# Patient Record
Sex: FEMALE | Race: WHITE | HISPANIC OR LATINO | Employment: PART TIME | URBAN - METROPOLITAN AREA
[De-identification: names, ages, dates, MRNs, and addresses within clinical notes are randomized per-mention and may not be internally consistent; named-entity substitution may affect disease eponyms.]

---

## 2018-03-19 LAB
ALBUMIN SERPL BCP-MCNC: 4.5 G/DL (ref 3.5–5)
ALBUMIN/GLOB SERPL ELPH: 1.6 {RATIO}
ALP SERPL-CCNC: 99 U/L (ref 25–125)
ALT SERPL W P-5'-P-CCNC: 33 U/L (ref 7–35)
AST SERPL-CCNC: 26 U/L (ref 13–35)
BILIRUB SERPL-MCNC: 0.7 MG/DL (ref 0.1–1.4)
BUN/CREAT SERPL: 23
CALCIUM SERPL-MCNC: 9.3 MG/DL (ref 8.7–10.7)
CHLORIDE SERPL-SCNC: 98 MMOL/L (ref 99–108)
CHOLESTEROL, TOTAL: 156
CO2 SERPL-SCNC: 25 MMOL/L (ref 13–22)
CREAT SERPL-MCNC: 0.8 MG/DL
EGFR IF AFRICAN AMERICAN: 87
EST. GFR  (NON AFRICAN AMERICAN): 76 MG/DL
GLOBULIN SER CALC-MCNC: 2.8 G/L
GLUCOSE: 150
HBA1C MFR BLD: 7.1 % (ref 4–6)
HDLC SERPL-MCNC: 43 MG/DL (ref 35–70)
Lab: 19
POTASSIUM SERPL-SCNC: 4.9 MMOL/L (ref 3.4–5.3)
PROT SERPL-MCNC: 7.3 G/DL
SODIUM BLD-SCNC: 138 MMOL/L (ref 137–147)

## 2018-09-26 ENCOUNTER — CLINICAL SUPPORT (OUTPATIENT)
Dept: FAMILY MEDICINE | Facility: CLINIC | Age: 63
End: 2018-09-26
Attending: FAMILY MEDICINE
Payer: MEDICARE

## 2018-09-26 ENCOUNTER — HOSPITAL ENCOUNTER (OUTPATIENT)
Dept: RADIOLOGY | Facility: HOSPITAL | Age: 63
Discharge: HOME | End: 2018-09-26
Attending: FAMILY MEDICINE
Payer: MEDICARE

## 2018-09-26 ENCOUNTER — OFFICE VISIT (OUTPATIENT)
Dept: FAMILY MEDICINE | Facility: CLINIC | Age: 63
End: 2018-09-26
Payer: MEDICARE

## 2018-09-26 VITALS
BODY MASS INDEX: 24.09 KG/M2 | WEIGHT: 130.94 LBS | HEIGHT: 62 IN | OXYGEN SATURATION: 98 % | HEART RATE: 74 BPM | SYSTOLIC BLOOD PRESSURE: 130 MMHG | DIASTOLIC BLOOD PRESSURE: 68 MMHG

## 2018-09-26 DIAGNOSIS — K21.9 GASTROESOPHAGEAL REFLUX DISEASE WITHOUT ESOPHAGITIS: ICD-10-CM

## 2018-09-26 DIAGNOSIS — I10 ESSENTIAL HYPERTENSION: ICD-10-CM

## 2018-09-26 DIAGNOSIS — M15.9 PRIMARY OSTEOARTHRITIS INVOLVING MULTIPLE JOINTS: ICD-10-CM

## 2018-09-26 DIAGNOSIS — E78.5 DYSLIPIDEMIA: ICD-10-CM

## 2018-09-26 DIAGNOSIS — E11.65 TYPE 2 DIABETES MELLITUS WITH HYPERGLYCEMIA, WITHOUT LONG-TERM CURRENT USE OF INSULIN: ICD-10-CM

## 2018-09-26 DIAGNOSIS — E04.1 THYROID NODULE: ICD-10-CM

## 2018-09-26 DIAGNOSIS — Z23 NEED FOR VACCINATION AGAINST STREPTOCOCCUS PNEUMONIAE: ICD-10-CM

## 2018-09-26 DIAGNOSIS — Z23 NEED FOR INFLUENZA VACCINATION: ICD-10-CM

## 2018-09-26 DIAGNOSIS — Z00.00 ANNUAL PHYSICAL EXAM: Primary | ICD-10-CM

## 2018-09-26 DIAGNOSIS — Z12.31 ENCOUNTER FOR SCREENING MAMMOGRAM FOR MALIGNANT NEOPLASM OF BREAST: ICD-10-CM

## 2018-09-26 LAB
ALBUMIN/CREAT UR: 9.3 UG/MG
CREAT UR-MCNC: 43 MG/DL
MICROALBUMIN UR DL<=1MG/L-MCNC: 4 UG/ML

## 2018-09-26 PROCEDURE — 73560 X-RAY EXAM OF KNEE 1 OR 2: CPT | Mod: TC,FY,PO,RT

## 2018-09-26 PROCEDURE — 90662 IIV NO PRSV INCREASED AG IM: CPT | Mod: PBBFAC,PO

## 2018-09-26 PROCEDURE — 73502 X-RAY EXAM HIP UNI 2-3 VIEWS: CPT | Mod: 26,RT,, | Performed by: RADIOLOGY

## 2018-09-26 PROCEDURE — 73560 X-RAY EXAM OF KNEE 1 OR 2: CPT | Mod: 26,RT,, | Performed by: RADIOLOGY

## 2018-09-26 PROCEDURE — 99205 OFFICE O/P NEW HI 60 MIN: CPT | Mod: PBBFAC,25,27,PO | Performed by: FAMILY MEDICINE

## 2018-09-26 PROCEDURE — 99499 UNLISTED E&M SERVICE: CPT | Mod: S$GLB,,, | Performed by: FAMILY MEDICINE

## 2018-09-26 PROCEDURE — 73030 X-RAY EXAM OF SHOULDER: CPT | Mod: TC,FY,PO,RT

## 2018-09-26 PROCEDURE — 92250 FUNDUS PHOTOGRAPHY W/I&R: CPT | Mod: S$GLB,,, | Performed by: OPTOMETRIST

## 2018-09-26 PROCEDURE — 99212 OFFICE O/P EST SF 10 MIN: CPT | Mod: PBBFAC,PO,25

## 2018-09-26 PROCEDURE — 73502 X-RAY EXAM HIP UNI 2-3 VIEWS: CPT | Mod: TC,FY,PO,RT

## 2018-09-26 PROCEDURE — 82043 UR ALBUMIN QUANTITATIVE: CPT

## 2018-09-26 PROCEDURE — 99999 PR PBB SHADOW E&M-NEW PATIENT-LVL V: CPT | Mod: PBBFAC,,, | Performed by: FAMILY MEDICINE

## 2018-09-26 PROCEDURE — G0009 ADMIN PNEUMOCOCCAL VACCINE: HCPCS | Mod: PBBFAC,PO

## 2018-09-26 PROCEDURE — 73030 X-RAY EXAM OF SHOULDER: CPT | Mod: 26,RT,, | Performed by: RADIOLOGY

## 2018-09-26 PROCEDURE — 99999 PR PBB SHADOW E&M-EST. PATIENT-LVL II: CPT | Mod: PBBFAC,,,

## 2018-09-26 PROCEDURE — 99214 OFFICE O/P EST MOD 30 MIN: CPT | Mod: S$PBB,,, | Performed by: FAMILY MEDICINE

## 2018-09-26 RX ORDER — ASPIRIN 81 MG/1
81 TABLET ORAL DAILY
COMMUNITY
End: 2018-09-26 | Stop reason: SDUPTHER

## 2018-09-26 RX ORDER — LOSARTAN POTASSIUM 100 MG/1
100 TABLET ORAL DAILY
Qty: 90 TABLET | Refills: 3 | Status: SHIPPED | OUTPATIENT
Start: 2018-09-26 | End: 2019-11-19 | Stop reason: SDUPTHER

## 2018-09-26 RX ORDER — ASPIRIN 81 MG/1
81 TABLET ORAL DAILY
Qty: 90 TABLET | Refills: 3 | Status: SHIPPED | OUTPATIENT
Start: 2018-09-26 | End: 2019-11-19 | Stop reason: SDUPTHER

## 2018-09-26 RX ORDER — METFORMIN HYDROCHLORIDE 750 MG/1
750 TABLET, EXTENDED RELEASE ORAL
Qty: 90 TABLET | Refills: 3 | Status: SHIPPED | OUTPATIENT
Start: 2018-09-26 | End: 2019-08-29 | Stop reason: SDUPTHER

## 2018-09-26 RX ORDER — LOSARTAN POTASSIUM 100 MG/1
100 TABLET ORAL DAILY
COMMUNITY
End: 2018-09-26 | Stop reason: SDUPTHER

## 2018-09-26 RX ORDER — CALCIUM CARBONATE 200(500)MG
1 TABLET,CHEWABLE ORAL DAILY
COMMUNITY
End: 2019-04-29

## 2018-09-26 RX ORDER — METFORMIN HYDROCHLORIDE 750 MG/1
750 TABLET, EXTENDED RELEASE ORAL
COMMUNITY
End: 2018-09-26 | Stop reason: SDUPTHER

## 2018-09-26 RX ORDER — LOVASTATIN 20 MG/1
20 TABLET ORAL NIGHTLY
Qty: 90 TABLET | Refills: 3 | Status: SHIPPED | OUTPATIENT
Start: 2018-09-26 | End: 2019-11-19 | Stop reason: SDUPTHER

## 2018-09-26 RX ORDER — LOVASTATIN 20 MG/1
20 TABLET ORAL NIGHTLY
COMMUNITY
End: 2018-09-26 | Stop reason: SDUPTHER

## 2018-09-26 RX ORDER — OMEPRAZOLE 20 MG/1
20 CAPSULE, DELAYED RELEASE ORAL DAILY
COMMUNITY
End: 2018-09-26 | Stop reason: SDUPTHER

## 2018-09-26 RX ORDER — OMEPRAZOLE 20 MG/1
20 CAPSULE, DELAYED RELEASE ORAL
Qty: 90 CAPSULE | Refills: 3 | Status: SHIPPED | OUTPATIENT
Start: 2018-09-26 | End: 2019-11-19 | Stop reason: SDUPTHER

## 2018-09-26 NOTE — PATIENT INSTRUCTIONS
Consejos para controlar el reflujo de ácido (agruras)    Para controlar el reflujo de ácido es necesario hacer algunos cambios básicos en la alimentación y el estilo de maykel. Los siguientes consejos pueden ser suficientes para aliviar el malestar.  Vigile lo que come  · Evite los alimentos grasosos o demasiado picantes.  · Coma menos alimentos ácidos, aubrey cítricos y alimentos a base de tomates, ya que pueden agravar los síntomas.  · Limite el consumo de alcohol, cafeína y bebidas gaseosas. Todas estas bebidas aumentan el reflujo.  · Intente limitar el consumo de chocolate y menta. Laxmi puede empeorar el reflujo de ácido en algunas personas.  Vigile cuándo come  · Evite acostarse blanquita 3 horas después de elif comido.  · No coma nada antes de irse a la cama.  Mantenga la dion levantada  Mantener la dion y el pecho elevados unas 4 a 6 pulgadas, le ayudará a aliviar el reflujo cuando esté acostado. Ponga soportes debajo de la cabecera de la cama para elevarla.  Otros cambios  · Pierda el exceso de peso.  · No yue ejercicio poco antes de acostarse.  · Evite usar ropas demasiado ajustadas.  · Limite el uso de aspirina e ibuprofeno.  · Deje de fumar.   Date Last Reviewed: 3/14/2014  © 5981-0980 [employer], [email or web address]. [address] [place] [address], [place] [address]. Todos los derechos reservados. Esta información no pretende sustituir la atención médica profesional. Sólo august médico puede diagnosticar y tratar un problema de jorge.

## 2018-09-26 NOTE — PROGRESS NOTES
Subjective:       Patient ID: Tomeka Burns is a 63 y.o. female.    Chief Complaint: Establish Care; Shoulder Pain (x 3 weeks); and Leg Pain (x one year on and off)    63-year-old female came to the clinic for her physical examination .  Patient with right shoulder hip and knee pain for the last year.  The pain is 3/10 of intensity on and off aggravated with activity and better with rest.  Blood pressure today stable .  No chest pain, palpitation, orthopnea or PND.  Last A1c was 7.1 .  No polyuria, polydipsia or polyphagia .  Patient previously diagnosed with thyroid nodule couple years ago.  No recent follow-up.      Review of Systems   Constitutional: Negative.    HENT: Negative.    Eyes: Negative.    Respiratory: Negative.    Cardiovascular: Negative.  Negative for chest pain, palpitations and leg swelling.   Gastrointestinal: Negative.    Endocrine: Negative for cold intolerance, heat intolerance, polydipsia, polyphagia and polyuria.   Genitourinary: Negative.    Musculoskeletal: Positive for arthralgias.   Skin: Negative.    Neurological: Negative.    Psychiatric/Behavioral: Negative.        Objective:      Physical Exam   Constitutional: She is oriented to person, place, and time. She appears well-developed and well-nourished. No distress.   HENT:   Head: Normocephalic and atraumatic.   Right Ear: External ear normal.   Left Ear: External ear normal.   Nose: Nose normal.   Mouth/Throat: Oropharynx is clear and moist. No oropharyngeal exudate.   Eyes: Conjunctivae and EOM are normal. Pupils are equal, round, and reactive to light. Right eye exhibits no discharge. Left eye exhibits no discharge. No scleral icterus.   Neck: Normal range of motion. Neck supple. No JVD present. No tracheal deviation present. No thyromegaly present.   Cardiovascular: Normal rate, regular rhythm, normal heart sounds and intact distal pulses. Exam reveals no gallop and no friction rub.   No murmur heard.  Pulmonary/Chest: Effort normal  and breath sounds normal. No stridor. No respiratory distress. She has no wheezes. She has no rales. She exhibits no tenderness.   Abdominal: Soft. Bowel sounds are normal. She exhibits no distension and no mass. There is no tenderness. There is no rebound and no guarding.   Genitourinary: Vagina normal and uterus normal. Rectal exam shows guaiac negative stool. No vaginal discharge found.   Musculoskeletal: Normal range of motion. She exhibits no edema or tenderness.   Lymphadenopathy:     She has no cervical adenopathy.   Neurological: She is alert and oriented to person, place, and time. She has normal reflexes. She displays normal reflexes. No cranial nerve deficit. She exhibits normal muscle tone. Coordination normal.   Skin: Skin is warm and dry. No rash noted. She is not diaphoretic. No erythema. No pallor.   Psychiatric: She has a normal mood and affect. Her behavior is normal. Judgment and thought content normal.       Assessment:       1. Annual physical exam    2. Need for vaccination against Streptococcus pneumoniae    3. Need for influenza vaccination    4. Type 2 diabetes mellitus with hyperglycemia, without long-term current use of insulin    5. Essential hypertension    6. Gastroesophageal reflux disease without esophagitis    7. Encounter for screening mammogram for malignant neoplasm of breast    8. Primary osteoarthritis involving multiple joints    9. Thyroid nodule    10. Dyslipidemia        Plan:         Tomeka was seen today for establish care, shoulder pain and leg pain.    Diagnoses and all orders for this visit:    Annual physical exam  -     Comprehensive metabolic panel; Future  -     Lipid panel; Future  -     Hemoglobin A1c; Future  -     TSH; Future  -     Cancel: Microalbumin/creatinine urine ratio; Future  -     CBC auto differential; Future    Need for vaccination against Streptococcus pneumoniae  -     Pneumococcal Polysaccharide Vaccine (23 Valent) (SQ/IM)    Need for influenza  vaccination  -     Influenza - High Dose (65+) (PF) (IM)    Type 2 diabetes mellitus with hyperglycemia, without long-term current use of insulin  -     Comprehensive metabolic panel; Future  -     Lipid panel; Future  -     Hemoglobin A1c; Future  -     Cancel: Microalbumin/creatinine urine ratio; Future  -     Diabetic Eye Screening Photo; Future  -     aspirin (ECOTRIN) 81 MG EC tablet; Take 1 tablet (81 mg total) by mouth once daily.  -     metFORMIN (GLUCOPHAGE-XR) 750 MG 24 hr tablet; Take 1 tablet (750 mg total) by mouth daily with breakfast.  -     Microalbumin/creatinine urine ratio    Essential hypertension  -     Comprehensive metabolic panel; Future  -     Lipid panel; Future  -     TSH; Future  -     aspirin (ECOTRIN) 81 MG EC tablet; Take 1 tablet (81 mg total) by mouth once daily.  -     losartan (COZAAR) 100 MG tablet; Take 1 tablet (100 mg total) by mouth once daily.    Gastroesophageal reflux disease without esophagitis  -     omeprazole (PRILOSEC) 20 MG capsule; Take 1 capsule (20 mg total) by mouth before breakfast.    Encounter for screening mammogram for malignant neoplasm of breast  -     Mammo Digital Screening Bilat with CAD; Future  -     Mammo Digital Screening Bilat with CAD    Primary osteoarthritis involving multiple joints  -     X-ray Shoulder 2 or More Views Right; Future  -     X-Ray Hip 2 or 3 views Right; Future  -     X-Ray Knee 1 or 2 View Right; Future    Thyroid nodule  -     US Soft Tissue Head Neck Thyroid; Future    Dyslipidemia  -     lovastatin (MEVACOR) 20 MG tablet; Take 1 tablet (20 mg total) by mouth every evening.

## 2018-09-26 NOTE — PROGRESS NOTES
Tomeka Burns is a 63 y.o. female here for a diabetic eye screening with non-dilated fundus photos per Dr. Hernández     Patient cooperative?: Yes  Small pupils?: Yes  Last eye exam: n/a    For exam results, see Encounter Report.

## 2018-09-27 ENCOUNTER — TELEPHONE (OUTPATIENT)
Dept: ADMINISTRATIVE | Facility: HOSPITAL | Age: 63
End: 2018-09-27

## 2018-09-28 DIAGNOSIS — Z11.59 NEED FOR HEPATITIS C SCREENING TEST: ICD-10-CM

## 2018-10-03 ENCOUNTER — HOSPITAL ENCOUNTER (OUTPATIENT)
Dept: RADIOLOGY | Facility: HOSPITAL | Age: 63
Discharge: HOME | End: 2018-10-03
Attending: FAMILY MEDICINE
Payer: MEDICARE

## 2018-10-03 DIAGNOSIS — E04.2 MULTINODULAR THYROID: Primary | ICD-10-CM

## 2018-10-03 DIAGNOSIS — E04.1 THYROID NODULE: ICD-10-CM

## 2018-10-03 PROCEDURE — 76536 US EXAM OF HEAD AND NECK: CPT | Mod: TC,PO

## 2018-10-03 RX ORDER — INSULIN PUMP SYRINGE, 3 ML
EACH MISCELLANEOUS
Qty: 1 EACH | Refills: 0 | Status: SHIPPED | OUTPATIENT
Start: 2018-10-03 | End: 2019-08-02 | Stop reason: SDUPTHER

## 2018-10-03 RX ORDER — LANCETS
1 EACH MISCELLANEOUS DAILY
Qty: 100 EACH | Refills: 3 | Status: SHIPPED | OUTPATIENT
Start: 2018-10-03 | End: 2019-08-02 | Stop reason: SDUPTHER

## 2018-10-03 NOTE — TELEPHONE ENCOUNTER
Patient with multinodular thyroid.      Endocrinology referral was done.    Thyroid scan was ordered by Radiology recommendation.    Please notify the patient with appointments.

## 2018-10-03 NOTE — TELEPHONE ENCOUNTER
Spoke to patient and inform of MD recommendations,  and that referral specialist will be calling her to see endocrinologist and thyroid scan was ordered. Patient voices understanding.

## 2018-10-04 ENCOUNTER — PATIENT MESSAGE (OUTPATIENT)
Dept: FAMILY MEDICINE | Facility: CLINIC | Age: 63
End: 2018-10-04

## 2018-10-05 ENCOUNTER — TELEPHONE (OUTPATIENT)
Dept: FAMILY MEDICINE | Facility: CLINIC | Age: 63
End: 2018-10-05

## 2018-10-05 NOTE — TELEPHONE ENCOUNTER
Blood glucometer was sent to the pharmacy 10/3.      Patient is calling Because is not in the pharmacy.    Please contact the pharmacy .

## 2018-10-08 ENCOUNTER — PATIENT MESSAGE (OUTPATIENT)
Dept: FAMILY MEDICINE | Facility: CLINIC | Age: 63
End: 2018-10-08

## 2018-10-25 ENCOUNTER — HOSPITAL ENCOUNTER (OUTPATIENT)
Dept: RADIOLOGY | Facility: HOSPITAL | Age: 63
Discharge: HOME | End: 2018-10-25
Attending: FAMILY MEDICINE
Payer: MEDICARE

## 2018-10-25 ENCOUNTER — TELEPHONE (OUTPATIENT)
Dept: ADMINISTRATIVE | Facility: HOSPITAL | Age: 63
End: 2018-10-25

## 2018-10-25 DIAGNOSIS — E04.2 MULTINODULAR THYROID: ICD-10-CM

## 2018-10-25 PROCEDURE — 78014 THYROID IMAGING W/BLOOD FLOW: CPT | Mod: TC,PO

## 2018-10-25 PROCEDURE — A9516 IODINE I-123 SOD IODIDE MIC: HCPCS | Mod: PO

## 2018-10-26 ENCOUNTER — HOSPITAL ENCOUNTER (OUTPATIENT)
Dept: RADIOLOGY | Facility: HOSPITAL | Age: 63
Discharge: HOME | End: 2018-10-26
Attending: FAMILY MEDICINE
Payer: MEDICARE

## 2018-11-18 ENCOUNTER — PATIENT MESSAGE (OUTPATIENT)
Dept: FAMILY MEDICINE | Facility: CLINIC | Age: 63
End: 2018-11-18

## 2018-11-19 ENCOUNTER — TELEPHONE (OUTPATIENT)
Dept: OPTOMETRY | Facility: CLINIC | Age: 63
End: 2018-11-19

## 2018-11-19 NOTE — TELEPHONE ENCOUNTER
Called patient informed her with diabetic eye screening result; patient will follow up in 12 months.      ----- Message from Saroj Torres, OD sent at 11/16/2018  2:17 PM CST -----  No diabetic retinopathy, no csme. Return in 1 year for dilated eye exam.
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
## 2019-01-21 ENCOUNTER — OFFICE VISIT (OUTPATIENT)
Dept: ENDOCRINOLOGY | Facility: CLINIC | Age: 64
End: 2019-01-21
Payer: MEDICARE

## 2019-01-21 VITALS
WEIGHT: 130.19 LBS | BODY MASS INDEX: 23.07 KG/M2 | HEART RATE: 76 BPM | DIASTOLIC BLOOD PRESSURE: 90 MMHG | HEIGHT: 63 IN | RESPIRATION RATE: 18 BRPM | SYSTOLIC BLOOD PRESSURE: 170 MMHG

## 2019-01-21 DIAGNOSIS — E11.65 TYPE 2 DIABETES MELLITUS WITH HYPERGLYCEMIA, WITHOUT LONG-TERM CURRENT USE OF INSULIN: ICD-10-CM

## 2019-01-21 DIAGNOSIS — E04.2 MULTIPLE THYROID NODULES: Primary | ICD-10-CM

## 2019-01-21 DIAGNOSIS — I10 ESSENTIAL HYPERTENSION: ICD-10-CM

## 2019-01-21 PROCEDURE — 3077F SYST BP >= 140 MM HG: CPT | Mod: CPTII,S$GLB,, | Performed by: INTERNAL MEDICINE

## 2019-01-21 PROCEDURE — 1101F PR PT FALLS ASSESS DOC 0-1 FALLS W/OUT INJ PAST YR: ICD-10-PCS | Mod: CPTII,S$GLB,, | Performed by: INTERNAL MEDICINE

## 2019-01-21 PROCEDURE — 3080F DIAST BP >= 90 MM HG: CPT | Mod: CPTII,S$GLB,, | Performed by: INTERNAL MEDICINE

## 2019-01-21 PROCEDURE — 1101F PT FALLS ASSESS-DOCD LE1/YR: CPT | Mod: CPTII,S$GLB,, | Performed by: INTERNAL MEDICINE

## 2019-01-21 PROCEDURE — 3080F PR MOST RECENT DIASTOLIC BLOOD PRESSURE >= 90 MM HG: ICD-10-PCS | Mod: CPTII,S$GLB,, | Performed by: INTERNAL MEDICINE

## 2019-01-21 PROCEDURE — 3008F BODY MASS INDEX DOCD: CPT | Mod: CPTII,S$GLB,, | Performed by: INTERNAL MEDICINE

## 2019-01-21 PROCEDURE — 3045F PR MOST RECENT HEMOGLOBIN A1C LEVEL 7.0-9.0%: CPT | Mod: CPTII,S$GLB,, | Performed by: INTERNAL MEDICINE

## 2019-01-21 PROCEDURE — 3077F PR MOST RECENT SYSTOLIC BLOOD PRESSURE >= 140 MM HG: ICD-10-PCS | Mod: CPTII,S$GLB,, | Performed by: INTERNAL MEDICINE

## 2019-01-21 PROCEDURE — 99204 OFFICE O/P NEW MOD 45 MIN: CPT | Mod: S$GLB,,, | Performed by: INTERNAL MEDICINE

## 2019-01-21 PROCEDURE — 99204 PR OFFICE/OUTPT VISIT, NEW, LEVL IV, 45-59 MIN: ICD-10-PCS | Mod: S$GLB,,, | Performed by: INTERNAL MEDICINE

## 2019-01-21 PROCEDURE — 99999 PR PBB SHADOW E&M-EST. PATIENT-LVL III: CPT | Mod: PBBFAC,,, | Performed by: INTERNAL MEDICINE

## 2019-01-21 PROCEDURE — 3045F PR MOST RECENT HEMOGLOBIN A1C LEVEL 7.0-9.0%: ICD-10-PCS | Mod: CPTII,S$GLB,, | Performed by: INTERNAL MEDICINE

## 2019-01-21 PROCEDURE — 99999 PR PBB SHADOW E&M-EST. PATIENT-LVL III: ICD-10-PCS | Mod: PBBFAC,,, | Performed by: INTERNAL MEDICINE

## 2019-01-21 PROCEDURE — 3008F PR BODY MASS INDEX (BMI) DOCUMENTED: ICD-10-PCS | Mod: CPTII,S$GLB,, | Performed by: INTERNAL MEDICINE

## 2019-01-21 NOTE — LETTER
January 21, 2019      Andrei Miller MD  [address] [facility] [address]           [facility]  [address]  [place] [address]  Phone: [phone]          Patient: Tomeka Burns   MR Number: [id number]   YOB: 1955   Date of Visit: 1/21/2019       Dear Dr. Andrei Miller:    Thank you for referring Tomeka Burns to me for evaluation. Attached you will find relevant portions of my assessment and plan of care.    If you have questions, please do not hesitate to call me. I look forward to following Tomeka Burns along with you.    Sincerely,    Karla Rousseau MD    Enclosure  CC:  No Recipients    If you would like to receive this communication electronically, please contact [email or web address] or [phone] to request more information on [employer] Link access.    For providers and/or their staff who would like to refer a patient to [facility], please contact us through our one-stop-shop provider referral line, [facility], at [phone].    If you feel you have received this communication in error or would no longer like to receive these types of communications, please e-mail [email or web address]

## 2019-02-06 ENCOUNTER — TELEPHONE (OUTPATIENT)
Dept: FAMILY MEDICINE | Facility: CLINIC | Age: 64
End: 2019-02-06

## 2019-02-06 DIAGNOSIS — I10 ESSENTIAL HYPERTENSION: ICD-10-CM

## 2019-02-06 DIAGNOSIS — E11.65 TYPE 2 DIABETES MELLITUS WITH HYPERGLYCEMIA, WITHOUT LONG-TERM CURRENT USE OF INSULIN: Primary | ICD-10-CM

## 2019-02-06 NOTE — TELEPHONE ENCOUNTER
----- Message from Sheyla Olivares sent at 2/6/2019 12:51 PM CST -----  Contact: [phone]  Pt its requesting an appointment for lab work prior to her follow up on April . Please advise

## 2019-03-29 ENCOUNTER — LAB VISIT (OUTPATIENT)
Dept: LAB | Facility: HOSPITAL | Age: 64
End: 2019-03-29
Attending: FAMILY MEDICINE
Payer: MEDICARE

## 2019-03-29 DIAGNOSIS — I10 ESSENTIAL HYPERTENSION: ICD-10-CM

## 2019-03-29 DIAGNOSIS — E11.65 TYPE 2 DIABETES MELLITUS WITH HYPERGLYCEMIA, WITHOUT LONG-TERM CURRENT USE OF INSULIN: ICD-10-CM

## 2019-03-29 LAB
ALBUMIN SERPL BCP-MCNC: 4.1 G/DL (ref 3.5–5.2)
ALP SERPL-CCNC: 95 U/L (ref 55–135)
ALT SERPL W/O P-5'-P-CCNC: 58 U/L (ref 10–44)
ANION GAP SERPL CALC-SCNC: 11 MMOL/L (ref 8–16)
AST SERPL-CCNC: 40 U/L (ref 10–40)
BASOPHILS # BLD AUTO: 0.02 K/UL (ref 0–0.2)
BASOPHILS NFR BLD: 0.3 % (ref 0–1.9)
BILIRUB SERPL-MCNC: 1.1 MG/DL (ref 0.1–1)
BUN SERPL-MCNC: 18 MG/DL (ref 8–23)
CALCIUM SERPL-MCNC: 9.8 MG/DL (ref 8.7–10.5)
CHLORIDE SERPL-SCNC: 102 MMOL/L (ref 95–110)
CHOLEST SERPL-MCNC: 165 MG/DL (ref 120–199)
CHOLEST/HDLC SERPL: 3.8 {RATIO} (ref 2–5)
CO2 SERPL-SCNC: 24 MMOL/L (ref 23–29)
CREAT SERPL-MCNC: 0.8 MG/DL (ref 0.5–1.4)
DIFFERENTIAL METHOD: NORMAL
EOSINOPHIL # BLD AUTO: 0.3 K/UL (ref 0–0.5)
EOSINOPHIL NFR BLD: 3.7 % (ref 0–8)
ERYTHROCYTE [DISTWIDTH] IN BLOOD BY AUTOMATED COUNT: 11.9 % (ref 11.5–14.5)
EST. GFR  (AFRICAN AMERICAN): >60 ML/MIN/1.73 M^2
EST. GFR  (NON AFRICAN AMERICAN): >60 ML/MIN/1.73 M^2
ESTIMATED AVG GLUCOSE: 157 MG/DL (ref 68–131)
GLUCOSE SERPL-MCNC: 147 MG/DL (ref 70–110)
HBA1C MFR BLD HPLC: 7.1 % (ref 4–5.6)
HCT VFR BLD AUTO: 42.1 % (ref 37–48.5)
HDLC SERPL-MCNC: 44 MG/DL (ref 40–75)
HDLC SERPL: 26.7 % (ref 20–50)
HGB BLD-MCNC: 13.6 G/DL (ref 12–16)
IMM GRANULOCYTES # BLD AUTO: 0.01 K/UL (ref 0–0.04)
IMM GRANULOCYTES NFR BLD AUTO: 0.1 % (ref 0–0.5)
LDLC SERPL CALC-MCNC: 98.8 MG/DL (ref 63–159)
LYMPHOCYTES # BLD AUTO: 3.1 K/UL (ref 1–4.8)
LYMPHOCYTES NFR BLD: 44.3 % (ref 18–48)
MCH RBC QN AUTO: 29 PG (ref 27–31)
MCHC RBC AUTO-ENTMCNC: 32.3 G/DL (ref 32–36)
MCV RBC AUTO: 90 FL (ref 82–98)
MONOCYTES # BLD AUTO: 0.5 K/UL (ref 0.3–1)
MONOCYTES NFR BLD: 7.2 % (ref 4–15)
NEUTROPHILS # BLD AUTO: 3.1 K/UL (ref 1.8–7.7)
NEUTROPHILS NFR BLD: 44.4 % (ref 38–73)
NONHDLC SERPL-MCNC: 121 MG/DL
NRBC BLD-RTO: 0 /100 WBC
PLATELET # BLD AUTO: 260 K/UL (ref 150–350)
PMV BLD AUTO: 11.1 FL (ref 9.2–12.9)
POTASSIUM SERPL-SCNC: 4.2 MMOL/L (ref 3.5–5.1)
PROT SERPL-MCNC: 7.7 G/DL (ref 6–8.4)
RBC # BLD AUTO: 4.69 M/UL (ref 4–5.4)
SODIUM SERPL-SCNC: 137 MMOL/L (ref 136–145)
TRIGL SERPL-MCNC: 111 MG/DL (ref 30–150)
WBC # BLD AUTO: 6.96 K/UL (ref 3.9–12.7)

## 2019-03-29 PROCEDURE — 80053 COMPREHEN METABOLIC PANEL: CPT

## 2019-03-29 PROCEDURE — 85025 COMPLETE CBC W/AUTO DIFF WBC: CPT

## 2019-03-29 PROCEDURE — 83036 HEMOGLOBIN GLYCOSYLATED A1C: CPT

## 2019-03-29 PROCEDURE — 36415 COLL VENOUS BLD VENIPUNCTURE: CPT | Mod: PO

## 2019-03-29 PROCEDURE — 80061 LIPID PANEL: CPT

## 2019-04-01 ENCOUNTER — OFFICE VISIT (OUTPATIENT)
Dept: FAMILY MEDICINE | Facility: CLINIC | Age: 64
End: 2019-04-01
Payer: MEDICARE

## 2019-04-01 VITALS
HEIGHT: 63 IN | OXYGEN SATURATION: 98 % | DIASTOLIC BLOOD PRESSURE: 78 MMHG | SYSTOLIC BLOOD PRESSURE: 128 MMHG | BODY MASS INDEX: 23.09 KG/M2 | WEIGHT: 130.31 LBS | HEART RATE: 72 BPM

## 2019-04-01 DIAGNOSIS — E11.65 TYPE 2 DIABETES MELLITUS WITH HYPERGLYCEMIA, WITHOUT LONG-TERM CURRENT USE OF INSULIN: ICD-10-CM

## 2019-04-01 DIAGNOSIS — R07.89 ATYPICAL CHEST PAIN: ICD-10-CM

## 2019-04-01 DIAGNOSIS — M25.511 CHRONIC RIGHT SHOULDER PAIN: ICD-10-CM

## 2019-04-01 DIAGNOSIS — E78.5 DYSLIPIDEMIA: ICD-10-CM

## 2019-04-01 DIAGNOSIS — I10 ESSENTIAL HYPERTENSION: Primary | ICD-10-CM

## 2019-04-01 DIAGNOSIS — G89.29 CHRONIC RIGHT SHOULDER PAIN: ICD-10-CM

## 2019-04-01 DIAGNOSIS — Z23 NEED FOR PROPHYLACTIC VACCINATION AGAINST HEPATITIS B VIRUS: ICD-10-CM

## 2019-04-01 DIAGNOSIS — Z01.419 ENCOUNTER FOR GYNECOLOGICAL EXAMINATION WITHOUT ABNORMAL FINDING: ICD-10-CM

## 2019-04-01 PROCEDURE — G0010 HEPATITIS B VACCINE ADULT IM: ICD-10-PCS | Mod: S$GLB,,, | Performed by: FAMILY MEDICINE

## 2019-04-01 PROCEDURE — 2024F 7 FLD RTA PHOTO EVC RTNOPTHY: CPT | Mod: S$GLB,,, | Performed by: FAMILY MEDICINE

## 2019-04-01 PROCEDURE — 3008F PR BODY MASS INDEX (BMI) DOCUMENTED: ICD-10-PCS | Mod: CPTII,S$GLB,, | Performed by: FAMILY MEDICINE

## 2019-04-01 PROCEDURE — 3074F PR MOST RECENT SYSTOLIC BLOOD PRESSURE < 130 MM HG: ICD-10-PCS | Mod: CPTII,S$GLB,, | Performed by: FAMILY MEDICINE

## 2019-04-01 PROCEDURE — 99214 OFFICE O/P EST MOD 30 MIN: CPT | Mod: 25,S$GLB,, | Performed by: FAMILY MEDICINE

## 2019-04-01 PROCEDURE — 99999 PR PBB SHADOW E&M-EST. PATIENT-LVL IV: ICD-10-PCS | Mod: PBBFAC,,, | Performed by: FAMILY MEDICINE

## 2019-04-01 PROCEDURE — 90746 HEPATITIS B VACCINE ADULT IM: ICD-10-PCS | Mod: S$GLB,,, | Performed by: FAMILY MEDICINE

## 2019-04-01 PROCEDURE — 99214 PR OFFICE/OUTPT VISIT, EST, LEVL IV, 30-39 MIN: ICD-10-PCS | Mod: 25,S$GLB,, | Performed by: FAMILY MEDICINE

## 2019-04-01 PROCEDURE — 3045F PR MOST RECENT HEMOGLOBIN A1C LEVEL 7.0-9.0%: ICD-10-PCS | Mod: CPTII,S$GLB,, | Performed by: FAMILY MEDICINE

## 2019-04-01 PROCEDURE — G0010 ADMIN HEPATITIS B VACCINE: HCPCS | Mod: S$GLB,,, | Performed by: FAMILY MEDICINE

## 2019-04-01 PROCEDURE — 3074F SYST BP LT 130 MM HG: CPT | Mod: CPTII,S$GLB,, | Performed by: FAMILY MEDICINE

## 2019-04-01 PROCEDURE — 99999 PR PBB SHADOW E&M-EST. PATIENT-LVL IV: CPT | Mod: PBBFAC,,, | Performed by: FAMILY MEDICINE

## 2019-04-01 PROCEDURE — 1101F PR PT FALLS ASSESS DOC 0-1 FALLS W/OUT INJ PAST YR: ICD-10-PCS | Mod: CPTII,S$GLB,, | Performed by: FAMILY MEDICINE

## 2019-04-01 PROCEDURE — 1101F PT FALLS ASSESS-DOCD LE1/YR: CPT | Mod: CPTII,S$GLB,, | Performed by: FAMILY MEDICINE

## 2019-04-01 PROCEDURE — 3078F DIAST BP <80 MM HG: CPT | Mod: CPTII,S$GLB,, | Performed by: FAMILY MEDICINE

## 2019-04-01 PROCEDURE — 90746 HEPB VACCINE 3 DOSE ADULT IM: CPT | Mod: S$GLB,,, | Performed by: FAMILY MEDICINE

## 2019-04-01 PROCEDURE — 3008F BODY MASS INDEX DOCD: CPT | Mod: CPTII,S$GLB,, | Performed by: FAMILY MEDICINE

## 2019-04-01 PROCEDURE — 3045F PR MOST RECENT HEMOGLOBIN A1C LEVEL 7.0-9.0%: CPT | Mod: CPTII,S$GLB,, | Performed by: FAMILY MEDICINE

## 2019-04-01 PROCEDURE — 2024F PR 7 FIELD PHOTOS WITH INTERP/ REVIEW: ICD-10-PCS | Mod: S$GLB,,, | Performed by: FAMILY MEDICINE

## 2019-04-01 PROCEDURE — 3078F PR MOST RECENT DIASTOLIC BLOOD PRESSURE < 80 MM HG: ICD-10-PCS | Mod: CPTII,S$GLB,, | Performed by: FAMILY MEDICINE

## 2019-04-01 RX ORDER — ETODOLAC 400 MG/1
400 TABLET, EXTENDED RELEASE ORAL DAILY PRN
Qty: 30 TABLET | Refills: 0 | Status: SHIPPED | OUTPATIENT
Start: 2019-04-01 | End: 2019-05-06 | Stop reason: SDUPTHER

## 2019-04-01 NOTE — PROGRESS NOTES
Subjective:       Patient ID: Tomeka Burns is a 63 y.o. female.    Chief Complaint: Follow-up; Hypertension; Diabetes; and Shoulder Pain (left side off and on)    63 years old female who came to clinic with right shoulder pain for the last month.  The pain is 6/10 of intensity on and off aggravated with activity and better with rest.  Patient also with episodic chest pain. Patient with this normal stress test 3 years ago.  No palpitation, orthopnea or PND.  Last A1c was stable.  No polyuria, polydipsia or polyphagia .  Patient requests hepatitis B vaccination and shingles.    Review of Systems   Constitutional: Negative for activity change and unexpected weight change.   HENT: Positive for hearing loss. Negative for rhinorrhea and trouble swallowing.    Eyes: Negative for discharge and visual disturbance.   Respiratory: Positive for chest tightness. Negative for wheezing.    Cardiovascular: Positive for chest pain. Negative for palpitations.   Gastrointestinal: Negative for blood in stool, constipation, diarrhea and vomiting.   Endocrine: Negative for polydipsia and polyuria.   Genitourinary: Negative for difficulty urinating, dysuria, hematuria and menstrual problem.   Musculoskeletal: Positive for arthralgias and joint swelling. Negative for neck pain.   Neurological: Negative for weakness and headaches.   Psychiatric/Behavioral: Negative for confusion and dysphoric mood.       Objective:      Physical Exam   Constitutional: She is oriented to person, place, and time. She appears well-developed and well-nourished. No distress.   HENT:   Head: Normocephalic and atraumatic.   Right Ear: External ear normal.   Left Ear: External ear normal.   Nose: Nose normal.   Mouth/Throat: Oropharynx is clear and moist. No oropharyngeal exudate.   Eyes: Pupils are equal, round, and reactive to light. Conjunctivae and EOM are normal. Right eye exhibits no discharge. Left eye exhibits no discharge. No scleral icterus.   Neck: Normal  range of motion. Neck supple. No JVD present. No tracheal deviation present. No thyromegaly present.   Cardiovascular: Normal rate, regular rhythm, normal heart sounds and intact distal pulses. Exam reveals no gallop and no friction rub.   No murmur heard.  Pulmonary/Chest: Effort normal and breath sounds normal. No stridor. No respiratory distress. She has no wheezes. She has no rales. She exhibits no tenderness.   Abdominal: Soft. Bowel sounds are normal. She exhibits no distension and no mass. There is no tenderness. There is no rebound and no guarding.   Musculoskeletal: She exhibits no edema.        Right shoulder: She exhibits decreased range of motion and tenderness.   Lymphadenopathy:     She has no cervical adenopathy.   Neurological: She is alert and oriented to person, place, and time. She has normal reflexes. No cranial nerve deficit. She exhibits normal muscle tone. Coordination normal.   Skin: Skin is warm and dry. No rash noted. She is not diaphoretic. No erythema. No pallor.   Psychiatric: She has a normal mood and affect. Her behavior is normal. Judgment and thought content normal.       Assessment:       1. Essential hypertension    2. Atypical chest pain    3. Type 2 diabetes mellitus with hyperglycemia, without long-term current use of insulin    4. Dyslipidemia    5. Chronic right shoulder pain    6. Encounter for gynecological examination without abnormal finding    7. Need for prophylactic vaccination against hepatitis B virus        Plan:         Tomeka was seen today for follow-up, hypertension, diabetes and shoulder pain.    Diagnoses and all orders for this visit:    Essential hypertension    Atypical chest pain  -     Echocardiogram stress test with CFD (Cupid Only); Future    Type 2 diabetes mellitus with hyperglycemia, without long-term current use of insulin    Dyslipidemia    Chronic right shoulder pain  -     etodolac (LODINE XL) 400 MG 24 hr tablet; Take 1 tablet (400 mg total) by  mouth daily as needed.  -     Ambulatory referral to Orthopedics    Encounter for gynecological examination without abnormal finding    Need for prophylactic vaccination against hepatitis B virus  -     (In Office Administered) Hepatitis B Vaccine (Adult) (IM)    Continue monitoring blood sugar at home,ADA diet.  Continue monitoring blood pressure at home, low sodium diet.

## 2019-04-22 ENCOUNTER — HOSPITAL ENCOUNTER (OUTPATIENT)
Dept: CARDIOLOGY | Facility: HOSPITAL | Age: 64
Discharge: HOME | End: 2019-04-22
Attending: FAMILY MEDICINE
Payer: MEDICARE

## 2019-04-22 DIAGNOSIS — I44.7 LEFT BUNDLE BRANCH BLOCK: ICD-10-CM

## 2019-04-22 DIAGNOSIS — R07.89 ATYPICAL CHEST PAIN: ICD-10-CM

## 2019-04-22 LAB
AORTIC ROOT ANNULUS: 2.57 CM
AV INDEX (PROSTH): 0.73
AV MEAN GRADIENT: 3.49 MMHG
AV PEAK GRADIENT: 5.86 MMHG
AV VALVE AREA: 2.26 CM2
AV VELOCITY RATIO: 0.82
CV ECHO LV RWT: 0.38 CM
DOP CALC AO PEAK VEL: 1.21 M/S
DOP CALC AO VTI: 29.58 CM
DOP CALC LVOT AREA: 3.08 CM2
DOP CALC LVOT DIAMETER: 1.98 CM
DOP CALC LVOT PEAK VEL: 0.99 M/S
DOP CALC LVOT STROKE VOLUME: 66.81 CM3
DOP CALCLVOT PEAK VEL VTI: 21.71 CM
E WAVE DECELERATION TIME: 256.13 MSEC
E/A RATIO: 0.48
ECHO LV POSTERIOR WALL: 0.81 CM (ref 0.6–1.1)
FRACTIONAL SHORTENING: 33 % (ref 28–44)
INTERVENTRICULAR SEPTUM: 0.95 CM (ref 0.6–1.1)
LA MAJOR: 4.09 CM
LA MINOR: 4.14 CM
LA WIDTH: 3.61 CM
LEFT ATRIUM SIZE: 3.03 CM
LEFT ATRIUM VOLUME: 38.26 CM3
LEFT INTERNAL DIMENSION IN SYSTOLE: 2.84 CM (ref 2.1–4)
LEFT VENTRICLE DIASTOLIC VOLUME: 79.78 ML
LEFT VENTRICLE SYSTOLIC VOLUME: 30.69 ML
LEFT VENTRICULAR INTERNAL DIMENSION IN DIASTOLE: 4.23 CM (ref 3.5–6)
LEFT VENTRICULAR MASS: 116.44 G
LV LATERAL E/E' RATIO: 10
MV PEAK A VEL: 1.24 M/S
MV PEAK E VEL: 0.6 M/S
PULM VEIN S/D RATIO: 3.78
PV PEAK D VEL: 0.18 M/S
PV PEAK S VEL: 0.68 M/S
PV PEAK VELOCITY: 1.12 CM/S
RA MAJOR: 3.84 CM
RA PRESSURE: 3 MMHG
RIGHT VENTRICULAR END-DIASTOLIC DIMENSION: 2.19 CM
STJ: 2.54 CM
TDI LATERAL: 0.06

## 2019-04-22 PROCEDURE — 93306 TTE W/DOPPLER COMPLETE: CPT | Mod: 26,,, | Performed by: INTERNAL MEDICINE

## 2019-04-22 PROCEDURE — 93306 TTE W/DOPPLER COMPLETE: CPT

## 2019-04-22 PROCEDURE — 93306 TRANSTHORACIC ECHO (TTE) COMPLETE (CUPID ONLY): ICD-10-PCS | Mod: 26,,, | Performed by: INTERNAL MEDICINE

## 2019-04-24 ENCOUNTER — TELEPHONE (OUTPATIENT)
Dept: CARDIOLOGY | Facility: CLINIC | Age: 64
End: 2019-04-24

## 2019-04-29 ENCOUNTER — OFFICE VISIT (OUTPATIENT)
Dept: OBSTETRICS AND GYNECOLOGY | Facility: CLINIC | Age: 64
End: 2019-04-29
Payer: MEDICARE

## 2019-04-29 VITALS
WEIGHT: 130.31 LBS | SYSTOLIC BLOOD PRESSURE: 170 MMHG | DIASTOLIC BLOOD PRESSURE: 78 MMHG | BODY MASS INDEX: 23.08 KG/M2

## 2019-04-29 DIAGNOSIS — M89.9 DISORDER OF BONE: ICD-10-CM

## 2019-04-29 DIAGNOSIS — Z01.419 ENCOUNTER FOR GYNECOLOGICAL EXAMINATION WITHOUT ABNORMAL FINDING: Primary | ICD-10-CM

## 2019-04-29 DIAGNOSIS — Z13.820 OSTEOPOROSIS SCREENING: ICD-10-CM

## 2019-04-29 PROCEDURE — G0101 CA SCREEN;PELVIC/BREAST EXAM: HCPCS | Mod: S$GLB,,, | Performed by: OBSTETRICS & GYNECOLOGY

## 2019-04-29 PROCEDURE — 3078F PR MOST RECENT DIASTOLIC BLOOD PRESSURE < 80 MM HG: ICD-10-PCS | Mod: CPTII,S$GLB,, | Performed by: OBSTETRICS & GYNECOLOGY

## 2019-04-29 PROCEDURE — 3077F PR MOST RECENT SYSTOLIC BLOOD PRESSURE >= 140 MM HG: ICD-10-PCS | Mod: CPTII,S$GLB,, | Performed by: OBSTETRICS & GYNECOLOGY

## 2019-04-29 PROCEDURE — 99999 PR PBB SHADOW E&M-EST. PATIENT-LVL III: ICD-10-PCS | Mod: PBBFAC,,, | Performed by: OBSTETRICS & GYNECOLOGY

## 2019-04-29 PROCEDURE — 3077F SYST BP >= 140 MM HG: CPT | Mod: CPTII,S$GLB,, | Performed by: OBSTETRICS & GYNECOLOGY

## 2019-04-29 PROCEDURE — 3078F DIAST BP <80 MM HG: CPT | Mod: CPTII,S$GLB,, | Performed by: OBSTETRICS & GYNECOLOGY

## 2019-04-29 PROCEDURE — G0101 PR CA SCREEN;PELVIC/BREAST EXAM: ICD-10-PCS | Mod: S$GLB,,, | Performed by: OBSTETRICS & GYNECOLOGY

## 2019-04-29 PROCEDURE — 99999 PR PBB SHADOW E&M-EST. PATIENT-LVL III: CPT | Mod: PBBFAC,,, | Performed by: OBSTETRICS & GYNECOLOGY

## 2019-04-29 NOTE — PROGRESS NOTES
62 yo female s/p HYST/RSO many years ago for benign reasons who presents for routine gyn visit.  No gyn complaints today.  Does report vaginal dryness that improves slightly with OTC lubricants.  The patient has used HRT in the past. This caused problems with breast tenderness.  . No h/o STDs. Declines STD testing today.  No h/o abl pap smears prior to HYST.    ROS:  GENERAL: Denies weight gain or weight loss. Feeling well overall.   SKIN: Denies rash or lesions.   CHEST: Denies chest pain or shortness of breath.   CARDIOVASCULAR: Denies palpitations or left sided chest pain.   ABDOMEN: No abdominal pain, constipation, diarrhea, nausea, vomiting or rectal bleeding.   URINARY: No frequency, dysuria, hematuria, or burning on urination.  REPRODUCTIVE: See HPI.   BREASTS:  denies pain, lumps, or nipple discharge.   HEMATOLOGIC: No easy bruisability or excessive bleeding.   MUSCULOSKELETAL: Denies joint pain or swelling.   NEUROLOGIC: Denies syncope or weakness.   PSYCHIATRIC: Denies depression, anxiety or mood swings.         PE:   Vitals: BP (!) 170/78   Wt 59.1 kg (130 lb 4.7 oz)   BMI 23.08 kg/m²    Repeat /78  APPEARANCE: Well nourished, well developed, in no acute distress.  SKIN: Normal skin turgor, no lesions.  CHEST: Lungs clear to auscultation.  HEART: Regular rate and rhythm, no murmurs, rubs or gallops.  ABDOMEN: Soft. No tenderness or masses. No hepatosplenomegaly. No hernias.  BREASTS: Symmetrical, no skin changes or visible lesions. No palpable masses, nipple discharge or adenopathy bilaterally.  PELVIC: Normal external female genitalia without lesions. Normal hair distribution. Adequate perineal body, normal urethral meatus. Atrophic vagina without lesions or discharge. Uterus/cervix surgically absent; No significant cystocele or rectocele. Bimanual exam showed vaginal cuff intact, nontender. Adnexa without masses or tenderness. Urethra and bladder normal.      AP  Routine gyn  -s/p normal  breast exam: mammogram completed with DIS in 10/2018  -s/p normal pelvic exam:   -Pap not indicated  -STD testing: declined  -colonoscopy: completed in 2016  -DEXA: ordered    BIBIANA Delgadillo MD

## 2019-04-29 NOTE — LETTER
April 29, 2019      Andrei Miller MD  [address] [facility]  [place] [address]           [facility]  [address] [facility], [address]  [address]th [address] [facility]  [place] [address]  Phone: [phone]          Patient: Tomeka Burns   MR Number: [id number]   YOB: 1955   Date of Visit: 4/29/2019       Dear Dr. Andrei Miller:    Thank you for referring Tomeka Burns to me for evaluation. Attached you will find relevant portions of my assessment and plan of care.    If you have questions, please do not hesitate to call me. I look forward to following Tomeka Burns along with you.    Sincerely,    Alayna Delgadillo MD    Enclosure  CC:  No Recipients    If you would like to receive this communication electronically, please contact [email or web address] or [phone] to request more information on [employer] Link access.    For providers and/or their staff who would like to refer a patient to [facility], please contact us through our one-stop-shop provider referral line, [facility], at [phone].    If you feel you have received this communication in error or would no longer like to receive these types of communications, please e-mail [email or web address]

## 2019-04-30 ENCOUNTER — OFFICE VISIT (OUTPATIENT)
Dept: CARDIOLOGY | Facility: CLINIC | Age: 64
End: 2019-04-30
Payer: MEDICARE

## 2019-04-30 VITALS
SYSTOLIC BLOOD PRESSURE: 180 MMHG | WEIGHT: 129.94 LBS | HEART RATE: 84 BPM | DIASTOLIC BLOOD PRESSURE: 80 MMHG | BODY MASS INDEX: 23.02 KG/M2 | OXYGEN SATURATION: 97 %

## 2019-04-30 DIAGNOSIS — E11.65 TYPE 2 DIABETES MELLITUS WITH HYPERGLYCEMIA, WITHOUT LONG-TERM CURRENT USE OF INSULIN: ICD-10-CM

## 2019-04-30 DIAGNOSIS — R07.9 ACUTE CHEST PAIN: ICD-10-CM

## 2019-04-30 DIAGNOSIS — E04.2 MULTIPLE THYROID NODULES: ICD-10-CM

## 2019-04-30 DIAGNOSIS — R06.00 DYSPNEA, UNSPECIFIED TYPE: ICD-10-CM

## 2019-04-30 DIAGNOSIS — I44.7 LEFT BUNDLE BRANCH BLOCK: Primary | ICD-10-CM

## 2019-04-30 DIAGNOSIS — R00.2 PALPITATIONS: ICD-10-CM

## 2019-04-30 DIAGNOSIS — I10 ESSENTIAL HYPERTENSION: ICD-10-CM

## 2019-04-30 PROCEDURE — 93010 ELECTROCARDIOGRAM REPORT: CPT | Mod: S$GLB,,, | Performed by: INTERNAL MEDICINE

## 2019-04-30 PROCEDURE — 3008F BODY MASS INDEX DOCD: CPT | Mod: CPTII,S$GLB,, | Performed by: INTERNAL MEDICINE

## 2019-04-30 PROCEDURE — 2024F PR 7 FIELD PHOTOS WITH INTERP/ REVIEW: ICD-10-PCS | Mod: S$GLB,,, | Performed by: INTERNAL MEDICINE

## 2019-04-30 PROCEDURE — 93005 ELECTROCARDIOGRAM TRACING: CPT | Mod: S$GLB,,, | Performed by: INTERNAL MEDICINE

## 2019-04-30 PROCEDURE — 3045F PR MOST RECENT HEMOGLOBIN A1C LEVEL 7.0-9.0%: ICD-10-PCS | Mod: CPTII,S$GLB,, | Performed by: INTERNAL MEDICINE

## 2019-04-30 PROCEDURE — 3079F PR MOST RECENT DIASTOLIC BLOOD PRESSURE 80-89 MM HG: ICD-10-PCS | Mod: CPTII,S$GLB,, | Performed by: INTERNAL MEDICINE

## 2019-04-30 PROCEDURE — 1101F PT FALLS ASSESS-DOCD LE1/YR: CPT | Mod: CPTII,S$GLB,, | Performed by: INTERNAL MEDICINE

## 2019-04-30 PROCEDURE — 1101F PR PT FALLS ASSESS DOC 0-1 FALLS W/OUT INJ PAST YR: ICD-10-PCS | Mod: CPTII,S$GLB,, | Performed by: INTERNAL MEDICINE

## 2019-04-30 PROCEDURE — 3045F PR MOST RECENT HEMOGLOBIN A1C LEVEL 7.0-9.0%: CPT | Mod: CPTII,S$GLB,, | Performed by: INTERNAL MEDICINE

## 2019-04-30 PROCEDURE — 2024F 7 FLD RTA PHOTO EVC RTNOPTHY: CPT | Mod: S$GLB,,, | Performed by: INTERNAL MEDICINE

## 2019-04-30 PROCEDURE — 99999 PR PBB SHADOW E&M-EST. PATIENT-LVL III: ICD-10-PCS | Mod: PBBFAC,,, | Performed by: INTERNAL MEDICINE

## 2019-04-30 PROCEDURE — 3079F DIAST BP 80-89 MM HG: CPT | Mod: CPTII,S$GLB,, | Performed by: INTERNAL MEDICINE

## 2019-04-30 PROCEDURE — 3077F SYST BP >= 140 MM HG: CPT | Mod: CPTII,S$GLB,, | Performed by: INTERNAL MEDICINE

## 2019-04-30 PROCEDURE — 99205 OFFICE O/P NEW HI 60 MIN: CPT | Mod: S$GLB,,, | Performed by: INTERNAL MEDICINE

## 2019-04-30 PROCEDURE — 99205 PR OFFICE/OUTPT VISIT, NEW, LEVL V, 60-74 MIN: ICD-10-PCS | Mod: S$GLB,,, | Performed by: INTERNAL MEDICINE

## 2019-04-30 PROCEDURE — 3008F PR BODY MASS INDEX (BMI) DOCUMENTED: ICD-10-PCS | Mod: CPTII,S$GLB,, | Performed by: INTERNAL MEDICINE

## 2019-04-30 PROCEDURE — 93005 EKG 12-LEAD: ICD-10-PCS | Mod: S$GLB,,, | Performed by: INTERNAL MEDICINE

## 2019-04-30 PROCEDURE — 93010 EKG 12-LEAD: ICD-10-PCS | Mod: S$GLB,,, | Performed by: INTERNAL MEDICINE

## 2019-04-30 PROCEDURE — 99999 PR PBB SHADOW E&M-EST. PATIENT-LVL III: CPT | Mod: PBBFAC,,, | Performed by: INTERNAL MEDICINE

## 2019-04-30 PROCEDURE — 3077F PR MOST RECENT SYSTOLIC BLOOD PRESSURE >= 140 MM HG: ICD-10-PCS | Mod: CPTII,S$GLB,, | Performed by: INTERNAL MEDICINE

## 2019-04-30 NOTE — LETTER
April 30, 2019      Lino Armas, RANDY  [address]  [address]  [facility] [address]           [facility]  [address], [address]  [facility] [address]  Phone: [phone]          Patient: Tomeka Burns   MR Number: [id number]   YOB: 1955   Date of Visit: 4/30/2019       Dear Lnio Armas:    Thank you for referring Tomeka Burns to me for evaluation. Attached you will find relevant portions of my assessment and plan of care.    If you have questions, please do not hesitate to call me. I look forward to following Tomeka Burns along with you.    Sincerely,    Gab Calderon MD    Enclosure  CC:  No Recipients    If you would like to receive this communication electronically, please contact [email or web address] or [phone] to request more information on [employer] Link access.    For providers and/or their staff who would like to refer a patient to [facility], please contact us through our one-stop-shop provider referral line, [facility], at [phone].    If you feel you have received this communication in error or would no longer like to receive these types of communications, please e-mail [email or web address]

## 2019-04-30 NOTE — PROGRESS NOTES
Subjective:    Patient ID:  Tomeka Burns is a 63 y.o. female who presents for follow-up of Chest Pain      HPI  64 y/o Iranian speaking female with hx of HTN, DM, LBBB (unknown if new) who presents for evaluation. Had developed mild nonspecific, left sided sharp chest pain, unrelated to exertion, lasts seconds, and resolves spontaneously. Sent for Exercise stress echo, however, found to have LBBB and stress portion stopped. 2DE with normal EF, DD, no WMA's. Claims to have had a stress test years ago and was told she had a mild blockage. Has some intermittent TROTTER and palps. Denies orthopnea, PND, syncope. Compliant with meds, does not exercise regularly, does not follow a low salt diet. Non smoker. No fam hx of early CAD, although mother had valve replaced at about 56 y/o.    Review of Systems   Constitution: Negative for malaise/fatigue.   HENT: Negative for congestion.    Eyes: Negative for blurred vision.   Cardiovascular: Positive for chest pain, dyspnea on exertion and palpitations. Negative for claudication, cyanosis, irregular heartbeat, leg swelling, near-syncope, orthopnea, paroxysmal nocturnal dyspnea and syncope.   Respiratory: Negative for shortness of breath.    Endocrine: Negative for polyuria.   Hematologic/Lymphatic: Negative for bleeding problem.   Skin: Negative for itching and rash.   Musculoskeletal: Negative for joint swelling, muscle cramps and muscle weakness.   Gastrointestinal: Negative for abdominal pain, hematemesis, hematochezia, melena, nausea and vomiting.   Genitourinary: Negative for dysuria and hematuria.   Neurological: Negative for dizziness, focal weakness, headaches, light-headedness, loss of balance and weakness.   Psychiatric/Behavioral: Negative for depression. The patient is not nervous/anxious.         Objective:    Physical Exam   Constitutional: She is oriented to person, place, and time. She appears well-developed and well-nourished.   HENT:   Head: Normocephalic and  atraumatic.   Neck: Neck supple. No JVD present.   Cardiovascular: Normal rate, regular rhythm and normal heart sounds.   Pulses:       Carotid pulses are 2+ on the right side, and 2+ on the left side.       Radial pulses are 2+ on the right side, and 2+ on the left side.        Femoral pulses are 2+ on the right side, and 2+ on the left side.       Dorsalis pedis pulses are 2+ on the right side, and 2+ on the left side.        Posterior tibial pulses are 2+ on the right side, and 2+ on the left side.   Pulmonary/Chest: Effort normal and breath sounds normal.   Abdominal: Soft. Bowel sounds are normal.   Musculoskeletal: She exhibits no edema.   Neurological: She is alert and oriented to person, place, and time.   Skin: Skin is warm and dry.   Psychiatric: She has a normal mood and affect. Her behavior is normal. Thought content normal.         Assessment:       1. Left bundle branch block    2. Essential hypertension    3. Type 2 diabetes mellitus with hyperglycemia, without long-term current use of insulin    4. Multiple thyroid nodules    5. Acute chest pain    6. Dyspnea, unspecified type    7. Palpitations      63 year old patient with hx and presentation as above. Has risk factors for having CAD and will evaluate symptoms with noninvasive cardiac stress imaging and Holter. BP elevated in clinic and recent other clinic visit. Repeat /80. BP log and will consider adding HCTZ. Discussed the etiology, evaluation, and management of chest pain. Discussed the importance of med compliance, heart healthy diet, and regular exercise.       Plan:       -Nuclear SPECT  -Holter x 48 hours  -BP log BID  -f/u in 1 month

## 2019-05-03 ENCOUNTER — TELEPHONE (OUTPATIENT)
Dept: FAMILY MEDICINE | Facility: CLINIC | Age: 64
End: 2019-05-03

## 2019-05-03 DIAGNOSIS — K63.5 COLON POLYPOSIS: Primary | ICD-10-CM

## 2019-05-06 ENCOUNTER — OFFICE VISIT (OUTPATIENT)
Dept: ORTHOPEDICS | Facility: CLINIC | Age: 64
End: 2019-05-06
Payer: MEDICARE

## 2019-05-06 VITALS — WEIGHT: 129 LBS | HEIGHT: 63 IN | BODY MASS INDEX: 22.86 KG/M2

## 2019-05-06 DIAGNOSIS — M75.21 BICEPS TENDINITIS OF RIGHT UPPER EXTREMITY: ICD-10-CM

## 2019-05-06 DIAGNOSIS — M25.511 CHRONIC RIGHT SHOULDER PAIN: ICD-10-CM

## 2019-05-06 DIAGNOSIS — G89.29 CHRONIC RIGHT SHOULDER PAIN: ICD-10-CM

## 2019-05-06 PROCEDURE — 99203 PR OFFICE/OUTPT VISIT, NEW, LEVL III, 30-44 MIN: ICD-10-PCS | Mod: 25,S$GLB,, | Performed by: ORTHOPAEDIC SURGERY

## 2019-05-06 PROCEDURE — 3008F PR BODY MASS INDEX (BMI) DOCUMENTED: ICD-10-PCS | Mod: CPTII,S$GLB,, | Performed by: ORTHOPAEDIC SURGERY

## 2019-05-06 PROCEDURE — 99999 PR PBB SHADOW E&M-EST. PATIENT-LVL III: CPT | Mod: PBBFAC,,, | Performed by: ORTHOPAEDIC SURGERY

## 2019-05-06 PROCEDURE — 20550 PR INJECT TENDON SHEATH/LIGAMENT: ICD-10-PCS | Mod: RT,S$GLB,, | Performed by: ORTHOPAEDIC SURGERY

## 2019-05-06 PROCEDURE — 3008F BODY MASS INDEX DOCD: CPT | Mod: CPTII,S$GLB,, | Performed by: ORTHOPAEDIC SURGERY

## 2019-05-06 PROCEDURE — 99203 OFFICE O/P NEW LOW 30 MIN: CPT | Mod: 25,S$GLB,, | Performed by: ORTHOPAEDIC SURGERY

## 2019-05-06 PROCEDURE — 1101F PT FALLS ASSESS-DOCD LE1/YR: CPT | Mod: CPTII,S$GLB,, | Performed by: ORTHOPAEDIC SURGERY

## 2019-05-06 PROCEDURE — 99999 PR PBB SHADOW E&M-EST. PATIENT-LVL III: ICD-10-PCS | Mod: PBBFAC,,, | Performed by: ORTHOPAEDIC SURGERY

## 2019-05-06 PROCEDURE — 20550 NJX 1 TENDON SHEATH/LIGAMENT: CPT | Mod: RT,S$GLB,, | Performed by: ORTHOPAEDIC SURGERY

## 2019-05-06 PROCEDURE — 1101F PR PT FALLS ASSESS DOC 0-1 FALLS W/OUT INJ PAST YR: ICD-10-PCS | Mod: CPTII,S$GLB,, | Performed by: ORTHOPAEDIC SURGERY

## 2019-05-06 RX ORDER — ETODOLAC 400 MG/1
400 TABLET, EXTENDED RELEASE ORAL DAILY PRN
Qty: 30 TABLET | Refills: 0 | Status: SHIPPED | OUTPATIENT
Start: 2019-05-06 | End: 2019-06-06 | Stop reason: SDUPTHER

## 2019-05-06 RX ORDER — TRIAMCINOLONE ACETONIDE 40 MG/ML
40 INJECTION, SUSPENSION INTRA-ARTICULAR; INTRAMUSCULAR
Status: COMPLETED | OUTPATIENT
Start: 2019-05-06 | End: 2019-05-06

## 2019-05-06 RX ADMIN — TRIAMCINOLONE ACETONIDE 40 MG: 40 INJECTION, SUSPENSION INTRA-ARTICULAR; INTRAMUSCULAR at 10:05

## 2019-05-06 NOTE — PROGRESS NOTES
"Subjective:      Patient ID: Tomeka Burns is a 63 y.o. female.    Chief Complaint: Pain of the Right Shoulder      HPI  Tomeka Burns is a  63 y.o. female presenting today for right shoulder pain.  There was not a history of trauma.  Onset of symptoms began about 6 months ago  She is reporting pain in the anterolateral shoulder area  Symptoms worse with use  No numbness or tingling reported.      Review of patient's allergies indicates:  No Known Allergies      Current Outpatient Medications   Medication Sig Dispense Refill    aspirin (ECOTRIN) 81 MG EC tablet Take 1 tablet (81 mg total) by mouth once daily. 90 tablet 3    blood sugar diagnostic (BLOOD GLUCOSE TEST) Strp 1 strip by Misc.(Non-Drug; Combo Route) route Daily. 100 strip 1    blood-glucose meter kit Use as instructed 1 each 0    etodolac (LODINE XL) 400 MG 24 hr tablet Take 1 tablet (400 mg total) by mouth daily as needed. 30 tablet 0    lancets Misc 1 lancet by Misc.(Non-Drug; Combo Route) route Daily. 100 each 3    losartan (COZAAR) 100 MG tablet Take 1 tablet (100 mg total) by mouth once daily. 90 tablet 3    lovastatin (MEVACOR) 20 MG tablet Take 1 tablet (20 mg total) by mouth every evening. 90 tablet 3    metFORMIN (GLUCOPHAGE-XR) 750 MG 24 hr tablet Take 1 tablet (750 mg total) by mouth daily with breakfast. 90 tablet 3    omeprazole (PRILOSEC) 20 MG capsule Take 1 capsule (20 mg total) by mouth before breakfast. 90 capsule 3     No current facility-administered medications for this visit.        Past Medical History:   Diagnosis Date    Diabetes mellitus, type 2     GERD (gastroesophageal reflux disease)     Hyperlipidemia     Hypertension     Multiple thyroid nodules 1/21/2019       Past Surgical History:   Procedure Laterality Date    HYSTERECTOMY      TONSILLECTOMY         Review of Systems:  ROS    OBJECTIVE:     PHYSICAL EXAM:  Height: 5' 3" (160 cm) Weight: 58.5 kg (129 lb)  Vitals:    05/06/19 1010   Weight: 58.5 kg (129 lb) " "  Height: 5' 3" (1.6 m)   PainSc:   6     Well developed, well nourished female in no acute distress  Alert and oriented x 3  HEENT- Normal exam  Lungs- Clear to auscultation  Heart- Regular rate and rhythm  Abdomen- Soft nontender  Extremity exam- examination right shoulder he does she does have tenderness over the biceps tendon anteriorly range of motion right shoulder full  Mildly positive impingement sign  No instability  Neurologic exam intact  She does have pain with resisted biceps stress and elbow flexion    RADIOGRAPHS:  Mild spurring off the anterolateral acromion  Comments: I have personally reviewed the imaging and I agree with the above radiologist's report.    ASSESSMENT/PLAN:     IMPRESSION:  Biceps tendonitis right shoulder    PLAN:  I explained the nature of the problem to the patient recommended performed injection today.  After pause for time-out identified the right shoulder injected biceps tendon with combination Kenalog 40mg and 2 cc xylocaine  She tolerated the procedure well without complication  Continue Advil or Motrin by mouth  Follow-up 4-6 weeks       - We talked at length about the anatomy and pathophysiology of   Encounter Diagnoses   Name Primary?    Chronic right shoulder pain     Biceps tendinitis of right upper extremity            Disclaimer: This note has been generated using voice-recognition software. There may be typographical errors that have been missed during proof-reading.  "

## 2019-05-07 ENCOUNTER — HOSPITAL ENCOUNTER (OUTPATIENT)
Dept: RADIOLOGY | Facility: HOSPITAL | Age: 64
Discharge: HOME | End: 2019-05-07
Attending: OBSTETRICS & GYNECOLOGY
Payer: MEDICARE

## 2019-05-07 DIAGNOSIS — Z01.419 ENCOUNTER FOR GYNECOLOGICAL EXAMINATION WITHOUT ABNORMAL FINDING: ICD-10-CM

## 2019-05-07 DIAGNOSIS — Z13.820 OSTEOPOROSIS SCREENING: ICD-10-CM

## 2019-05-07 DIAGNOSIS — M89.9 DISORDER OF BONE: ICD-10-CM

## 2019-05-07 PROCEDURE — 77080 DEXA BONE DENSITY SPINE HIP: ICD-10-PCS | Mod: 26,,, | Performed by: RADIOLOGY

## 2019-05-07 PROCEDURE — 77080 DXA BONE DENSITY AXIAL: CPT | Mod: TC

## 2019-05-07 PROCEDURE — 77080 DXA BONE DENSITY AXIAL: CPT | Mod: 26,,, | Performed by: RADIOLOGY

## 2019-05-09 ENCOUNTER — HOSPITAL ENCOUNTER (OUTPATIENT)
Dept: RADIOLOGY | Facility: HOSPITAL | Age: 64
Discharge: HOME | End: 2019-05-09
Attending: INTERNAL MEDICINE
Payer: MEDICARE

## 2019-05-09 ENCOUNTER — HOSPITAL ENCOUNTER (OUTPATIENT)
Dept: CARDIOLOGY | Facility: HOSPITAL | Age: 64
Discharge: HOME | End: 2019-05-09
Attending: INTERNAL MEDICINE
Payer: MEDICARE

## 2019-05-09 DIAGNOSIS — I10 ESSENTIAL HYPERTENSION: ICD-10-CM

## 2019-05-09 DIAGNOSIS — R00.2 PALPITATIONS: ICD-10-CM

## 2019-05-09 DIAGNOSIS — R06.00 DYSPNEA, UNSPECIFIED TYPE: ICD-10-CM

## 2019-05-09 DIAGNOSIS — R07.9 ACUTE CHEST PAIN: ICD-10-CM

## 2019-05-09 LAB
CV STRESS BASE HR: 66 BPM
DIASTOLIC BLOOD PRESSURE: 86 MMHG
OHS CV CPX 1 MINUTE RECOVERY HEART RATE: 108 BPM
OHS CV CPX 85 PERCENT MAX PREDICTED HEART RATE MALE: 126
OHS CV CPX MAX PREDICTED HEART RATE: 149
OHS CV CPX PATIENT IS FEMALE: 1
OHS CV CPX PATIENT IS MALE: 0
OHS CV CPX PEAK DIASTOLIC BLOOD PRESSURE: 84 MMHG
OHS CV CPX PEAK HEAR RATE: 125 BPM
OHS CV CPX PEAK RATE PRESSURE PRODUCT: NORMAL
OHS CV CPX PEAK SYSTOLIC BLOOD PRESSURE: 196 MMHG
OHS CV CPX PERCENT MAX PREDICTED HEART RATE ACHIEVED: 84
OHS CV CPX RATE PRESSURE PRODUCT PRESENTING: NORMAL
SYSTOLIC BLOOD PRESSURE: 181 MMHG

## 2019-05-09 PROCEDURE — 63600175 PHARM REV CODE 636 W HCPCS: Mod: PO | Performed by: INTERNAL MEDICINE

## 2019-05-09 PROCEDURE — 93016 CV STRESS TEST SUPVJ ONLY: CPT | Mod: ,,, | Performed by: INTERNAL MEDICINE

## 2019-05-09 PROCEDURE — A9502 TC99M TETROFOSMIN: HCPCS | Mod: PO

## 2019-05-09 PROCEDURE — 93017 CV STRESS TEST TRACING ONLY: CPT | Mod: PO

## 2019-05-09 PROCEDURE — 93227 XTRNL ECG REC<48 HR R&I: CPT | Mod: ,,, | Performed by: INTERNAL MEDICINE

## 2019-05-09 PROCEDURE — 93018 CV STRESS TEST I&R ONLY: CPT | Mod: ,,, | Performed by: INTERNAL MEDICINE

## 2019-05-09 PROCEDURE — 93227 HOLTER MONITOR - 48 HOUR (CUPID ONLY): ICD-10-PCS | Mod: ,,, | Performed by: INTERNAL MEDICINE

## 2019-05-09 PROCEDURE — 93018 TREADMILL STRESS TEST (CUPID ONLY): ICD-10-PCS | Mod: ,,, | Performed by: INTERNAL MEDICINE

## 2019-05-09 PROCEDURE — 93226 XTRNL ECG REC<48 HR SCAN A/R: CPT | Mod: PO

## 2019-05-09 PROCEDURE — 93016 TREADMILL STRESS TEST (CUPID ONLY): ICD-10-PCS | Mod: ,,, | Performed by: INTERNAL MEDICINE

## 2019-05-09 RX ORDER — REGADENOSON 0.08 MG/ML
0.4 INJECTION, SOLUTION INTRAVENOUS ONCE
Status: COMPLETED | OUTPATIENT
Start: 2019-05-09 | End: 2019-05-09

## 2019-05-09 RX ADMIN — REGADENOSON 0.4 MG: 0.08 INJECTION, SOLUTION INTRAVENOUS at 12:05

## 2019-05-13 ENCOUNTER — TELEPHONE (OUTPATIENT)
Dept: FAMILY MEDICINE | Facility: CLINIC | Age: 64
End: 2019-05-13

## 2019-05-13 NOTE — TELEPHONE ENCOUNTER
----- Message from Angeli Munoz LPN sent at 5/13/2019 12:26 PM CDT -----  Contact: self, [phone] (M)      ----- Message -----  From: Sonam Mitchell  Sent: 5/13/2019  11:37 AM  To: [facility] Staff    Patient requests to know when she can come back in to get 2nd dose of Hep B. Please advise.

## 2019-05-14 ENCOUNTER — TELEPHONE (OUTPATIENT)
Dept: CARDIOLOGY | Facility: CLINIC | Age: 64
End: 2019-05-14

## 2019-05-14 ENCOUNTER — CLINICAL SUPPORT (OUTPATIENT)
Dept: FAMILY MEDICINE | Facility: CLINIC | Age: 64
End: 2019-05-14
Payer: MEDICARE

## 2019-05-14 DIAGNOSIS — Z23 NEED FOR HEPATITIS B VACCINATION: Primary | ICD-10-CM

## 2019-05-14 PROCEDURE — 99999 PR PBB SHADOW E&M-EST. PATIENT-LVL I: CPT | Mod: PBBFAC,,,

## 2019-05-14 PROCEDURE — 90746 HEPB VACCINE 3 DOSE ADULT IM: CPT | Mod: S$GLB,,, | Performed by: FAMILY MEDICINE

## 2019-05-14 PROCEDURE — 99999 PR PBB SHADOW E&M-EST. PATIENT-LVL I: ICD-10-PCS | Mod: PBBFAC,,,

## 2019-05-14 PROCEDURE — 90746 HEPATITIS B VACCINE ADULT IM: ICD-10-PCS | Mod: S$GLB,,, | Performed by: FAMILY MEDICINE

## 2019-05-14 PROCEDURE — G0010 HEPATITIS B VACCINE ADULT IM: ICD-10-PCS | Mod: S$GLB,,, | Performed by: FAMILY MEDICINE

## 2019-05-14 PROCEDURE — G0010 ADMIN HEPATITIS B VACCINE: HCPCS | Mod: S$GLB,,, | Performed by: FAMILY MEDICINE

## 2019-05-14 NOTE — TELEPHONE ENCOUNTER
Left pt detailed message in regards to result notes     Pt advised to give a call back on how she'd like to proceed

## 2019-05-14 NOTE — TELEPHONE ENCOUNTER
----- Message from Gab Calderon MD sent at 5/14/2019  3:41 PM CDT -----  Your stress test was abnormal. I would like to perform a procedure called an angiogram to look for heart blockages. We can schedule for the procedure directly or schedule you a clinic visit first to discuss the procedure.

## 2019-05-15 ENCOUNTER — TELEPHONE (OUTPATIENT)
Dept: CARDIOLOGY | Facility: CLINIC | Age: 64
End: 2019-05-15

## 2019-05-15 LAB
OHS CV EVENT MONITOR DAY: 0
OHS CV HOLTER LENGTH DECIMAL HOURS: 48
OHS CV HOLTER LENGTH HOURS: 48
OHS CV HOLTER LENGTH MINUTES: 0

## 2019-05-15 NOTE — TELEPHONE ENCOUNTER
----- Message from Rhina Williamson sent at 5/15/2019  8:57 AM CDT -----  Contact: Self [phone]  Patient is returning your call.  Please advise.

## 2019-05-22 ENCOUNTER — TELEPHONE (OUTPATIENT)
Dept: CARDIOLOGY | Facility: CLINIC | Age: 64
End: 2019-05-22

## 2019-05-22 NOTE — TELEPHONE ENCOUNTER
----- Message from Gab Calderon MD sent at 5/22/2019 11:58 AM CDT -----  Your heart monitor showed normal heart rhythm and no abnormality

## 2019-05-28 ENCOUNTER — OFFICE VISIT (OUTPATIENT)
Dept: CARDIOLOGY | Facility: CLINIC | Age: 64
End: 2019-05-28
Payer: MEDICARE

## 2019-05-28 VITALS
WEIGHT: 130.75 LBS | HEART RATE: 73 BPM | BODY MASS INDEX: 23.16 KG/M2 | OXYGEN SATURATION: 97 % | DIASTOLIC BLOOD PRESSURE: 90 MMHG | SYSTOLIC BLOOD PRESSURE: 186 MMHG

## 2019-05-28 DIAGNOSIS — E11.65 TYPE 2 DIABETES MELLITUS WITH HYPERGLYCEMIA, WITHOUT LONG-TERM CURRENT USE OF INSULIN: ICD-10-CM

## 2019-05-28 DIAGNOSIS — I10 ESSENTIAL HYPERTENSION: ICD-10-CM

## 2019-05-28 DIAGNOSIS — R94.39 ABNORMAL STRESS TEST: Primary | ICD-10-CM

## 2019-05-28 DIAGNOSIS — I44.7 LEFT BUNDLE BRANCH BLOCK: ICD-10-CM

## 2019-05-28 PROCEDURE — 3008F PR BODY MASS INDEX (BMI) DOCUMENTED: ICD-10-PCS | Mod: CPTII,S$GLB,, | Performed by: INTERNAL MEDICINE

## 2019-05-28 PROCEDURE — 3077F SYST BP >= 140 MM HG: CPT | Mod: CPTII,S$GLB,, | Performed by: INTERNAL MEDICINE

## 2019-05-28 PROCEDURE — 3077F PR MOST RECENT SYSTOLIC BLOOD PRESSURE >= 140 MM HG: ICD-10-PCS | Mod: CPTII,S$GLB,, | Performed by: INTERNAL MEDICINE

## 2019-05-28 PROCEDURE — 99214 PR OFFICE/OUTPT VISIT, EST, LEVL IV, 30-39 MIN: ICD-10-PCS | Mod: S$GLB,,, | Performed by: INTERNAL MEDICINE

## 2019-05-28 PROCEDURE — 1101F PT FALLS ASSESS-DOCD LE1/YR: CPT | Mod: CPTII,S$GLB,, | Performed by: INTERNAL MEDICINE

## 2019-05-28 PROCEDURE — 99499 RISK ADDL DX/OHS AUDIT: ICD-10-PCS | Mod: S$GLB,,, | Performed by: INTERNAL MEDICINE

## 2019-05-28 PROCEDURE — 3045F PR MOST RECENT HEMOGLOBIN A1C LEVEL 7.0-9.0%: CPT | Mod: CPTII,S$GLB,, | Performed by: INTERNAL MEDICINE

## 2019-05-28 PROCEDURE — 99214 OFFICE O/P EST MOD 30 MIN: CPT | Mod: S$GLB,,, | Performed by: INTERNAL MEDICINE

## 2019-05-28 PROCEDURE — 2024F PR 7 FIELD PHOTOS WITH INTERP/ REVIEW: ICD-10-PCS | Mod: S$GLB,,, | Performed by: INTERNAL MEDICINE

## 2019-05-28 PROCEDURE — 3080F PR MOST RECENT DIASTOLIC BLOOD PRESSURE >= 90 MM HG: ICD-10-PCS | Mod: CPTII,S$GLB,, | Performed by: INTERNAL MEDICINE

## 2019-05-28 PROCEDURE — 1101F PR PT FALLS ASSESS DOC 0-1 FALLS W/OUT INJ PAST YR: ICD-10-PCS | Mod: CPTII,S$GLB,, | Performed by: INTERNAL MEDICINE

## 2019-05-28 PROCEDURE — 2024F 7 FLD RTA PHOTO EVC RTNOPTHY: CPT | Mod: S$GLB,,, | Performed by: INTERNAL MEDICINE

## 2019-05-28 PROCEDURE — 99499 UNLISTED E&M SERVICE: CPT | Mod: S$GLB,,, | Performed by: INTERNAL MEDICINE

## 2019-05-28 PROCEDURE — 99999 PR PBB SHADOW E&M-EST. PATIENT-LVL III: ICD-10-PCS | Mod: PBBFAC,,, | Performed by: INTERNAL MEDICINE

## 2019-05-28 PROCEDURE — 99999 PR PBB SHADOW E&M-EST. PATIENT-LVL III: CPT | Mod: PBBFAC,,, | Performed by: INTERNAL MEDICINE

## 2019-05-28 PROCEDURE — 3045F PR MOST RECENT HEMOGLOBIN A1C LEVEL 7.0-9.0%: ICD-10-PCS | Mod: CPTII,S$GLB,, | Performed by: INTERNAL MEDICINE

## 2019-05-28 PROCEDURE — 3080F DIAST BP >= 90 MM HG: CPT | Mod: CPTII,S$GLB,, | Performed by: INTERNAL MEDICINE

## 2019-05-28 PROCEDURE — 3008F BODY MASS INDEX DOCD: CPT | Mod: CPTII,S$GLB,, | Performed by: INTERNAL MEDICINE

## 2019-05-28 RX ORDER — SODIUM CHLORIDE 9 MG/ML
3 INJECTION, SOLUTION INTRAVENOUS CONTINUOUS
Status: CANCELLED | OUTPATIENT
Start: 2019-05-28 | End: 2019-05-28

## 2019-05-28 RX ORDER — DIPHENHYDRAMINE HCL 25 MG
50 CAPSULE ORAL ONCE
Status: CANCELLED | OUTPATIENT
Start: 2019-05-28 | End: 2019-05-28

## 2019-05-28 RX ORDER — HYDROCHLOROTHIAZIDE 25 MG/1
25 TABLET ORAL DAILY
Qty: 90 TABLET | Refills: 3 | Status: SHIPPED | OUTPATIENT
Start: 2019-05-28 | End: 2020-11-09 | Stop reason: SDUPTHER

## 2019-05-28 NOTE — PROGRESS NOTES
Subjective:    Patient ID:  Tomeka Burns is a 63 y.o. female who presents for follow-up of Abnormal Stress Test      HPI     64 y/o Slovak speaking female with hx of HTN, DM, LBBB (unknown if new) who presents for f/u. Had developed mild nonspecific, left sided sharp chest pain, unrelated to exertion, lasts seconds, and resolves spontaneously. Sent for Exercise stress echo, however, found to have LBBB and stress portion stopped. 2DE with normal EF, DD, no WMA's. Claims to have had a stress test years ago and was told she had a mild blockage. Has some intermittent TROTTER and palps. Had nuclear SPECT with fixed perfusion defect in the septal wall of the left ventricle and moderate apical thinning.  Here to discuss angiogram.   Has BP log and average 140-150's at home. Again elevated today 180's.  Denies orthopnea, PND, syncope. Compliant with meds, does not exercise regularly, does not follow a low salt diet. Non smoker. No fam hx of early CAD, although mother had valve replaced at about 54 y/o.    Review of Systems   Constitution: Positive for night sweats. Negative for malaise/fatigue.   HENT: Negative for congestion.    Eyes: Negative for blurred vision.   Cardiovascular: Positive for chest pain, dyspnea on exertion and palpitations. Negative for claudication, cyanosis, irregular heartbeat, leg swelling, near-syncope, orthopnea, paroxysmal nocturnal dyspnea and syncope.   Respiratory: Negative for shortness of breath.    Endocrine: Negative for polyuria.   Hematologic/Lymphatic: Negative for bleeding problem.   Skin: Negative for itching and rash.   Musculoskeletal: Positive for arthritis and joint pain. Negative for joint swelling, muscle cramps and muscle weakness.   Gastrointestinal: Negative for abdominal pain, hematemesis, hematochezia, melena, nausea and vomiting.   Genitourinary: Negative for dysuria and hematuria.   Neurological: Negative for dizziness, focal weakness, headaches, light-headedness, loss of  balance and weakness.   Psychiatric/Behavioral: Negative for depression. The patient is not nervous/anxious.         Objective:    Physical Exam   Constitutional: She is oriented to person, place, and time. She appears well-developed and well-nourished.   HENT:   Head: Normocephalic and atraumatic.   Neck: Neck supple. No JVD present.   Cardiovascular: Normal rate, regular rhythm and normal heart sounds.   Pulses:       Carotid pulses are 2+ on the right side, and 2+ on the left side.       Radial pulses are 2+ on the right side, and 2+ on the left side.        Femoral pulses are 2+ on the right side, and 2+ on the left side.       Dorsalis pedis pulses are 2+ on the right side, and 2+ on the left side.        Posterior tibial pulses are 2+ on the right side, and 2+ on the left side.   Pulmonary/Chest: Effort normal and breath sounds normal.   Abdominal: Soft. Bowel sounds are normal.   Musculoskeletal: She exhibits no edema.   Neurological: She is alert and oriented to person, place, and time.   Skin: Skin is warm and dry.   Psychiatric: She has a normal mood and affect. Her behavior is normal. Thought content normal.         Assessment:       1. Abnormal stress test    2. Left bundle branch block    3. Essential hypertension    4. Type 2 diabetes mellitus with hyperglycemia, without long-term current use of insulin      62 y/o pt with hx and presentation as above. Has risk factors for CAD and abnormal stress test with symptoms and will plan for LHC. For BP will add HCTZ (separate tab for now as she just renewed 90 day supply for losartan and will combine meds when Rx done. Discussed the etiology, evaluation, and management of CAD. Discussed the importance of med compliance, heart healthy diet, and regular exercise.        Plan:       -HCTZ 25 mg daily  -BP log  -LHC (diagnostic only  -Right radial access with 4/5 slender, mandy, pigtail  -The procedure was discussed with the pt along with the risks/benefits and  the pt voiced understanding. All questions were answered and written consents obtained.   -f/u in 1 month

## 2019-05-28 NOTE — H&P (VIEW-ONLY)
Subjective:    Patient ID:  Tomeka Burns is a 63 y.o. female who presents for follow-up of Abnormal Stress Test      HPI     64 y/o Greek speaking female with hx of HTN, DM, LBBB (unknown if new) who presents for f/u. Had developed mild nonspecific, left sided sharp chest pain, unrelated to exertion, lasts seconds, and resolves spontaneously. Sent for Exercise stress echo, however, found to have LBBB and stress portion stopped. 2DE with normal EF, DD, no WMA's. Claims to have had a stress test years ago and was told she had a mild blockage. Has some intermittent TROTTER and palps. Had nuclear SPECT with fixed perfusion defect in the septal wall of the left ventricle and moderate apical thinning.  Here to discuss angiogram.   Has BP log and average 140-150's at home. Again elevated today 180's.  Denies orthopnea, PND, syncope. Compliant with meds, does not exercise regularly, does not follow a low salt diet. Non smoker. No fam hx of early CAD, although mother had valve replaced at about 54 y/o.    Review of Systems   Constitution: Positive for night sweats. Negative for malaise/fatigue.   HENT: Negative for congestion.    Eyes: Negative for blurred vision.   Cardiovascular: Positive for chest pain, dyspnea on exertion and palpitations. Negative for claudication, cyanosis, irregular heartbeat, leg swelling, near-syncope, orthopnea, paroxysmal nocturnal dyspnea and syncope.   Respiratory: Negative for shortness of breath.    Endocrine: Negative for polyuria.   Hematologic/Lymphatic: Negative for bleeding problem.   Skin: Negative for itching and rash.   Musculoskeletal: Positive for arthritis and joint pain. Negative for joint swelling, muscle cramps and muscle weakness.   Gastrointestinal: Negative for abdominal pain, hematemesis, hematochezia, melena, nausea and vomiting.   Genitourinary: Negative for dysuria and hematuria.   Neurological: Negative for dizziness, focal weakness, headaches, light-headedness, loss of  balance and weakness.   Psychiatric/Behavioral: Negative for depression. The patient is not nervous/anxious.         Objective:    Physical Exam   Constitutional: She is oriented to person, place, and time. She appears well-developed and well-nourished.   HENT:   Head: Normocephalic and atraumatic.   Neck: Neck supple. No JVD present.   Cardiovascular: Normal rate, regular rhythm and normal heart sounds.   Pulses:       Carotid pulses are 2+ on the right side, and 2+ on the left side.       Radial pulses are 2+ on the right side, and 2+ on the left side.        Femoral pulses are 2+ on the right side, and 2+ on the left side.       Dorsalis pedis pulses are 2+ on the right side, and 2+ on the left side.        Posterior tibial pulses are 2+ on the right side, and 2+ on the left side.   Pulmonary/Chest: Effort normal and breath sounds normal.   Abdominal: Soft. Bowel sounds are normal.   Musculoskeletal: She exhibits no edema.   Neurological: She is alert and oriented to person, place, and time.   Skin: Skin is warm and dry.   Psychiatric: She has a normal mood and affect. Her behavior is normal. Thought content normal.         Assessment:       1. Abnormal stress test    2. Left bundle branch block    3. Essential hypertension    4. Type 2 diabetes mellitus with hyperglycemia, without long-term current use of insulin      64 y/o pt with hx and presentation as above. Has risk factors for CAD and abnormal stress test with symptoms and will plan for LHC. For BP will add HCTZ (separate tab for now as she just renewed 90 day supply for losartan and will combine meds when Rx done. Discussed the etiology, evaluation, and management of CAD. Discussed the importance of med compliance, heart healthy diet, and regular exercise.        Plan:       -HCTZ 25 mg daily  -BP log  -LHC (diagnostic only  -Right radial access with 4/5 slender, mandy, pigtail  -The procedure was discussed with the pt along with the risks/benefits and  the pt voiced understanding. All questions were answered and written consents obtained.   -f/u in 1 month

## 2019-06-06 ENCOUNTER — OFFICE VISIT (OUTPATIENT)
Dept: ORTHOPEDICS | Facility: CLINIC | Age: 64
End: 2019-06-06
Payer: MEDICARE

## 2019-06-06 VITALS — BODY MASS INDEX: 23.04 KG/M2 | HEIGHT: 63 IN | WEIGHT: 130 LBS

## 2019-06-06 DIAGNOSIS — G89.29 CHRONIC RIGHT SHOULDER PAIN: ICD-10-CM

## 2019-06-06 DIAGNOSIS — M75.21 BICEPS TENDINITIS OF RIGHT UPPER EXTREMITY: Primary | ICD-10-CM

## 2019-06-06 DIAGNOSIS — M25.511 CHRONIC RIGHT SHOULDER PAIN: ICD-10-CM

## 2019-06-06 PROCEDURE — 1101F PR PT FALLS ASSESS DOC 0-1 FALLS W/OUT INJ PAST YR: ICD-10-PCS | Mod: CPTII,S$GLB,, | Performed by: ORTHOPAEDIC SURGERY

## 2019-06-06 PROCEDURE — 99999 PR PBB SHADOW E&M-EST. PATIENT-LVL III: ICD-10-PCS | Mod: PBBFAC,,, | Performed by: ORTHOPAEDIC SURGERY

## 2019-06-06 PROCEDURE — 3008F BODY MASS INDEX DOCD: CPT | Mod: CPTII,S$GLB,, | Performed by: ORTHOPAEDIC SURGERY

## 2019-06-06 PROCEDURE — 99213 PR OFFICE/OUTPT VISIT, EST, LEVL III, 20-29 MIN: ICD-10-PCS | Mod: S$GLB,,, | Performed by: ORTHOPAEDIC SURGERY

## 2019-06-06 PROCEDURE — 99999 PR PBB SHADOW E&M-EST. PATIENT-LVL III: CPT | Mod: PBBFAC,,, | Performed by: ORTHOPAEDIC SURGERY

## 2019-06-06 PROCEDURE — 3008F PR BODY MASS INDEX (BMI) DOCUMENTED: ICD-10-PCS | Mod: CPTII,S$GLB,, | Performed by: ORTHOPAEDIC SURGERY

## 2019-06-06 PROCEDURE — 1101F PT FALLS ASSESS-DOCD LE1/YR: CPT | Mod: CPTII,S$GLB,, | Performed by: ORTHOPAEDIC SURGERY

## 2019-06-06 PROCEDURE — 99213 OFFICE O/P EST LOW 20 MIN: CPT | Mod: S$GLB,,, | Performed by: ORTHOPAEDIC SURGERY

## 2019-06-06 RX ORDER — ETODOLAC 400 MG/1
400 TABLET, EXTENDED RELEASE ORAL DAILY PRN
Qty: 30 TABLET | Refills: 1 | Status: SHIPPED | OUTPATIENT
Start: 2019-06-06 | End: 2019-08-06

## 2019-06-06 NOTE — PROGRESS NOTES
"Subjective:      Patient ID: Tomeka Burns is a 63 y.o. female.  Chief Complaint: Pain and Follow-up of the Right Shoulder      HPI  Tomeka Burns is a  63 y.o. female presenting today for follow up of right shoulder pain related to biceps tendinitis.  She reports that she is much better after the injection last visit  The Lodine also helps  .    Review of patient's allergies indicates:  No Known Allergies      Current Outpatient Medications   Medication Sig Dispense Refill    aspirin (ECOTRIN) 81 MG EC tablet Take 1 tablet (81 mg total) by mouth once daily. 90 tablet 3    blood sugar diagnostic (BLOOD GLUCOSE TEST) Strp 1 strip by Misc.(Non-Drug; Combo Route) route Daily. 100 strip 1    blood-glucose meter kit Use as instructed 1 each 0    etodolac (LODINE XL) 400 MG 24 hr tablet Take 1 tablet (400 mg total) by mouth daily as needed. 30 tablet 1    hydroCHLOROthiazide (HYDRODIURIL) 25 MG tablet Take 1 tablet (25 mg total) by mouth once daily. 90 tablet 3    lancets Misc 1 lancet by Misc.(Non-Drug; Combo Route) route Daily. 100 each 3    losartan (COZAAR) 100 MG tablet Take 1 tablet (100 mg total) by mouth once daily. 90 tablet 3    lovastatin (MEVACOR) 20 MG tablet Take 1 tablet (20 mg total) by mouth every evening. 90 tablet 3    metFORMIN (GLUCOPHAGE-XR) 750 MG 24 hr tablet Take 1 tablet (750 mg total) by mouth daily with breakfast. 90 tablet 3    omeprazole (PRILOSEC) 20 MG capsule Take 1 capsule (20 mg total) by mouth before breakfast. 90 capsule 3     No current facility-administered medications for this visit.        Past Medical History:   Diagnosis Date    Diabetes mellitus, type 2     GERD (gastroesophageal reflux disease)     Hyperlipidemia     Hypertension     Multiple thyroid nodules 1/21/2019       Past Surgical History:   Procedure Laterality Date    HYSTERECTOMY      TONSILLECTOMY         OBJECTIVE:   PHYSICAL EXAM:  Height: 5' 3" (160 cm) Weight: 59 kg (130 lb)  Vitals:    06/06/19 1142 " "  Weight: 59 kg (130 lb)   Height: 5' 3" (1.6 m)   PainSc:   3   PainLoc: Shoulder     Ortho/SPM Exam  Examination right shoulder there is no tenderness no swelling range of motion excellent mildly positive impingement sign good strength  Neurologic exam normal    RADIOGRAPHS:  None  Comments: I have personally reviewed the imaging and I agree with the above radiologist's report.    ASSESSMENT/PLAN:     IMPRESSION:  Biceps tendonitis right shoulder improved  Continue Lodine as needed  Avoid overhead lifting keep an eye on symptoms  PLAN:  As above    FOLLOW UP:  P.r.n.    Disclaimer: This note has been generated using voice-recognition software. There may be typographical errors that have been missed during proof-reading.  "

## 2019-06-06 NOTE — LETTER
June 6, 2019      Andrei Miller MD  [address] [facility]  [place] [address]           [facility]  [address] [place] [address]  [facility] [address]  Phone: [phone]          Patient: Tomeka Burns   MR Number: [id number]   YOB: 1955   Date of Visit: 6/6/2019       Dear Dr. Andrei Miller:    Thank you for referring Tomeka Burns to me for evaluation. Attached you will find relevant portions of my assessment and plan of care.    If you have questions, please do not hesitate to call me. I look forward to following Tomeka Burns along with you.    Sincerely,    Karlo Gomez Jr., MD    Enclosure  CC:  No Recipients    If you would like to receive this communication electronically, please contact [email or web address] or [phone] to request more information on [facility] access.    For providers and/or their staff who would like to refer a patient to [facility], please contact us through our one-stop-shop provider referral line, [facility], at [phone].    If you feel you have received this communication in error or would no longer like to receive these types of communications, please e-mail [email or web address]

## 2019-06-17 ENCOUNTER — HOSPITAL ENCOUNTER (OUTPATIENT)
Facility: HOSPITAL | Age: 64
Discharge: HOME | End: 2019-06-17
Attending: INTERNAL MEDICINE | Admitting: INTERNAL MEDICINE
Payer: MEDICARE

## 2019-06-17 VITALS
BODY MASS INDEX: 22.5 KG/M2 | TEMPERATURE: 99 F | DIASTOLIC BLOOD PRESSURE: 60 MMHG | OXYGEN SATURATION: 100 % | WEIGHT: 127 LBS | HEART RATE: 71 BPM | SYSTOLIC BLOOD PRESSURE: 136 MMHG | HEIGHT: 63 IN | RESPIRATION RATE: 16 BRPM

## 2019-06-17 DIAGNOSIS — R94.39 ABNORMAL STRESS TEST: ICD-10-CM

## 2019-06-17 DIAGNOSIS — I44.7 LEFT BUNDLE BRANCH BLOCK: ICD-10-CM

## 2019-06-17 LAB
ABO + RH BLD: NORMAL
ANION GAP SERPL CALC-SCNC: 10 MMOL/L (ref 8–16)
BASOPHILS # BLD AUTO: 0.01 K/UL (ref 0–0.2)
BASOPHILS NFR BLD: 0.1 % (ref 0–1.9)
BLD GP AB SCN CELLS X3 SERPL QL: NORMAL
BUN SERPL-MCNC: 18 MG/DL (ref 8–23)
CALCIUM SERPL-MCNC: 10.1 MG/DL (ref 8.7–10.5)
CATH EF QUANTITATIVE: 60 %
CHLORIDE SERPL-SCNC: 103 MMOL/L (ref 95–110)
CO2 SERPL-SCNC: 26 MMOL/L (ref 23–29)
CREAT SERPL-MCNC: 0.9 MG/DL (ref 0.5–1.4)
DIFFERENTIAL METHOD: NORMAL
EOSINOPHIL # BLD AUTO: 0.1 K/UL (ref 0–0.5)
EOSINOPHIL NFR BLD: 1.9 % (ref 0–8)
ERYTHROCYTE [DISTWIDTH] IN BLOOD BY AUTOMATED COUNT: 12.2 % (ref 11.5–14.5)
EST. GFR  (AFRICAN AMERICAN): >60 ML/MIN/1.73 M^2
EST. GFR  (NON AFRICAN AMERICAN): >60 ML/MIN/1.73 M^2
GLUCOSE SERPL-MCNC: 162 MG/DL (ref 70–110)
HCT VFR BLD AUTO: 43.3 % (ref 37–48.5)
HGB BLD-MCNC: 14.6 G/DL (ref 12–16)
LYMPHOCYTES # BLD AUTO: 2.3 K/UL (ref 1–4.8)
LYMPHOCYTES NFR BLD: 33.5 % (ref 18–48)
MCH RBC QN AUTO: 29.1 PG (ref 27–31)
MCHC RBC AUTO-ENTMCNC: 33.7 G/DL (ref 32–36)
MCV RBC AUTO: 86 FL (ref 82–98)
MONOCYTES # BLD AUTO: 0.7 K/UL (ref 0.3–1)
MONOCYTES NFR BLD: 9.3 % (ref 4–15)
NEUTROPHILS # BLD AUTO: 3.8 K/UL (ref 1.8–7.7)
NEUTROPHILS NFR BLD: 55.1 % (ref 38–73)
PLATELET # BLD AUTO: 286 K/UL (ref 150–350)
PMV BLD AUTO: 10 FL (ref 9.2–12.9)
POTASSIUM SERPL-SCNC: 3.9 MMOL/L (ref 3.5–5.1)
RBC # BLD AUTO: 5.01 M/UL (ref 4–5.4)
SODIUM SERPL-SCNC: 139 MMOL/L (ref 136–145)
WBC # BLD AUTO: 6.98 K/UL (ref 3.9–12.7)

## 2019-06-17 PROCEDURE — 85025 COMPLETE CBC W/AUTO DIFF WBC: CPT

## 2019-06-17 PROCEDURE — C1769 GUIDE WIRE: HCPCS | Performed by: INTERNAL MEDICINE

## 2019-06-17 PROCEDURE — 86901 BLOOD TYPING SEROLOGIC RH(D): CPT

## 2019-06-17 PROCEDURE — 93010 ELECTROCARDIOGRAM REPORT: CPT | Mod: ,,, | Performed by: INTERNAL MEDICINE

## 2019-06-17 PROCEDURE — 93458 L HRT ARTERY/VENTRICLE ANGIO: CPT | Performed by: INTERNAL MEDICINE

## 2019-06-17 PROCEDURE — 93005 ELECTROCARDIOGRAM TRACING: CPT

## 2019-06-17 PROCEDURE — C1894 INTRO/SHEATH, NON-LASER: HCPCS | Performed by: INTERNAL MEDICINE

## 2019-06-17 PROCEDURE — 36415 COLL VENOUS BLD VENIPUNCTURE: CPT

## 2019-06-17 PROCEDURE — 93458 PR CATH PLACE/CORON ANGIO, IMG SUPER/INTERP,W LEFT HEART VENTRICULOGRAPHY: ICD-10-PCS | Mod: 26,,, | Performed by: INTERNAL MEDICINE

## 2019-06-17 PROCEDURE — 93458 L HRT ARTERY/VENTRICLE ANGIO: CPT | Mod: 26,,, | Performed by: INTERNAL MEDICINE

## 2019-06-17 PROCEDURE — 80048 BASIC METABOLIC PNL TOTAL CA: CPT

## 2019-06-17 PROCEDURE — 63600175 PHARM REV CODE 636 W HCPCS: Performed by: INTERNAL MEDICINE

## 2019-06-17 PROCEDURE — 99152 MOD SED SAME PHYS/QHP 5/>YRS: CPT | Mod: ,,, | Performed by: INTERNAL MEDICINE

## 2019-06-17 PROCEDURE — 25500020 PHARM REV CODE 255: Performed by: INTERNAL MEDICINE

## 2019-06-17 PROCEDURE — 99152 PR MOD CONSCIOUS SEDATION, SAME PHYS, 5+ YRS, FIRST 15 MIN: ICD-10-PCS | Mod: ,,, | Performed by: INTERNAL MEDICINE

## 2019-06-17 PROCEDURE — 99152 MOD SED SAME PHYS/QHP 5/>YRS: CPT | Performed by: INTERNAL MEDICINE

## 2019-06-17 PROCEDURE — C1887 CATHETER, GUIDING: HCPCS | Performed by: INTERNAL MEDICINE

## 2019-06-17 PROCEDURE — 93010 EKG 12-LEAD: ICD-10-PCS | Mod: ,,, | Performed by: INTERNAL MEDICINE

## 2019-06-17 RX ORDER — SODIUM CHLORIDE 9 MG/ML
3 INJECTION, SOLUTION INTRAVENOUS CONTINUOUS
Status: ACTIVE | OUTPATIENT
Start: 2019-06-17 | End: 2019-06-17

## 2019-06-17 RX ORDER — DIPHENHYDRAMINE HYDROCHLORIDE 50 MG/ML
INJECTION INTRAMUSCULAR; INTRAVENOUS
Status: DISCONTINUED | OUTPATIENT
Start: 2019-06-17 | End: 2019-06-17 | Stop reason: HOSPADM

## 2019-06-17 RX ORDER — DIPHENHYDRAMINE HCL 25 MG
50 CAPSULE ORAL ONCE
Status: DISCONTINUED | OUTPATIENT
Start: 2019-06-17 | End: 2019-06-17 | Stop reason: HOSPADM

## 2019-06-17 RX ORDER — MIDAZOLAM HYDROCHLORIDE 1 MG/ML
INJECTION, SOLUTION INTRAMUSCULAR; INTRAVENOUS
Status: DISCONTINUED | OUTPATIENT
Start: 2019-06-17 | End: 2019-06-17 | Stop reason: HOSPADM

## 2019-06-17 RX ORDER — IODIXANOL 320 MG/ML
INJECTION, SOLUTION INTRAVASCULAR
Status: DISCONTINUED | OUTPATIENT
Start: 2019-06-17 | End: 2019-06-17 | Stop reason: HOSPADM

## 2019-06-17 RX ORDER — FENTANYL CITRATE 50 UG/ML
INJECTION, SOLUTION INTRAMUSCULAR; INTRAVENOUS
Status: DISCONTINUED | OUTPATIENT
Start: 2019-06-17 | End: 2019-06-17 | Stop reason: HOSPADM

## 2019-06-17 NOTE — Clinical Note
110 ml injected throughout the case. 40 mL total wasted during the case. 150 mL total used in the case.

## 2019-06-17 NOTE — NURSING
Remaining air removed from right radial vasc band. Gauze and tegaderm dressing applied c.d.i.   No drainage or shadowing noted. No bleeding or hematoma noted around site. +2 bing radial pulses palpated. Skin normal in color, warm to touch, < 3 sec cap refill.   Pt tolerated well.  Will continue to monitor pt.

## 2019-06-17 NOTE — NURSING
1040  Patient transferred to recovery cath lab .  Pt drowsy but able to follow commands. Pt is stable when connecting to cardiac monitors.  VSS. Right radial vasc band in place with 15ml of air in band c.d.i. no drainage or noted. No redness, bruising, or hematoma noted around site. +2 bing radial pulses palpated.  dopplered bing pedal pulses.

## 2019-06-17 NOTE — DISCHARGE SUMMARY
[facility]  Discharge Summary      Admit Date: 6/17/2019    Discharge Date and Time:  06/17/2019 12:29 PM    Attending Physician: Gab Calderon MD     Reason for Admission: [facility]    Procedures Performed: Procedure(s) (LRB):  Left heart cath (N/A)  ANGIOGRAM, CORONARY ARTERY (N/A)  Left ventriculography (N/A)    Hospital Course (synopsis of major diagnoses, care, treatment, and services provided during the course of the hospital stay): The pt was brought to the cath lab and LHC revealed normal coronaries, normal EF and elevated EDP. No immediate post procedure complications.      Final Diagnoses:    Principal Problem: Abnormal stress test   Secondary Diagnoses:   Active Hospital Problems    Diagnosis  POA    *Abnormal stress test [R94.39]  Yes     Priority: Low    Left bundle branch block [I44.7]  Yes    Multiple thyroid nodules [E04.2]  Yes    Essential hypertension [I10]  Yes    Type 2 diabetes mellitus with hyperglycemia, without long-term current use of insulin [E11.65]  Yes      Resolved Hospital Problems   No resolved problems to display.       Discharged Condition: good    Disposition: Home or Self Care    Follow Up/Patient Instructions:   As previously scheduled    Medications:  Reconciled Home Medications:      Medication List      CONTINUE taking these medications    aspirin 81 MG EC tablet  Commonly known as:  ECOTRIN  Take 1 tablet (81 mg total) by mouth once daily.     blood sugar diagnostic Strp  Commonly known as:  BLOOD GLUCOSE TEST  1 strip by Misc.(Non-Drug; Combo Route) route Daily.     blood-glucose meter kit  Use as instructed     etodolac 400 MG 24 hr tablet  Commonly known as:  LODINE XL  Take 1 tablet (400 mg total) by mouth daily as needed.     hydroCHLOROthiazide 25 MG tablet  Commonly known as:  HYDRODIURIL  Take 1 tablet (25 mg total) by mouth once daily.     lancets Misc  1 lancet by Misc.(Non-Drug; Combo Route) route Daily.     losartan 100 MG tablet  Commonly  known as:  COZAAR  Take 1 tablet (100 mg total) by mouth once daily.     lovastatin 20 MG tablet  Commonly known as:  MEVACOR  Take 1 tablet (20 mg total) by mouth every evening.     metFORMIN 750 MG 24 hr tablet  Commonly known as:  GLUCOPHAGE-XR  Take 1 tablet (750 mg total) by mouth daily with breakfast.     omeprazole 20 MG capsule  Commonly known as:  PRILOSEC  Take 1 capsule (20 mg total) by mouth before breakfast.          Discharge Procedure Orders   Diet Cardiac     Activity as tolerated

## 2019-06-17 NOTE — PROCEDURES
": Gab Calderon MD  Pre-procedure diagnosis: Abnormal stress test  Post-procedure diagnosis: Normal coronaries    Post Procedure Note: [facility]    The pt was brought to the cath lab and under sterile technique, right radial access was obtained without difficulty. Images were obtained in multiple views and normal coronaries with normal EF noted. Please see full report for details. The pt tolerated the procedure well without complications. Radial band device used with successful hemostasis.     Vitals:    06/17/19 0747   BP: (!) 163/72   BP Location: Right arm   Patient Position: Lying   Pulse: 75   Resp: 18   Temp: 98.9 °F (37.2 °C)   TempSrc: Oral   SpO2: 99%   Weight: 57.6 kg (127 lb)   Height: 5' 3" (1.6 m)         Gen: NAD  Ext: 2+ radial pulse, no evidence of hematoma  Estimated blood loss: < 50 cc    Plan:  -Post cath care per protocol  -Primary prevention of CAD    "

## 2019-06-17 NOTE — Clinical Note
Catheter is inserted into the ostium   right coronary artery. Angiography performed of the right coronary arteries. Angiography performed via hand injection with .

## 2019-07-02 ENCOUNTER — OFFICE VISIT (OUTPATIENT)
Dept: CARDIOLOGY | Facility: CLINIC | Age: 64
End: 2019-07-02
Payer: MEDICARE

## 2019-07-02 VITALS
WEIGHT: 129.88 LBS | SYSTOLIC BLOOD PRESSURE: 150 MMHG | OXYGEN SATURATION: 98 % | HEART RATE: 86 BPM | BODY MASS INDEX: 23 KG/M2 | DIASTOLIC BLOOD PRESSURE: 82 MMHG

## 2019-07-02 DIAGNOSIS — I44.7 LEFT BUNDLE BRANCH BLOCK: Primary | ICD-10-CM

## 2019-07-02 DIAGNOSIS — M75.21 BICEPS TENDINITIS OF RIGHT UPPER EXTREMITY: ICD-10-CM

## 2019-07-02 DIAGNOSIS — I10 ESSENTIAL HYPERTENSION: ICD-10-CM

## 2019-07-02 DIAGNOSIS — E04.2 MULTIPLE THYROID NODULES: ICD-10-CM

## 2019-07-02 DIAGNOSIS — K63.5 COLON POLYPOSIS: ICD-10-CM

## 2019-07-02 DIAGNOSIS — E11.65 TYPE 2 DIABETES MELLITUS WITH HYPERGLYCEMIA, WITHOUT LONG-TERM CURRENT USE OF INSULIN: ICD-10-CM

## 2019-07-02 PROCEDURE — 3079F DIAST BP 80-89 MM HG: CPT | Mod: CPTII,S$GLB,, | Performed by: INTERNAL MEDICINE

## 2019-07-02 PROCEDURE — 1101F PT FALLS ASSESS-DOCD LE1/YR: CPT | Mod: CPTII,S$GLB,, | Performed by: INTERNAL MEDICINE

## 2019-07-02 PROCEDURE — 1101F PR PT FALLS ASSESS DOC 0-1 FALLS W/OUT INJ PAST YR: ICD-10-PCS | Mod: CPTII,S$GLB,, | Performed by: INTERNAL MEDICINE

## 2019-07-02 PROCEDURE — 99499 UNLISTED E&M SERVICE: CPT | Mod: S$GLB,,, | Performed by: INTERNAL MEDICINE

## 2019-07-02 PROCEDURE — 3077F PR MOST RECENT SYSTOLIC BLOOD PRESSURE >= 140 MM HG: ICD-10-PCS | Mod: CPTII,S$GLB,, | Performed by: INTERNAL MEDICINE

## 2019-07-02 PROCEDURE — 99214 PR OFFICE/OUTPT VISIT, EST, LEVL IV, 30-39 MIN: ICD-10-PCS | Mod: S$GLB,,, | Performed by: INTERNAL MEDICINE

## 2019-07-02 PROCEDURE — 99214 OFFICE O/P EST MOD 30 MIN: CPT | Mod: S$GLB,,, | Performed by: INTERNAL MEDICINE

## 2019-07-02 PROCEDURE — 3079F PR MOST RECENT DIASTOLIC BLOOD PRESSURE 80-89 MM HG: ICD-10-PCS | Mod: CPTII,S$GLB,, | Performed by: INTERNAL MEDICINE

## 2019-07-02 PROCEDURE — 3008F BODY MASS INDEX DOCD: CPT | Mod: CPTII,S$GLB,, | Performed by: INTERNAL MEDICINE

## 2019-07-02 PROCEDURE — 3045F PR MOST RECENT HEMOGLOBIN A1C LEVEL 7.0-9.0%: ICD-10-PCS | Mod: CPTII,S$GLB,, | Performed by: INTERNAL MEDICINE

## 2019-07-02 PROCEDURE — 3008F PR BODY MASS INDEX (BMI) DOCUMENTED: ICD-10-PCS | Mod: CPTII,S$GLB,, | Performed by: INTERNAL MEDICINE

## 2019-07-02 PROCEDURE — 3077F SYST BP >= 140 MM HG: CPT | Mod: CPTII,S$GLB,, | Performed by: INTERNAL MEDICINE

## 2019-07-02 PROCEDURE — 3045F PR MOST RECENT HEMOGLOBIN A1C LEVEL 7.0-9.0%: CPT | Mod: CPTII,S$GLB,, | Performed by: INTERNAL MEDICINE

## 2019-07-02 PROCEDURE — 2024F 7 FLD RTA PHOTO EVC RTNOPTHY: CPT | Mod: S$GLB,,, | Performed by: INTERNAL MEDICINE

## 2019-07-02 PROCEDURE — 99999 PR PBB SHADOW E&M-EST. PATIENT-LVL III: ICD-10-PCS | Mod: PBBFAC,,, | Performed by: INTERNAL MEDICINE

## 2019-07-02 PROCEDURE — 2024F PR 7 FIELD PHOTOS WITH INTERP/ REVIEW: ICD-10-PCS | Mod: S$GLB,,, | Performed by: INTERNAL MEDICINE

## 2019-07-02 PROCEDURE — 99499 RISK ADDL DX/OHS AUDIT: ICD-10-PCS | Mod: S$GLB,,, | Performed by: INTERNAL MEDICINE

## 2019-07-02 PROCEDURE — 99999 PR PBB SHADOW E&M-EST. PATIENT-LVL III: CPT | Mod: PBBFAC,,, | Performed by: INTERNAL MEDICINE

## 2019-07-02 NOTE — PROGRESS NOTES
Subjective:    Patient ID:  Tomeka Burns is a 63 y.o. female who presents for follow-up of Hypertension      HPI     62 y/o Estonian speaking female with hx of HTN, DM, LBBB who presents for f/u. Had developed mild nonspecific, left sided sharp chest pain, unrelated to exertion, sent for exercise stress echo, however, found to have LBBB and stress portion stopped. 2DE with normal EF, DD, no WMA's. Claims to have had a stress test years ago and was told she had a mild blockage. Has some intermittent TROTTER and palps. Had nuclear SPECT with fixed perfusion defect in the septal wall of the left ventricle and moderate apical thinning. Had LHC with normal coronaries, normal EF and DD. Last clinic visit started on HCTZ and BP improved this clinic visit. Does not check BP at home.    Denies orthopnea, PND, syncope. Compliant with meds, does not exercise regularly, does not follow a low salt diet. Non smoker. No fam hx of early CAD, although mother had valve replaced at about 56 y/o.    Review of Systems   Constitution: Negative for malaise/fatigue.   HENT: Negative for congestion.    Eyes: Negative for blurred vision.   Cardiovascular: Negative for chest pain, claudication, cyanosis, dyspnea on exertion, irregular heartbeat, leg swelling, near-syncope, orthopnea, palpitations, paroxysmal nocturnal dyspnea and syncope.   Respiratory: Negative for shortness of breath.    Endocrine: Negative for polyuria.   Hematologic/Lymphatic: Negative for bleeding problem.   Skin: Negative for itching and rash.   Musculoskeletal: Negative for joint swelling, muscle cramps and muscle weakness.   Gastrointestinal: Positive for heartburn. Negative for abdominal pain, hematemesis, hematochezia, melena, nausea and vomiting.   Genitourinary: Negative for dysuria and hematuria.   Neurological: Negative for dizziness, focal weakness, headaches, light-headedness, loss of balance and weakness.   Psychiatric/Behavioral: Negative for depression. The  patient is not nervous/anxious.         Objective:    Physical Exam   Constitutional: She is oriented to person, place, and time. She appears well-developed and well-nourished.   HENT:   Head: Normocephalic and atraumatic.   Neck: Neck supple. No JVD present.   Cardiovascular: Normal rate, regular rhythm and normal heart sounds.   Pulses:       Carotid pulses are 2+ on the right side, and 2+ on the left side.       Radial pulses are 2+ on the right side, and 2+ on the left side.        Femoral pulses are 2+ on the right side, and 2+ on the left side.       Dorsalis pedis pulses are 2+ on the right side, and 2+ on the left side.        Posterior tibial pulses are 2+ on the right side, and 2+ on the left side.   Pulmonary/Chest: Effort normal and breath sounds normal.   Abdominal: Soft. Bowel sounds are normal.   Musculoskeletal: She exhibits no edema.   Neurological: She is alert and oriented to person, place, and time.   Skin: Skin is warm and dry.   Psychiatric: She has a normal mood and affect. Her behavior is normal. Thought content normal.         Assessment:       1. Left bundle branch block    2. Essential hypertension    3. Multiple thyroid nodules    4. Type 2 diabetes mellitus with hyperglycemia, without long-term current use of insulin    5. Colon polyposis    6. Biceps tendinitis of right upper extremity      62 y/o pt with hx and presentation as above. Doing well from a cardiac perspective and compensated from a HF perspective. LBBB with normal coronaries and normal EF. BP improved and will obtain BP log to determine need for better control. Discussed the etiology, evaluation, and management of HTN. Discussed the importance of med compliance, heart healthy diet, and regular exercise.        Plan:       -BP log BID  -f/u in 1 month

## 2019-08-02 RX ORDER — LANCETS
1 EACH MISCELLANEOUS 2 TIMES DAILY
Qty: 200 EACH | Refills: 3 | Status: SHIPPED | OUTPATIENT
Start: 2019-08-02 | End: 2020-09-08 | Stop reason: SDUPTHER

## 2019-08-02 RX ORDER — INSULIN PUMP SYRINGE, 3 ML
EACH MISCELLANEOUS
Qty: 1 EACH | Refills: 0 | Status: SHIPPED | OUTPATIENT
Start: 2019-08-02 | End: 2022-02-18 | Stop reason: SDUPTHER

## 2019-08-05 ENCOUNTER — PATIENT OUTREACH (OUTPATIENT)
Dept: ADMINISTRATIVE | Facility: OTHER | Age: 64
End: 2019-08-05

## 2019-08-06 ENCOUNTER — OFFICE VISIT (OUTPATIENT)
Dept: CARDIOLOGY | Facility: CLINIC | Age: 64
End: 2019-08-06
Payer: MEDICARE

## 2019-08-06 VITALS
WEIGHT: 129.88 LBS | BODY MASS INDEX: 23 KG/M2 | OXYGEN SATURATION: 98 % | DIASTOLIC BLOOD PRESSURE: 72 MMHG | HEART RATE: 81 BPM | SYSTOLIC BLOOD PRESSURE: 148 MMHG

## 2019-08-06 DIAGNOSIS — E11.65 TYPE 2 DIABETES MELLITUS WITH HYPERGLYCEMIA, WITHOUT LONG-TERM CURRENT USE OF INSULIN: ICD-10-CM

## 2019-08-06 DIAGNOSIS — R94.39 ABNORMAL STRESS TEST: ICD-10-CM

## 2019-08-06 DIAGNOSIS — I44.7 LEFT BUNDLE BRANCH BLOCK: Primary | ICD-10-CM

## 2019-08-06 DIAGNOSIS — E04.2 MULTIPLE THYROID NODULES: ICD-10-CM

## 2019-08-06 DIAGNOSIS — I10 ESSENTIAL HYPERTENSION: ICD-10-CM

## 2019-08-06 PROCEDURE — 3078F PR MOST RECENT DIASTOLIC BLOOD PRESSURE < 80 MM HG: ICD-10-PCS | Mod: CPTII,S$GLB,, | Performed by: INTERNAL MEDICINE

## 2019-08-06 PROCEDURE — 1101F PT FALLS ASSESS-DOCD LE1/YR: CPT | Mod: CPTII,S$GLB,, | Performed by: INTERNAL MEDICINE

## 2019-08-06 PROCEDURE — 3077F SYST BP >= 140 MM HG: CPT | Mod: CPTII,S$GLB,, | Performed by: INTERNAL MEDICINE

## 2019-08-06 PROCEDURE — 99214 PR OFFICE/OUTPT VISIT, EST, LEVL IV, 30-39 MIN: ICD-10-PCS | Mod: S$GLB,,, | Performed by: INTERNAL MEDICINE

## 2019-08-06 PROCEDURE — 3008F BODY MASS INDEX DOCD: CPT | Mod: CPTII,S$GLB,, | Performed by: INTERNAL MEDICINE

## 2019-08-06 PROCEDURE — 3045F PR MOST RECENT HEMOGLOBIN A1C LEVEL 7.0-9.0%: ICD-10-PCS | Mod: CPTII,S$GLB,, | Performed by: INTERNAL MEDICINE

## 2019-08-06 PROCEDURE — 99214 OFFICE O/P EST MOD 30 MIN: CPT | Mod: S$GLB,,, | Performed by: INTERNAL MEDICINE

## 2019-08-06 PROCEDURE — 3008F PR BODY MASS INDEX (BMI) DOCUMENTED: ICD-10-PCS | Mod: CPTII,S$GLB,, | Performed by: INTERNAL MEDICINE

## 2019-08-06 PROCEDURE — 1101F PR PT FALLS ASSESS DOC 0-1 FALLS W/OUT INJ PAST YR: ICD-10-PCS | Mod: CPTII,S$GLB,, | Performed by: INTERNAL MEDICINE

## 2019-08-06 PROCEDURE — 3045F PR MOST RECENT HEMOGLOBIN A1C LEVEL 7.0-9.0%: CPT | Mod: CPTII,S$GLB,, | Performed by: INTERNAL MEDICINE

## 2019-08-06 PROCEDURE — 2024F PR 7 FIELD PHOTOS WITH INTERP/ REVIEW: ICD-10-PCS | Mod: S$GLB,,, | Performed by: INTERNAL MEDICINE

## 2019-08-06 PROCEDURE — 99499 UNLISTED E&M SERVICE: CPT | Mod: S$GLB,,, | Performed by: INTERNAL MEDICINE

## 2019-08-06 PROCEDURE — 3078F DIAST BP <80 MM HG: CPT | Mod: CPTII,S$GLB,, | Performed by: INTERNAL MEDICINE

## 2019-08-06 PROCEDURE — 99999 PR PBB SHADOW E&M-EST. PATIENT-LVL III: CPT | Mod: PBBFAC,,, | Performed by: INTERNAL MEDICINE

## 2019-08-06 PROCEDURE — 2024F 7 FLD RTA PHOTO EVC RTNOPTHY: CPT | Mod: S$GLB,,, | Performed by: INTERNAL MEDICINE

## 2019-08-06 PROCEDURE — 99999 PR PBB SHADOW E&M-EST. PATIENT-LVL III: ICD-10-PCS | Mod: PBBFAC,,, | Performed by: INTERNAL MEDICINE

## 2019-08-06 PROCEDURE — 3077F PR MOST RECENT SYSTOLIC BLOOD PRESSURE >= 140 MM HG: ICD-10-PCS | Mod: CPTII,S$GLB,, | Performed by: INTERNAL MEDICINE

## 2019-08-06 PROCEDURE — 99499 RISK ADDL DX/OHS AUDIT: ICD-10-PCS | Mod: S$GLB,,, | Performed by: INTERNAL MEDICINE

## 2019-08-06 NOTE — PROGRESS NOTES
Subjective:    Patient ID:  Tomeka Burns is a 63 y.o. female who presents for follow-up of Hypertension      HPI     64 y/o Croatian speaking female with hx of HTN, DM, LBBB who presents for f/u. Had developed mild nonspecific, left sided sharp chest pain, unrelated to exertion, sent for exercise stress echo, however, found to have LBBB and stress portion stopped. 2DE with normal EF, DD, no WMA's. Claims to have had a stress test years ago and was told she had a mild blockage. Has some intermittent TROTTER and palps. Had nuclear SPECT with fixed perfusion defect in the septal wall of the left ventricle and moderate apical thinning. Had LHC with normal coronaries, normal EF and DD. Holter with no significant abnormalities. Has had extensive cardiac w/u. Was started on HCTZ.   BP log available and SBP averaging in the 110-120's. Elevated in clinic today, but controlled at home.   Denies orthopnea, PND, syncope. Has regular dizziness that she thinks may be due to low blood sugar. Compliant with meds, does not exercise regularly, does not follow a low salt diet. Non smoker. No fam hx of early CAD, although mother had valve replaced at about 54 y/o.    Review of Systems   Constitution: Negative for malaise/fatigue.   HENT: Negative for congestion.    Eyes: Negative for blurred vision.   Cardiovascular: Negative for chest pain, claudication, cyanosis, dyspnea on exertion, irregular heartbeat, leg swelling, near-syncope, orthopnea, palpitations, paroxysmal nocturnal dyspnea and syncope.   Respiratory: Negative for shortness of breath.    Endocrine: Negative for polyuria.   Hematologic/Lymphatic: Negative for bleeding problem.   Skin: Negative for itching and rash.   Musculoskeletal: Negative for joint swelling, muscle cramps and muscle weakness.   Gastrointestinal: Negative for abdominal pain, hematemesis, hematochezia, melena, nausea and vomiting.   Genitourinary: Negative for dysuria and hematuria.   Neurological: Positive  for dizziness. Negative for focal weakness, headaches, light-headedness, loss of balance and weakness.   Psychiatric/Behavioral: Negative for depression. The patient is not nervous/anxious.         Objective:    Physical Exam   Constitutional: She is oriented to person, place, and time. She appears well-developed and well-nourished.   HENT:   Head: Normocephalic and atraumatic.   Neck: Neck supple. No JVD present.   Cardiovascular: Normal rate, regular rhythm and normal heart sounds.   Pulses:       Carotid pulses are 2+ on the right side, and 2+ on the left side.       Radial pulses are 2+ on the right side, and 2+ on the left side.        Femoral pulses are 2+ on the right side, and 2+ on the left side.       Dorsalis pedis pulses are 2+ on the right side, and 2+ on the left side.        Posterior tibial pulses are 2+ on the right side, and 2+ on the left side.   Pulmonary/Chest: Effort normal and breath sounds normal.   Abdominal: Soft. Bowel sounds are normal.   Musculoskeletal: She exhibits no edema.   Neurological: She is alert and oriented to person, place, and time.   Skin: Skin is warm and dry.   Psychiatric: She has a normal mood and affect. Her behavior is normal. Thought content normal.         Assessment:       1. Left bundle branch block    2. Essential hypertension    3. Abnormal stress test    4. Type 2 diabetes mellitus with hyperglycemia, without long-term current use of insulin    5. Multiple thyroid nodules      64 y/o pt with hx and presentation as above. Doing well from a cardiac perspective and compensated from a HF perspective. Extensive cardiac w/u. BP controlled. Discussed the etiology, evaluation, and management of HTN and LBBB. Discussed the importance of med compliance, heart healthy diet, and regular exercise.        Plan:       -Continue current medical management  -BP log  -low sodium diet  -f/u in 6 months

## 2019-08-27 ENCOUNTER — PATIENT MESSAGE (OUTPATIENT)
Dept: FAMILY MEDICINE | Facility: CLINIC | Age: 64
End: 2019-08-27

## 2019-08-29 ENCOUNTER — OFFICE VISIT (OUTPATIENT)
Dept: FAMILY MEDICINE | Facility: CLINIC | Age: 64
End: 2019-08-29
Payer: MEDICARE

## 2019-08-29 VITALS
BODY MASS INDEX: 22.66 KG/M2 | HEART RATE: 88 BPM | OXYGEN SATURATION: 96 % | DIASTOLIC BLOOD PRESSURE: 70 MMHG | WEIGHT: 127.88 LBS | SYSTOLIC BLOOD PRESSURE: 120 MMHG | HEIGHT: 63 IN

## 2019-08-29 DIAGNOSIS — I10 ESSENTIAL HYPERTENSION: Primary | ICD-10-CM

## 2019-08-29 DIAGNOSIS — E11.65 TYPE 2 DIABETES MELLITUS WITH HYPERGLYCEMIA, WITHOUT LONG-TERM CURRENT USE OF INSULIN: ICD-10-CM

## 2019-08-29 PROCEDURE — 3045F PR MOST RECENT HEMOGLOBIN A1C LEVEL 7.0-9.0%: ICD-10-PCS | Mod: CPTII,S$GLB,, | Performed by: FAMILY MEDICINE

## 2019-08-29 PROCEDURE — 99999 PR PBB SHADOW E&M-EST. PATIENT-LVL III: ICD-10-PCS | Mod: PBBFAC,,, | Performed by: FAMILY MEDICINE

## 2019-08-29 PROCEDURE — 3078F PR MOST RECENT DIASTOLIC BLOOD PRESSURE < 80 MM HG: ICD-10-PCS | Mod: CPTII,S$GLB,, | Performed by: FAMILY MEDICINE

## 2019-08-29 PROCEDURE — 99499 RISK ADDL DX/OHS AUDIT: ICD-10-PCS | Mod: S$GLB,,, | Performed by: FAMILY MEDICINE

## 2019-08-29 PROCEDURE — 3074F SYST BP LT 130 MM HG: CPT | Mod: CPTII,S$GLB,, | Performed by: FAMILY MEDICINE

## 2019-08-29 PROCEDURE — 2024F 7 FLD RTA PHOTO EVC RTNOPTHY: CPT | Mod: S$GLB,,, | Performed by: FAMILY MEDICINE

## 2019-08-29 PROCEDURE — 99499 UNLISTED E&M SERVICE: CPT | Mod: S$GLB,,, | Performed by: FAMILY MEDICINE

## 2019-08-29 PROCEDURE — 2024F PR 7 FIELD PHOTOS WITH INTERP/ REVIEW: ICD-10-PCS | Mod: S$GLB,,, | Performed by: FAMILY MEDICINE

## 2019-08-29 PROCEDURE — 1101F PT FALLS ASSESS-DOCD LE1/YR: CPT | Mod: CPTII,S$GLB,, | Performed by: FAMILY MEDICINE

## 2019-08-29 PROCEDURE — 99214 PR OFFICE/OUTPT VISIT, EST, LEVL IV, 30-39 MIN: ICD-10-PCS | Mod: S$GLB,,, | Performed by: FAMILY MEDICINE

## 2019-08-29 PROCEDURE — 3074F PR MOST RECENT SYSTOLIC BLOOD PRESSURE < 130 MM HG: ICD-10-PCS | Mod: CPTII,S$GLB,, | Performed by: FAMILY MEDICINE

## 2019-08-29 PROCEDURE — 1101F PR PT FALLS ASSESS DOC 0-1 FALLS W/OUT INJ PAST YR: ICD-10-PCS | Mod: CPTII,S$GLB,, | Performed by: FAMILY MEDICINE

## 2019-08-29 PROCEDURE — 3078F DIAST BP <80 MM HG: CPT | Mod: CPTII,S$GLB,, | Performed by: FAMILY MEDICINE

## 2019-08-29 PROCEDURE — 3045F PR MOST RECENT HEMOGLOBIN A1C LEVEL 7.0-9.0%: CPT | Mod: CPTII,S$GLB,, | Performed by: FAMILY MEDICINE

## 2019-08-29 PROCEDURE — 99999 PR PBB SHADOW E&M-EST. PATIENT-LVL III: CPT | Mod: PBBFAC,,, | Performed by: FAMILY MEDICINE

## 2019-08-29 PROCEDURE — 99214 OFFICE O/P EST MOD 30 MIN: CPT | Mod: S$GLB,,, | Performed by: FAMILY MEDICINE

## 2019-08-29 RX ORDER — METFORMIN HYDROCHLORIDE 750 MG/1
750 TABLET, EXTENDED RELEASE ORAL
Qty: 90 TABLET | Refills: 3 | Status: SHIPPED | OUTPATIENT
Start: 2019-08-29 | End: 2019-10-02

## 2019-08-29 NOTE — PROGRESS NOTES
Subjective:       Patient ID: Tomeka Burns is a 64 y.o. female.    Chief Complaint: Follow-up; Hypertension; and Diabetes    64 years old female came to the clinic for her blood pressure check.  Blood pressure today stable.  No chest pain, palpitation, orthopnea or PND.  Patient last A1c was stable.  Patient with compliance with medical regimen.  Patient reports elevated blood sugar at home sometimes the 180s.  Patient with polydipsia.  No polyuria or polyphagia.    Diabetes   No MedicAlert identification noted. The initial diagnosis of diabetes was made 15 years ago. Hypoglycemia symptoms include dizziness, mood changes and sleepiness. Pertinent negatives for hypoglycemia include no confusion, headaches, hunger, nervousness/anxiousness, seizures, speech difficulty, sweats or tremors. Associated symptoms include blurred vision, foot paresthesias, polydipsia and visual change. Pertinent negatives for diabetes include no chest pain, no foot ulcerations, no polyphagia, no polyuria and no weight loss. Hypoglycemia complications include nocturnal hypoglycemia. Pertinent negatives for hypoglycemia complications include no blackouts, no hospitalization, no required assistance and no required glucagon injection. Symptoms are stable. Pertinent negatives for diabetic complications include no autonomic neuropathy, CVA, heart disease, impotence, nephropathy, peripheral neuropathy, PVD or retinopathy. Risk factors for coronary artery disease include dyslipidemia, family history, hypertension and post-menopausal. Current diabetic treatment includes oral agent (monotherapy). She is compliant with treatment all of the time. She is currently taking insulin pre-breakfast. Insulin injections are given by patient. Her weight is stable. She is following a generally healthy diet. Meal planning includes avoidance of concentrated sweets. She has not had a previous visit with a dietitian. She participates in exercise intermittently. She  monitors blood glucose at home 1-2 x per day. Her home blood glucose trend is increasing steadily. She does not see a podiatrist.Eye exam is current.     Review of Systems   Constitutional: Negative.  Negative for weight loss.   HENT: Negative.    Eyes: Positive for blurred vision.   Respiratory: Negative.    Cardiovascular: Negative.  Negative for chest pain, palpitations and leg swelling.   Gastrointestinal: Negative.    Endocrine: Positive for polydipsia. Negative for polyphagia and polyuria.   Genitourinary: Negative.  Negative for impotence.   Musculoskeletal: Negative.    Skin: Negative.    Neurological: Positive for dizziness. Negative for tremors, seizures, speech difficulty and headaches.   Psychiatric/Behavioral: Negative.  Negative for confusion. The patient is not nervous/anxious.        Objective:      Physical Exam   Constitutional: She is oriented to person, place, and time. She appears well-developed and well-nourished. No distress.   HENT:   Head: Normocephalic and atraumatic.   Right Ear: External ear normal.   Left Ear: External ear normal.   Nose: Nose normal.   Mouth/Throat: Oropharynx is clear and moist. No oropharyngeal exudate.   Eyes: Pupils are equal, round, and reactive to light. Conjunctivae and EOM are normal. Right eye exhibits no discharge. Left eye exhibits no discharge. No scleral icterus.   Neck: Normal range of motion. Neck supple. No JVD present. No tracheal deviation present. No thyromegaly present.   Cardiovascular: Normal rate, regular rhythm, normal heart sounds and intact distal pulses. Exam reveals no gallop and no friction rub.   No murmur heard.  Pulmonary/Chest: Effort normal and breath sounds normal. No stridor. No respiratory distress. She has no wheezes. She has no rales. She exhibits no tenderness.   Abdominal: Soft. Bowel sounds are normal. She exhibits no distension and no mass. There is no tenderness. There is no rebound and no guarding.   Musculoskeletal: Normal  range of motion. She exhibits no edema or tenderness.   Lymphadenopathy:     She has no cervical adenopathy.   Neurological: She is alert and oriented to person, place, and time. She has normal reflexes. No cranial nerve deficit. She exhibits normal muscle tone. Coordination normal.   Skin: Skin is warm and dry. No rash noted. She is not diaphoretic. No erythema. No pallor.   Psychiatric: She has a normal mood and affect. Her behavior is normal. Judgment and thought content normal.       Assessment:       1. Essential hypertension    2. Type 2 diabetes mellitus with hyperglycemia, without long-term current use of insulin        Plan:         Tomeka was seen today for follow-up, hypertension and diabetes.    Diagnoses and all orders for this visit:    Essential hypertension  -     Comprehensive metabolic panel; Future  -     Lipid panel; Future    Type 2 diabetes mellitus with hyperglycemia, without long-term current use of insulin  -     Comprehensive metabolic panel; Future  -     Hemoglobin A1c; Future  -     metFORMIN (GLUCOPHAGE-XR) 750 MG 24 hr tablet; Take 1 tablet (750 mg total) by mouth daily with breakfast.    Continue monitoring blood pressure at home, low sodium diet.  Continue monitoring blood sugar at home,ADA diet.

## 2019-08-30 ENCOUNTER — LAB VISIT (OUTPATIENT)
Dept: LAB | Facility: HOSPITAL | Age: 64
End: 2019-08-30
Attending: FAMILY MEDICINE
Payer: MEDICARE

## 2019-08-30 DIAGNOSIS — E11.65 TYPE 2 DIABETES MELLITUS WITH HYPERGLYCEMIA, WITHOUT LONG-TERM CURRENT USE OF INSULIN: ICD-10-CM

## 2019-08-30 DIAGNOSIS — I10 ESSENTIAL HYPERTENSION: ICD-10-CM

## 2019-08-30 LAB
ALBUMIN SERPL BCP-MCNC: 4.2 G/DL (ref 3.5–5.2)
ALP SERPL-CCNC: 94 U/L (ref 55–135)
ALT SERPL W/O P-5'-P-CCNC: 52 U/L (ref 10–44)
ANION GAP SERPL CALC-SCNC: 9 MMOL/L (ref 8–16)
AST SERPL-CCNC: 34 U/L (ref 10–40)
BILIRUB SERPL-MCNC: 0.7 MG/DL (ref 0.1–1)
BUN SERPL-MCNC: 20 MG/DL (ref 8–23)
CALCIUM SERPL-MCNC: 9.2 MG/DL (ref 8.7–10.5)
CHLORIDE SERPL-SCNC: 103 MMOL/L (ref 95–110)
CHOLEST SERPL-MCNC: 152 MG/DL (ref 120–199)
CHOLEST/HDLC SERPL: 3.4 {RATIO} (ref 2–5)
CO2 SERPL-SCNC: 25 MMOL/L (ref 23–29)
CREAT SERPL-MCNC: 1 MG/DL (ref 0.5–1.4)
EST. GFR  (AFRICAN AMERICAN): >60 ML/MIN/1.73 M^2
EST. GFR  (NON AFRICAN AMERICAN): 58.8 ML/MIN/1.73 M^2
ESTIMATED AVG GLUCOSE: 166 MG/DL (ref 68–131)
GLUCOSE SERPL-MCNC: 129 MG/DL (ref 70–110)
HBA1C MFR BLD HPLC: 7.4 % (ref 4–5.6)
HDLC SERPL-MCNC: 45 MG/DL (ref 40–75)
HDLC SERPL: 29.6 % (ref 20–50)
LDLC SERPL CALC-MCNC: 84.6 MG/DL (ref 63–159)
NONHDLC SERPL-MCNC: 107 MG/DL
POTASSIUM SERPL-SCNC: 4.1 MMOL/L (ref 3.5–5.1)
PROT SERPL-MCNC: 7.6 G/DL (ref 6–8.4)
SODIUM SERPL-SCNC: 137 MMOL/L (ref 136–145)
TRIGL SERPL-MCNC: 112 MG/DL (ref 30–150)

## 2019-08-30 PROCEDURE — 80053 COMPREHEN METABOLIC PANEL: CPT

## 2019-08-30 PROCEDURE — 80061 LIPID PANEL: CPT

## 2019-08-30 PROCEDURE — 36415 COLL VENOUS BLD VENIPUNCTURE: CPT | Mod: PO

## 2019-08-30 PROCEDURE — 83036 HEMOGLOBIN GLYCOSYLATED A1C: CPT

## 2019-10-02 ENCOUNTER — OFFICE VISIT (OUTPATIENT)
Dept: FAMILY MEDICINE | Facility: CLINIC | Age: 64
End: 2019-10-02
Payer: MEDICARE

## 2019-10-02 ENCOUNTER — PATIENT MESSAGE (OUTPATIENT)
Dept: FAMILY MEDICINE | Facility: CLINIC | Age: 64
End: 2019-10-02

## 2019-10-02 VITALS
WEIGHT: 130.06 LBS | BODY MASS INDEX: 23.04 KG/M2 | HEART RATE: 74 BPM | OXYGEN SATURATION: 100 % | HEIGHT: 63 IN | DIASTOLIC BLOOD PRESSURE: 74 MMHG | SYSTOLIC BLOOD PRESSURE: 120 MMHG

## 2019-10-02 DIAGNOSIS — R94.4 DECREASED GFR: ICD-10-CM

## 2019-10-02 DIAGNOSIS — I10 ESSENTIAL HYPERTENSION: Primary | ICD-10-CM

## 2019-10-02 DIAGNOSIS — Z23 NEED FOR VACCINATION AGAINST STREPTOCOCCUS PNEUMONIAE: ICD-10-CM

## 2019-10-02 DIAGNOSIS — E11.65 TYPE 2 DIABETES MELLITUS WITH HYPERGLYCEMIA, WITHOUT LONG-TERM CURRENT USE OF INSULIN: ICD-10-CM

## 2019-10-02 DIAGNOSIS — Z12.31 ENCOUNTER FOR SCREENING MAMMOGRAM FOR MALIGNANT NEOPLASM OF BREAST: ICD-10-CM

## 2019-10-02 PROCEDURE — 3074F PR MOST RECENT SYSTOLIC BLOOD PRESSURE < 130 MM HG: ICD-10-PCS | Mod: CPTII,S$GLB,ICN, | Performed by: FAMILY MEDICINE

## 2019-10-02 PROCEDURE — G0009 ADMIN PNEUMOCOCCAL VACCINE: HCPCS | Mod: S$GLB,ICN,, | Performed by: FAMILY MEDICINE

## 2019-10-02 PROCEDURE — 99214 OFFICE O/P EST MOD 30 MIN: CPT | Mod: 25,S$GLB,ICN, | Performed by: FAMILY MEDICINE

## 2019-10-02 PROCEDURE — 3074F SYST BP LT 130 MM HG: CPT | Mod: CPTII,S$GLB,ICN, | Performed by: FAMILY MEDICINE

## 2019-10-02 PROCEDURE — 3078F PR MOST RECENT DIASTOLIC BLOOD PRESSURE < 80 MM HG: ICD-10-PCS | Mod: CPTII,S$GLB,ICN, | Performed by: FAMILY MEDICINE

## 2019-10-02 PROCEDURE — 99999 PR PBB SHADOW E&M-EST. PATIENT-LVL IV: CPT | Mod: PBBFAC,,, | Performed by: FAMILY MEDICINE

## 2019-10-02 PROCEDURE — 1101F PR PT FALLS ASSESS DOC 0-1 FALLS W/OUT INJ PAST YR: ICD-10-PCS | Mod: CPTII,S$GLB,ICN, | Performed by: FAMILY MEDICINE

## 2019-10-02 PROCEDURE — 90670 PCV13 VACCINE IM: CPT | Mod: S$GLB,ICN,, | Performed by: FAMILY MEDICINE

## 2019-10-02 PROCEDURE — G0009 PNEUMOCOCCAL CONJUGATE VACCINE 13-VALENT LESS THAN 5YO & GREATER THAN: ICD-10-PCS | Mod: S$GLB,ICN,, | Performed by: FAMILY MEDICINE

## 2019-10-02 PROCEDURE — 99214 PR OFFICE/OUTPT VISIT, EST, LEVL IV, 30-39 MIN: ICD-10-PCS | Mod: 25,S$GLB,ICN, | Performed by: FAMILY MEDICINE

## 2019-10-02 PROCEDURE — 99999 PR PBB SHADOW E&M-EST. PATIENT-LVL IV: ICD-10-PCS | Mod: PBBFAC,,, | Performed by: FAMILY MEDICINE

## 2019-10-02 PROCEDURE — 3051F PR MOST RECENT HEMOGLOBIN A1C LEVEL 7.0 - < 8.0%: ICD-10-PCS | Mod: CPTII,S$GLB,, | Performed by: FAMILY MEDICINE

## 2019-10-02 PROCEDURE — 3051F HG A1C>EQUAL 7.0%<8.0%: CPT | Mod: CPTII,S$GLB,, | Performed by: FAMILY MEDICINE

## 2019-10-02 PROCEDURE — 90670 PNEUMOCOCCAL CONJUGATE VACCINE 13-VALENT LESS THAN 5YO & GREATER THAN: ICD-10-PCS | Mod: S$GLB,ICN,, | Performed by: FAMILY MEDICINE

## 2019-10-02 PROCEDURE — 1101F PT FALLS ASSESS-DOCD LE1/YR: CPT | Mod: CPTII,S$GLB,ICN, | Performed by: FAMILY MEDICINE

## 2019-10-02 PROCEDURE — 3078F DIAST BP <80 MM HG: CPT | Mod: CPTII,S$GLB,ICN, | Performed by: FAMILY MEDICINE

## 2019-10-02 RX ORDER — METFORMIN HYDROCHLORIDE 500 MG/1
500 TABLET ORAL 2 TIMES DAILY WITH MEALS
Qty: 180 TABLET | Refills: 3 | Status: SHIPPED | OUTPATIENT
Start: 2019-10-02 | End: 2019-10-04

## 2019-10-02 NOTE — PROGRESS NOTES
Subjective:       Patient ID: Tomeka Burns is a 64 y.o. female.    Chief Complaint: Follow-up and Hypertension    64 years old female came to clinic blood pressure check.  Pressure today stable.  No Chest pain, palpitation, orthopnea or PND.  Last A1c was slightly elevated.  No Polyuria, polydipsia or polyphagia.  Patient with decreased kidney function in comparison with previous reports .  Patient due for her mammogram and pneumonia shot.    Review of Systems   Constitutional: Negative for activity change and unexpected weight change.   HENT: Negative for hearing loss, rhinorrhea and trouble swallowing.    Eyes: Negative for discharge and visual disturbance.   Respiratory: Negative for chest tightness and wheezing.    Cardiovascular: Negative for chest pain, palpitations and leg swelling.   Gastrointestinal: Negative for blood in stool, constipation, diarrhea and vomiting.   Endocrine: Negative for polydipsia, polyphagia and polyuria.   Genitourinary: Negative for difficulty urinating, dysuria, hematuria and menstrual problem.   Musculoskeletal: Negative for arthralgias, joint swelling and neck pain.   Neurological: Positive for weakness. Negative for headaches.   Psychiatric/Behavioral: Negative for confusion and dysphoric mood.       Objective:      Physical Exam   Constitutional: She is oriented to person, place, and time. She appears well-developed and well-nourished. No distress.   HENT:   Head: Normocephalic and atraumatic.   Right Ear: External ear normal.   Left Ear: External ear normal.   Nose: Nose normal.   Mouth/Throat: Oropharynx is clear and moist. No oropharyngeal exudate.   Eyes: Pupils are equal, round, and reactive to light. Conjunctivae and EOM are normal. Right eye exhibits no discharge. Left eye exhibits no discharge. No scleral icterus.   Neck: Normal range of motion. Neck supple. No JVD present. No tracheal deviation present. No thyromegaly present.   Cardiovascular: Normal rate, regular  rhythm, normal heart sounds and intact distal pulses. Exam reveals no gallop and no friction rub.   No murmur heard.  Pulmonary/Chest: Effort normal and breath sounds normal. No stridor. No respiratory distress. She has no wheezes. She has no rales. She exhibits no tenderness.   Abdominal: Soft. Bowel sounds are normal. She exhibits no distension and no mass. There is no tenderness. There is no rebound and no guarding.   Musculoskeletal: Normal range of motion. She exhibits no edema or tenderness.   Lymphadenopathy:     She has no cervical adenopathy.   Neurological: She is alert and oriented to person, place, and time. She has normal reflexes. No cranial nerve deficit. She exhibits normal muscle tone. Coordination normal.   Skin: Skin is warm and dry. No rash noted. She is not diaphoretic. No erythema. No pallor.   Psychiatric: She has a normal mood and affect. Her behavior is normal. Judgment and thought content normal.       Assessment:       1. Essential hypertension    2. Decreased GFR    3. Type 2 diabetes mellitus with hyperglycemia, without long-term current use of insulin    4. Need for vaccination against Streptococcus pneumoniae    5. Encounter for screening mammogram for malignant neoplasm of breast        Plan:         Tomeka was seen today for follow-up and hypertension.    Diagnoses and all orders for this visit:    Essential hypertension    Decreased GFR  -     Renal function panel; Future  -     US Retroperitoneal Complete; Future    Type 2 diabetes mellitus with hyperglycemia, without long-term current use of insulin  -     metFORMIN (GLUCOPHAGE) 500 MG tablet; Take 1 tablet (500 mg total) by mouth 2 (two) times daily with meals.  -     Renal function panel; Future  -     Hemoglobin A1c; Future    Need for vaccination against Streptococcus pneumoniae  -     (In Office Administered) Pneumococcal Conjugate Vaccine (13 Valent) (IM)    Encounter for screening mammogram for malignant neoplasm of breast  -      Cancel: Mammo Digital Screening Bilat; Future  -     Cancel: Mammo Digital Screening Bilat  -     Mammo Digital Screening Bilat; Future  -     Mammo Digital Screening Bilat    Continue monitoring blood pressure at home, low sodium diet.  Continue monitoring blood sugar at home,ADA diet.

## 2019-10-03 ENCOUNTER — TELEPHONE (OUTPATIENT)
Dept: FAMILY MEDICINE | Facility: CLINIC | Age: 64
End: 2019-10-03

## 2019-10-03 DIAGNOSIS — Z12.31 ENCOUNTER FOR SCREENING MAMMOGRAM FOR MALIGNANT NEOPLASM OF BREAST: Primary | ICD-10-CM

## 2019-10-04 ENCOUNTER — PATIENT MESSAGE (OUTPATIENT)
Dept: FAMILY MEDICINE | Facility: CLINIC | Age: 64
End: 2019-10-04

## 2019-10-04 ENCOUNTER — TELEPHONE (OUTPATIENT)
Dept: FAMILY MEDICINE | Facility: CLINIC | Age: 64
End: 2019-10-04

## 2019-10-04 DIAGNOSIS — E11.65 TYPE 2 DIABETES MELLITUS WITH HYPERGLYCEMIA, WITHOUT LONG-TERM CURRENT USE OF INSULIN: ICD-10-CM

## 2019-10-04 RX ORDER — METFORMIN HYDROCHLORIDE 750 MG/1
TABLET, EXTENDED RELEASE ORAL
Qty: 90 TABLET | Refills: 0 | Status: SHIPPED | OUTPATIENT
Start: 2019-10-04 | End: 2019-10-04

## 2019-10-04 RX ORDER — METFORMIN HYDROCHLORIDE 500 MG/1
500 TABLET ORAL 2 TIMES DAILY WITH MEALS
Qty: 60 TABLET | Refills: 0 | Status: SHIPPED | OUTPATIENT
Start: 2019-10-04 | End: 2019-10-06 | Stop reason: SDUPTHER

## 2019-10-04 NOTE — TELEPHONE ENCOUNTER
----- Message from Layla Lubin sent at 10/4/2019 12:13 PM CDT -----  Contact: Self [phone]  Patient is calling to get refills on her medication sent to [facility] [address] [place], [place] [address] [address] [phone] (Phone) [phone] (Fax)      1. metFORMIN (GLUCOPHAGE) 500 MG tablet 180 tablet

## 2019-10-06 ENCOUNTER — TELEPHONE (OUTPATIENT)
Dept: FAMILY MEDICINE | Facility: CLINIC | Age: 64
End: 2019-10-06

## 2019-10-06 DIAGNOSIS — E11.65 TYPE 2 DIABETES MELLITUS WITH HYPERGLYCEMIA, WITHOUT LONG-TERM CURRENT USE OF INSULIN: ICD-10-CM

## 2019-10-06 RX ORDER — METFORMIN HYDROCHLORIDE 500 MG/1
500 TABLET ORAL 2 TIMES DAILY WITH MEALS
Qty: 180 TABLET | Refills: 3 | Status: SHIPPED | OUTPATIENT
Start: 2019-10-06 | End: 2020-08-07

## 2019-10-08 ENCOUNTER — HOSPITAL ENCOUNTER (OUTPATIENT)
Dept: RADIOLOGY | Facility: HOSPITAL | Age: 64
Discharge: HOME | End: 2019-10-08
Attending: FAMILY MEDICINE
Payer: MEDICARE

## 2019-10-08 DIAGNOSIS — R94.4 DECREASED GFR: ICD-10-CM

## 2019-10-08 PROCEDURE — 76770 US EXAM ABDO BACK WALL COMP: CPT | Mod: TC,PO

## 2019-10-09 ENCOUNTER — TELEPHONE (OUTPATIENT)
Dept: FAMILY MEDICINE | Facility: CLINIC | Age: 64
End: 2019-10-09

## 2019-10-09 DIAGNOSIS — K76.0 FATTY LIVER: Primary | ICD-10-CM

## 2019-10-09 NOTE — TELEPHONE ENCOUNTER
Liver specialist referral was done.    Patient with evidence of fatty liver.    Please contact the patient with appointment.

## 2019-10-10 NOTE — TELEPHONE ENCOUNTER
Spoke with patient about lab result.  [facility]   Patient verbalized understanding on instruction given.

## 2019-10-14 ENCOUNTER — PATIENT OUTREACH (OUTPATIENT)
Dept: ADMINISTRATIVE | Facility: HOSPITAL | Age: 64
End: 2019-10-14

## 2019-10-29 ENCOUNTER — PATIENT OUTREACH (OUTPATIENT)
Dept: ADMINISTRATIVE | Facility: OTHER | Age: 64
End: 2019-10-29

## 2019-10-31 ENCOUNTER — OFFICE VISIT (OUTPATIENT)
Dept: HEPATOLOGY | Facility: CLINIC | Age: 64
End: 2019-10-31
Payer: MEDICARE

## 2019-10-31 ENCOUNTER — PROCEDURE VISIT (OUTPATIENT)
Dept: HEPATOLOGY | Facility: CLINIC | Age: 64
End: 2019-10-31
Attending: PHYSICIAN ASSISTANT
Payer: MEDICARE

## 2019-10-31 VITALS
WEIGHT: 127.88 LBS | SYSTOLIC BLOOD PRESSURE: 172 MMHG | DIASTOLIC BLOOD PRESSURE: 75 MMHG | HEIGHT: 63 IN | HEART RATE: 77 BPM | RESPIRATION RATE: 16 BRPM | BODY MASS INDEX: 22.66 KG/M2 | TEMPERATURE: 98 F

## 2019-10-31 DIAGNOSIS — R74.8 ELEVATED LIVER ENZYMES: Primary | ICD-10-CM

## 2019-10-31 DIAGNOSIS — I10 ESSENTIAL HYPERTENSION: ICD-10-CM

## 2019-10-31 DIAGNOSIS — R74.8 ELEVATED LIVER ENZYMES: ICD-10-CM

## 2019-10-31 DIAGNOSIS — E11.65 TYPE 2 DIABETES MELLITUS WITH HYPERGLYCEMIA, WITHOUT LONG-TERM CURRENT USE OF INSULIN: ICD-10-CM

## 2019-10-31 PROCEDURE — 99204 OFFICE O/P NEW MOD 45 MIN: CPT | Mod: S$GLB,,, | Performed by: PHYSICIAN ASSISTANT

## 2019-10-31 PROCEDURE — 99999 PR PBB SHADOW E&M-EST. PATIENT-LVL V: ICD-10-PCS | Mod: PBBFAC,,, | Performed by: PHYSICIAN ASSISTANT

## 2019-10-31 PROCEDURE — 99499 UNLISTED E&M SERVICE: CPT | Mod: S$GLB,,, | Performed by: PHYSICIAN ASSISTANT

## 2019-10-31 PROCEDURE — 3078F PR MOST RECENT DIASTOLIC BLOOD PRESSURE < 80 MM HG: ICD-10-PCS | Mod: CPTII,S$GLB,, | Performed by: PHYSICIAN ASSISTANT

## 2019-10-31 PROCEDURE — 3077F PR MOST RECENT SYSTOLIC BLOOD PRESSURE >= 140 MM HG: ICD-10-PCS | Mod: CPTII,S$GLB,, | Performed by: PHYSICIAN ASSISTANT

## 2019-10-31 PROCEDURE — 3078F DIAST BP <80 MM HG: CPT | Mod: CPTII,S$GLB,, | Performed by: PHYSICIAN ASSISTANT

## 2019-10-31 PROCEDURE — 91200 LIVER ELASTOGRAPHY: CPT | Mod: S$GLB,,, | Performed by: PHYSICIAN ASSISTANT

## 2019-10-31 PROCEDURE — 99204 PR OFFICE/OUTPT VISIT, NEW, LEVL IV, 45-59 MIN: ICD-10-PCS | Mod: S$GLB,,, | Performed by: PHYSICIAN ASSISTANT

## 2019-10-31 PROCEDURE — 3077F SYST BP >= 140 MM HG: CPT | Mod: CPTII,S$GLB,, | Performed by: PHYSICIAN ASSISTANT

## 2019-10-31 PROCEDURE — 99999 PR PBB SHADOW E&M-EST. PATIENT-LVL V: CPT | Mod: PBBFAC,,, | Performed by: PHYSICIAN ASSISTANT

## 2019-10-31 PROCEDURE — 91200 PR LIVER ELASTOGRAPHY W/OUT IMAG W/INTERP & REPORT: ICD-10-PCS | Mod: S$GLB,,, | Performed by: PHYSICIAN ASSISTANT

## 2019-10-31 PROCEDURE — 99499 RISK ADDL DX/OHS AUDIT: ICD-10-PCS | Mod: S$GLB,,, | Performed by: PHYSICIAN ASSISTANT

## 2019-10-31 PROCEDURE — 1101F PR PT FALLS ASSESS DOC 0-1 FALLS W/OUT INJ PAST YR: ICD-10-PCS | Mod: CPTII,S$GLB,, | Performed by: PHYSICIAN ASSISTANT

## 2019-10-31 PROCEDURE — 1101F PT FALLS ASSESS-DOCD LE1/YR: CPT | Mod: CPTII,S$GLB,, | Performed by: PHYSICIAN ASSISTANT

## 2019-10-31 NOTE — PROGRESS NOTES
I have personally performed a face to face diagnostic evaluation on this patient. I have reviewed and agree with today's findings and the care plan outlined by Jose Olvera PA-C      My findings are as follows:  Patient presents with likely NAFLD.    Strong hx of metabolic syndrome with DM/hypertension and CAD  FH of liver tx    - labs and US + fibroscan      she will return to Jose Olvera PA-C  for follow-up.

## 2019-10-31 NOTE — PROGRESS NOTES
HEPATOLOGY CLINIC VISIT NOTE     REFERRING PROVIDER: Dr. Andrei Wilson*    REASON FOR VISIT: elevated liver enzymes     HISTORY: This is a 64 y.o. White female here for evaluation of elevated liver enzymes, referred by PCP. She is here with . Reports a h/o fatty liver on U/S in past. She has mildly elevated liver enzymes.  ON most recent labs, AST 34, ALT 52. Elevated since at least 09/2018.     PMH of HTN, DMII BMI is 22. H/o LBBB    Pt denies signs of hepatic decompensation including: jaundice, dark urine, abdominal distention, hematemesis, melena, slowed mentation.    Brother with cirrhosis, s/p txp.     No formal staging     Liver staging:  No formal staging     Past Medical History:   Diagnosis Date    Diabetes mellitus, type 2     GERD (gastroesophageal reflux disease)     Hyperlipidemia     Hypertension     Multiple thyroid nodules 1/21/2019     Past Surgical History:   Procedure Laterality Date    ANGIOCARDIOGRAPHY OF LEFT VENTRICLE N/A 6/17/2019    Procedure: Left ventriculography;  Surgeon: Gab Calderon MD;  Location: [facility] CATH LAB/EP;  Service: Cardiology;  Laterality: N/A;    CORONARY ANGIOGRAPHY N/A 6/17/2019    Procedure: ANGIOGRAM, CORONARY ARTERY;  Surgeon: Gab Calderon MD;  Location: [facility] CATH LAB/EP;  Service: Cardiology;  Laterality: N/A;    HYSTERECTOMY      LEFT HEART CATHETERIZATION N/A 6/17/2019    Procedure: Left heart cath;  Surgeon: Gab Calderon MD;  Location: [facility] CATH LAB/EP;  Service: Cardiology;  Laterality: N/A;    TONSILLECTOMY       FAMILY HISTORY  Brother s/p LT, cirrhosis    SOCIAL HISTORY:   Social History     Tobacco Use   Smoking Status Never Smoker   Smokeless Tobacco Never Used       Social History     Substance and Sexual Activity   Alcohol Use No    Frequency: Never    Drinks per session: Patient refused    Binge frequency: Never     Social History     Substance and Sexual Activity   Drug Use No     ROS:   No fever, chills, weight  loss, fatigue  No chest pain, palpitations, dyspnea, cough  No abdominal pain, nausea, vomiting  No headaches, visual changes  No lower extremity edema  No depression or anxiety    PHYSICAL EXAM:  Friendly White female, in no acute distress; alert and oriented to person, place and time  VITALS: reviewed  HEENT: Sclerae anicteric.   NECK: Supple  CVS: Regular rate and rhythm. No murmurs  LUNGS: Normal respiratory effort. Clear bilaterally  ABDOMEN: Flat, soft, nontender. No organomegaly or masses. No ascites or hernias.   SKIN: Warm and dry. No jaundice, No obvious rashes.   EXTREMITIES: No lower extremity edema  NEURO/PSYCH: Normal gate. Memory intact. Thought and speech pattern appropriate. Behavior normal. No depression or anxiety noted.    RECENT LABS:  Lab Results   Component Value Date    WBC 6.98 06/17/2019    HGB 14.6 06/17/2019     06/17/2019     No results found for: INR  Lab Results   Component Value Date    AST 34 08/30/2019    ALT 52 (H) 08/30/2019    BILITOT 0.7 08/30/2019    ALBUMIN 4.2 08/30/2019    ALKPHOS 94 08/30/2019    CREATININE 1.0 08/30/2019    BUN 20 08/30/2019     08/30/2019    K 4.1 08/30/2019     RECENT IMAGING:  none    ASSESSMENT  64 y.o. White female with:  1. ELEVATED LIVER ENZYMES  -- suspect NAFLD/ROMERO  -- limited serological eval, negative HCV testing  -- ILEANA D  -- discussed exercise, diet, and lifestyle modification  -- needs better a1c controlled  2. DMII  3. HTN      EDUCATION:  We discussed the manifestations of NAFLD. At this time there is no FDA approved therapy for NAFLD.   The patient and I discussed the importance of diet, exercise, and weight loss for management of NAFLD. We discussed a low fat, low carb/low sugar, high fiber diet, and a goal of 30 minutes of exercise 5 times per week.   Pt is aware that fatty liver can progress to steatohepatitis and possibly to cirrhosis, putting one at increased risk for liver cancer, liver failure, and death.        PLAN:  1. Labs today  2. Fibroscan today  3. U/S   4. F/u TBD, likely 1 year     Thank you for allowing me to participate in the care of Tomeka Olvera PA-C

## 2019-10-31 NOTE — LETTER
October 31, 2019      Andrei Miller MD  [address] [place] [address]           [facility] - Hepatology  [address]  [place] [address]  Phone: [phone]  Fax: [phone]          Patient: Tomeka Burns   MR Number: [id number]   YOB: 1955   Date of Visit: 10/31/2019       Dear Dr. Andrei Miller:    Thank you for referring Tomeka Burns to me for evaluation. Attached you will find relevant portions of my assessment and plan of care.    If you have questions, please do not hesitate to call me. I look forward to following Tomeka Burns along with you.    Sincerely,    Jose Olvera PA-C    Enclosure  CC:  No Recipients    If you would like to receive this communication electronically, please contact [email or web address] or [phone] to request more information on [employer] access.    For providers and/or their staff who would like to refer a patient to [facility], please contact us through our one-stop-shop provider referral line, [facility], at [phone].    If you feel you have received this communication in error or would no longer like to receive these types of communications, please e-mail [email or web address]

## 2019-11-01 NOTE — PROCEDURES
Procedures   Vibration-controlled Transient Elastography Procedure (Fibroscan)    Name: Tomeka Burns  Date of Procedure : 2019   :: Jose Olvera PA-C  Diagnosis: NAFLD    Probe: M    Findings  Median liver stiffness score: 7.1 KPa  CAP readin dB/m    IQR/med: 14 %    Interpretation  Fibrosis interpretation is based on medial liver stiffness - Kilopascal (kPa).     Fibrosis stage: F1     Steatosis interpretation is based on controlled attenuation parameter - (dB/m).    Steatosis grade: >S3       Jose Olvera PA-C  Hepatology/HCV  [facility]

## 2019-11-04 ENCOUNTER — HOSPITAL ENCOUNTER (OUTPATIENT)
Dept: RADIOLOGY | Facility: HOSPITAL | Age: 64
Discharge: HOME | End: 2019-11-04
Attending: PHYSICIAN ASSISTANT
Payer: MEDICARE

## 2019-11-04 DIAGNOSIS — R74.8 ELEVATED LIVER ENZYMES: ICD-10-CM

## 2019-11-04 PROCEDURE — 76700 US EXAM ABDOM COMPLETE: CPT | Mod: TC,PO

## 2019-11-07 ENCOUNTER — TELEPHONE (OUTPATIENT)
Dept: HEPATOLOGY | Facility: CLINIC | Age: 64
End: 2019-11-07

## 2019-11-07 NOTE — TELEPHONE ENCOUNTER
----- Message from Jose Olvera PA-C sent at 11/7/2019  9:09 AM CST -----  F/u in 6 months, please schedule  thanks

## 2019-11-14 ENCOUNTER — CLINICAL SUPPORT (OUTPATIENT)
Dept: FAMILY MEDICINE | Facility: CLINIC | Age: 64
End: 2019-11-14
Payer: MEDICARE

## 2019-11-14 DIAGNOSIS — Z23 NEED FOR HEPATITIS B VACCINATION: Primary | ICD-10-CM

## 2019-11-14 PROCEDURE — 90471 IMMUNIZATION ADMIN: CPT | Mod: PBBFAC,PO

## 2019-11-15 ENCOUNTER — TELEPHONE (OUTPATIENT)
Dept: FAMILY MEDICINE | Facility: CLINIC | Age: 64
End: 2019-11-15

## 2019-11-15 DIAGNOSIS — Z23 NEED FOR VACCINATION: Primary | ICD-10-CM

## 2019-11-15 NOTE — TELEPHONE ENCOUNTER
Discussed with patient that during a routine audit we discovered a variation in the temperature of our medication refrigerator which may have affected the potency and effectiveness of our vaccines. The vaccine was not harmful but may have been ineffective so we recommend revaccination. Appointment set up for revaccination on 11/22/19. She verbalized understanding and all questions were answered.     Melinda Espinosa, NP

## 2019-11-19 DIAGNOSIS — I10 ESSENTIAL HYPERTENSION: ICD-10-CM

## 2019-11-19 DIAGNOSIS — E11.65 TYPE 2 DIABETES MELLITUS WITH HYPERGLYCEMIA, WITHOUT LONG-TERM CURRENT USE OF INSULIN: ICD-10-CM

## 2019-11-19 DIAGNOSIS — E78.5 DYSLIPIDEMIA: ICD-10-CM

## 2019-11-19 DIAGNOSIS — K21.9 GASTROESOPHAGEAL REFLUX DISEASE WITHOUT ESOPHAGITIS: ICD-10-CM

## 2019-11-19 RX ORDER — LOVASTATIN 20 MG/1
20 TABLET ORAL NIGHTLY
Qty: 90 TABLET | Refills: 3 | Status: SHIPPED | OUTPATIENT
Start: 2019-11-19 | End: 2020-11-09 | Stop reason: SDUPTHER

## 2019-11-19 RX ORDER — OMEPRAZOLE 20 MG/1
20 CAPSULE, DELAYED RELEASE ORAL
Qty: 90 CAPSULE | Refills: 3 | Status: SHIPPED | OUTPATIENT
Start: 2019-11-19 | End: 2020-03-04

## 2019-11-19 RX ORDER — LOSARTAN POTASSIUM 100 MG/1
100 TABLET ORAL DAILY
Qty: 90 TABLET | Refills: 3 | Status: SHIPPED | OUTPATIENT
Start: 2019-11-19 | End: 2020-11-09 | Stop reason: SDUPTHER

## 2019-11-19 RX ORDER — ASPIRIN 81 MG/1
81 TABLET ORAL DAILY
Qty: 90 TABLET | Refills: 3 | Status: SHIPPED | OUTPATIENT
Start: 2019-11-19 | End: 2021-10-25 | Stop reason: SDUPTHER

## 2019-11-22 ENCOUNTER — CLINICAL SUPPORT (OUTPATIENT)
Dept: FAMILY MEDICINE | Facility: CLINIC | Age: 64
End: 2019-11-22
Payer: MEDICARE

## 2019-11-22 PROCEDURE — 90471 IMMUNIZATION ADMIN: CPT | Mod: PBBFAC,PO

## 2019-11-22 PROCEDURE — 99999 PR PBB SHADOW E&M-EST. PATIENT-LVL I: ICD-10-PCS | Mod: PBBFAC,,,

## 2019-11-22 PROCEDURE — 99999 PR PBB SHADOW E&M-EST. PATIENT-LVL I: CPT | Mod: PBBFAC,,,

## 2020-01-04 ENCOUNTER — PATIENT OUTREACH (OUTPATIENT)
Dept: ADMINISTRATIVE | Facility: OTHER | Age: 65
End: 2020-01-04

## 2020-01-06 ENCOUNTER — OFFICE VISIT (OUTPATIENT)
Dept: ENDOCRINOLOGY | Facility: CLINIC | Age: 65
End: 2020-01-06
Payer: MEDICARE

## 2020-01-06 ENCOUNTER — PATIENT MESSAGE (OUTPATIENT)
Dept: ENDOCRINOLOGY | Facility: CLINIC | Age: 65
End: 2020-01-06

## 2020-01-06 ENCOUNTER — LAB VISIT (OUTPATIENT)
Dept: LAB | Facility: HOSPITAL | Age: 65
End: 2020-01-06
Attending: INTERNAL MEDICINE
Payer: MEDICARE

## 2020-01-06 VITALS
HEIGHT: 63 IN | DIASTOLIC BLOOD PRESSURE: 76 MMHG | WEIGHT: 125.44 LBS | HEART RATE: 80 BPM | BODY MASS INDEX: 22.23 KG/M2 | SYSTOLIC BLOOD PRESSURE: 148 MMHG | RESPIRATION RATE: 18 BRPM

## 2020-01-06 DIAGNOSIS — G62.9 NEUROPATHY: ICD-10-CM

## 2020-01-06 DIAGNOSIS — E11.65 TYPE 2 DIABETES MELLITUS WITH HYPERGLYCEMIA, WITHOUT LONG-TERM CURRENT USE OF INSULIN: ICD-10-CM

## 2020-01-06 DIAGNOSIS — E04.2 MULTIPLE THYROID NODULES: Primary | ICD-10-CM

## 2020-01-06 DIAGNOSIS — I10 ESSENTIAL HYPERTENSION: ICD-10-CM

## 2020-01-06 DIAGNOSIS — E78.5 HYPERLIPIDEMIA, UNSPECIFIED HYPERLIPIDEMIA TYPE: ICD-10-CM

## 2020-01-06 DIAGNOSIS — D51.9 ANEMIA DUE TO VITAMIN B12 DEFICIENCY, UNSPECIFIED B12 DEFICIENCY TYPE: ICD-10-CM

## 2020-01-06 LAB
ESTIMATED AVG GLUCOSE: 151 MG/DL (ref 68–131)
HBA1C MFR BLD HPLC: 6.9 % (ref 4–5.6)
VIT B12 SERPL-MCNC: 801 PG/ML (ref 210–950)

## 2020-01-06 PROCEDURE — 3077F SYST BP >= 140 MM HG: CPT | Mod: CPTII,S$GLB,, | Performed by: INTERNAL MEDICINE

## 2020-01-06 PROCEDURE — 1126F PR PAIN SEVERITY QUANTIFIED, NO PAIN PRESENT: ICD-10-PCS | Mod: S$GLB,,, | Performed by: INTERNAL MEDICINE

## 2020-01-06 PROCEDURE — 3077F PR MOST RECENT SYSTOLIC BLOOD PRESSURE >= 140 MM HG: ICD-10-PCS | Mod: CPTII,S$GLB,, | Performed by: INTERNAL MEDICINE

## 2020-01-06 PROCEDURE — 99214 OFFICE O/P EST MOD 30 MIN: CPT | Mod: S$GLB,,, | Performed by: INTERNAL MEDICINE

## 2020-01-06 PROCEDURE — 99999 PR PBB SHADOW E&M-EST. PATIENT-LVL IV: ICD-10-PCS | Mod: PBBFAC,,, | Performed by: INTERNAL MEDICINE

## 2020-01-06 PROCEDURE — 1159F PR MEDICATION LIST DOCUMENTED IN MEDICAL RECORD: ICD-10-PCS | Mod: S$GLB,,, | Performed by: INTERNAL MEDICINE

## 2020-01-06 PROCEDURE — 3078F DIAST BP <80 MM HG: CPT | Mod: CPTII,S$GLB,, | Performed by: INTERNAL MEDICINE

## 2020-01-06 PROCEDURE — 36415 COLL VENOUS BLD VENIPUNCTURE: CPT

## 2020-01-06 PROCEDURE — 3078F PR MOST RECENT DIASTOLIC BLOOD PRESSURE < 80 MM HG: ICD-10-PCS | Mod: CPTII,S$GLB,, | Performed by: INTERNAL MEDICINE

## 2020-01-06 PROCEDURE — 99214 PR OFFICE/OUTPT VISIT, EST, LEVL IV, 30-39 MIN: ICD-10-PCS | Mod: S$GLB,,, | Performed by: INTERNAL MEDICINE

## 2020-01-06 PROCEDURE — 99999 PR PBB SHADOW E&M-EST. PATIENT-LVL IV: CPT | Mod: PBBFAC,,, | Performed by: INTERNAL MEDICINE

## 2020-01-06 PROCEDURE — 1101F PR PT FALLS ASSESS DOC 0-1 FALLS W/OUT INJ PAST YR: ICD-10-PCS | Mod: CPTII,S$GLB,, | Performed by: INTERNAL MEDICINE

## 2020-01-06 PROCEDURE — 1159F MED LIST DOCD IN RCRD: CPT | Mod: S$GLB,,, | Performed by: INTERNAL MEDICINE

## 2020-01-06 PROCEDURE — 83036 HEMOGLOBIN GLYCOSYLATED A1C: CPT

## 2020-01-06 PROCEDURE — 1101F PT FALLS ASSESS-DOCD LE1/YR: CPT | Mod: CPTII,S$GLB,, | Performed by: INTERNAL MEDICINE

## 2020-01-06 PROCEDURE — 82607 VITAMIN B-12: CPT

## 2020-01-06 PROCEDURE — 1126F AMNT PAIN NOTED NONE PRSNT: CPT | Mod: S$GLB,,, | Performed by: INTERNAL MEDICINE

## 2020-01-06 NOTE — ASSESSMENT & PLAN NOTE
Repeat A1c today  Pending result consider need for uptitration of metformin  If needs additional agent would consider DPP4  Discussed dietary modification as relatively high carbohydrate diet
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
## 2020-01-06 NOTE — ASSESSMENT & PLAN NOTE
Obtain repeat US now. Previous thyroid function and uptake and scan normal  Discussed possible need for FNA pending results  Discussed possible outcomes of FNA including malignancy, benign, unsatisfactory, indeterminate  Discussed indications for surgery

## 2020-01-08 ENCOUNTER — HOSPITAL ENCOUNTER (OUTPATIENT)
Dept: RADIOLOGY | Facility: HOSPITAL | Age: 65
Discharge: HOME | End: 2020-01-08
Attending: INTERNAL MEDICINE
Payer: MEDICARE

## 2020-01-08 DIAGNOSIS — E04.2 MULTIPLE THYROID NODULES: ICD-10-CM

## 2020-01-08 PROCEDURE — 76536 US EXAM OF HEAD AND NECK: CPT | Mod: TC,PO

## 2020-01-10 ENCOUNTER — PATIENT MESSAGE (OUTPATIENT)
Dept: ENDOCRINOLOGY | Facility: CLINIC | Age: 65
End: 2020-01-10

## 2020-01-10 DIAGNOSIS — E04.2 MULTIPLE THYROID NODULES: Primary | ICD-10-CM

## 2020-01-15 ENCOUNTER — PATIENT OUTREACH (OUTPATIENT)
Dept: ADMINISTRATIVE | Facility: HOSPITAL | Age: 65
End: 2020-01-15

## 2020-02-07 ENCOUNTER — OFFICE VISIT (OUTPATIENT)
Dept: CARDIOLOGY | Facility: CLINIC | Age: 65
End: 2020-02-07
Payer: MEDICARE

## 2020-02-07 ENCOUNTER — HOSPITAL ENCOUNTER (OUTPATIENT)
Dept: ENDOCRINOLOGY | Facility: CLINIC | Age: 65
Discharge: HOME | End: 2020-02-07
Attending: INTERNAL MEDICINE
Payer: MEDICARE

## 2020-02-07 VITALS
BODY MASS INDEX: 22.44 KG/M2 | DIASTOLIC BLOOD PRESSURE: 84 MMHG | HEIGHT: 63 IN | WEIGHT: 126.63 LBS | OXYGEN SATURATION: 98 % | SYSTOLIC BLOOD PRESSURE: 168 MMHG | HEART RATE: 74 BPM

## 2020-02-07 DIAGNOSIS — R94.39 ABNORMAL STRESS TEST: ICD-10-CM

## 2020-02-07 DIAGNOSIS — E11.65 TYPE 2 DIABETES MELLITUS WITH HYPERGLYCEMIA, WITHOUT LONG-TERM CURRENT USE OF INSULIN: ICD-10-CM

## 2020-02-07 DIAGNOSIS — E78.5 HYPERLIPIDEMIA, UNSPECIFIED HYPERLIPIDEMIA TYPE: ICD-10-CM

## 2020-02-07 DIAGNOSIS — E04.2 MULTIPLE THYROID NODULES: ICD-10-CM

## 2020-02-07 DIAGNOSIS — I44.7 LEFT BUNDLE BRANCH BLOCK: Primary | ICD-10-CM

## 2020-02-07 DIAGNOSIS — I10 ESSENTIAL HYPERTENSION: ICD-10-CM

## 2020-02-07 DIAGNOSIS — E04.1 THYROID NODULE: Primary | ICD-10-CM

## 2020-02-07 PROCEDURE — 99999 PR PBB SHADOW E&M-EST. PATIENT-LVL III: CPT | Mod: PBBFAC,,, | Performed by: INTERNAL MEDICINE

## 2020-02-07 PROCEDURE — 99214 OFFICE O/P EST MOD 30 MIN: CPT | Mod: S$GLB,,, | Performed by: INTERNAL MEDICINE

## 2020-02-07 PROCEDURE — 1126F PR PAIN SEVERITY QUANTIFIED, NO PAIN PRESENT: ICD-10-PCS | Mod: S$GLB,,, | Performed by: INTERNAL MEDICINE

## 2020-02-07 PROCEDURE — 1159F PR MEDICATION LIST DOCUMENTED IN MEDICAL RECORD: ICD-10-PCS | Mod: S$GLB,,, | Performed by: INTERNAL MEDICINE

## 2020-02-07 PROCEDURE — 1101F PT FALLS ASSESS-DOCD LE1/YR: CPT | Mod: CPTII,S$GLB,, | Performed by: INTERNAL MEDICINE

## 2020-02-07 PROCEDURE — 99214 PR OFFICE/OUTPT VISIT, EST, LEVL IV, 30-39 MIN: ICD-10-PCS | Mod: S$GLB,,, | Performed by: INTERNAL MEDICINE

## 2020-02-07 PROCEDURE — 1101F PR PT FALLS ASSESS DOC 0-1 FALLS W/OUT INJ PAST YR: ICD-10-PCS | Mod: CPTII,S$GLB,, | Performed by: INTERNAL MEDICINE

## 2020-02-07 PROCEDURE — 10005 US FINE NEEDLE ASPIRATION BIOPSY, FIRST LESION: ICD-10-PCS | Mod: S$GLB,,, | Performed by: INTERNAL MEDICINE

## 2020-02-07 PROCEDURE — 1126F AMNT PAIN NOTED NONE PRSNT: CPT | Mod: S$GLB,,, | Performed by: INTERNAL MEDICINE

## 2020-02-07 PROCEDURE — 3044F PR MOST RECENT HEMOGLOBIN A1C LEVEL <7.0%: ICD-10-PCS | Mod: CPTII,S$GLB,, | Performed by: INTERNAL MEDICINE

## 2020-02-07 PROCEDURE — 10005 FNA BX W/US GDN 1ST LES: CPT | Mod: S$GLB,,, | Performed by: INTERNAL MEDICINE

## 2020-02-07 PROCEDURE — 88173 CYTOPATH EVAL FNA REPORT: CPT | Mod: 26,,, | Performed by: PATHOLOGY

## 2020-02-07 PROCEDURE — 99499 UNLISTED E&M SERVICE: CPT | Mod: S$GLB,,, | Performed by: INTERNAL MEDICINE

## 2020-02-07 PROCEDURE — 88173 CYTOPATH EVAL FNA REPORT: CPT | Performed by: PATHOLOGY

## 2020-02-07 PROCEDURE — 99499 RISK ADDL DX/OHS AUDIT: ICD-10-PCS | Mod: S$GLB,,, | Performed by: INTERNAL MEDICINE

## 2020-02-07 PROCEDURE — 3079F PR MOST RECENT DIASTOLIC BLOOD PRESSURE 80-89 MM HG: ICD-10-PCS | Mod: CPTII,S$GLB,, | Performed by: INTERNAL MEDICINE

## 2020-02-07 PROCEDURE — 99999 PR PBB SHADOW E&M-EST. PATIENT-LVL III: ICD-10-PCS | Mod: PBBFAC,,, | Performed by: INTERNAL MEDICINE

## 2020-02-07 PROCEDURE — 3044F HG A1C LEVEL LT 7.0%: CPT | Mod: CPTII,S$GLB,, | Performed by: INTERNAL MEDICINE

## 2020-02-07 PROCEDURE — 1159F MED LIST DOCD IN RCRD: CPT | Mod: S$GLB,,, | Performed by: INTERNAL MEDICINE

## 2020-02-07 PROCEDURE — 3077F SYST BP >= 140 MM HG: CPT | Mod: CPTII,S$GLB,, | Performed by: INTERNAL MEDICINE

## 2020-02-07 PROCEDURE — 88173 PR  INTERPRETATION OF FNA SMEAR: ICD-10-PCS | Mod: 26,,, | Performed by: PATHOLOGY

## 2020-02-07 PROCEDURE — 3079F DIAST BP 80-89 MM HG: CPT | Mod: CPTII,S$GLB,, | Performed by: INTERNAL MEDICINE

## 2020-02-07 PROCEDURE — 3077F PR MOST RECENT SYSTOLIC BLOOD PRESSURE >= 140 MM HG: ICD-10-PCS | Mod: CPTII,S$GLB,, | Performed by: INTERNAL MEDICINE

## 2020-02-07 NOTE — PROGRESS NOTES
Subjective:    Patient ID:  Tomeka Burns is a 64 y.o. female who presents for follow-up of Hypertension      HPI    63 y/o Tamazight speaking female with hx of HTN, DM, LBBB who presents for f/u. Had developed mild nonspecific, left sided sharp chest pain, unrelated to exertion, sent for exercise stress echo, however, found to have LBBB and stress portion stopped. 2DE with normal EF, DD, no WMA's. Claims to have had a stress test years ago and was told she had a mild blockage. Has some intermittent TROTTER and palps. Had nuclear SPECT with fixed perfusion defect in the septal wall of the left ventricle and moderate apical thinning. Had LHC with normal coronaries, normal EF and DD. Holter with no significant abnormalities. Has had extensive cardiac w/u. Was started on HCTZ.   BP log available and SBP averaging in the 120-130's. Elevated in clinic today, but controlled at home.   Denies orthopnea, PND, syncope. Has intermittent dizziness that she thinks may be due to low blood sugar. Compliant with meds, had been attempting to  exercise with walking and stationary bike. Non smoker. No fam hx of early CAD, although mother had valve replaced at about 56 y/o.    Review of Systems   Constitution: Negative for malaise/fatigue.   HENT: Negative for congestion.    Eyes: Negative for blurred vision.   Cardiovascular: Negative for chest pain, claudication, cyanosis, dyspnea on exertion, irregular heartbeat, leg swelling, near-syncope, orthopnea, palpitations, paroxysmal nocturnal dyspnea and syncope.   Respiratory: Negative for shortness of breath.    Endocrine: Negative for polyuria.   Hematologic/Lymphatic: Negative for bleeding problem.   Skin: Negative for itching and rash.   Musculoskeletal: Negative for joint swelling, muscle cramps and muscle weakness.   Gastrointestinal: Negative for abdominal pain, hematemesis, hematochezia, melena, nausea and vomiting.   Genitourinary: Negative for dysuria and hematuria.   Neurological:  Positive for dizziness. Negative for focal weakness, headaches, light-headedness, loss of balance and weakness.   Psychiatric/Behavioral: Negative for depression. The patient is not nervous/anxious.         Objective:    Physical Exam   Constitutional: She is oriented to person, place, and time. She appears well-developed and well-nourished.   HENT:   Head: Normocephalic and atraumatic.   Neck: Neck supple. No JVD present.   Cardiovascular: Normal rate, regular rhythm and normal heart sounds.   Pulses:       Carotid pulses are 2+ on the right side, and 2+ on the left side.       Radial pulses are 2+ on the right side, and 2+ on the left side.        Femoral pulses are 2+ on the right side, and 2+ on the left side.       Dorsalis pedis pulses are 2+ on the right side, and 2+ on the left side.        Posterior tibial pulses are 2+ on the right side, and 2+ on the left side.   Pulmonary/Chest: Effort normal and breath sounds normal.   Abdominal: Soft. Bowel sounds are normal.   Musculoskeletal: She exhibits no edema.   Neurological: She is alert and oriented to person, place, and time.   Skin: Skin is warm and dry.   Psychiatric: She has a normal mood and affect. Her behavior is normal. Thought content normal.         Assessment:       1. Left bundle branch block    2. Essential hypertension    3. Hyperlipidemia, unspecified hyperlipidemia type    4. Abnormal stress test    5. Type 2 diabetes mellitus with hyperglycemia, without long-term current use of insulin    6. Multiple thyroid nodules      63 y/o pt with hx and presentation as above. Doing well from a cardiac perspective and compensated from a HF perspective. Extensive cardiac w/u. BP controlled at home. Discussed the etiology, evaluation, and management of HTN, HLD, DM, and LBBB. Discussed the importance of med compliance, heart healthy diet, and regular exercise.      Plan:       -Continue current medical management  -f/u every 1-2 years

## 2020-02-11 ENCOUNTER — TELEPHONE (OUTPATIENT)
Dept: ENDOCRINOLOGY | Facility: CLINIC | Age: 65
End: 2020-02-11

## 2020-02-11 DIAGNOSIS — E04.1 THYROID NODULE: Primary | ICD-10-CM

## 2020-02-11 LAB — FINAL PATHOLOGIC DIAGNOSIS: ABNORMAL

## 2020-02-11 NOTE — TELEPHONE ENCOUNTER
Called to discuss results of FNA- FLUS. Discussed with her and her daughter. Recommend repeat FNA with holding for molecular markers and with further management pending outcome of FNA/markers. Discussed indications for monitoring or surgery

## 2020-02-12 ENCOUNTER — TELEPHONE (OUTPATIENT)
Dept: FAMILY MEDICINE | Facility: CLINIC | Age: 65
End: 2020-02-12

## 2020-02-12 DIAGNOSIS — I10 ESSENTIAL HYPERTENSION: ICD-10-CM

## 2020-02-12 DIAGNOSIS — E11.65 TYPE 2 DIABETES MELLITUS WITH HYPERGLYCEMIA, WITHOUT LONG-TERM CURRENT USE OF INSULIN: Primary | ICD-10-CM

## 2020-02-12 NOTE — TELEPHONE ENCOUNTER
----- Message from Anisa Head sent at 2/12/2020  2:07 PM CST -----  Contact: [phone]/self   Patient is requesting orders for lab work to be done before appointment on 02-28-20. Please advise.

## 2020-02-13 NOTE — TELEPHONE ENCOUNTER
Spoke to patient and appointment was set up for February 26. Patient voices understanding. Also appointment was change to March 04, 2020.

## 2020-02-26 ENCOUNTER — LAB VISIT (OUTPATIENT)
Dept: LAB | Facility: HOSPITAL | Age: 65
End: 2020-02-26
Attending: FAMILY MEDICINE
Payer: MEDICARE

## 2020-02-26 DIAGNOSIS — E11.65 TYPE 2 DIABETES MELLITUS WITH HYPERGLYCEMIA, WITHOUT LONG-TERM CURRENT USE OF INSULIN: ICD-10-CM

## 2020-02-26 DIAGNOSIS — I10 ESSENTIAL HYPERTENSION: ICD-10-CM

## 2020-02-26 LAB
ALBUMIN SERPL BCP-MCNC: 4.4 G/DL (ref 3.5–5.2)
ALP SERPL-CCNC: 94 U/L (ref 38–126)
ALT SERPL W/O P-5'-P-CCNC: 37 U/L (ref 10–44)
ANION GAP SERPL CALC-SCNC: 11 MMOL/L (ref 8–16)
AST SERPL-CCNC: 34 U/L (ref 15–46)
BASOPHILS # BLD AUTO: 0.03 K/UL (ref 0–0.2)
BASOPHILS NFR BLD: 0.3 % (ref 0–1.9)
BILIRUB SERPL-MCNC: 0.8 MG/DL (ref 0.1–1)
BUN SERPL-MCNC: 27 MG/DL (ref 7–17)
CALCIUM SERPL-MCNC: 9.6 MG/DL (ref 8.7–10.5)
CHLORIDE SERPL-SCNC: 96 MMOL/L (ref 95–110)
CHOLEST SERPL-MCNC: 188 MG/DL (ref 120–199)
CHOLEST/HDLC SERPL: 4.6 {RATIO} (ref 2–5)
CO2 SERPL-SCNC: 27 MMOL/L (ref 23–29)
CREAT SERPL-MCNC: 0.77 MG/DL (ref 0.5–1.4)
DIFFERENTIAL METHOD: NORMAL
EOSINOPHIL # BLD AUTO: 0.3 K/UL (ref 0–0.5)
EOSINOPHIL NFR BLD: 3.5 % (ref 0–8)
ERYTHROCYTE [DISTWIDTH] IN BLOOD BY AUTOMATED COUNT: 11.9 % (ref 11.5–14.5)
EST. GFR  (AFRICAN AMERICAN): >60 ML/MIN/1.73 M^2
EST. GFR  (NON AFRICAN AMERICAN): >60 ML/MIN/1.73 M^2
ESTIMATED AVG GLUCOSE: 143 MG/DL (ref 68–131)
GLUCOSE SERPL-MCNC: 132 MG/DL (ref 70–110)
HBA1C MFR BLD HPLC: 6.6 % (ref 4–5.6)
HCT VFR BLD AUTO: 39.1 % (ref 37–48.5)
HDLC SERPL-MCNC: 41 MG/DL (ref 40–75)
HDLC SERPL: 21.8 % (ref 20–50)
HGB BLD-MCNC: 13 G/DL (ref 12–16)
IMM GRANULOCYTES # BLD AUTO: 0.02 K/UL (ref 0–0.04)
IMM GRANULOCYTES NFR BLD AUTO: 0.2 % (ref 0–0.5)
LDLC SERPL CALC-MCNC: 114.4 MG/DL (ref 63–159)
LYMPHOCYTES # BLD AUTO: 3.6 K/UL (ref 1–4.8)
LYMPHOCYTES NFR BLD: 39.5 % (ref 18–48)
MCH RBC QN AUTO: 28.3 PG (ref 27–31)
MCHC RBC AUTO-ENTMCNC: 33.2 G/DL (ref 32–36)
MCV RBC AUTO: 85 FL (ref 82–98)
MONOCYTES # BLD AUTO: 0.7 K/UL (ref 0.3–1)
MONOCYTES NFR BLD: 7.2 % (ref 4–15)
NEUTROPHILS # BLD AUTO: 4.5 K/UL (ref 1.8–7.7)
NEUTROPHILS NFR BLD: 49.3 % (ref 38–73)
NONHDLC SERPL-MCNC: 147 MG/DL
NRBC BLD-RTO: 0 /100 WBC
PLATELET # BLD AUTO: 280 K/UL (ref 150–350)
PMV BLD AUTO: 10 FL (ref 9.2–12.9)
POTASSIUM SERPL-SCNC: 4.5 MMOL/L (ref 3.5–5.1)
PROT SERPL-MCNC: 7.7 G/DL (ref 6–8.4)
RBC # BLD AUTO: 4.59 M/UL (ref 4–5.4)
SODIUM SERPL-SCNC: 134 MMOL/L (ref 136–145)
TRIGL SERPL-MCNC: 163 MG/DL (ref 30–150)
TSH SERPL DL<=0.005 MIU/L-ACNC: 2.04 UIU/ML (ref 0.4–4)
WBC # BLD AUTO: 9.07 K/UL (ref 3.9–12.7)

## 2020-02-26 PROCEDURE — 83036 HEMOGLOBIN GLYCOSYLATED A1C: CPT

## 2020-02-26 PROCEDURE — 85025 COMPLETE CBC W/AUTO DIFF WBC: CPT | Mod: PO

## 2020-02-26 PROCEDURE — 80053 COMPREHEN METABOLIC PANEL: CPT | Mod: PO

## 2020-02-26 PROCEDURE — 36415 COLL VENOUS BLD VENIPUNCTURE: CPT | Mod: PO

## 2020-02-26 PROCEDURE — 80061 LIPID PANEL: CPT

## 2020-02-26 PROCEDURE — 84443 ASSAY THYROID STIM HORMONE: CPT | Mod: PO

## 2020-03-04 ENCOUNTER — OFFICE VISIT (OUTPATIENT)
Dept: FAMILY MEDICINE | Facility: CLINIC | Age: 65
End: 2020-03-04
Payer: MEDICARE

## 2020-03-04 VITALS
DIASTOLIC BLOOD PRESSURE: 70 MMHG | OXYGEN SATURATION: 98 % | WEIGHT: 129 LBS | SYSTOLIC BLOOD PRESSURE: 130 MMHG | HEART RATE: 70 BPM | HEIGHT: 63 IN | BODY MASS INDEX: 22.86 KG/M2

## 2020-03-04 DIAGNOSIS — I10 ESSENTIAL HYPERTENSION: Primary | ICD-10-CM

## 2020-03-04 DIAGNOSIS — E78.5 DYSLIPIDEMIA: ICD-10-CM

## 2020-03-04 DIAGNOSIS — L03.012 PARONYCHIA OF FINGER OF LEFT HAND: ICD-10-CM

## 2020-03-04 DIAGNOSIS — E11.65 TYPE 2 DIABETES MELLITUS WITH HYPERGLYCEMIA, WITHOUT LONG-TERM CURRENT USE OF INSULIN: ICD-10-CM

## 2020-03-04 DIAGNOSIS — M25.551 PAIN OF RIGHT HIP JOINT: ICD-10-CM

## 2020-03-04 DIAGNOSIS — K29.30 CHRONIC SUPERFICIAL GASTRITIS WITHOUT BLEEDING: ICD-10-CM

## 2020-03-04 DIAGNOSIS — K76.0 FATTY LIVER: ICD-10-CM

## 2020-03-04 PROCEDURE — 99999 PR PBB SHADOW E&M-EST. PATIENT-LVL IV: CPT | Mod: PBBFAC,,, | Performed by: FAMILY MEDICINE

## 2020-03-04 PROCEDURE — 1159F MED LIST DOCD IN RCRD: CPT | Mod: S$GLB,,, | Performed by: FAMILY MEDICINE

## 2020-03-04 PROCEDURE — 3078F DIAST BP <80 MM HG: CPT | Mod: CPTII,S$GLB,, | Performed by: FAMILY MEDICINE

## 2020-03-04 PROCEDURE — 99214 PR OFFICE/OUTPT VISIT, EST, LEVL IV, 30-39 MIN: ICD-10-PCS | Mod: S$GLB,,, | Performed by: FAMILY MEDICINE

## 2020-03-04 PROCEDURE — 99214 OFFICE O/P EST MOD 30 MIN: CPT | Mod: S$GLB,,, | Performed by: FAMILY MEDICINE

## 2020-03-04 PROCEDURE — 3044F HG A1C LEVEL LT 7.0%: CPT | Mod: CPTII,S$GLB,, | Performed by: FAMILY MEDICINE

## 2020-03-04 PROCEDURE — 3075F SYST BP GE 130 - 139MM HG: CPT | Mod: CPTII,S$GLB,, | Performed by: FAMILY MEDICINE

## 2020-03-04 PROCEDURE — 1125F PR PAIN SEVERITY QUANTIFIED, PAIN PRESENT: ICD-10-PCS | Mod: S$GLB,,, | Performed by: FAMILY MEDICINE

## 2020-03-04 PROCEDURE — 1101F PT FALLS ASSESS-DOCD LE1/YR: CPT | Mod: CPTII,S$GLB,, | Performed by: FAMILY MEDICINE

## 2020-03-04 PROCEDURE — 1101F PR PT FALLS ASSESS DOC 0-1 FALLS W/OUT INJ PAST YR: ICD-10-PCS | Mod: CPTII,S$GLB,, | Performed by: FAMILY MEDICINE

## 2020-03-04 PROCEDURE — 1125F AMNT PAIN NOTED PAIN PRSNT: CPT | Mod: S$GLB,,, | Performed by: FAMILY MEDICINE

## 2020-03-04 PROCEDURE — 3044F PR MOST RECENT HEMOGLOBIN A1C LEVEL <7.0%: ICD-10-PCS | Mod: CPTII,S$GLB,, | Performed by: FAMILY MEDICINE

## 2020-03-04 PROCEDURE — 1159F PR MEDICATION LIST DOCUMENTED IN MEDICAL RECORD: ICD-10-PCS | Mod: S$GLB,,, | Performed by: FAMILY MEDICINE

## 2020-03-04 PROCEDURE — 99999 PR PBB SHADOW E&M-EST. PATIENT-LVL IV: ICD-10-PCS | Mod: PBBFAC,,, | Performed by: FAMILY MEDICINE

## 2020-03-04 PROCEDURE — 3075F PR MOST RECENT SYSTOLIC BLOOD PRESS GE 130-139MM HG: ICD-10-PCS | Mod: CPTII,S$GLB,, | Performed by: FAMILY MEDICINE

## 2020-03-04 PROCEDURE — 3078F PR MOST RECENT DIASTOLIC BLOOD PRESSURE < 80 MM HG: ICD-10-PCS | Mod: CPTII,S$GLB,, | Performed by: FAMILY MEDICINE

## 2020-03-04 RX ORDER — MUPIROCIN 20 MG/G
OINTMENT TOPICAL 3 TIMES DAILY
Qty: 30 G | Refills: 0 | Status: SHIPPED | OUTPATIENT
Start: 2020-03-04 | End: 2020-03-14

## 2020-03-04 RX ORDER — PANTOPRAZOLE SODIUM 40 MG/1
40 TABLET, DELAYED RELEASE ORAL
Qty: 90 TABLET | Refills: 3 | Status: SHIPPED | OUTPATIENT
Start: 2020-03-04 | End: 2021-08-12 | Stop reason: SDUPTHER

## 2020-03-04 RX ORDER — CEPHALEXIN 500 MG/1
500 CAPSULE ORAL EVERY 12 HOURS
Qty: 14 CAPSULE | Refills: 0 | Status: SHIPPED | OUTPATIENT
Start: 2020-03-04 | End: 2020-03-11

## 2020-03-04 NOTE — PROGRESS NOTES
Subjective:       Patient ID: Tomeka Burns is a 64 y.o. female.    Chief Complaint: Follow-up; Hypertension; Hand Pain (left side on and off); Leg Pain (on and off left side x 8); and Pain (under left arm on and off)    64 years old female came to the clinic for diabetes check.  Last A1c was stable.  No polyuria, polydipsia or polyphagia.  Patient with chronic left hip and knee pain. The pain is 6/10 of intensity on and off aggravated with activity and better with rest.  Patient is willing to start physical therapy for the pain. Liver function testing was normal.  She was previously diagnosed with fatty liver.  Patient with left middle finger swelling for the last week.  Patient is concerned about possible infection.  Patient reports clear discharge. No fever or chills.  Patient was previously diagnosed with gastritis.  Pantoprazole was prescribed.    Review of Systems   Constitutional: Negative.  Negative for activity change and unexpected weight change.   HENT: Negative.  Negative for hearing loss, rhinorrhea and trouble swallowing.    Eyes: Negative.  Negative for discharge and visual disturbance.   Respiratory: Negative.  Negative for chest tightness and wheezing.    Cardiovascular: Negative.  Negative for chest pain and palpitations.   Gastrointestinal: Negative.  Negative for blood in stool, constipation, diarrhea and vomiting.   Endocrine: Negative for polydipsia and polyuria.   Genitourinary: Negative.  Negative for difficulty urinating, dysuria, hematuria and menstrual problem.   Musculoskeletal: Positive for arthralgias. Negative for joint swelling and neck pain.   Skin: Negative.    Neurological: Negative.  Negative for weakness and headaches.   Psychiatric/Behavioral: Negative.  Negative for confusion and dysphoric mood.       Objective:      Physical Exam   Constitutional: She is oriented to person, place, and time. She appears well-developed and well-nourished. No distress.   HENT:   Head:  Normocephalic and atraumatic.   Right Ear: External ear normal.   Left Ear: External ear normal.   Nose: Nose normal.   Mouth/Throat: Oropharynx is clear and moist. No oropharyngeal exudate.   Eyes: Pupils are equal, round, and reactive to light. Conjunctivae and EOM are normal. Right eye exhibits no discharge. Left eye exhibits no discharge. No scleral icterus.   Neck: Normal range of motion. Neck supple. No JVD present. No tracheal deviation present. No thyromegaly present.   Cardiovascular: Normal rate, regular rhythm, normal heart sounds and intact distal pulses. Exam reveals no gallop and no friction rub.   No murmur heard.  Pulmonary/Chest: Effort normal and breath sounds normal. No stridor. No respiratory distress. She has no wheezes. She has no rales. She exhibits no tenderness.   Abdominal: Soft. Bowel sounds are normal. She exhibits no distension and no mass. There is no tenderness. There is no rebound and no guarding.   Musculoskeletal: Normal range of motion. She exhibits no edema or tenderness.        Hands:  Lymphadenopathy:     She has no cervical adenopathy.   Neurological: She is alert and oriented to person, place, and time. She has normal reflexes. No cranial nerve deficit. She exhibits normal muscle tone. Coordination normal.   Skin: Skin is warm and dry. No rash noted. She is not diaphoretic. No erythema. No pallor.   Psychiatric: She has a normal mood and affect. Her behavior is normal. Judgment and thought content normal.       Assessment:       1. Essential hypertension    2. Type 2 diabetes mellitus with hyperglycemia, without long-term current use of insulin    3. Dyslipidemia    4. Fatty liver    5. Chronic superficial gastritis without bleeding    6. Pain of right hip joint    7. Paronychia of finger of left hand        Plan:         Tomeka was seen today for follow-up, hypertension, hand pain, leg pain and pain.    Diagnoses and all orders for this visit:    Essential hypertension  -      Comprehensive metabolic panel; Future  -     Lipid panel; Future  -     Urinalysis; Future  -     CBC auto differential; Future    Type 2 diabetes mellitus with hyperglycemia, without long-term current use of insulin  -     Comprehensive metabolic panel; Future  -     Lipid panel; Future  -     Hemoglobin A1c; Future    Dyslipidemia  -     Comprehensive metabolic panel; Future  -     Lipid panel; Future    Fatty liver  -     Comprehensive metabolic panel; Future    Chronic superficial gastritis without bleeding  -     pantoprazole (PROTONIX) 40 MG tablet; Take 1 tablet (40 mg total) by mouth before breakfast.    Pain of right hip joint  -     Ambulatory referral/consult to Physical/Occupational Therapy; Future    Paronychia of finger of left hand  -     mupirocin (BACTROBAN) 2 % ointment; Apply topically 3 (three) times daily. for 10 days  -     cephALEXin (KEFLEX) 500 MG capsule; Take 1 capsule (500 mg total) by mouth every 12 (twelve) hours. for 7 days    Continue monitoring blood pressure at home, low sodium diet.  Continue monitoring blood sugar at home,ADA diet.

## 2020-03-12 ENCOUNTER — CLINICAL SUPPORT (OUTPATIENT)
Dept: REHABILITATION | Facility: HOSPITAL | Age: 65
End: 2020-03-12
Payer: MEDICARE

## 2020-03-12 DIAGNOSIS — M25.562 CHRONIC PAIN OF LEFT KNEE: ICD-10-CM

## 2020-03-12 DIAGNOSIS — G89.29 CHRONIC PAIN OF LEFT KNEE: ICD-10-CM

## 2020-03-12 DIAGNOSIS — M62.81 MUSCLE WEAKNESS OF LOWER EXTREMITY: ICD-10-CM

## 2020-03-12 DIAGNOSIS — M25.551 PAIN OF RIGHT HIP JOINT: ICD-10-CM

## 2020-03-12 DIAGNOSIS — M25.552 PAIN IN LEFT HIP: ICD-10-CM

## 2020-03-12 PROCEDURE — 97110 THERAPEUTIC EXERCISES: CPT | Mod: PO

## 2020-03-12 PROCEDURE — 97161 PT EVAL LOW COMPLEX 20 MIN: CPT | Mod: PO

## 2020-03-12 NOTE — PLAN OF CARE
[facility]  Physical Therapy Initial Evaluation    Date: 3/12/2020   Name: Tomeka Burns  Clinic Number: [id number]    Therapy Diagnosis:   Encounter Diagnoses   Name Primary?    Pain of right hip joint     Pain in left hip     Chronic pain of left knee     Muscle weakness of lower extremity      Physician: Andrei Miller*    Physician Orders: PT Eval and Treat   Medical Diagnosis from Referral: M25.551 (ICD-10-CM) - Pain of right hip joint  Evaluation Date: 3/12/2020  Authorization Period Expiration: 03/26/2020  Plan of Care Expiration: 5/12/2020  Visit # / Visits authorized: 1/ 6    Time In: 3:00pm  Time Out: 3:55pm  Total Appointment Time (timed & untimed codes): 55 minutes    Precautions: Standard and Diabetes    Subjective   Date of onset: month ago  History of current condition - Breannedaysi reports: pt reports insidious onset of symptoms that began last month.  Began having pain in left hip and left knee that she noticed while prolonged standing, walking, ascending/descending stairs, sit to stand transfers, bed mobility, and increased pain in morning.  Pt is  at [employer] and work shifts require her to stand for 6 hours which increases her pain.  Pt reports history of similar symptoms in right hip a couple years ago.     Medical History:   Past Medical History:   Diagnosis Date    Diabetes mellitus, type 2     GERD (gastroesophageal reflux disease)     Hyperlipidemia     Hypertension     Multiple thyroid nodules 1/21/2019       Surgical History:   Tomeka Burns  has a past surgical history that includes Hysterectomy; Tonsillectomy; Left heart catheterization (N/A, 6/17/2019); Coronary angiography (N/A, 6/17/2019); and Angiocardiography of left ventricle (N/A, 6/17/2019).    Medications:   Tomeka has a current medication list which includes the following prescription(s): aspirin, blood sugar diagnostic, blood-glucose meter, fluzone high-dose 2019-20 (pf),  hydrochlorothiazide, lancets, losartan, lovastatin, metformin, mupirocin, and pantoprazole.    Allergies:   Review of patient's allergies indicates:  No Known Allergies     Imaging, x-ray: 9/26/2018  FINDINGS:  Mild DJD.  No fracture or dislocation.  No bone destruction identified          Prior Therapy: no  Social History: single story home lives with their family  Occupation: [employer]  Prior Level of Function: independent  Current Level of Function: independent    Pain:  Current 0/10, worst 8/10, best 0/10   Location: left hip and knee   Description: Aching and Dull  Aggravating Factors: Standing, Laying, Walking, Morning and Getting out of bed/chair  Easing Factors: massage and exercise    Pts goals: decrease pain    Objective     Observation: pt is a 64 year old female that presents to therapy in no apparent distress.    Posture: stands with weight shifted to left LE    Hip Range of Motion:   Left active Left Passive Right active  Right Passive   Flexion 105 120 110 120   Abduction 35 40 35 40   Extension 10  15 10 15   Ext. Rotation 25 30 30 35   Int. Rotation 40 40 40 45     LROM:  Forward bending: reaches to feet with no increased pain reported  Side bending to the right:  Reaches to knee with no increased pain reported  Side bending to the left:  Reaches to knee with no increase in pain reported        Lower Extremity Strength  Right LE  Left LE    Knee extension: 4/5 Knee extension: 4/5   Knee flexion: 4/5 Knee flexion: 4-/5   Hip flexion: 4/5 Hip flexion: 4-/5   Hip Internal Rotation:  4-/5    Hip Internal Rotation: 3+/5      Hip External Rotation: 4-/5    Hip External Rotation: 3+/5      Hip extension:  4/5 Hip extension: 4-/5   Hip abduction: 4/5 Hip abduction: 4-/5   Hip adduction: 4+/5 Hip adduction 4-/5         Special Tests:  Bridge Test: negative  CAITLYN: positive left LE  SLS (R): 30 seconds  SLS (L): 10 seconds      Flexibility:    Popliteal Angle: R = 25 degrees ; L = 30 degrees   Charline's test: R =  neg ; L = pos   Elie test: R = neg ; L = pos for quad tightness      Joint Mobility: normal joint mobility testing    Palpation: tenderness to palpation noted in following musculature: psoas, iliacus, TFL, quads, and along IT band.  Pt noted to have tenderness across medial joint line of left knee.    Sensation: intact to light touch    Edema: none noted at time of examination        Limitation/Restriction for FOTO Hip Survey    Therapist reviewed FOTO scores for Tomeka Burns on 3/12/2020.   FOTO documents entered into [employer] - see Media section.    Limitation Score: 57%         TREATMENT   Treatment Time In: 3:45pm  Treatment Time Out: 3:55pm  Total Treatment time (time-based codes) separate from Evaluation: 10 minutes    Tomeka received therapeutic exercises to develop strength, ROM and flexibility for 10 minutes including:  - hamstring stretch with strap  - hip flexor/quad stretch  - SLR  - hip adduction with pillow  - hip abduction with red band    Home Exercises and Patient Education Provided    Education provided:   - pt instructed to perform exercises within comfortable range  - increased on proper form and technique of HEP    Written Home Exercises Provided: yes.  Exercises were reviewed and Tomeka was able to demonstrate them prior to the end of the session.  Tomeka demonstrated good  understanding of the education provided.     See EMR under Patient Instructions for exercises provided 3/12/2020.    Assessment   Tomeka is a 64 y.o. female referred to outpatient Physical Therapy with a medical diagnosis of left hip pain. Pt presents with signs and symptoms consistent with the diagnosis.  She is noted to have decreased hip rotation, decreased flexibility, decreased strength, and tight/tender surrounding musculature.  Pt would benefit from manual therapy, LE stretching and strengthening, core stabilization exercises, and modalities to decrease pain, improve functional mobility, and return to prior level of  function.    Pt prognosis is Good.   Pt will benefit from skilled outpatient Physical Therapy to address the deficits stated above and in the chart below, provide pt/family education, and to maximize pt's level of independence.     Plan of care discussed with patient: Yes  Pt's spiritual, cultural and educational needs considered and patient is agreeable to the plan of care and goals as stated below:     Anticipated Barriers for therapy: none    Medical Necessity is demonstrated by the following  History  Co-morbidities and personal factors that may impact the plan of care Co-morbidities:   diabetes and HTN    Personal Factors:   no deficits     low   Examination  Body Structures and Functions, activity limitations and participation restrictions that may impact the plan of care Body Regions:   lower extremities    Body Systems:    ROM  strength  balance  gait  transfers    Participation Restrictions:   none    Activity limitations:   Learning and applying knowledge  no deficits    General Tasks and Commands  no deficits    Communication  no deficits    Mobility  lifting and carrying objects  walking    Self care  no deficits    Domestic Life  doing house work (cleaning house, washing dishes, laundry)    Interactions/Relationships  no deficits    Life Areas  no deficits    Community and Social Life  no deficits         moderate   Clinical Presentation stable and uncomplicated low   Decision Making/ Complexity Score: low     Goals:  Short Term Goals: (4 weeks)  1. Patient will be compliant with HEP to promote the independent management of current diagnosis.   2. Patient will increase strength of left hip abduction to 4/5.  3. Patient will increase single leg stance of left LE to 20 seconds.  4. Patient will report a decrease in left hip/knee pain to 4/10 during ADL's.    Long Term Goals: (8 weeks)  1. Patient will increase strength of left hip musculature to 4+/5 to improve stability while ambulating over uneven  surfaces.  2. Patient will increase single leg stance of left LE to 30 seconds.   3. Patient will report a decrease in complaints of left hip/knee pain to 1/10 during ADL's.  4. Patient will increase left knee strength to 5/5.  1. Patient will improve FOTO limitation status from 57% to 40% indicating increased functional mobility.      Plan   Plan of care Certification: 3/12/2020 to 5/12/2020.    Outpatient Physical Therapy 2 times weekly for 8 weeks to include the following interventions: Gait Training, Manual Therapy, Moist Heat/ Ice, Neuromuscular Re-ed, Patient Education, Therapeutic Exercise and IASTM, vacuum cupping, dry needling, and kinesiotaping.     Evan Sweet, PT

## 2020-03-18 ENCOUNTER — CLINICAL SUPPORT (OUTPATIENT)
Dept: REHABILITATION | Facility: HOSPITAL | Age: 65
End: 2020-03-18
Payer: MEDICARE

## 2020-03-18 DIAGNOSIS — M25.552 PAIN IN LEFT HIP: Primary | ICD-10-CM

## 2020-03-18 DIAGNOSIS — M62.81 MUSCLE WEAKNESS OF LOWER EXTREMITY: ICD-10-CM

## 2020-03-18 DIAGNOSIS — G89.29 CHRONIC PAIN OF LEFT KNEE: ICD-10-CM

## 2020-03-18 DIAGNOSIS — M25.562 CHRONIC PAIN OF LEFT KNEE: ICD-10-CM

## 2020-03-18 PROCEDURE — 97110 THERAPEUTIC EXERCISES: CPT | Mod: PO

## 2020-03-18 NOTE — PROGRESS NOTES
"  Physical Therapy Daily Treatment Note     Name: Tomeka Burns  Clinic Number: [id number]    Therapy Diagnosis:   Encounter Diagnoses   Name Primary?    Chronic pain of left knee     Muscle weakness of lower extremity     Pain in left hip Yes     Physician: Andrei Miller*    Visit Date: 3/18/2020    Physician Orders: PT Eval and Treat   Medical Diagnosis from Referral: M25.551 (ICD-10-CM) - Pain of right hip joint  Evaluation Date: 3/12/2020  Authorization Period Expiration: 03/26/2020  Plan of Care Expiration: 5/12/2020  Visit # / Visits authorized: 1/ 20    Time In: 1:00pm  Time Out: 2:00pm  Total Billable Time: 60 minutes    Precautions: Standard and Diabetes    Subjective     Pt reports: having pain along lateral aspect of left hip and along IT band to left knee.  Her pain is worse with prolonged standing and walking.  She was compliant with home exercise program.  Response to previous treatment: muscle soreness   Functional change: none at this time    Pain: 3/10  Location:  Left hip and knee     Objective     Tomeka received therapeutic exercises to develop strength, endurance, ROM, flexibility and core stabilization for 55 minutes including:    Date  3/18/2020   VISIT 2/20       POC EXP. DATE 5/12/2020   VISIT AMOUNT  MEDICARE TOTAL 121.28  234.48   FACE-TO-FACE 4/12/2020   FOTO 2/5       TABLE:    Hamstring stretch 5 x 10"   IT band stretch 5 x 10"   Hip CAITLYN stretch 5 x 10"   Hip flexor stretch 5 x 10"   SKTC 5 x 10"   TA with march 1 x 10"   bridging 2 x 10   SLR 1 x 10   Hip abduction 2 x 10 RTB   Hip adduction 20 x 3" w/ball   Hip extension 2 x 10   SAQ 2 x 10 x 1.5#           SEATED:    LAQ 2 x 10 x 1.5#   Hip flexion 2 x 10 x 1.5#   Hamstring curl 2 x 10 RTB   Hip ER/IR 2 x 10 RTB           STANDING:    Single leg stance 3 x 10"   Mini- squats 1 x 10   Side stepping 3 laps x 8 ft YTB                   Initials NATHANIEL Eckert received the following manual therapy techniques: Soft tissue " Mobilization and IASTM were applied to the: left LE for 5 minutes, including:  - IASTM to left IT band, gluteus penny, hamstrings, and quads  - STM to TFL, piriformis, quads, IT band      Home Exercises Provided and Patient Education Provided     Education provided:   - proper squatting technique and form  - perform exercises within comfortable range  - added to HEP    Written Home Exercises Provided: Patient instructed to cont prior HEP.  Exercises were reviewed and Tomeka was able to demonstrate them prior to the end of the session.  Tomeka demonstrated good  understanding of the education provided.     See EMR under Patient Instructions for exercises provided prior visit.    Assessment     Tomeka presents to therapy ambulating with antalgic gait.  She is noted to have tenderness to palpation in TFL, IT band, and quads as well as weakness in left hip flexors.  Pt will continue with PT POC and progress hip and knee strengthening to improve stability during prolonged weight bearing.   Tomeka is progressing well towards her goals.   Pt prognosis is Good.     Pt will continue to benefit from skilled outpatient physical therapy to address the deficits listed in the problem list box on initial evaluation, provide pt/family education and to maximize pt's level of independence in the home and community environment.     Pt's spiritual, cultural and educational needs considered and pt agreeable to plan of care and goals.     Anticipated barriers to physical therapy: none    Goals:   Short Term Goals: (4 weeks)  1. Patient will be compliant with HEP to promote the independent management of current diagnosis. In Progress, Not Met  2. Patient will increase strength of left hip abduction to 4/5. In Progress, Not Met  3. Patient will increase single leg stance of left LE to 20 seconds. In Progress, Not Met  4. Patient will report a decrease in left hip/knee pain to 4/10 during ADL's. In Progress, Not Met     Long Term Goals: (8  weeks)  1. Patient will increase strength of left hip musculature to 4+/5 to improve stability while ambulating over uneven surfaces. In Progress, Not Met  2. Patient will increase single leg stance of left LE to 30 seconds. In Progress, Not Met  3. Patient will report a decrease in complaints of left hip/knee pain to 1/10 during ADL's. In Progress, Not Met  4. Patient will increase left knee strength to 5/5. In Progress, Not Met  5.   Patient will improve FOTO limitation status from 57% to 40% indicating increased functional mobility. In Progress, Not Met    Plan     Continue with PT POC, progress SLR repetitions next visit.     Evan Sweet, PT

## 2020-03-20 ENCOUNTER — CLINICAL SUPPORT (OUTPATIENT)
Dept: REHABILITATION | Facility: HOSPITAL | Age: 65
End: 2020-03-20
Payer: MEDICARE

## 2020-03-20 DIAGNOSIS — M62.81 MUSCLE WEAKNESS OF LOWER EXTREMITY: ICD-10-CM

## 2020-03-20 DIAGNOSIS — G89.29 CHRONIC PAIN OF LEFT KNEE: ICD-10-CM

## 2020-03-20 DIAGNOSIS — M25.562 CHRONIC PAIN OF LEFT KNEE: ICD-10-CM

## 2020-03-20 PROCEDURE — 97110 THERAPEUTIC EXERCISES: CPT | Mod: PO

## 2020-03-20 NOTE — PROGRESS NOTES
"  Physical Therapy Daily Treatment Note     Name: Tomeka Burns  Clinic Number: [id number]    Therapy Diagnosis:   Encounter Diagnoses   Name Primary?    Chronic pain of left knee     Muscle weakness of lower extremity      Physician: Andrei Miller*    Visit Date: 3/20/2020    Physician Orders: PT Eval and Treat   Medical Diagnosis from Referral: M25.551 (ICD-10-CM) - Pain of right hip joint  Evaluation Date: 3/12/2020  Authorization Period Expiration: 03/26/2020  Plan of Care Expiration: 5/12/2020  Visit # / Visits authorized: 1/ 20    Time In: 10:00am  Time Out: 10:45am  Total Billable Time: 45 minutes    Precautions: Standard and Diabetes    Subjective     Pt reports: she had increase in pain following last treatment and having to go to work after therapy.  She reports symptoms are decreased on this date, and main complaint was medial left knee pain.  She was compliant with home exercise program.  Response to previous treatment: muscle soreness, increased medial left knee pain   Functional change: none at this time    Pain: 3/10  Location:  Left hip and knee     Objective     Toemka received therapeutic exercises to develop strength, endurance, ROM, flexibility and core stabilization for 40 minutes including:    Date  3/20/2020 3/18/2020   VISIT 3/20 2/20        POC EXP. DATE 5/12/2020 5/12/2020   VISIT AMOUNT  MEDICARE TOTAL 90.96  325.44 121.28  234.48   FACE-TO-FACE 4/12/2020 4/12/2020   FOTO 3/5 2/5        TABLE:     Hamstring stretch (L) 5 x 10" 5 x 10"   IT band stretch (L) 5 x 10" 5 x 10"   Hip CAITLYN stretch (L) 5 x 10" 5 x 10"   Hip flexor stretch (L) 5 x 10" 5 x 10"   SKTC (L) 5 x 10" 5 x 10"   TA with march 1 x 15 1 x 10"   bridging 2 x 10 2 x 10   SLR (B) 2 x 10 1 x 10   Hip abduction 2 x 10 RTB 2 x 10 RTB   Hip adduction 20 x 3" w/ball 20 x 3" w/ball   Hip extension (B) 2 x 10 2 x 10   SAQ (B) 2 x 10 x 1.5# 2 x 10 x 1.5#             SEATED:     LAQ (B) 2 x 10 x 1.5# 2 x 10 x 1.5#   Hip flexion " "(B) 2 x 10 x 1.5# 2 x 10 x 1.5#   Hamstring curl (B) 2 x 10 RTB 2 x 10 RTB   Hip ER/IR (L) 2 x 10 RTB 2 x 10 RTB             STANDING:     Single leg stance -- 3 x 10"   Mini- squats -- 1 x 10   Side stepping -- 3 laps x 8 ft YTB                       Initials NATHANIEL Eckert received the following manual therapy techniques: Soft tissue Mobilization and IASTM were applied to the: left LE for 5 minutes, including:  - IASTM to left IT band, gluteus penny, hamstrings, and quads  - STM to TFL, piriformis, quads, IT band      Home Exercises Provided and Patient Education Provided     Education provided:   - proper squatting technique and form  - perform exercises within comfortable range  - added to HEP    Written Home Exercises Provided: Patient instructed to cont prior HEP.  Exercises were reviewed and Tomeka was able to demonstrate them prior to the end of the session.  Tomeka demonstrated good  understanding of the education provided.     See EMR under Patient Instructions for exercises provided prior visit.    Assessment     Tomeka is noted to have tenderness to palpation across medial joint line of left knee, but has decreased tightness/tenderness in left hip musculature as compared to last visit.  She did not perform standing exercises on this date due to subjective complaints following last treatment.  Pt was able to progress SLR exercise with no complaint of increased left hip pain.  Pt will continue with progression of exercises to improve stability of hip and knee during prolonged standing while at work.   Tomeka is progressing well towards her goals.   Pt prognosis is Good.     Pt will continue to benefit from skilled outpatient physical therapy to address the deficits listed in the problem list box on initial evaluation, provide pt/family education and to maximize pt's level of independence in the home and community environment.     Pt's spiritual, cultural and educational needs considered and pt agreeable to " plan of care and goals.     Anticipated barriers to physical therapy: none    Goals:   Short Term Goals: (4 weeks)  1. Patient will be compliant with HEP to promote the independent management of current diagnosis. In Progress, Not Met  2. Patient will increase strength of left hip abduction to 4/5. In Progress, Not Met  3. Patient will increase single leg stance of left LE to 20 seconds. In Progress, Not Met  4. Patient will report a decrease in left hip/knee pain to 4/10 during ADL's. In Progress, Not Met     Long Term Goals: (8 weeks)  1. Patient will increase strength of left hip musculature to 4+/5 to improve stability while ambulating over uneven surfaces. In Progress, Not Met  2. Patient will increase single leg stance of left LE to 30 seconds. In Progress, Not Met  3. Patient will report a decrease in complaints of left hip/knee pain to 1/10 during ADL's. In Progress, Not Met  4. Patient will increase left knee strength to 5/5. In Progress, Not Met  5.   Patient will improve FOTO limitation status from 57% to 40% indicating increased functional mobility. In Progress, Not Met    Plan     Continue with PT POC, progress resisted hip abduction and repetitions of hip adduction next visit.     Evan Sweet, PT

## 2020-03-23 ENCOUNTER — CLINICAL SUPPORT (OUTPATIENT)
Dept: REHABILITATION | Facility: HOSPITAL | Age: 65
End: 2020-03-23
Payer: MEDICARE

## 2020-03-23 ENCOUNTER — DOCUMENTATION ONLY (OUTPATIENT)
Dept: REHABILITATION | Facility: HOSPITAL | Age: 65
End: 2020-03-23

## 2020-03-23 DIAGNOSIS — M62.81 MUSCLE WEAKNESS OF LOWER EXTREMITY: ICD-10-CM

## 2020-03-23 DIAGNOSIS — G89.29 CHRONIC PAIN OF LEFT KNEE: ICD-10-CM

## 2020-03-23 DIAGNOSIS — M25.562 CHRONIC PAIN OF LEFT KNEE: ICD-10-CM

## 2020-03-23 PROCEDURE — 97110 THERAPEUTIC EXERCISES: CPT | Mod: PO

## 2020-03-23 NOTE — PROGRESS NOTES
"  Physical Therapy Daily Treatment Note     Name: Tomeka Burns  Clinic Number: [id number]    Therapy Diagnosis:   Encounter Diagnoses   Name Primary?    Chronic pain of left knee     Muscle weakness of lower extremity      Physician: Andrei Miller*    Visit Date: 3/23/2020    Physician Orders: PT Eval and Treat   Medical Diagnosis from Referral: M25.551 (ICD-10-CM) - Pain of right hip joint  Evaluation Date: 3/12/2020  Authorization Period Expiration: 03/26/2020  Plan of Care Expiration: 5/12/2020  Visit # / Visits authorized: 4/ 20    Time In: 1:00 pm  Time Out: 1:46 pm  Total Billable Time: 46 minutes    Precautions: Standard and Diabetes    Subjective     Pt reports: no pain or soreness today, she did walk yesterday 2 laps around the park. She did have some pain after her last visit.   She was compliant with home exercise program.  Response to previous treatment: some soreness  Functional change: able to walk in the park with no increase pain    Pain: 0/10  Location:  Left hip and knee     Objective     Tomeka received therapeutic exercises to develop strength, endurance, ROM, flexibility and core stabilization for 46 minutes including:    Date  3/23/2020 3/20/2020 3/18/2020   VISIT 4/20 3/20 2/20         POC EXP. DATE 5/12/2020 5/12/2020 5/12/2020   VISIT AMOUNT  MEDICARE TOTAL 90.96  416.40 90.96  325.44 121.28  234.48   FACE-TO-FACE 4/12/2020 4/12/2020 4/12/2020   FOTO 4/5 3/5 2/5         TABLE:      Hamstring stretch (L) 5 x 10" 5 x 10" 5 x 10"   IT band stretch (L) 5 x 10" 5 x 10" 5 x 10"   Hip CAITLYN stretch (L) 5 x 10" 5 x 10" 5 x 10"   Hip flexor stretch (L) 5 x 10" 5 x 10" 5 x 10"   SKTC (L) 5 x 10" 5 x 10" 5 x 10"   TA with march 2 x 10 1 x 15 1 x 10"   bridging 3 x 10 2 x 10 2 x 10   SLR (B) 2 x 10 2 x 10 1 x 10   Hip abduction 3 x 10 RTB 2 x 10 RTB 2 x 10 RTB   Hip adduction 30 x 3" w/ ball 20 x 3" w/ball 20 x 3" w/ball   Hip extension (B) 2 x 10 2 x 10 2 x 10   SAQ (B) 3 x 10 x 1.5# 2 x 10 x " "1.5# 2 x 10 x 1.5#               SEATED:      LAQ (B) 2 x 10 x 1.5# 2 x 10 x 1.5# 2 x 10 x 1.5#   Hip flexion (B) 2 x 10 x 1.5# 2 x 10 x 1.5# 2 x 10 x 1.5#   Hamstring curl (B) 2 x 10 RTB 2 x 10 RTB 2 x 10 RTB   Hip ER/IR (L) 2 x 10 RTB 2 x 10 RTB 2 x 10 RTB               STANDING:      Single leg stance 3 x 10 " -- 3 x 10"   Mini- squats 1 x 10 -- 1 x 10   Side stepping 3 laps x 8 ft YTB -- 3 laps x 8 ft YTB                           Initials DG NATHANIEL ARMSTRONG       Home Exercises Provided and Patient Education Provided     Education provided:   -continuing to perform her HEP while complying with COVID19 restrictions.     Written Home Exercises Provided: Patient instructed to cont prior HEP.  Exercises were reviewed and Tomeka was able to demonstrate them prior to the end of the session.  Tomeka demonstrated good  understanding of the education provided.     See EMR under Patient Instructions for exercises provided prior visit.    Assessment     Tomeka was able to perform all of today's progressions with no increase in symptoms prior to leaving the clinic. She requires occasional verbal cues and demonstrations for the correct technique with her exercises, especially seated hip IR and ER. She will continue with progression of exercises to improve stability of hip and knee during prolonged standing while at work.   Tomeka is progressing well towards her goals.   Pt prognosis is Good.     Pt will continue to benefit from skilled outpatient physical therapy to address the deficits listed in the problem list box on initial evaluation, provide pt/family education and to maximize pt's level of independence in the home and community environment.     Pt's spiritual, cultural and educational needs considered and pt agreeable to plan of care and goals.     Anticipated barriers to physical therapy: none    Goals:   Short Term Goals: (4 weeks)  1. Patient will be compliant with HEP to promote the independent management of current diagnosis. In " Progress, Not Met  2. Patient will increase strength of left hip abduction to 4/5. In Progress, Not Met  3. Patient will increase single leg stance of left LE to 20 seconds. In Progress, Not Met  4. Patient will report a decrease in left hip/knee pain to 4/10 during ADL's. In Progress, Not Met     Long Term Goals: (8 weeks)  1. Patient will increase strength of left hip musculature to 4+/5 to improve stability while ambulating over uneven surfaces. In Progress, Not Met  2. Patient will increase single leg stance of left LE to 30 seconds. In Progress, Not Met  3. Patient will report a decrease in complaints of left hip/knee pain to 1/10 during ADL's. In Progress, Not Met  4. Patient will increase left knee strength to 5/5. In Progress, Not Met  5.   Patient will improve FOTO limitation status from 57% to 40% indicating increased functional mobility. In Progress, Not Met    Plan     Resume her in clinic visits one COVID19 restrictions are lifted.     Kathy Adan, PT

## 2020-03-23 NOTE — PROGRESS NOTES
Postponed Appointments  Patient: Tomeka Burns  Date: 3/23/2020  Diagnosis:   1. Chronic pain of left knee     2. Muscle weakness of lower extremity       MRN: [id number]  Spoke with patient due to therapy following updates regarding COVID-19 closely and taking every precaution to ensure the safety of our patients, staff and community. In an abundance of caution and in an effort to help reduce risk and limit community spread, we have decided to temporarily postpone appointments for patients who may be at increased risk to attend in-person therapy or where therapy is not critically needed at this time. Plan of care and home exercise program were reviewed and patient has what they need to continue therapy at home. All patient questions were answered. Also stated to patient that we are exploring virtual methods of providing care and will be in touch over the next few weeks. Patient verbalized understanding to all.    Kathy Adan, PT    3/23/2020

## 2020-04-08 ENCOUNTER — TELEPHONE (OUTPATIENT)
Dept: REHABILITATION | Facility: HOSPITAL | Age: 65
End: 2020-04-08

## 2020-04-08 NOTE — TELEPHONE ENCOUNTER
Called to check on Aura's leg pain and if she had any questions regarding HEP.  Left message and instructed her to contact me at the clinic if she has any questions or concerns with HEP.    Ventura Sweet, PT

## 2020-05-06 DIAGNOSIS — Z20.822 CLOSE EXPOSURE TO COVID-19 VIRUS: Primary | ICD-10-CM

## 2020-05-13 ENCOUNTER — LAB VISIT (OUTPATIENT)
Dept: INTERNAL MEDICINE | Facility: CLINIC | Age: 65
End: 2020-05-13
Payer: MEDICARE

## 2020-05-13 DIAGNOSIS — Z20.822 CLOSE EXPOSURE TO COVID-19 VIRUS: ICD-10-CM

## 2020-05-13 LAB — SARS-COV-2 RNA RESP QL NAA+PROBE: NOT DETECTED

## 2020-05-13 PROCEDURE — U0002 COVID-19 LAB TEST NON-CDC: HCPCS

## 2020-05-15 ENCOUNTER — HOSPITAL ENCOUNTER (OUTPATIENT)
Dept: ENDOCRINOLOGY | Facility: CLINIC | Age: 65
Discharge: HOME | End: 2020-05-15
Attending: INTERNAL MEDICINE
Payer: MEDICARE

## 2020-05-15 DIAGNOSIS — E04.1 THYROID NODULE: ICD-10-CM

## 2020-05-15 PROCEDURE — 88173 PR  INTERPRETATION OF FNA SMEAR: ICD-10-PCS | Mod: 26,,, | Performed by: PATHOLOGY

## 2020-05-15 PROCEDURE — 10005 US FINE NEEDLE ASPIRATION BIOPSY, FIRST LESION: ICD-10-PCS | Mod: S$GLB,,, | Performed by: INTERNAL MEDICINE

## 2020-05-15 PROCEDURE — 10005 FNA BX W/US GDN 1ST LES: CPT | Mod: S$GLB,,, | Performed by: INTERNAL MEDICINE

## 2020-05-15 PROCEDURE — 88173 CYTOPATH EVAL FNA REPORT: CPT | Mod: 26,,, | Performed by: PATHOLOGY

## 2020-05-15 PROCEDURE — 88173 CYTOPATH EVAL FNA REPORT: CPT | Performed by: PATHOLOGY

## 2020-05-18 ENCOUNTER — OFFICE VISIT (OUTPATIENT)
Dept: HEPATOLOGY | Facility: CLINIC | Age: 65
End: 2020-05-18
Payer: MEDICARE

## 2020-05-18 VITALS
HEART RATE: 95 BPM | SYSTOLIC BLOOD PRESSURE: 144 MMHG | BODY MASS INDEX: 22.46 KG/M2 | OXYGEN SATURATION: 97 % | DIASTOLIC BLOOD PRESSURE: 65 MMHG | TEMPERATURE: 99 F | RESPIRATION RATE: 18 BRPM | HEIGHT: 63 IN | WEIGHT: 126.75 LBS

## 2020-05-18 DIAGNOSIS — K76.0 NAFLD (NONALCOHOLIC FATTY LIVER DISEASE): Primary | ICD-10-CM

## 2020-05-18 DIAGNOSIS — E11.65 TYPE 2 DIABETES MELLITUS WITH HYPERGLYCEMIA, WITHOUT LONG-TERM CURRENT USE OF INSULIN: ICD-10-CM

## 2020-05-18 DIAGNOSIS — E78.5 HYPERLIPIDEMIA, UNSPECIFIED HYPERLIPIDEMIA TYPE: ICD-10-CM

## 2020-05-18 PROCEDURE — 1126F AMNT PAIN NOTED NONE PRSNT: CPT | Mod: S$GLB,,, | Performed by: NURSE PRACTITIONER

## 2020-05-18 PROCEDURE — 3078F PR MOST RECENT DIASTOLIC BLOOD PRESSURE < 80 MM HG: ICD-10-PCS | Mod: CPTII,S$GLB,, | Performed by: NURSE PRACTITIONER

## 2020-05-18 PROCEDURE — 99999 PR PBB SHADOW E&M-EST. PATIENT-LVL IV: ICD-10-PCS | Mod: PBBFAC,,, | Performed by: NURSE PRACTITIONER

## 2020-05-18 PROCEDURE — 99499 UNLISTED E&M SERVICE: CPT | Mod: S$GLB,,, | Performed by: NURSE PRACTITIONER

## 2020-05-18 PROCEDURE — 3044F HG A1C LEVEL LT 7.0%: CPT | Mod: CPTII,S$GLB,, | Performed by: NURSE PRACTITIONER

## 2020-05-18 PROCEDURE — 99213 PR OFFICE/OUTPT VISIT, EST, LEVL III, 20-29 MIN: ICD-10-PCS | Mod: S$GLB,,, | Performed by: NURSE PRACTITIONER

## 2020-05-18 PROCEDURE — 1159F MED LIST DOCD IN RCRD: CPT | Mod: S$GLB,,, | Performed by: NURSE PRACTITIONER

## 2020-05-18 PROCEDURE — 99213 OFFICE O/P EST LOW 20 MIN: CPT | Mod: S$GLB,,, | Performed by: NURSE PRACTITIONER

## 2020-05-18 PROCEDURE — 1101F PT FALLS ASSESS-DOCD LE1/YR: CPT | Mod: CPTII,S$GLB,, | Performed by: NURSE PRACTITIONER

## 2020-05-18 PROCEDURE — 3078F DIAST BP <80 MM HG: CPT | Mod: CPTII,S$GLB,, | Performed by: NURSE PRACTITIONER

## 2020-05-18 PROCEDURE — 1101F PR PT FALLS ASSESS DOC 0-1 FALLS W/OUT INJ PAST YR: ICD-10-PCS | Mod: CPTII,S$GLB,, | Performed by: NURSE PRACTITIONER

## 2020-05-18 PROCEDURE — 99499 RISK ADDL DX/OHS AUDIT: ICD-10-PCS | Mod: S$GLB,,, | Performed by: NURSE PRACTITIONER

## 2020-05-18 PROCEDURE — 3044F PR MOST RECENT HEMOGLOBIN A1C LEVEL <7.0%: ICD-10-PCS | Mod: CPTII,S$GLB,, | Performed by: NURSE PRACTITIONER

## 2020-05-18 PROCEDURE — 3077F SYST BP >= 140 MM HG: CPT | Mod: CPTII,S$GLB,, | Performed by: NURSE PRACTITIONER

## 2020-05-18 PROCEDURE — 1159F PR MEDICATION LIST DOCUMENTED IN MEDICAL RECORD: ICD-10-PCS | Mod: S$GLB,,, | Performed by: NURSE PRACTITIONER

## 2020-05-18 PROCEDURE — 3077F PR MOST RECENT SYSTOLIC BLOOD PRESSURE >= 140 MM HG: ICD-10-PCS | Mod: CPTII,S$GLB,, | Performed by: NURSE PRACTITIONER

## 2020-05-18 PROCEDURE — 99999 PR PBB SHADOW E&M-EST. PATIENT-LVL IV: CPT | Mod: PBBFAC,,, | Performed by: NURSE PRACTITIONER

## 2020-05-18 PROCEDURE — 1126F PR PAIN SEVERITY QUANTIFIED, NO PAIN PRESENT: ICD-10-PCS | Mod: S$GLB,,, | Performed by: NURSE PRACTITIONER

## 2020-05-18 NOTE — PATIENT INSTRUCTIONS
Liver enzymes look much better. Continue to maintain good control of blood sugar.  Will repeat liver labs in September.  Return to clinic in 1 year with repeat Fibroscan.        Fatty liver    There is no FDA approved therapy for non-alcoholic fatty liver disease (NAFLD); therefore, lifestyle changes are important:  1. Low carb/sugar, high protein diet  2. Exercise, 5 days per week, 30 minutes per day, as tolerated  3. Recommend good control of cholesterol, blood pressure, blood sugar levels (as these are risk factors for fatty liver)     In some people, fatty liver can progress to steatohepatitis (inflamatory fatty liver) and possibly to cirrhosis, putting one at increased risk for liver cancer and liver failure Lifestyle changes can help to decrease this risk.     Online resources:    Website with information about fatty liver and inflammation related to fatty liver (ROMERO): [email or web address]    Facebook support group with tips and recipes:   Non-Alcoholic Fatty Liver Disease (NAFLD) Diet & Nutrition Support     [facility]: Nonalcoholic Fatty Liver Disease

## 2020-05-18 NOTE — PROGRESS NOTES
[facility] - Established Patient    Last Clinic Visit: 10/31/19    CHIEF COMPLAINT: Follow-up for NAFLD    HPI: This is a 64 y.o. White female here for follow-up for NAFLD.  Previously followed by KEATON Mullen; new patient to me.    Initially referred for elevated transaminases, noted since 9/2018, ALT>AST.  Prior imaging has shown fatty liver.    Serologic workup has been negative for viral hepatitis.   + immunity to hep A/B noted.  Ferritin normal.    Fibroscan at last visit (11/2019):  KPa 7.1, F1 or mild fibrosis  , S3 or >67% steatosis    Updates on risk factors for fatty liver:     · Weight -- Body mass index is 22.46 kg/m².                        · Dyslipidemia -- well controlled on statin                                · Insulin resistance / diabetes -- improving, HgbA1c 6.6 (down from 7.4)          · Hypertension -- BP mildly elevated  · Alcohol use -- none    Her liver enzymes have improved with better blood sugar control. Most recently 30s.      She feels well, no complaints.   Denies symptoms of hepatic decompensation including jaundice, ascites, cognitive problems to suggest hepatic encephalopathy, or GI bleeding.          Review of patient's allergies indicates:  No Known Allergies     Current Outpatient Medications on File Prior to Visit   Medication Sig Dispense Refill    aspirin (ECOTRIN) 81 MG EC tablet Take 1 tablet (81 mg total) by mouth once daily. 90 tablet 3    hydroCHLOROthiazide (HYDRODIURIL) 25 MG tablet Take 1 tablet (25 mg total) by mouth once daily. 90 tablet 3    losartan (COZAAR) 100 MG tablet Take 1 tablet (100 mg total) by mouth once daily. 90 tablet 3    lovastatin (MEVACOR) 20 MG tablet Take 1 tablet (20 mg total) by mouth every evening. 90 tablet 3    metFORMIN (GLUCOPHAGE) 500 MG tablet Take 1 tablet (500 mg total) by mouth 2 (two) times daily with meals. 180 tablet 3    pantoprazole (PROTONIX) 40 MG tablet Take 1 tablet (40 mg total) by mouth  before breakfast. 90 tablet 3    blood sugar diagnostic (BLOOD GLUCOSE TEST) Strp 1 strip by Misc.(Non-Drug; Combo Route) route 2 (two) times daily. 200 strip 3    blood-glucose meter kit Use as instructed 1 each 0    FLUZONE HIGH-DOSE 2019-20, PF, 180 mcg/0.5 mL Syrg PHARMACIST ADMINISTERED IMMUNIZATION ADMINISTERED AT TIME OF DISPENSING  0    lancets Misc 1 lancet by Misc.(Non-Drug; Combo Route) route 2 (two) times daily. 200 each 3     No current facility-administered medications on file prior to visit.          PMHX:  has a past medical history of Diabetes mellitus, type 2, GERD (gastroesophageal reflux disease), Hyperlipidemia, Hypertension, and Multiple thyroid nodules (1/21/2019).    PSHX:  has a past surgical history that includes Hysterectomy; Tonsillectomy; Left heart catheterization (N/A, 6/17/2019); Coronary angiography (N/A, 6/17/2019); and Angiocardiography of left ventricle (N/A, 6/17/2019).    FAMILY HISTORY: History reviewed. No pertinent family history.    SOCIAL HISTORY:   Social History     Tobacco Use   Smoking Status Never Smoker   Smokeless Tobacco Never Used       Social History     Substance and Sexual Activity   Alcohol Use No    Frequency: Never    Drinks per session: Patient refused    Binge frequency: Never       Social History     Substance and Sexual Activity   Drug Use No         Review of Systems   Constitutional: Negative for appetite change, chills, fatigue, fever and unexpected weight change.   Eyes: Negative for visual disturbance.   Respiratory: Negative for cough and shortness of breath.    Cardiovascular: Negative for chest pain, palpitations and leg swelling.   Gastrointestinal: Negative for abdominal distention, abdominal pain, blood in stool, constipation, diarrhea, nausea and vomiting. No change in stool color.  Genitourinary: Negative for dysuria and hematuria. Denies dark urine.   Musculoskeletal: Negative for arthralgias, gait problem, joint swelling and  "myalgias.   Skin: Negative for color change, rash and wound. Denies itching.   Neurological: Negative for dizziness, tremors, speech difficulty, and headaches.   Hematological: Does not bruise/bleed easily.   Psychiatric/Behavioral: Negative for confusion, decreased concentration and sleep disturbance. Denies memory loss. Denies depression. The patient is not nervous/anxious.        Physical Exam   Constitutional: Well-nourished. No distress. Alert and oriented to person, place, and time.  Eyes: No scleral icterus.   Cardiovascular: Normal rate, regular rhythm and normal heart sounds. No murmur heard.  Pulmonary/Chest: Respiratory effort and breath sounds normal. No respiratory distress.   Abdominal: Soft, non-tender. No distension; no ascites appreciated. There is no palpable hepatosplenomegaly. No hernia or mass.   Musculoskeletal: No edema.   Neurological: No tremor. Coordination and gait normal. No asterixis.    Skin: Skin is warm and dry. No rash or erythema. No jaundice. No telangiectasias. + palmar erythema   Psychiatric: Normal mood and affect. Speech, behavior, and thought content normal. No depression or anxiety noted.       BP (!) 144/65 (BP Location: Right arm, Patient Position: Sitting, BP Method: Medium (Automatic))   Pulse 95   Temp 99 °F (37.2 °C)   Resp 18   Ht 5' 3" (1.6 m)   Wt 57.5 kg (126 lb 12.2 oz)   SpO2 97%   BMI 22.46 kg/m²         LABS:  Lab Results   Component Value Date    WBC 9.07 02/26/2020    HGB 13.0 02/26/2020    HCT 39.1 02/26/2020     02/26/2020     (L) 02/26/2020    K 4.5 02/26/2020    CREATININE 0.77 02/26/2020    ALT 37 02/26/2020    AST 34 02/26/2020    ALKPHOS 94 02/26/2020    BILITOT 0.8 02/26/2020    ALBUMIN 4.4 02/26/2020    FERRITIN 219 10/31/2019    FESATURATED 19 (L) 10/31/2019    PETH Negative 10/31/2019    CERULOPLSM 36.0 10/31/2019    CHOL 188 02/26/2020    TRIG 163 (H) 02/26/2020    LDLCALC 114.4 02/26/2020    HGBA1C 6.6 (H) 02/26/2020 "       Hepatitis A Antibody IgG   Date Value Ref Range Status   10/31/2019 Positive (A)  Final       Hepatitis B Surface Ag   Date Value Ref Range Status   10/31/2019 Negative Negative Final     Hep B Core Total Ab   Date Value Ref Range Status   10/31/2019 Negative  Final     Hep B S Ab   Date Value Ref Range Status   10/31/2019 Positive (A)  Final     Hepatitis C Ab   Date Value Ref Range Status   10/03/2018 Negative  Final     Immunization History   Administered Date(s) Administered    Hepatitis B, Adult 04/01/2019, 05/14/2019, 11/14/2019    Influenza 10/21/2010    Influenza - High Dose - PF (65 years and older) 09/26/2018, 08/30/2019    Influenza - Intradermal - Trivalent - PF 11/05/2013    Influenza - Quadrivalent - MDCK - PF 11/16/2017    Influenza - Quadrivalent - PF (6 months and older) 10/10/2014    Influenza Split 10/21/2010    Pneumococcal Conjugate - 13 Valent 10/02/2019, 11/22/2019    Pneumococcal Polysaccharide - 23 Valent 09/26/2018          DIAGNOSTIC STUDIES:  ABD. US  Results for orders placed during the hospital encounter of 11/04/19   US Abdomen Complete    Narrative EXAMINATION:  US ABDOMEN COMPLETE    CLINICAL HISTORY:  Abnormal levels of other serum enzymes    TECHNIQUE:  Multiple static ultrasound images are submitted for interpretation.    COMPARISON:  None    FINDINGS:  The liver has moderately increased echogenicity and coarsening of its echotexture.  It measures 12.2 cm in length. There is no intrahepatic biliary ductal dilatation. The common bile duct has a diameter of 3 mm. The gallbladder is normal in appearance. The visualized portion of the pancreas is normal in appearance. The spleen is small and heterogeneous in appearance.  It measures 7.3 cm in length. The kidneys are normal in appearance. The right kidney measures 10.0 cm in length. The left kidney measures 9.7 cm in length. The abdominal aorta and inferior vena cava are normal in appearance. The main portal vein has a  normal venous waveform with flow directed towards the liver. It has a velocity of 25 cm/sec.      Impression 1. The liver has moderately increased echogenicity and coarsening of its echotexture.  This is characteristic of hepatic steatosis or chronic liver disease.  2. The spleen is small and heterogeneous in appearance.  It measures 7.3 cm in length.  3. If additional imaging evaluation is clinically indicated, I recommend consideration an MRI examination of the abdomen without and with IV contrast.      Electronically signed by: Ketan De Leon MD  Date:    11/04/2019  Time:    10:06           ASSESSMENT / PLAN:  64 y.o. White female with:    1. NAFLD  -- Labs and Fibroscan reviewed with patient. Liver enzymes improving with better DM control. Fibroscan previously reassuring with suggestion of only mild fibrosis (F1).   -- Discussed importance of lifestyle modifications including healthy diet and adequate physical activity to achieve and maintain ideal body weight.   -- Low carbohydrate, low sugar diet.  -- Maintain control of blood pressure, cholesterol, and blood sugar as these are risk factors for fatty liver    *If statins are being considered for HLD, statins are safe in patients with liver disease. Statins can be used to treat dyslipidemia in patients with both NAFLD and ROMERO.    2. DM, HLD, HTN      Next liver labs in Sept.  Return to clinic in 1 year with Fibroscan.          Orders Placed This Encounter   Procedures    FibroScan (Vibration Controlled Transient Elastography)    Comprehensive metabolic panel         EDUCATION / DISCUSSION:   We discussed the manifestations of fatty liver. At this time there is no FDA approved therapy for fatty liver. The patient and I discussed the importance of lifestyle modifications such as diet, exercise, and weight loss for management of fatty liver. We reviewed modification of risk factors such as hypertension, hyperlipidemia, and diabetes mellitus / impaired fasting  glucose. Discussed that excessive alcohol consumption can also cause fatty liver. We discussed a low carb, low sugar diet and a goal of 30 minutes of exercise 5 times per week. Patient is aware that fatty liver can progress to steatohepatitis and possibly to cirrhosis, putting one at increased risk for liver cancer or liver failure.           Thank you for allowing me to participate in the care of Tomeka Garcia, FNP-C  Hepatology and Liver Transplant

## 2020-05-19 LAB — FINAL PATHOLOGIC DIAGNOSIS: ABNORMAL

## 2020-05-20 ENCOUNTER — TELEPHONE (OUTPATIENT)
Dept: ENDOCRINOLOGY | Facility: CLINIC | Age: 65
End: 2020-05-20

## 2020-05-20 NOTE — TELEPHONE ENCOUNTER
Called patient to discuss FNA results. No answer, left VM asking for call back and will try again later

## 2020-05-21 ENCOUNTER — TELEPHONE (OUTPATIENT)
Dept: ENDOCRINOLOGY | Facility: CLINIC | Age: 65
End: 2020-05-21

## 2020-05-21 DIAGNOSIS — E04.1 THYROID NODULE: Primary | ICD-10-CM

## 2020-05-21 NOTE — TELEPHONE ENCOUNTER
----- Message from Deandra Morris sent at 5/20/2020  3:28 PM CDT -----  Contact: [phone]  Pt states call her back on tomorrow sorry she missed the call please call back to discuss

## 2020-05-21 NOTE — TELEPHONE ENCOUNTER
Called patient. Spoke with her daughter in law at her request about diagnosis  Explained need for thyroidectomy as well as possible treatment with iodine in future although will depend on pathology  All questions answered  Will refer to Dr. Salomno for thyroidectomy

## 2020-06-01 ENCOUNTER — INITIAL CONSULT (OUTPATIENT)
Dept: SURGERY | Facility: CLINIC | Age: 65
End: 2020-06-01
Attending: SURGERY
Payer: MEDICARE

## 2020-06-01 VITALS
DIASTOLIC BLOOD PRESSURE: 77 MMHG | HEIGHT: 63 IN | SYSTOLIC BLOOD PRESSURE: 175 MMHG | BODY MASS INDEX: 22.32 KG/M2 | WEIGHT: 126 LBS | HEART RATE: 77 BPM

## 2020-06-01 DIAGNOSIS — E04.1 THYROID NODULE: Primary | ICD-10-CM

## 2020-06-01 PROCEDURE — 99499 RISK ADDL DX/OHS AUDIT: ICD-10-PCS | Mod: S$GLB,,, | Performed by: SURGERY

## 2020-06-01 PROCEDURE — 3077F SYST BP >= 140 MM HG: CPT | Mod: CPTII,S$GLB,, | Performed by: SURGERY

## 2020-06-01 PROCEDURE — 1126F AMNT PAIN NOTED NONE PRSNT: CPT | Mod: S$GLB,,, | Performed by: SURGERY

## 2020-06-01 PROCEDURE — 99204 PR OFFICE/OUTPT VISIT, NEW, LEVL IV, 45-59 MIN: ICD-10-PCS | Mod: S$GLB,,, | Performed by: SURGERY

## 2020-06-01 PROCEDURE — 3077F PR MOST RECENT SYSTOLIC BLOOD PRESSURE >= 140 MM HG: ICD-10-PCS | Mod: CPTII,S$GLB,, | Performed by: SURGERY

## 2020-06-01 PROCEDURE — 3078F PR MOST RECENT DIASTOLIC BLOOD PRESSURE < 80 MM HG: ICD-10-PCS | Mod: CPTII,S$GLB,, | Performed by: SURGERY

## 2020-06-01 PROCEDURE — 1126F PR PAIN SEVERITY QUANTIFIED, NO PAIN PRESENT: ICD-10-PCS | Mod: S$GLB,,, | Performed by: SURGERY

## 2020-06-01 PROCEDURE — 99204 OFFICE O/P NEW MOD 45 MIN: CPT | Mod: S$GLB,,, | Performed by: SURGERY

## 2020-06-01 PROCEDURE — 1101F PR PT FALLS ASSESS DOC 0-1 FALLS W/OUT INJ PAST YR: ICD-10-PCS | Mod: CPTII,S$GLB,, | Performed by: SURGERY

## 2020-06-01 PROCEDURE — 99499 UNLISTED E&M SERVICE: CPT | Mod: S$GLB,,, | Performed by: SURGERY

## 2020-06-01 PROCEDURE — 1159F MED LIST DOCD IN RCRD: CPT | Mod: S$GLB,,, | Performed by: SURGERY

## 2020-06-01 PROCEDURE — 1101F PT FALLS ASSESS-DOCD LE1/YR: CPT | Mod: CPTII,S$GLB,, | Performed by: SURGERY

## 2020-06-01 PROCEDURE — 3078F DIAST BP <80 MM HG: CPT | Mod: CPTII,S$GLB,, | Performed by: SURGERY

## 2020-06-01 PROCEDURE — 1159F PR MEDICATION LIST DOCUMENTED IN MEDICAL RECORD: ICD-10-PCS | Mod: S$GLB,,, | Performed by: SURGERY

## 2020-06-01 RX ORDER — SODIUM CHLORIDE 9 MG/ML
INJECTION, SOLUTION INTRAVENOUS CONTINUOUS
Status: CANCELLED | OUTPATIENT
Start: 2020-06-01

## 2020-06-01 RX ORDER — LIDOCAINE HYDROCHLORIDE 10 MG/ML
1 INJECTION, SOLUTION EPIDURAL; INFILTRATION; INTRACAUDAL; PERINEURAL ONCE
Status: CANCELLED | OUTPATIENT
Start: 2020-06-01 | End: 2020-06-01

## 2020-06-01 NOTE — LETTER
Endocrine/General Surgery  [address] [place] [address]  Phone: [phone]  Fax: [phone] June 2, 2020         Karla Rousseau MD  [address]  [facility] [address]    Patient: Tomeka Burns   YOB: 1955   Date of Visit: 6/1/2020     Dear Dr. Rousseau:    I saw Ms. Burns today in clinic. Attached you will find a copy of my note.    As you know, she is a 64 y.o. female who presented with a new diagnosis of papillary thyroid cancer. I was able to discuss the indications with surgery as well as the other treatment options. The current plan includes total thyroidectomy with possible central neck dissection in the near future.    If you have any questions or concerns, please don't hesitate to contact my office. Again, thank you kindly for this referral.    With many thanks,       Hermelinda Zuniga MD      Andrei Miller MD  [address] [place] [id number]  VIA In Basket
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
## 2020-06-01 NOTE — LETTER
Endocrine/General Surgery  [address] [place] [address]  Phone: [phone]  Fax: [phone] June 1, 2020     Patient: Tomeka Burns   YOB: 1955   Date of Visit: 6/1/2020       To whom it may concern,    I saw Ms. Burns today in clinic. Tomeka has been under my care since 6/1/2020.    She is tentatively scheduled for surgery on June 16, 2020 and is expected to be out of work for 2-4 weeks.  She will likely be able perform all duties without restriction.     If you have any questions or concerns, please don't hesitate to contact my office. Thank you for accomodating Tomeka during this time of her treatment.        With many thanks,        Hermelinda Zuniga MD        CC  No Recipients
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
## 2020-06-01 NOTE — Clinical Note
June 2, 2020      Karla Rousseau MD  [address] [facility] [address]           [place] [address]  [address], [address]  [facility] [address]  Phone: [phone]  Fax: [phone]          Patient: Tomeka Burns   MR Number: [id number]   YOB: 1955   Date of Visit: 6/1/2020       Dear Dr. Karla Rousseau:    Thank you for referring Tomeka Burns to me for evaluation. Attached you will find relevant portions of my assessment and plan of care.    If you have questions, please do not hesitate to call me. I look forward to following Tomeka Burns along with you.    Sincerely,    Hermelinda Zuniga MD    Enclosure  CC:  No Recipients    If you would like to receive this communication electronically, please contact [email or web address] or [phone] to request more information on [facility] access.    For providers and/or their staff who would like to refer a patient to [facility], please contact us through our one-stop-shop provider referral line, [facility], at [phone].    If you feel you have received this communication in error or would no longer like to receive these types of communications, please e-mail [email or web address]

## 2020-06-10 ENCOUNTER — HOSPITAL ENCOUNTER (OUTPATIENT)
Dept: PREADMISSION TESTING | Facility: OTHER | Age: 65
Discharge: HOME | End: 2020-06-10
Attending: SURGERY
Payer: MEDICARE

## 2020-06-10 ENCOUNTER — ANESTHESIA EVENT (OUTPATIENT)
Dept: SURGERY | Facility: OTHER | Age: 65
DRG: 627 | End: 2020-06-10
Payer: MEDICARE

## 2020-06-10 VITALS
BODY MASS INDEX: 22.32 KG/M2 | SYSTOLIC BLOOD PRESSURE: 153 MMHG | HEART RATE: 72 BPM | WEIGHT: 126 LBS | DIASTOLIC BLOOD PRESSURE: 78 MMHG | TEMPERATURE: 98 F | HEIGHT: 63 IN | OXYGEN SATURATION: 98 % | RESPIRATION RATE: 16 BRPM

## 2020-06-10 DIAGNOSIS — E04.1 THYROID NODULE: Primary | ICD-10-CM

## 2020-06-10 RX ORDER — LIDOCAINE HYDROCHLORIDE 10 MG/ML
0.5 INJECTION, SOLUTION EPIDURAL; INFILTRATION; INTRACAUDAL; PERINEURAL ONCE
Status: CANCELLED | OUTPATIENT
Start: 2020-06-10 | End: 2020-06-10

## 2020-06-10 RX ORDER — SODIUM CHLORIDE, SODIUM LACTATE, POTASSIUM CHLORIDE, CALCIUM CHLORIDE 600; 310; 30; 20 MG/100ML; MG/100ML; MG/100ML; MG/100ML
INJECTION, SOLUTION INTRAVENOUS CONTINUOUS
Status: CANCELLED | OUTPATIENT
Start: 2020-06-10

## 2020-06-10 RX ORDER — ACETAMINOPHEN 500 MG
1000 TABLET ORAL
Status: CANCELLED | OUTPATIENT
Start: 2020-06-10 | End: 2020-06-10

## 2020-06-10 RX ORDER — CELECOXIB 200 MG/1
400 CAPSULE ORAL
Status: CANCELLED | OUTPATIENT
Start: 2020-06-10 | End: 2020-06-10

## 2020-06-10 RX ORDER — PREGABALIN 50 MG/1
50 CAPSULE ORAL
Status: CANCELLED | OUTPATIENT
Start: 2020-06-10 | End: 2020-06-10

## 2020-06-10 NOTE — ANESTHESIA PREPROCEDURE EVALUATION
06/10/2020  Tomeka Burns is a 64 y.o., female.    Anesthesia Evaluation    I have reviewed the Patient Summary Reports.    I have reviewed the Nursing Notes. I have reviewed the NPO Status.      Review of Systems  Anesthesia Hx:  No problems with previous Anesthesia  History of prior surgery of interest to airway management or planning: Previous anesthesia: General LORIE many yrs ago NAAC with general anesthesia.  Denies Family Hx of Anesthesia complications.   Denies Personal Hx of Anesthesia complications.   Social:  Non-Smoker    Hematology/Oncology:  Hematology Normal   Oncology Normal     EENT/Dental:EENT/Dental Normal   Cardiovascular:   Exercise tolerance: good Hypertension, well controlled ECG has been reviewed. Normal heart cath June 2019   Pulmonary:  Pulmonary Normal    Renal/:  Renal/ Normal     Hepatic/GI:   GERD, well controlled Fatty liver   Musculoskeletal:  Musculoskeletal Normal    Neurological:  Neurology Normal    Endocrine:   Diabetes, type 2    Dermatological:  Skin Normal    Psych:  Psychiatric Normal           Physical Exam  General:  Well nourished    Airway/Jaw/Neck:  Airway Findings: Mouth Opening: Normal Tongue: Normal  General Airway Assessment: Adult  Mallampati: II      Dental:  Dental Findings: In tact        Mental Status:  Mental Status Findings:  Cooperative, Alert and Oriented         Anesthesia Plan  Type of Anesthesia, risks & benefits discussed:  Anesthesia Type:  general  Patient's Preference:   Intra-op Monitoring Plan: standard ASA monitors  Intra-op Monitoring Plan Comments:   Post Op Pain Control Plan: per primary service following discharge from PACU and multimodal analgesia  Post Op Pain Control Plan Comments:   Induction:   IV  Beta Blocker:         Informed Consent: Patient understands risks and agrees with Anesthesia plan.  Questions answered. Anesthesia  consent signed with patient.  ASA Score: 3     Day of Surgery Review of History & Physical:    H&P update referred to the surgeon.     Anesthesia Plan Notes: Labs in epic ok        Ready For Surgery From Anesthesia Perspective.

## 2020-06-11 ENCOUNTER — TELEPHONE (OUTPATIENT)
Dept: FAMILY MEDICINE | Facility: CLINIC | Age: 65
End: 2020-06-11

## 2020-06-11 ENCOUNTER — LAB VISIT (OUTPATIENT)
Dept: LAB | Facility: HOSPITAL | Age: 65
End: 2020-06-11
Attending: FAMILY MEDICINE
Payer: MEDICARE

## 2020-06-11 ENCOUNTER — OFFICE VISIT (OUTPATIENT)
Dept: FAMILY MEDICINE | Facility: CLINIC | Age: 65
End: 2020-06-11
Payer: MEDICARE

## 2020-06-11 VITALS
HEIGHT: 63 IN | BODY MASS INDEX: 22.54 KG/M2 | WEIGHT: 127.19 LBS | TEMPERATURE: 98 F | SYSTOLIC BLOOD PRESSURE: 138 MMHG | DIASTOLIC BLOOD PRESSURE: 80 MMHG | HEART RATE: 90 BPM | OXYGEN SATURATION: 98 %

## 2020-06-11 DIAGNOSIS — I10 ESSENTIAL HYPERTENSION: ICD-10-CM

## 2020-06-11 DIAGNOSIS — E11.65 TYPE 2 DIABETES MELLITUS WITH HYPERGLYCEMIA, WITHOUT LONG-TERM CURRENT USE OF INSULIN: Primary | ICD-10-CM

## 2020-06-11 DIAGNOSIS — E11.65 TYPE 2 DIABETES MELLITUS WITH HYPERGLYCEMIA, WITHOUT LONG-TERM CURRENT USE OF INSULIN: ICD-10-CM

## 2020-06-11 DIAGNOSIS — I44.7 LEFT BUNDLE BRANCH BLOCK: ICD-10-CM

## 2020-06-11 DIAGNOSIS — E04.2 MULTIPLE THYROID NODULES: ICD-10-CM

## 2020-06-11 DIAGNOSIS — E78.5 HYPERLIPIDEMIA, UNSPECIFIED HYPERLIPIDEMIA TYPE: ICD-10-CM

## 2020-06-11 LAB
ALBUMIN SERPL BCP-MCNC: 4.3 G/DL (ref 3.5–5.2)
ALP SERPL-CCNC: 104 U/L (ref 55–135)
ALT SERPL W/O P-5'-P-CCNC: 40 U/L (ref 10–44)
ANION GAP SERPL CALC-SCNC: 9 MMOL/L (ref 8–16)
AST SERPL-CCNC: 34 U/L (ref 10–40)
BASOPHILS # BLD AUTO: 0.03 K/UL (ref 0–0.2)
BASOPHILS NFR BLD: 0.4 % (ref 0–1.9)
BILIRUB SERPL-MCNC: 0.8 MG/DL (ref 0.1–1)
BUN SERPL-MCNC: 17 MG/DL (ref 8–23)
CALCIUM SERPL-MCNC: 9.5 MG/DL (ref 8.7–10.5)
CHLORIDE SERPL-SCNC: 100 MMOL/L (ref 95–110)
CO2 SERPL-SCNC: 25 MMOL/L (ref 23–29)
CREAT SERPL-MCNC: 0.9 MG/DL (ref 0.5–1.4)
DIFFERENTIAL METHOD: NORMAL
EOSINOPHIL # BLD AUTO: 0.2 K/UL (ref 0–0.5)
EOSINOPHIL NFR BLD: 2.3 % (ref 0–8)
ERYTHROCYTE [DISTWIDTH] IN BLOOD BY AUTOMATED COUNT: 12.1 % (ref 11.5–14.5)
EST. GFR  (AFRICAN AMERICAN): >60 ML/MIN/1.73 M^2
EST. GFR  (NON AFRICAN AMERICAN): >60 ML/MIN/1.73 M^2
GLUCOSE SERPL-MCNC: 103 MG/DL (ref 70–110)
HCT VFR BLD AUTO: 41.9 % (ref 37–48.5)
HGB BLD-MCNC: 13.8 G/DL (ref 12–16)
IMM GRANULOCYTES # BLD AUTO: 0.01 K/UL (ref 0–0.04)
IMM GRANULOCYTES NFR BLD AUTO: 0.1 % (ref 0–0.5)
LYMPHOCYTES # BLD AUTO: 2.9 K/UL (ref 1–4.8)
LYMPHOCYTES NFR BLD: 38.2 % (ref 18–48)
MCH RBC QN AUTO: 28.6 PG (ref 27–31)
MCHC RBC AUTO-ENTMCNC: 32.9 G/DL (ref 32–36)
MCV RBC AUTO: 87 FL (ref 82–98)
MONOCYTES # BLD AUTO: 0.7 K/UL (ref 0.3–1)
MONOCYTES NFR BLD: 8.8 % (ref 4–15)
NEUTROPHILS # BLD AUTO: 3.8 K/UL (ref 1.8–7.7)
NEUTROPHILS NFR BLD: 50.2 % (ref 38–73)
NRBC BLD-RTO: 0 /100 WBC
PLATELET # BLD AUTO: 281 K/UL (ref 150–350)
PMV BLD AUTO: 10.8 FL (ref 9.2–12.9)
POTASSIUM SERPL-SCNC: 4.2 MMOL/L (ref 3.5–5.1)
PROT SERPL-MCNC: 8.1 G/DL (ref 6–8.4)
RBC # BLD AUTO: 4.83 M/UL (ref 4–5.4)
SODIUM SERPL-SCNC: 134 MMOL/L (ref 136–145)
WBC # BLD AUTO: 7.48 K/UL (ref 3.9–12.7)

## 2020-06-11 PROCEDURE — 1159F PR MEDICATION LIST DOCUMENTED IN MEDICAL RECORD: ICD-10-PCS | Mod: S$GLB,,, | Performed by: FAMILY MEDICINE

## 2020-06-11 PROCEDURE — 3079F DIAST BP 80-89 MM HG: CPT | Mod: CPTII,S$GLB,, | Performed by: FAMILY MEDICINE

## 2020-06-11 PROCEDURE — 3044F PR MOST RECENT HEMOGLOBIN A1C LEVEL <7.0%: ICD-10-PCS | Mod: CPTII,S$GLB,, | Performed by: FAMILY MEDICINE

## 2020-06-11 PROCEDURE — 1125F AMNT PAIN NOTED PAIN PRSNT: CPT | Mod: S$GLB,,, | Performed by: FAMILY MEDICINE

## 2020-06-11 PROCEDURE — 80053 COMPREHEN METABOLIC PANEL: CPT

## 2020-06-11 PROCEDURE — 36415 COLL VENOUS BLD VENIPUNCTURE: CPT | Mod: PO

## 2020-06-11 PROCEDURE — 1101F PT FALLS ASSESS-DOCD LE1/YR: CPT | Mod: CPTII,S$GLB,, | Performed by: FAMILY MEDICINE

## 2020-06-11 PROCEDURE — 1101F PR PT FALLS ASSESS DOC 0-1 FALLS W/OUT INJ PAST YR: ICD-10-PCS | Mod: CPTII,S$GLB,, | Performed by: FAMILY MEDICINE

## 2020-06-11 PROCEDURE — 1159F MED LIST DOCD IN RCRD: CPT | Mod: S$GLB,,, | Performed by: FAMILY MEDICINE

## 2020-06-11 PROCEDURE — 3044F HG A1C LEVEL LT 7.0%: CPT | Mod: CPTII,S$GLB,, | Performed by: FAMILY MEDICINE

## 2020-06-11 PROCEDURE — 3079F PR MOST RECENT DIASTOLIC BLOOD PRESSURE 80-89 MM HG: ICD-10-PCS | Mod: CPTII,S$GLB,, | Performed by: FAMILY MEDICINE

## 2020-06-11 PROCEDURE — 83036 HEMOGLOBIN GLYCOSYLATED A1C: CPT

## 2020-06-11 PROCEDURE — 3075F PR MOST RECENT SYSTOLIC BLOOD PRESS GE 130-139MM HG: ICD-10-PCS | Mod: CPTII,S$GLB,, | Performed by: FAMILY MEDICINE

## 2020-06-11 PROCEDURE — 85025 COMPLETE CBC W/AUTO DIFF WBC: CPT

## 2020-06-11 PROCEDURE — 99214 OFFICE O/P EST MOD 30 MIN: CPT | Mod: S$GLB,,, | Performed by: FAMILY MEDICINE

## 2020-06-11 PROCEDURE — 99214 PR OFFICE/OUTPT VISIT, EST, LEVL IV, 30-39 MIN: ICD-10-PCS | Mod: S$GLB,,, | Performed by: FAMILY MEDICINE

## 2020-06-11 PROCEDURE — 99999 PR PBB SHADOW E&M-EST. PATIENT-LVL III: CPT | Mod: PBBFAC,,, | Performed by: FAMILY MEDICINE

## 2020-06-11 PROCEDURE — 99999 PR PBB SHADOW E&M-EST. PATIENT-LVL III: ICD-10-PCS | Mod: PBBFAC,,, | Performed by: FAMILY MEDICINE

## 2020-06-11 PROCEDURE — 1125F PR PAIN SEVERITY QUANTIFIED, PAIN PRESENT: ICD-10-PCS | Mod: S$GLB,,, | Performed by: FAMILY MEDICINE

## 2020-06-11 PROCEDURE — 3075F SYST BP GE 130 - 139MM HG: CPT | Mod: CPTII,S$GLB,, | Performed by: FAMILY MEDICINE

## 2020-06-11 NOTE — TELEPHONE ENCOUNTER
----- Message from Beryl Edmonds RN sent at 6/11/2020 10:18 AM CDT -----  Ms. Burns is scheduled for surgery on 6/16/2020.  Can she be medically cleared for the procedure?  Thanks,  Beryl Edmonds RN, [place]  Ext [phone]  ----- Message -----  From: Beryl Edmonds RN  Sent: 6/1/2020  10:27 AM CDT  To: [facility] Staff    Mrs. Burns will be having a thyroidectomy with Dr. Zuniga on 6/16/2020.  Can medical clearance be provided prior to the procedure?  Thanks,  Beryl Edmonds RN, SD

## 2020-06-11 NOTE — TELEPHONE ENCOUNTER
I call patient and there was no answer and left a message for her to come in at 2:20 that there was a space for a doctor to see her.

## 2020-06-11 NOTE — PROGRESS NOTES
Subjective:       Patient ID: Tomeka Burns is a 64 y.o. female.    Chief Complaint: Pre-op Exam; Hypertension; Hyperlipidemia; and Diabetes  63 yo female pt of Dr. Hernández with HTN, DM, Type 2, LBBB and hyperlipidemia presents for a pre-operative clearance. Pt to have a thyroidectomy by Dr. Zuniga on 6/16/20.  Hypertension   This is a chronic problem. The current episode started more than 1 year ago. The problem is uncontrolled. Pertinent negatives include no blurred vision, chest pain, headaches, orthopnea, palpitations, peripheral edema, PND or shortness of breath. Past treatments include angiotensin blockers and diuretics. There is no history of chronic renal disease.   Hyperlipidemia   This is a chronic problem. The current episode started more than 1 year ago. The problem is controlled. Recent lipid tests were reviewed and are normal. Exacerbating diseases include diabetes. She has no history of chronic renal disease, hypothyroidism, liver disease, obesity or nephrotic syndrome. Pertinent negatives include no chest pain, myalgias or shortness of breath. Current antihyperlipidemic treatment includes statins. The current treatment provides significant improvement of lipids. There are no compliance problems.    Diabetes   She presents for her follow-up diabetic visit. She has type 2 diabetes mellitus. Pertinent negatives for hypoglycemia include no headaches. Pertinent negatives for diabetes include no blurred vision, no chest pain, no polydipsia, no polyphagia and no polyuria. Current diabetic treatment includes oral agent (monotherapy). She is compliant with treatment all of the time. Her weight is stable. She participates in exercise three times a week. She monitors blood glucose at home 1-2 x per day. Her breakfast blood glucose range is generally 140-180 mg/dl. An ACE inhibitor/angiotensin II receptor blocker is being taken. Eye exam is current.     Review of Systems   Eyes: Negative for blurred  vision.   Respiratory: Negative for shortness of breath.    Cardiovascular: Negative for chest pain, palpitations, orthopnea and PND.   Endocrine: Negative for polydipsia, polyphagia and polyuria.   Musculoskeletal: Negative for myalgias.   Neurological: Negative for headaches.       Objective:      Physical Exam   Constitutional: She is oriented to person, place, and time. She appears well-developed and well-nourished. No distress.   HENT:   Head: Normocephalic and atraumatic.   Neck: Normal range of motion. Neck supple. No JVD present. No thyromegaly present.   Cardiovascular: Normal rate, regular rhythm and normal heart sounds. Exam reveals no gallop and no friction rub.   No murmur heard.  Pulmonary/Chest: Effort normal and breath sounds normal. She has no wheezes. She has no rales.   Abdominal: Soft. Bowel sounds are normal. She exhibits no mass. There is no tenderness.   Lymphadenopathy:     She has no cervical adenopathy.   Neurological: She is alert and oriented to person, place, and time.   Skin: Skin is warm and dry. She is not diaphoretic.   Psychiatric: She has a normal mood and affect.   Vitals reviewed.      Assessment:       See plan  Plan:       Type 2 diabetes mellitus with hyperglycemia, without long-term current use of insulin: Stable  -     Comprehensive metabolic panel; Future; Expected date: 06/11/2020  -     Hemoglobin A1C; Future; Expected date: 06/11/2020    Essential hypertension: Stable  -     CBC auto differential; Future; Expected date: 06/11/2020    Hyperlipidemia, unspecified hyperlipidemia type: Stable    Left bundle branch block: Stable    Multiple thyroid nodules  - Will clear patient for thyroidectomy after review of labs done today.    6/12/20: CBC, CMP and HgbA1C done on 6/11/20 reviewed. Pt is cleared for surgery on 6/16/20.

## 2020-06-11 NOTE — TELEPHONE ENCOUNTER
I call patient to schedule a pre-op visit for today with Dr. Singh and left a detail message for patient to return my call that there was an opening today.    I also left a message with her daughter Jane to get in touch with her mom that there was an opening available today at 2:20p.m.

## 2020-06-12 LAB
ESTIMATED AVG GLUCOSE: 148 MG/DL (ref 68–131)
HBA1C MFR BLD HPLC: 6.8 % (ref 4–5.6)

## 2020-06-13 ENCOUNTER — CLINICAL SUPPORT (OUTPATIENT)
Dept: URGENT CARE | Facility: CLINIC | Age: 65
DRG: 627 | End: 2020-06-13
Payer: MEDICARE

## 2020-06-13 DIAGNOSIS — E04.1 THYROID NODULE: ICD-10-CM

## 2020-06-13 PROCEDURE — U0003 INFECTIOUS AGENT DETECTION BY NUCLEIC ACID (DNA OR RNA); SEVERE ACUTE RESPIRATORY SYNDROME CORONAVIRUS 2 (SARS-COV-2) (CORONAVIRUS DISEASE [COVID-19]), AMPLIFIED PROBE TECHNIQUE, MAKING USE OF HIGH THROUGHPUT TECHNOLOGIES AS DESCRIBED BY CMS-2020-01-R: HCPCS

## 2020-06-15 ENCOUNTER — TELEPHONE (OUTPATIENT)
Dept: SURGERY | Facility: CLINIC | Age: 65
End: 2020-06-15

## 2020-06-15 LAB — SARS-COV-2 RNA RESP QL NAA+PROBE: NOT DETECTED

## 2020-06-16 ENCOUNTER — ANESTHESIA (OUTPATIENT)
Dept: SURGERY | Facility: OTHER | Age: 65
DRG: 627 | End: 2020-06-16
Payer: MEDICARE

## 2020-06-16 ENCOUNTER — HOSPITAL ENCOUNTER (OUTPATIENT)
Facility: OTHER | Age: 65
Discharge: HOME | DRG: 627 | End: 2020-06-17
Attending: SURGERY | Admitting: SURGERY
Payer: MEDICARE

## 2020-06-16 DIAGNOSIS — E04.1 THYROID NODULE: ICD-10-CM

## 2020-06-16 DIAGNOSIS — C73 THYROID CANCER: Primary | Chronic | ICD-10-CM

## 2020-06-16 LAB
POCT GLUCOSE: 139 MG/DL (ref 70–110)
POCT GLUCOSE: 179 MG/DL (ref 70–110)
POCT GLUCOSE: 210 MG/DL (ref 70–110)
POCT GLUCOSE: 223 MG/DL (ref 70–110)
PTH-INTACT SERPL-MCNC: <5 PG/ML (ref 9–77)

## 2020-06-16 PROCEDURE — C9290 INJ, BUPIVACAINE LIPOSOME: HCPCS | Performed by: SURGERY

## 2020-06-16 PROCEDURE — 25000003 PHARM REV CODE 250: Performed by: ANESTHESIOLOGY

## 2020-06-16 PROCEDURE — G0378 HOSPITAL OBSERVATION PER HR: HCPCS

## 2020-06-16 PROCEDURE — 82962 GLUCOSE BLOOD TEST: CPT | Performed by: SURGERY

## 2020-06-16 PROCEDURE — 37000009 HC ANESTHESIA EA ADD 15 MINS: Performed by: SURGERY

## 2020-06-16 PROCEDURE — 63600175 PHARM REV CODE 636 W HCPCS: Performed by: ANESTHESIOLOGY

## 2020-06-16 PROCEDURE — 94799 UNLISTED PULMONARY SVC/PX: CPT

## 2020-06-16 PROCEDURE — 27201423 OPTIME MED/SURG SUP & DEVICES STERILE SUPPLY: Performed by: SURGERY

## 2020-06-16 PROCEDURE — 83970 ASSAY OF PARATHORMONE: CPT

## 2020-06-16 PROCEDURE — 36000706: Performed by: SURGERY

## 2020-06-16 PROCEDURE — 25000003 PHARM REV CODE 250: Performed by: SURGERY

## 2020-06-16 PROCEDURE — 63600175 PHARM REV CODE 636 W HCPCS: Performed by: SURGERY

## 2020-06-16 PROCEDURE — 63600175 PHARM REV CODE 636 W HCPCS: Performed by: STUDENT IN AN ORGANIZED HEALTH CARE EDUCATION/TRAINING PROGRAM

## 2020-06-16 PROCEDURE — 36415 COLL VENOUS BLD VENIPUNCTURE: CPT

## 2020-06-16 PROCEDURE — 60240 PR THYROIDECTOMY: ICD-10-PCS | Mod: ,,, | Performed by: SURGERY

## 2020-06-16 PROCEDURE — 36000707: Performed by: SURGERY

## 2020-06-16 PROCEDURE — 88307 TISSUE EXAM BY PATHOLOGIST: CPT | Performed by: PATHOLOGY

## 2020-06-16 PROCEDURE — 37000008 HC ANESTHESIA 1ST 15 MINUTES: Performed by: SURGERY

## 2020-06-16 PROCEDURE — 88307 PR  SURG PATH,LEVEL V: ICD-10-PCS | Mod: 26,,, | Performed by: PATHOLOGY

## 2020-06-16 PROCEDURE — 88307 TISSUE EXAM BY PATHOLOGIST: CPT | Mod: 26,,, | Performed by: PATHOLOGY

## 2020-06-16 PROCEDURE — 71000033 HC RECOVERY, INTIAL HOUR: Performed by: SURGERY

## 2020-06-16 PROCEDURE — 25000003 PHARM REV CODE 250: Performed by: REGISTERED NURSE

## 2020-06-16 PROCEDURE — 11000001 HC ACUTE MED/SURG PRIVATE ROOM

## 2020-06-16 PROCEDURE — 99900035 HC TECH TIME PER 15 MIN (STAT)

## 2020-06-16 PROCEDURE — 94761 N-INVAS EAR/PLS OXIMETRY MLT: CPT

## 2020-06-16 PROCEDURE — 63600175 PHARM REV CODE 636 W HCPCS: Performed by: REGISTERED NURSE

## 2020-06-16 PROCEDURE — 71000039 HC RECOVERY, EACH ADD'L HOUR: Performed by: SURGERY

## 2020-06-16 PROCEDURE — 60240 REMOVAL OF THYROID: CPT | Mod: ,,, | Performed by: SURGERY

## 2020-06-16 RX ORDER — CALCIUM CARBONATE 500(1250)
1500 TABLET ORAL
Status: DISCONTINUED | OUTPATIENT
Start: 2020-06-16 | End: 2020-06-17 | Stop reason: HOSPADM

## 2020-06-16 RX ORDER — HYDROMORPHONE HYDROCHLORIDE 2 MG/ML
0.4 INJECTION, SOLUTION INTRAMUSCULAR; INTRAVENOUS; SUBCUTANEOUS EVERY 5 MIN PRN
Status: DISCONTINUED | OUTPATIENT
Start: 2020-06-16 | End: 2020-06-16 | Stop reason: HOSPADM

## 2020-06-16 RX ORDER — ACETAMINOPHEN 500 MG
1000 TABLET ORAL
Status: COMPLETED | OUTPATIENT
Start: 2020-06-16 | End: 2020-06-16

## 2020-06-16 RX ORDER — LOSARTAN POTASSIUM 50 MG/1
100 TABLET ORAL DAILY
Status: DISCONTINUED | OUTPATIENT
Start: 2020-06-16 | End: 2020-06-17 | Stop reason: HOSPADM

## 2020-06-16 RX ORDER — LIDOCAINE HCL/PF 100 MG/5ML
SYRINGE (ML) INTRAVENOUS
Status: DISCONTINUED | OUTPATIENT
Start: 2020-06-16 | End: 2020-06-16

## 2020-06-16 RX ORDER — SODIUM CHLORIDE, SODIUM LACTATE, POTASSIUM CHLORIDE, CALCIUM CHLORIDE 600; 310; 30; 20 MG/100ML; MG/100ML; MG/100ML; MG/100ML
INJECTION, SOLUTION INTRAVENOUS CONTINUOUS
Status: DISCONTINUED | OUTPATIENT
Start: 2020-06-16 | End: 2020-06-16

## 2020-06-16 RX ORDER — SODIUM CHLORIDE 0.9 % (FLUSH) 0.9 %
10 SYRINGE (ML) INJECTION
Status: DISCONTINUED | OUTPATIENT
Start: 2020-06-16 | End: 2020-06-17 | Stop reason: HOSPADM

## 2020-06-16 RX ORDER — LEVOTHYROXINE SODIUM 112 UG/1
112 TABLET ORAL
Status: DISCONTINUED | OUTPATIENT
Start: 2020-06-17 | End: 2020-06-17 | Stop reason: HOSPADM

## 2020-06-16 RX ORDER — TALC
6 POWDER (GRAM) TOPICAL NIGHTLY PRN
Status: DISCONTINUED | OUTPATIENT
Start: 2020-06-16 | End: 2020-06-17 | Stop reason: HOSPADM

## 2020-06-16 RX ORDER — CELECOXIB 200 MG/1
400 CAPSULE ORAL
Status: COMPLETED | OUTPATIENT
Start: 2020-06-16 | End: 2020-06-16

## 2020-06-16 RX ORDER — KETAMINE HCL IN 0.9 % NACL 50 MG/5 ML
SYRINGE (ML) INTRAVENOUS
Status: DISCONTINUED | OUTPATIENT
Start: 2020-06-16 | End: 2020-06-16

## 2020-06-16 RX ORDER — ROCURONIUM BROMIDE 10 MG/ML
INJECTION, SOLUTION INTRAVENOUS
Status: DISCONTINUED | OUTPATIENT
Start: 2020-06-16 | End: 2020-06-16

## 2020-06-16 RX ORDER — SUCCINYLCHOLINE CHLORIDE 20 MG/ML
INJECTION INTRAMUSCULAR; INTRAVENOUS
Status: DISCONTINUED | OUTPATIENT
Start: 2020-06-16 | End: 2020-06-16

## 2020-06-16 RX ORDER — AMOXICILLIN 250 MG
1 CAPSULE ORAL 2 TIMES DAILY
Status: DISCONTINUED | OUTPATIENT
Start: 2020-06-16 | End: 2020-06-17 | Stop reason: HOSPADM

## 2020-06-16 RX ORDER — ONDANSETRON 8 MG/1
8 TABLET, ORALLY DISINTEGRATING ORAL EVERY 8 HOURS PRN
Status: DISCONTINUED | OUTPATIENT
Start: 2020-06-16 | End: 2020-06-17 | Stop reason: HOSPADM

## 2020-06-16 RX ORDER — PANTOPRAZOLE SODIUM 40 MG/1
40 TABLET, DELAYED RELEASE ORAL
Status: DISCONTINUED | OUTPATIENT
Start: 2020-06-17 | End: 2020-06-17 | Stop reason: HOSPADM

## 2020-06-16 RX ORDER — FENTANYL CITRATE 50 UG/ML
INJECTION, SOLUTION INTRAMUSCULAR; INTRAVENOUS
Status: DISCONTINUED | OUTPATIENT
Start: 2020-06-16 | End: 2020-06-16

## 2020-06-16 RX ORDER — ERGOCALCIFEROL 1.25 MG/1
50000 CAPSULE ORAL DAILY
Status: COMPLETED | OUTPATIENT
Start: 2020-06-16 | End: 2020-06-17

## 2020-06-16 RX ORDER — MIDAZOLAM HYDROCHLORIDE 1 MG/ML
INJECTION, SOLUTION INTRAMUSCULAR; INTRAVENOUS
Status: DISCONTINUED | OUTPATIENT
Start: 2020-06-16 | End: 2020-06-16

## 2020-06-16 RX ORDER — IBUPROFEN 200 MG
24 TABLET ORAL
Status: DISCONTINUED | OUTPATIENT
Start: 2020-06-16 | End: 2020-06-17 | Stop reason: HOSPADM

## 2020-06-16 RX ORDER — ACETAMINOPHEN 325 MG/1
650 TABLET ORAL EVERY 6 HOURS PRN
Status: DISCONTINUED | OUTPATIENT
Start: 2020-06-16 | End: 2020-06-17 | Stop reason: HOSPADM

## 2020-06-16 RX ORDER — LIDOCAINE HYDROCHLORIDE 10 MG/ML
1 INJECTION, SOLUTION EPIDURAL; INFILTRATION; INTRACAUDAL; PERINEURAL ONCE
Status: DISCONTINUED | OUTPATIENT
Start: 2020-06-16 | End: 2020-06-17 | Stop reason: HOSPADM

## 2020-06-16 RX ORDER — INSULIN ASPART 100 [IU]/ML
1-10 INJECTION, SOLUTION INTRAVENOUS; SUBCUTANEOUS
Status: DISCONTINUED | OUTPATIENT
Start: 2020-06-16 | End: 2020-06-17 | Stop reason: HOSPADM

## 2020-06-16 RX ORDER — OXYCODONE HYDROCHLORIDE 5 MG/1
10 TABLET ORAL EVERY 4 HOURS PRN
Status: DISCONTINUED | OUTPATIENT
Start: 2020-06-16 | End: 2020-06-16

## 2020-06-16 RX ORDER — SODIUM CHLORIDE 9 MG/ML
INJECTION, SOLUTION INTRAVENOUS CONTINUOUS
Status: DISCONTINUED | OUTPATIENT
Start: 2020-06-16 | End: 2020-06-16

## 2020-06-16 RX ORDER — LEVOTHYROXINE SODIUM 112 UG/1
112 TABLET ORAL DAILY
Qty: 30 TABLET | Refills: 2 | Status: SHIPPED | OUTPATIENT
Start: 2020-06-16 | End: 2020-07-17

## 2020-06-16 RX ORDER — DEXAMETHASONE SODIUM PHOSPHATE 4 MG/ML
4 INJECTION, SOLUTION INTRA-ARTICULAR; INTRALESIONAL; INTRAMUSCULAR; INTRAVENOUS; SOFT TISSUE EVERY 6 HOURS
Status: COMPLETED | OUTPATIENT
Start: 2020-06-16 | End: 2020-06-16

## 2020-06-16 RX ORDER — OXYCODONE HYDROCHLORIDE 5 MG/1
5 TABLET ORAL EVERY 4 HOURS PRN
Status: DISCONTINUED | OUTPATIENT
Start: 2020-06-16 | End: 2020-06-17 | Stop reason: HOSPADM

## 2020-06-16 RX ORDER — IBUPROFEN 200 MG
16 TABLET ORAL
Status: DISCONTINUED | OUTPATIENT
Start: 2020-06-16 | End: 2020-06-17 | Stop reason: HOSPADM

## 2020-06-16 RX ORDER — ONDANSETRON 2 MG/ML
4 INJECTION INTRAMUSCULAR; INTRAVENOUS DAILY PRN
Status: DISCONTINUED | OUTPATIENT
Start: 2020-06-16 | End: 2020-06-16 | Stop reason: HOSPADM

## 2020-06-16 RX ORDER — AMOXICILLIN 250 MG
1 CAPSULE ORAL 2 TIMES DAILY
COMMUNITY
Start: 2020-06-16 | End: 2021-09-17

## 2020-06-16 RX ORDER — PHENYLEPHRINE HYDROCHLORIDE 10 MG/ML
INJECTION INTRAVENOUS
Status: DISCONTINUED | OUTPATIENT
Start: 2020-06-16 | End: 2020-06-16

## 2020-06-16 RX ORDER — DEXAMETHASONE SODIUM PHOSPHATE 4 MG/ML
INJECTION, SOLUTION INTRA-ARTICULAR; INTRALESIONAL; INTRAMUSCULAR; INTRAVENOUS; SOFT TISSUE
Status: DISCONTINUED | OUTPATIENT
Start: 2020-06-16 | End: 2020-06-16

## 2020-06-16 RX ORDER — SODIUM CHLORIDE 0.9 % (FLUSH) 0.9 %
3 SYRINGE (ML) INJECTION
Status: DISCONTINUED | OUTPATIENT
Start: 2020-06-16 | End: 2020-06-17 | Stop reason: HOSPADM

## 2020-06-16 RX ORDER — LIDOCAINE HYDROCHLORIDE 10 MG/ML
0.5 INJECTION, SOLUTION EPIDURAL; INFILTRATION; INTRACAUDAL; PERINEURAL ONCE
Status: DISCONTINUED | OUTPATIENT
Start: 2020-06-16 | End: 2020-06-16 | Stop reason: HOSPADM

## 2020-06-16 RX ORDER — OXYCODONE HYDROCHLORIDE 5 MG/1
5 TABLET ORAL
Status: DISCONTINUED | OUTPATIENT
Start: 2020-06-16 | End: 2020-06-16 | Stop reason: HOSPADM

## 2020-06-16 RX ORDER — ONDANSETRON 4 MG/1
4 TABLET, ORALLY DISINTEGRATING ORAL EVERY 8 HOURS PRN
Status: DISCONTINUED | OUTPATIENT
Start: 2020-06-16 | End: 2020-06-17 | Stop reason: HOSPADM

## 2020-06-16 RX ORDER — ONDANSETRON 2 MG/ML
INJECTION INTRAMUSCULAR; INTRAVENOUS
Status: DISCONTINUED | OUTPATIENT
Start: 2020-06-16 | End: 2020-06-16

## 2020-06-16 RX ORDER — CALCITRIOL 0.25 UG/1
0.5 CAPSULE ORAL 2 TIMES DAILY
Status: DISCONTINUED | OUTPATIENT
Start: 2020-06-16 | End: 2020-06-17 | Stop reason: HOSPADM

## 2020-06-16 RX ORDER — PROPOFOL 10 MG/ML
INJECTION, EMULSION INTRAVENOUS
Status: DISCONTINUED | OUTPATIENT
Start: 2020-06-16 | End: 2020-06-16

## 2020-06-16 RX ORDER — GLUCAGON 1 MG
1 KIT INJECTION
Status: DISCONTINUED | OUTPATIENT
Start: 2020-06-16 | End: 2020-06-17 | Stop reason: HOSPADM

## 2020-06-16 RX ORDER — HYDROXYZINE HYDROCHLORIDE 25 MG/1
25 TABLET, FILM COATED ORAL EVERY 4 HOURS PRN
Status: DISCONTINUED | OUTPATIENT
Start: 2020-06-16 | End: 2020-06-17 | Stop reason: HOSPADM

## 2020-06-16 RX ORDER — SODIUM CHLORIDE 9 MG/ML
INJECTION, SOLUTION INTRAVENOUS CONTINUOUS
Status: DISCONTINUED | OUTPATIENT
Start: 2020-06-16 | End: 2020-06-17 | Stop reason: HOSPADM

## 2020-06-16 RX ORDER — IBUPROFEN 400 MG/1
400 TABLET ORAL EVERY 6 HOURS PRN
Status: DISCONTINUED | OUTPATIENT
Start: 2020-06-16 | End: 2020-06-17 | Stop reason: HOSPADM

## 2020-06-16 RX ORDER — CEFAZOLIN SODIUM 1 G/3ML
2 INJECTION, POWDER, FOR SOLUTION INTRAMUSCULAR; INTRAVENOUS
Status: COMPLETED | OUTPATIENT
Start: 2020-06-16 | End: 2020-06-16

## 2020-06-16 RX ORDER — PREGABALIN 50 MG/1
50 CAPSULE ORAL
Status: COMPLETED | OUTPATIENT
Start: 2020-06-16 | End: 2020-06-16

## 2020-06-16 RX ORDER — MEPERIDINE HYDROCHLORIDE 25 MG/ML
12.5 INJECTION INTRAMUSCULAR; INTRAVENOUS; SUBCUTANEOUS ONCE AS NEEDED
Status: DISCONTINUED | OUTPATIENT
Start: 2020-06-16 | End: 2020-06-16 | Stop reason: HOSPADM

## 2020-06-16 RX ORDER — HYDROMORPHONE HYDROCHLORIDE 1 MG/ML
0.2 INJECTION, SOLUTION INTRAMUSCULAR; INTRAVENOUS; SUBCUTANEOUS
Status: DISCONTINUED | OUTPATIENT
Start: 2020-06-16 | End: 2020-06-17 | Stop reason: HOSPADM

## 2020-06-16 RX ORDER — OXYCODONE HYDROCHLORIDE 5 MG/1
10 TABLET ORAL EVERY 4 HOURS PRN
Status: DISCONTINUED | OUTPATIENT
Start: 2020-06-16 | End: 2020-06-17 | Stop reason: HOSPADM

## 2020-06-16 RX ADMIN — HYDROMORPHONE HYDROCHLORIDE 0.4 MG: 2 INJECTION, SOLUTION INTRAMUSCULAR; INTRAVENOUS; SUBCUTANEOUS at 11:06

## 2020-06-16 RX ADMIN — FENTANYL CITRATE 25 MCG: 50 INJECTION, SOLUTION INTRAMUSCULAR; INTRAVENOUS at 08:06

## 2020-06-16 RX ADMIN — DOCUSATE SODIUM - SENNOSIDES 1 TABLET: 50; 8.6 TABLET, FILM COATED ORAL at 08:06

## 2020-06-16 RX ADMIN — SODIUM CHLORIDE: 0.9 INJECTION, SOLUTION INTRAVENOUS at 09:06

## 2020-06-16 RX ADMIN — LIDOCAINE HYDROCHLORIDE 20 MG: 20 INJECTION, SOLUTION INTRAVENOUS at 07:06

## 2020-06-16 RX ADMIN — PROPOFOL 40 MG: 10 INJECTION, EMULSION INTRAVENOUS at 10:06

## 2020-06-16 RX ADMIN — SODIUM CHLORIDE, SODIUM LACTATE, POTASSIUM CHLORIDE, AND CALCIUM CHLORIDE: 600; 310; 30; 20 INJECTION, SOLUTION INTRAVENOUS at 06:06

## 2020-06-16 RX ADMIN — CALCITRIOL CAPSULES 0.25 MCG 0.5 MCG: 0.25 CAPSULE ORAL at 04:06

## 2020-06-16 RX ADMIN — PHENYLEPHRINE HYDROCHLORIDE 100 MCG: 10 INJECTION INTRAVENOUS at 07:06

## 2020-06-16 RX ADMIN — PHENYLEPHRINE HYDROCHLORIDE 200 MCG: 10 INJECTION INTRAVENOUS at 07:06

## 2020-06-16 RX ADMIN — PROPOFOL 150 MG: 10 INJECTION, EMULSION INTRAVENOUS at 07:06

## 2020-06-16 RX ADMIN — ROCURONIUM BROMIDE 5 MG: 10 INJECTION, SOLUTION INTRAVENOUS at 07:06

## 2020-06-16 RX ADMIN — ONDANSETRON 4 MG: 2 INJECTION INTRAMUSCULAR; INTRAVENOUS at 10:06

## 2020-06-16 RX ADMIN — PREGABALIN 50 MG: 50 CAPSULE ORAL at 05:06

## 2020-06-16 RX ADMIN — CEFAZOLIN 2 G: 330 INJECTION, POWDER, FOR SOLUTION INTRAMUSCULAR; INTRAVENOUS at 07:06

## 2020-06-16 RX ADMIN — FENTANYL CITRATE 50 MCG: 50 INJECTION, SOLUTION INTRAMUSCULAR; INTRAVENOUS at 07:06

## 2020-06-16 RX ADMIN — ERGOCALCIFEROL 50000 UNITS: 1.25 CAPSULE ORAL at 04:06

## 2020-06-16 RX ADMIN — SUCCINYLCHOLINE CHLORIDE 140 MG: 20 INJECTION, SOLUTION INTRAMUSCULAR; INTRAVENOUS at 07:06

## 2020-06-16 RX ADMIN — DEXAMETHASONE SODIUM PHOSPHATE 4 MG: 4 INJECTION, SOLUTION INTRAMUSCULAR; INTRAVENOUS at 06:06

## 2020-06-16 RX ADMIN — CARBOXYMETHYLCELLULOSE SODIUM 1 DROP: 2.5 SOLUTION/ DROPS OPHTHALMIC at 07:06

## 2020-06-16 RX ADMIN — FENTANYL CITRATE 50 MCG: 50 INJECTION, SOLUTION INTRAMUSCULAR; INTRAVENOUS at 10:06

## 2020-06-16 RX ADMIN — ACETAMINOPHEN 1000 MG: 500 TABLET, FILM COATED ORAL at 05:06

## 2020-06-16 RX ADMIN — Medication 10 MG: at 07:06

## 2020-06-16 RX ADMIN — DEXAMETHASONE SODIUM PHOSPHATE 4 MG: 4 INJECTION, SOLUTION INTRAMUSCULAR; INTRAVENOUS at 07:06

## 2020-06-16 RX ADMIN — Medication 10 MG: at 08:06

## 2020-06-16 RX ADMIN — PROMETHAZINE HYDROCHLORIDE 6.25 MG: 25 INJECTION INTRAMUSCULAR; INTRAVENOUS at 03:06

## 2020-06-16 RX ADMIN — FENTANYL CITRATE 25 MCG: 50 INJECTION, SOLUTION INTRAMUSCULAR; INTRAVENOUS at 07:06

## 2020-06-16 RX ADMIN — FENTANYL CITRATE 25 MCG: 50 INJECTION, SOLUTION INTRAMUSCULAR; INTRAVENOUS at 09:06

## 2020-06-16 RX ADMIN — ONDANSETRON 8 MG: 8 TABLET, ORALLY DISINTEGRATING ORAL at 01:06

## 2020-06-16 RX ADMIN — MIDAZOLAM 2 MG: 1 INJECTION INTRAMUSCULAR; INTRAVENOUS at 06:06

## 2020-06-16 RX ADMIN — LOSARTAN POTASSIUM 100 MG: 50 TABLET ORAL at 01:06

## 2020-06-16 RX ADMIN — SODIUM CHLORIDE, SODIUM LACTATE, POTASSIUM CHLORIDE, AND CALCIUM CHLORIDE: 600; 310; 30; 20 INJECTION, SOLUTION INTRAVENOUS at 09:06

## 2020-06-16 RX ADMIN — CELECOXIB 400 MG: 200 CAPSULE ORAL at 05:06

## 2020-06-16 RX ADMIN — INSULIN ASPART 2 UNITS: 100 INJECTION, SOLUTION INTRAVENOUS; SUBCUTANEOUS at 10:06

## 2020-06-16 RX ADMIN — CALCIUM 1500 MG: 500 TABLET ORAL at 04:06

## 2020-06-16 RX ADMIN — SODIUM CHLORIDE: 0.9 INJECTION, SOLUTION INTRAVENOUS at 01:06

## 2020-06-16 RX ADMIN — Medication 10 MG: at 09:06

## 2020-06-16 RX ADMIN — DEXAMETHASONE SODIUM PHOSPHATE 4 MG: 4 INJECTION, SOLUTION INTRAMUSCULAR; INTRAVENOUS at 11:06

## 2020-06-16 RX ADMIN — INSULIN ASPART 4 UNITS: 100 INJECTION, SOLUTION INTRAVENOUS; SUBCUTANEOUS at 03:06

## 2020-06-16 RX ADMIN — PHENYLEPHRINE HYDROCHLORIDE 0.1 MCG/KG/MIN: 10 INJECTION INTRAVENOUS at 07:06

## 2020-06-16 NOTE — BRIEF OP NOTE
[facility] [place]  Brief Operative Note    SUMMARY     Surgery Date: 6/16/2020     Surgeon(s) and Role:     * Hermelinda Zuniga MD - Primary    Assisting Surgeon: Walker Diego MD - RES    Pre-op Diagnosis:  Papillary thyroid cancer     Post-op Diagnosis:  Post-Op Diagnosis Codes:     * Thyroid nodule [E04.1]    Procedure(s) (LRB):  THYROIDECTOMY (Bilateral)    Anesthesia: General    Description of Procedure: Total thyroidectomy     Description of the findings of the procedure: Total thyroidectomy for papillary thyroid cancer of right lobe; bilateral recurrent laryngeal nerves identified and stimulated appropriately with NIMS.     Estimated Blood Loss: Minimal          Specimens:   Specimen (12h ago, onward)    None

## 2020-06-16 NOTE — DISCHARGE INSTRUCTIONS
Recovering after Endocrine Surgery    Type of Procedure: Thyroidectomy    Postoperative clinic appointment date: 1-2 weeks after surgery    Activity:  You may resume normal daily activities upon discharge.  This includes walking and climbing stairs.  Avoid heavy lifting and vigorous exercise for approximately 2 weeks.    Showering:  You may resume normal showering and bathing approximately 2 days after surgery.  Your incision does not require special care and it may get wet.    Incision:  Your neck incision is closed with absorbable sutures under the skin.  You will not need to have any stitches removed.      Pain:  After surgery you may experience pain at the incision, generalized neck pain, or a sore throat.  You will be given a prescription for pain relief.  Most patients will still have discomfort 2-3 days after surgery.  Many times, over the counter pain medications, such as Extra Strength Tylenol, are effective.    Driving:  Use your judgment in resuming to drive.  Avoid driving if you are still experiencing neck pain and are using prescription pain medications.      Return to Work:  Recovery after surgery depends on the individual.  Most patients can expect to return to work approximately 1-2 weeks after surgery.    Diet:  You may resume your normal diet after surgery without any restrictions.    Constipation:  Anesthesia and pain medication can cause a decrease in bowel motility.  If you experience constipation postoperatively, you may take over the counter laxatives such as Milk of Magnesia.    Calcium Supplements:  Calcium tablets may be recommended after your surgery.  Most often this is to promote bone health and prevent low blood calcium during recovery.  Numbness and tingling in your fingertips or around your mouth may be experienced when calcium levels are low.  If tingling or numbness occurs, call you doctor.  You may chew up several extra tablets to relieve symptoms from low calcium.  Tums may be  substituted for calcium tablets.    Medications:  You may resume taking medication as instructed by your MD at discharge from the hospital.     Questions/Concerns:  If you have any questions or concerns please call your doctor's office: Hermelinda Zuniga MD,  [phone] or after hours call [phone] and ask for the General Surgery Resident on call.

## 2020-06-16 NOTE — PLAN OF CARE
CM met with pt for initial discharge planning assessment. Pt is alert and oriented at time of assessment. Son is present at time of assessment.    Pt and son confirmed demographic information is correct in Epic. PCp is correct, Andrei Miller , and pharmacy of choice is [facility] in [facility]. CM to ck in [facility].    Pt is independent with ADL's, has no DME or HH and confirms no need for either.    Pt takes meds a ordered, and is not a 30 day readmit.     Pt not on HD and does not take coumadin. Pt with no LW or POA at this time.     Pt and family state she will likely dc on tomorrow, 6/17/20.    Family to provide transportation home when discharged.    CM to follow for plans and arrangements.   06/16/20 4504   Discharge Assessment   Assessment Type Discharge Planning Assessment   Confirmed/corrected address and phone number on facesheet? Yes   Assessment information obtained from? Patient;Caregiver;Medical Record   Expected Length of Stay (days) 2   Communicated expected length of stay with patient/caregiver yes   Prior to hospitilization cognitive status: Alert/Oriented   Prior to hospitalization functional status: Independent   Current cognitive status: Alert/Oriented   Current Functional Status: Independent   Lives With child(chacho), adult   Able to Return to Prior Arrangements yes   Is patient able to care for self after discharge? Yes   Who are your caregiver(s) and their phone number(s)? Bernardino Burns, son, [phone]   Patient's perception of discharge disposition home or selfcare   Readmission Within the Last 30 Days no previous admission in last 30 days   Patient currently being followed by outpatient case management? No   Patient currently receives any other outside agency services? No   Equipment Currently Used at Home none   Do you have any problems affording any of your prescribed medications? No   Is the patient taking medications as prescribed? yes   Does the patient have transportation home? Yes    Transportation Anticipated family or friend will provide   Does the patient receive services at the Coumadin Clinic? No   Discharge Plan A Home   Discharge Plan B Home   DME Needed Upon Discharge  none   Patient/Family in Agreement with Plan yes

## 2020-06-16 NOTE — TRANSFER OF CARE
"Anesthesia Transfer of Care Note    Patient: Tomeka Burns    Procedure(s) Performed: Procedure(s) (LRB):  THYROIDECTOMY (Bilateral)    Patient location: PACU    Anesthesia Type: general    Transport from OR: Transported from OR on room air with adequate spontaneous ventilation    Post pain: adequate analgesia    Post assessment: no apparent anesthetic complications    Post vital signs: stable    Level of consciousness: sedated    Nausea/Vomiting: no nausea/vomiting    Complications: none    Transfer of care protocol was followed      Last vitals:   Visit Vitals  BP (!) 168/71 (BP Location: Right arm, Patient Position: Lying)   Pulse 93   Temp 36.3 °C (97.4 °F) (Oral)   Resp 16   Ht 5' 3" (1.6 m)   Wt 57.2 kg (126 lb)   SpO2 97%   Breastfeeding No   BMI 22.32 kg/m²     "

## 2020-06-16 NOTE — ANESTHESIA POSTPROCEDURE EVALUATION
Anesthesia Post Evaluation    Patient: Tomeka Burns    Procedure(s) Performed: Procedure(s) (LRB):  THYROIDECTOMY (Bilateral)    Final Anesthesia Type: general    Patient location during evaluation: PACU  Patient participation: Yes- Able to Participate  Level of consciousness: awake and alert  Post-procedure vital signs: reviewed and stable  Pain management: adequate  Airway patency: patent    PONV status at discharge: No PONV  Anesthetic complications: no      Cardiovascular status: blood pressure returned to baseline and stable  Respiratory status: unassisted, spontaneous ventilation and room air  Hydration status: euvolemic  Follow-up not needed.          Vitals Value Taken Time   /67 06/16/20 1302   Temp 36.7 °C (98 °F) 06/16/20 1302   Pulse 96 06/16/20 1302   Resp 17 06/16/20 1302   SpO2 96 % 06/16/20 1302         Event Time   Out of Recovery 12:41:46         Pain/Sally Score: Pain Rating Prior to Med Admin: 7 (6/16/2020 11:11 AM)  Pain Rating Post Med Admin: 3 (6/16/2020 11:45 AM)  Sally Score: 9 (6/16/2020 11:45 AM)

## 2020-06-16 NOTE — ANESTHESIA PROCEDURE NOTES
Intubation  Performed by: Sheba Cruz CRNA  Authorized by: Cady Dennis MD     Intubation:     Induction:  Intravenous    Intubated:  Postinduction    Mask Ventilation:  Easy mask    Attempts:  1    Attempted By:  CRNA    Method of Intubation:  Video laryngoscopy    Blade:  Dave 3    Laryngeal View Grade: Grade I - full view of chords      Difficult Airway Encountered?: No      Complications:  None    Airway Device:  EMG ETT (NIMS)    Airway Device Size:  8.0    Style/Cuff Inflation:  Cuffed (inflated to minimal occlusive pressure)    Inflation Amount (mL):  4    Tube secured:  20    Secured at:  The lips    Placement Verified By:  Capnometry    Complicating Factors:  None    Findings Post-Intubation:  BS equal bilateral and atraumatic/condition of teeth unchanged

## 2020-06-16 NOTE — OP NOTE
[facility]  Endocrine Surgery  Operative Report    SUMMARY     Date of Procedure: 6/16/2020     Procedure: Procedure(s) (LRB):  THYROIDECTOMY (Bilateral)     Indications: This patient presents with a nodule on the right side of the neck. 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 The patient now presents for a total thyroidectomy and possible central neck dissection.     Surgeon(s) and Role:     * Hermelinda Zuniga MD - Primary    Assisting Surgeon:  Walker DRAKE - Resident    Pre-Operative Diagnosis: Thyroid nodule [E04.1]    Post-Operative Diagnosis: Thyroid nodule [E04.1]    Anesthesia: General    Intraoperative Findings:     1.  A nodule was found within the thyroid lobe, located in the upper pole.  This measured approximately 1 cm in diameter and and was attached(minimally) the strap muscles overlying the gland anteriorly.   2. The bilateral recurrent laryngeal nerve was identified.  Function was verified using the NIMS system.  3. Parathyroid tissue was identified and preserved with a viable blood supply.       Description of the Findings of the Procedure:    The patient was seen in the Holding Room. The risks, benefits, complications, treatment options, and expected outcomes were discussed with the patient. The possibilities of reaction to medication, pulmonary aspiration, perforation of viscus, bleeding, recurrent infection, finding a normal thyroid, recurrently laryngeal nerve damage, the need for additional procedures, failure to diagnose a condition, and creating a complication requiring transfusion or operation were discussed with the patient. The patient concurred with the proposed plan, giving informed consent.  The site of surgery properly noted/marked. The patient was taken to Operating Room, identified as Tomeka Burns and the procedure verified as Thyroidectomy. A Time Out was held and the above information confirmed.    The patient was  placed supine after induction of a general anesthetic. The neck was extended and prepped and draped in standard fashion. An 7 cm transverse cervical incision was created above the sternal notch within a natural skin fold. Dissection was carried down through the platysma layer. Once this was completed, sub-platysmal flaps were raised superiorly to the thyroid cartilage and inferiorly to the sternal notch. The strap muscles were identified and divided at the midline. Sharp and blunt dissection were used to mobilize the right thyroid lobe in a medial direction. A nodule was found within the thyroid lobe, located in the upper pole.  This measured approximately 1 cm in diameter and and was attached(minimally) to the strap muscles overlying the gland anteriorly. Dissection continued posteriorly to expose the tracheoesophageal groove and right carotid artery. The right thyroid lobe was mobilized further and the superior and inferior pole vessels were divided with silk ties, clips and the bipolar cautery. The middle thyroid vein was similarly divided. The right recurrent laryngeal nerve was identified and preserved. Function was verified using the NIMS.The right inferior parathyroid gland was identified, and the right superior parathyroid was noted just superior to the tubercle of [place] near nerve insertion. Small vessels were likewise divided. The gland was rotated in a medial direction and taken off the trachea using the bipolar cautery. The isthmus was removed off the thyroid cartilage also using the bipolar cautery.     Attention was then give to the left lobe.  Sharp and blunt dissection were again used to mobilize the left thyroid lobe in a medial direction. Dissection continued posteriorly to expose the tracheoesophageal groove and left carotid artery. Similar to the right, the left thyroid lobe was mobilized further and the superior and inferior pole vessels were divided with silk ties, clips in the bipolar  cautery. The middle thyroid vein was similarly divided with the harmonic scalpel. The left recurrent laryngeal nerve was identified and preserved. Function was verified using the NIMS. Small vessels were likewise divided. The left superior and inferior parathyroid glands were identified and preserved in situ. The gland was rotated in a medial direction and taken off the trachea using the bipolar cautery. The specimen was submitted to pathology for permanent evaluation. A suture was placed for orientation purposes (stitch marks the right superior pole). Inspection of the central neck did not reveal any significant adenopathy or grossly palpable disease worrisome of metastatic spread.     The wound was irrigated and inspected carefully. Multiple Valsalva maneuvers were performed at 30 cm of water and additional hemostasis was achieved as necessary with focal application of bipolar cautery. This was augmented with Fibrillar which was placed in the thyroid fossa. The parathyroid tissue was found to be viable and the bilateral recurrent laryngeal nerves were left intact in their anatomic locations. Function was again verified using the NIMS prior to closure.  Then, 20 mL of Exparel was placed into the strap muscles and subcutaneous tissues for post-op analgesia. The strap muscles were then closed with interrupted 3-0 Vicryl suture.  The platysma was closed with interrupted 3-0 Vicryl suture, and the skin incision was closed with a 5-0 Monocryl subcuticular knot-less closure. Sterile dressings were was applied across the incision.    Instrument, sponge, and needle counts were correct prior to closure and at the conclusion of the case.     Significant Surgical Tasks Conducted by the Assistant(s), if Applicable: none    Complications: None    Total IV Fluids: see anesthesia record    Estimated Blood Loss (EBL): Minimal           Drains: none    Implants: none    Specimens: Total thyroid -stitch marks the right superior pole             Condition: stable    Disposition: PACU - hemodynamically stable.    Attestation: I was present and scrubbed for the entire procedure.

## 2020-06-16 NOTE — NURSING
Pt arrived to floor via stretcher with Cale RODRIGUEZ and transferred to bed. SCDs applied, oriented to room, call light placed within reach, suction set up ,and trach kit at bedside,  bed low and locked, and family at bedside. No complaints of pain .Incisions noted to neck, with derma bond. No acute distress noted at this time. Will continue to monitor.

## 2020-06-16 NOTE — NURSING
Pt free of trauma, falls, and injury. Pt VSS and afebrile throughout shift. Pt free of skin breakdown. Pt incision is clean, dry, covered with derma bond. No complaints of pain. Pt complains of N/V, prn med given. Pt has been voiding adequately throughout shift. Suction set up ,and TRACH KIT at bedside.Pt have ice pack applied to incision. Pt has call light in reach, bed alarm on, bed brakes on, side rails up x2, bed in low position, TEDs/SCDs on, IS at bedside, and nonskid socks on. Pt lying in bed in no distress.Hourly rounding performed this shift.

## 2020-06-17 VITALS
SYSTOLIC BLOOD PRESSURE: 143 MMHG | DIASTOLIC BLOOD PRESSURE: 63 MMHG | HEART RATE: 69 BPM | OXYGEN SATURATION: 96 % | HEIGHT: 63 IN | TEMPERATURE: 98 F | RESPIRATION RATE: 18 BRPM | BODY MASS INDEX: 22.32 KG/M2 | WEIGHT: 126 LBS

## 2020-06-17 LAB — POCT GLUCOSE: 165 MG/DL (ref 70–110)

## 2020-06-17 PROCEDURE — G0378 HOSPITAL OBSERVATION PER HR: HCPCS

## 2020-06-17 PROCEDURE — 25000003 PHARM REV CODE 250: Performed by: STUDENT IN AN ORGANIZED HEALTH CARE EDUCATION/TRAINING PROGRAM

## 2020-06-17 PROCEDURE — 25000003 PHARM REV CODE 250: Performed by: SURGERY

## 2020-06-17 RX ORDER — CALCIUM CARBONATE 500(1250)
1500 TABLET ORAL
Refills: 0 | COMMUNITY
Start: 2020-06-17 | End: 2021-02-01

## 2020-06-17 RX ORDER — CALCITRIOL 0.25 UG/1
0.5 CAPSULE ORAL 2 TIMES DAILY
Qty: 56 CAPSULE | Refills: 0 | Status: SHIPPED | OUTPATIENT
Start: 2020-06-17 | End: 2020-07-01

## 2020-06-17 RX ADMIN — CALCIUM 1500 MG: 500 TABLET ORAL at 08:06

## 2020-06-17 RX ADMIN — PANTOPRAZOLE SODIUM 40 MG: 40 TABLET, DELAYED RELEASE ORAL at 06:06

## 2020-06-17 RX ADMIN — LOSARTAN POTASSIUM 100 MG: 50 TABLET ORAL at 08:06

## 2020-06-17 RX ADMIN — LEVOTHYROXINE SODIUM 112 MCG: 112 TABLET ORAL at 06:06

## 2020-06-17 RX ADMIN — CALCITRIOL CAPSULES 0.25 MCG 0.5 MCG: 0.25 CAPSULE ORAL at 08:06

## 2020-06-17 RX ADMIN — ERGOCALCIFEROL 50000 UNITS: 1.25 CAPSULE ORAL at 08:06

## 2020-06-17 RX ADMIN — DOCUSATE SODIUM - SENNOSIDES 1 TABLET: 50; 8.6 TABLET, FILM COATED ORAL at 08:06

## 2020-06-17 NOTE — DISCHARGE SUMMARY
[facility]  General Surgery  Discharge Summary      Patient Name: Tomeka Burns  MRN: [id number]  Admission Date: 6/16/2020  Hospital Length of Stay: 1 days  Discharge Date and Time:  06/17/2020 8:09 AM  Attending Physician: Hermelinda Zuniga MD   Discharging Provider: Walker Diego MD  Primary Care Provider: Andrei Miller MD     Procedure(s) (LRB):  THYROIDECTOMY (Bilateral)     Hospital Course: Patient presented for the above procedure, which she tolerated without complication. She was monitored overnight. Her pain was controlled with an oral regimen, she tolerated her ordered diet, and she ambulated without difficulty. She was deemed safe for discharge home at that time.     Significant Diagnostic Studies: Please see full medical record.     Pending Diagnostic Studies:     Procedure Component Value Units Date/Time    Specimen to Pathology, Surgery General Surgery [[id number]] Collected: 06/16/20 1052    Order Status: Sent Lab Status: In process Updated: 06/16/20 1217        Final Active Diagnoses:    Diagnosis Date Noted POA    PRINCIPAL PROBLEM:  Thyroid cancer [C73] 06/16/2020 Yes     Chronic    Thyroid nodule [E04.1] 06/16/2020 Yes      Problems Resolved During this Admission:      Discharged Condition: good    Disposition: Home or Self Care    Follow Up:  Follow-up Information     Andrei Miller MD.    Specialty: Family Medicine  Contact information:  [address] [facility] [id number] [phone]             Hermelinda Zuniga MD.    Specialty: General Surgery  Contact information:  [address] [id number] [phone]                 Patient Instructions:      Diet general     Ice to affected area   Order Comments: 30 minutes on and then 30 minutes off. Repeat     Other restrictions (specify):   Order Comments: May shower immediately.  NO baths or soaks.  No driving while using narcotic pain medication.     Call MD for:  carlee  >100.4     Call MD for:  persistent nausea and vomiting     Call MD for:  severe uncontrolled pain     Call MD for:  difficulty breathing, headache or visual disturbances     Call MD for:  redness, tenderness, or signs of infection (pain, swelling, redness, odor or green/yellow discharge around incision site)     Call MD for:  hives     Call MD for:  persistent dizziness or light-headedness     Call MD for:  extreme fatigue     Call MD for:   Order Comments: If you have numbness and/or tingling around the face, lips, fingertips, toes take four(4) 500mg tablets of calcium carbonate (Tums).  The symptoms should go away in 15-30 minutes.   If the symptoms persist at 30 minutes you can repeat this.  If they still don't go away, call the physician.     No dressing needed     Notify your health care provider if you experience any of the following:  temperature >100.4     Notify your health care provider if you experience any of the following:  persistent nausea and vomiting or diarrhea     Notify your health care provider if you experience any of the following:  severe uncontrolled pain     Notify your health care provider if you experience any of the following:  redness, tenderness, or signs of infection (pain, swelling, redness, odor or green/yellow discharge around incision site)     Notify your health care provider if you experience any of the following:  difficulty breathing or increased cough     Notify your health care provider if you experience any of the following:  severe persistent headache     Notify your health care provider if you experience any of the following:  worsening rash     Notify your health care provider if you experience any of the following:  persistent dizziness, light-headedness, or visual disturbances     Notify your health care provider if you experience any of the following:  increased confusion or weakness     No dressing needed     Activity as tolerated     Medications:  Reconciled Home  Medications:      Medication List      START taking these medications    calcitRIOL 0.5 MCG Cap  Commonly known as: ROCALTROL  Take 1 capsule (0.5 mcg total) by mouth 2 (two) times daily. for 14 days     calcium carbonate 500 mg calcium (1,250 mg) tablet  Commonly known as: OS-ROBERT  Take 3 tablets (1,500 mg total) by mouth 3 (three) times daily with meals.     levothyroxine 112 MCG tablet  Commonly known as: SYNTHROID  Take 1 tablet (112 mcg total) by mouth once daily.     senna-docusate 8.6-50 mg 8.6-50 mg per tablet  Commonly known as: PERICOLACE  Take 1 tablet by mouth 2 (two) times daily.        CONTINUE taking these medications    aspirin 81 MG EC tablet  Commonly known as: ECOTRIN  Take 1 tablet (81 mg total) by mouth once daily.     blood sugar diagnostic Strp  Commonly known as: BLOOD GLUCOSE TEST  1 strip by Misc.(Non-Drug; Combo Route) route 2 (two) times daily.     blood-glucose meter kit  Use as instructed     FLUZONE HIGH-DOSE 2019-20 (PF) 180 mcg/0.5 mL Syrg  Generic drug: flu vacc [id number](65yr up)PF  PHARMACIST ADMINISTERED IMMUNIZATION ADMINISTERED AT TIME OF DISPENSING     hydroCHLOROthiazide 25 MG tablet  Commonly known as: HYDRODIURIL  Take 1 tablet (25 mg total) by mouth once daily.     lancets Misc  1 lancet by Misc.(Non-Drug; Combo Route) route 2 (two) times daily.     losartan 100 MG tablet  Commonly known as: COZAAR  Take 1 tablet (100 mg total) by mouth once daily.     lovastatin 20 MG tablet  Commonly known as: MEVACOR  Take 1 tablet (20 mg total) by mouth every evening.     metFORMIN 500 MG tablet  Commonly known as: GLUCOPHAGE  Take 1 tablet (500 mg total) by mouth 2 (two) times daily with meals.     pantoprazole 40 MG tablet  Commonly known as: PROTONIX  Take 1 tablet (40 mg total) by mouth before breakfast.            Walker Diego MD  General Surgery  [facility]

## 2020-06-17 NOTE — PLAN OF CARE
Patient is awake, alert, and oriented x 4. Patient is ambulatory and voids spontaneously.  Patient advanced to regular diet. Patient did not report any pain throughout shift. All medications given per orders. Bed locked in lowest position with side rails up x 2. Call light within reach of patient. Purposeful rounding maintained throughout shift. Will continue to monitor.

## 2020-06-17 NOTE — NURSING
Eager & in agreement w/ DC. VU of DC instructions--paperwork passed & explained. Scripts called into pharmacy; awaiting one medication to be delivered from outpatient pharmacy. IV removed w/ cath tip intact, WNL. To be DCd home w/ -- will be escorted downstairs via  transport team once dressed and ready & and medication arrives. Free from falls, injury, or skin breakdown this hospital admission. Hourly rounding performed this shift.

## 2020-06-17 NOTE — PLAN OF CARE
CM met with pt and family member for final discharge planning assessment.    Pt discharging home today, family to provide transportation  home.     Pt and family confirm no CM needs or barriers for discharge.    Ready for dc from case management perspective.   06/17/20 0851   Final Note   Assessment Type Final Discharge Note   Anticipated Discharge Disposition Home   What phone number can be called within the next 1-3 days to see how you are doing after discharge? [phone]   Hospital Follow Up  Appt(s) scheduled? Yes   Discharge plans and expectations educations in teach back method with documentation complete? Yes   Right Care Referral Info   Post Acute Recommendation No Care

## 2020-06-18 LAB
FINAL PATHOLOGIC DIAGNOSIS: NORMAL
GROSS: NORMAL
MICROSCOPIC EXAM: NORMAL

## 2020-07-01 ENCOUNTER — OFFICE VISIT (OUTPATIENT)
Dept: SURGERY | Facility: CLINIC | Age: 65
End: 2020-07-01
Payer: MEDICARE

## 2020-07-01 VITALS
HEART RATE: 81 BPM | WEIGHT: 126.13 LBS | BODY MASS INDEX: 22.35 KG/M2 | TEMPERATURE: 98 F | HEIGHT: 63 IN | DIASTOLIC BLOOD PRESSURE: 72 MMHG | SYSTOLIC BLOOD PRESSURE: 158 MMHG

## 2020-07-01 DIAGNOSIS — E20.89 OTHER HYPOPARATHYROIDISM: Primary | ICD-10-CM

## 2020-07-01 DIAGNOSIS — C73 THYROID CANCER: ICD-10-CM

## 2020-07-01 DIAGNOSIS — Z09 POSTOP CHECK: ICD-10-CM

## 2020-07-01 PROCEDURE — 99999 PR PBB SHADOW E&M-EST. PATIENT-LVL IV: CPT | Mod: PBBFAC,,, | Performed by: SURGERY

## 2020-07-01 PROCEDURE — 99024 POSTOP FOLLOW-UP VISIT: CPT | Mod: S$GLB,,, | Performed by: SURGERY

## 2020-07-01 PROCEDURE — 99499 RISK ADDL DX/OHS AUDIT: ICD-10-PCS | Mod: S$GLB,,, | Performed by: SURGERY

## 2020-07-01 PROCEDURE — 99999 PR PBB SHADOW E&M-EST. PATIENT-LVL IV: ICD-10-PCS | Mod: PBBFAC,,, | Performed by: SURGERY

## 2020-07-01 PROCEDURE — 99499 UNLISTED E&M SERVICE: CPT | Mod: S$GLB,,, | Performed by: SURGERY

## 2020-07-01 PROCEDURE — 99024 PR POST-OP FOLLOW-UP VISIT: ICD-10-PCS | Mod: S$GLB,,, | Performed by: SURGERY

## 2020-07-01 NOTE — PROGRESS NOTES
"Subjective:       Tomeka Burns presents to the clinic 2 weeks following total thyroidectomy. Eating a regular diet without difficulty. Bowel movements are Normal.  The patient is not having any pain.. Patient admits perioral numbness or tingling initially though this has resolved. Patient denies trouble swallowing and trouble speaking. She is currently on Calcium intermittently(last dose 2 days ago). She is taking Rocaltrol (0.25 mcg BID). Patient is taking Levothyroxine (112 mcg daily).       Objective:      BP (!) 158/72   Pulse 81   Temp 97.5 °F (36.4 °C) (Oral)   Ht 5' 3" (1.6 m)   Wt 57.2 kg (126 lb 1.7 oz)   BMI 22.34 kg/m²     General:   alert, appears stated age and cooperative. Chovstek's sign absent.   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      Procedure:  Complete thyroidectomy   Focality:  Single focus   Tumor Laterality:  Right   Tumor Size:  1.1 cm   Histologic Type: Papillary carcinoma, follicular variant, infiltrative   Margins:  The tumor does extend to the inked margin in a couple of   microscopic foci   Angioinvasion (Vascular Invasion):  Not identified   Lymphatic Invasion:  Not identified   Extrathyroid Extension:  Not identified   Number of Lymph Nodes Examined:  0   Staging: pT1b pN0 pMX     Labs:  Lab Results   Component Value Date    CALCIUM 9.5 06/11/2020    CALCIUM 9.6 02/26/2020    CALCIUM 9.2 08/30/2019    TSH 2.040 02/26/2020    TSH 0.632 09/26/2018    PTH <5.0 (L) 06/16/2020            Assessment:     Tomeka Burns is doing well post-operatively after total thyroidectomy.      Plan:     1. Continue any current medications.  Will plan to re-check labs this week.  2. Wound care and scar massage discussed.  3. Pt is to increase activities as " tolerated.  4. Follow up with endocrinology for the possibility for further treatment.

## 2020-07-01 NOTE — Clinical Note
Afternoon,    I hope that all is well.  I took this ladies thyroid out about 10 days ago.  She has been hypopara since.  I started her on calcium and rocaltrol but she has been taking the calcium consistently.  Her most recent calcium is normal and she is asymptomatic.  I want to re-check to ensure that she settles out appropriately.  Can she have this done when she gets her TG checked?  I already placed the orders.  Please let me know.    Thanks,    aob

## 2020-07-01 NOTE — LETTER
Endocrine/General Surgery  [address] [place] [address]  Phone: [phone]  Fax: [phone] July 1, 2020         Karla Rousseau MD  [address]  [facility] [address]    Patient: Tomeka Burns   YOB: 1955   Date of Visit: 7/1/2020     Dear Dr. Rousseau:    I wanted to let you know that I operated on Tomeka Burns. She underwent a total thyroidectomy on Jume 16, 2020. You will find a copy of the operative report and final pathology in the Lost My Name system for your review. She clinically is doing very well, and I suspect her recovery will be uneventful. She is on thyroid replacement (Levothyroxine 112 mcg daily), and is on calcium supplementation. I will check her labs prior to initiating a wean. She plans on seeing you in 6-8 weeks with repeat laboratory studies.     If you have any questions or concerns, please contact me. Again, thank you for allowing me to participate in the care of this patient.     Sincerely,      Hermelinda Zuniga MD    CC  Andrei Miller MD  [address] [facility] [address]  Via In Basket

## 2020-07-02 ENCOUNTER — LAB VISIT (OUTPATIENT)
Dept: LAB | Facility: HOSPITAL | Age: 65
End: 2020-07-02
Attending: SURGERY
Payer: MEDICARE

## 2020-07-02 DIAGNOSIS — E20.89 OTHER HYPOPARATHYROIDISM: ICD-10-CM

## 2020-07-02 LAB
ALBUMIN SERPL BCP-MCNC: 4.3 G/DL (ref 3.5–5.2)
ANION GAP SERPL CALC-SCNC: 11 MMOL/L (ref 8–16)
BUN SERPL-MCNC: 19 MG/DL (ref 7–17)
CALCIUM SERPL-MCNC: 9.9 MG/DL (ref 8.7–10.5)
CHLORIDE SERPL-SCNC: 97 MMOL/L (ref 95–110)
CO2 SERPL-SCNC: 28 MMOL/L (ref 23–29)
CREAT SERPL-MCNC: 0.66 MG/DL (ref 0.5–1.4)
EST. GFR  (AFRICAN AMERICAN): >60 ML/MIN/1.73 M^2
EST. GFR  (NON AFRICAN AMERICAN): >60 ML/MIN/1.73 M^2
GLUCOSE SERPL-MCNC: 182 MG/DL (ref 70–110)
PHOSPHATE SERPL-MCNC: 3.7 MG/DL (ref 2.7–4.5)
POTASSIUM SERPL-SCNC: 4 MMOL/L (ref 3.5–5.1)
PTH-INTACT SERPL-MCNC: <5 PG/ML (ref 9–77)
SODIUM SERPL-SCNC: 136 MMOL/L (ref 136–145)

## 2020-07-02 PROCEDURE — 83970 ASSAY OF PARATHORMONE: CPT | Mod: PO

## 2020-07-02 PROCEDURE — 80069 RENAL FUNCTION PANEL: CPT | Mod: PO

## 2020-07-02 PROCEDURE — 36415 COLL VENOUS BLD VENIPUNCTURE: CPT | Mod: PO

## 2020-07-07 ENCOUNTER — TELEPHONE (OUTPATIENT)
Dept: ENDOCRINOLOGY | Facility: CLINIC | Age: 65
End: 2020-07-07

## 2020-07-07 DIAGNOSIS — E20.9 HYPOPARATHYROIDISM, UNSPECIFIED HYPOPARATHYROIDISM TYPE: ICD-10-CM

## 2020-07-07 DIAGNOSIS — E04.1 THYROID NODULE: Primary | ICD-10-CM

## 2020-07-07 NOTE — TELEPHONE ENCOUNTER
Spoke with patient she is aware of labs this Friday and had no questions. She has the location contact information

## 2020-07-10 ENCOUNTER — TELEPHONE (OUTPATIENT)
Dept: ENDOCRINOLOGY | Facility: CLINIC | Age: 65
End: 2020-07-10

## 2020-07-10 ENCOUNTER — LAB VISIT (OUTPATIENT)
Dept: LAB | Facility: HOSPITAL | Age: 65
End: 2020-07-10
Attending: INTERNAL MEDICINE
Payer: MEDICARE

## 2020-07-10 DIAGNOSIS — E20.9 HYPOPARATHYROIDISM, UNSPECIFIED HYPOPARATHYROIDISM TYPE: ICD-10-CM

## 2020-07-10 DIAGNOSIS — E04.1 THYROID NODULE: Primary | ICD-10-CM

## 2020-07-10 LAB
ALBUMIN SERPL BCP-MCNC: 4.4 G/DL (ref 3.5–5.2)
ANION GAP SERPL CALC-SCNC: 10 MMOL/L (ref 8–16)
BUN SERPL-MCNC: 21 MG/DL (ref 7–17)
CALCIUM SERPL-MCNC: 8.3 MG/DL (ref 8.7–10.5)
CHLORIDE SERPL-SCNC: 99 MMOL/L (ref 95–110)
CO2 SERPL-SCNC: 27 MMOL/L (ref 23–29)
CREAT SERPL-MCNC: 0.8 MG/DL (ref 0.5–1.4)
EST. GFR  (AFRICAN AMERICAN): >60 ML/MIN/1.73 M^2
EST. GFR  (NON AFRICAN AMERICAN): >60 ML/MIN/1.73 M^2
GLUCOSE SERPL-MCNC: 241 MG/DL (ref 70–110)
PHOSPHATE SERPL-MCNC: 3.7 MG/DL (ref 2.7–4.5)
POTASSIUM SERPL-SCNC: 4.4 MMOL/L (ref 3.5–5.1)
PTH-INTACT SERPL-MCNC: 24 PG/ML (ref 9–77)
SODIUM SERPL-SCNC: 136 MMOL/L (ref 136–145)

## 2020-07-10 PROCEDURE — 36415 COLL VENOUS BLD VENIPUNCTURE: CPT | Mod: PO

## 2020-07-10 PROCEDURE — 80069 RENAL FUNCTION PANEL: CPT | Mod: PO

## 2020-07-10 PROCEDURE — 83970 ASSAY OF PARATHORMONE: CPT | Mod: PO

## 2020-07-10 NOTE — TELEPHONE ENCOUNTER
Labs reviewed and mild hypocalcemia. Spoke with patient who reported tingling in her lips. Advised she resume Tums BID and can take extra if tingling does not resolve. Will repeat labs 1 week

## 2020-07-16 ENCOUNTER — LAB VISIT (OUTPATIENT)
Dept: LAB | Facility: HOSPITAL | Age: 65
End: 2020-07-16
Attending: INTERNAL MEDICINE
Payer: MEDICARE

## 2020-07-16 DIAGNOSIS — E20.9 HYPOPARATHYROIDISM, UNSPECIFIED HYPOPARATHYROIDISM TYPE: ICD-10-CM

## 2020-07-16 DIAGNOSIS — E04.1 THYROID NODULE: ICD-10-CM

## 2020-07-16 LAB
ALBUMIN SERPL BCP-MCNC: 4.5 G/DL (ref 3.5–5.2)
ANION GAP SERPL CALC-SCNC: 9 MMOL/L (ref 8–16)
BUN SERPL-MCNC: 18 MG/DL (ref 7–17)
CALCIUM SERPL-MCNC: 9.5 MG/DL (ref 8.7–10.5)
CHLORIDE SERPL-SCNC: 96 MMOL/L (ref 95–110)
CO2 SERPL-SCNC: 31 MMOL/L (ref 23–29)
CREAT SERPL-MCNC: 0.74 MG/DL (ref 0.5–1.4)
EST. GFR  (AFRICAN AMERICAN): >60 ML/MIN/1.73 M^2
EST. GFR  (NON AFRICAN AMERICAN): >60 ML/MIN/1.73 M^2
GLUCOSE SERPL-MCNC: 176 MG/DL (ref 70–110)
PHOSPHATE SERPL-MCNC: 4.9 MG/DL (ref 2.7–4.5)
POTASSIUM SERPL-SCNC: 4.5 MMOL/L (ref 3.5–5.1)
SODIUM SERPL-SCNC: 136 MMOL/L (ref 136–145)
T4 FREE SERPL-MCNC: 2.16 NG/DL (ref 0.71–1.51)
TSH SERPL DL<=0.005 MIU/L-ACNC: <0.026 UIU/ML (ref 0.4–4)

## 2020-07-16 PROCEDURE — 84439 ASSAY OF FREE THYROXINE: CPT

## 2020-07-16 PROCEDURE — 80069 RENAL FUNCTION PANEL: CPT | Mod: PO

## 2020-07-16 PROCEDURE — 84443 ASSAY THYROID STIM HORMONE: CPT | Mod: PO

## 2020-07-16 PROCEDURE — 84432 ASSAY OF THYROGLOBULIN: CPT | Mod: PO

## 2020-07-16 PROCEDURE — 36415 COLL VENOUS BLD VENIPUNCTURE: CPT | Mod: PO

## 2020-07-17 ENCOUNTER — PATIENT MESSAGE (OUTPATIENT)
Dept: ENDOCRINOLOGY | Facility: CLINIC | Age: 65
End: 2020-07-17

## 2020-07-17 DIAGNOSIS — E20.9 HYPOPARATHYROIDISM, UNSPECIFIED HYPOPARATHYROIDISM TYPE: Primary | ICD-10-CM

## 2020-07-17 DIAGNOSIS — E89.0 POSTOPERATIVE HYPOTHYROIDISM: ICD-10-CM

## 2020-07-17 RX ORDER — LEVOTHYROXINE SODIUM 88 UG/1
88 TABLET ORAL
Qty: 30 TABLET | Refills: 11 | Status: SHIPPED | OUTPATIENT
Start: 2020-07-17 | End: 2020-11-05

## 2020-07-20 LAB
THRYOGLOBULIN INTERPRETATION: ABNORMAL
THYROGLOB AB SERPL-ACNC: <1.8 IU/ML
THYROGLOB SERPL-MCNC: 0.2 NG/ML

## 2020-08-07 ENCOUNTER — OFFICE VISIT (OUTPATIENT)
Dept: ENDOCRINOLOGY | Facility: CLINIC | Age: 65
End: 2020-08-07
Payer: MEDICARE

## 2020-08-07 ENCOUNTER — PATIENT MESSAGE (OUTPATIENT)
Dept: ENDOCRINOLOGY | Facility: CLINIC | Age: 65
End: 2020-08-07

## 2020-08-07 VITALS
RESPIRATION RATE: 18 BRPM | WEIGHT: 119.19 LBS | DIASTOLIC BLOOD PRESSURE: 74 MMHG | HEART RATE: 88 BPM | BODY MASS INDEX: 21.12 KG/M2 | HEIGHT: 63 IN | SYSTOLIC BLOOD PRESSURE: 128 MMHG

## 2020-08-07 DIAGNOSIS — E11.65 TYPE 2 DIABETES MELLITUS WITH HYPERGLYCEMIA, WITHOUT LONG-TERM CURRENT USE OF INSULIN: Primary | ICD-10-CM

## 2020-08-07 DIAGNOSIS — C73 PAPILLARY THYROID CARCINOMA: ICD-10-CM

## 2020-08-07 DIAGNOSIS — E89.0 POSTSURGICAL HYPOTHYROIDISM: ICD-10-CM

## 2020-08-07 DIAGNOSIS — E20.9 HYPOPARATHYROIDISM, UNSPECIFIED HYPOPARATHYROIDISM TYPE: ICD-10-CM

## 2020-08-07 DIAGNOSIS — I10 ESSENTIAL HYPERTENSION: ICD-10-CM

## 2020-08-07 DIAGNOSIS — E89.2 POSTSURGICAL HYPOPARATHYROIDISM: ICD-10-CM

## 2020-08-07 DIAGNOSIS — E89.0 POSTOPERATIVE HYPOTHYROIDISM: ICD-10-CM

## 2020-08-07 PROBLEM — E04.2 MULTIPLE THYROID NODULES: Status: RESOLVED | Noted: 2019-01-21 | Resolved: 2020-08-07

## 2020-08-07 PROCEDURE — 99499 UNLISTED E&M SERVICE: CPT | Mod: S$GLB,,, | Performed by: INTERNAL MEDICINE

## 2020-08-07 PROCEDURE — 3008F BODY MASS INDEX DOCD: CPT | Mod: CPTII,S$GLB,, | Performed by: INTERNAL MEDICINE

## 2020-08-07 PROCEDURE — 99999 PR PBB SHADOW E&M-EST. PATIENT-LVL V: ICD-10-PCS | Mod: PBBFAC,,, | Performed by: INTERNAL MEDICINE

## 2020-08-07 PROCEDURE — 3044F HG A1C LEVEL LT 7.0%: CPT | Mod: CPTII,S$GLB,, | Performed by: INTERNAL MEDICINE

## 2020-08-07 PROCEDURE — 3078F PR MOST RECENT DIASTOLIC BLOOD PRESSURE < 80 MM HG: ICD-10-PCS | Mod: CPTII,S$GLB,, | Performed by: INTERNAL MEDICINE

## 2020-08-07 PROCEDURE — 3044F PR MOST RECENT HEMOGLOBIN A1C LEVEL <7.0%: ICD-10-PCS | Mod: CPTII,S$GLB,, | Performed by: INTERNAL MEDICINE

## 2020-08-07 PROCEDURE — 99214 PR OFFICE/OUTPT VISIT, EST, LEVL IV, 30-39 MIN: ICD-10-PCS | Mod: S$GLB,,, | Performed by: INTERNAL MEDICINE

## 2020-08-07 PROCEDURE — 3078F DIAST BP <80 MM HG: CPT | Mod: CPTII,S$GLB,, | Performed by: INTERNAL MEDICINE

## 2020-08-07 PROCEDURE — 1101F PT FALLS ASSESS-DOCD LE1/YR: CPT | Mod: CPTII,S$GLB,, | Performed by: INTERNAL MEDICINE

## 2020-08-07 PROCEDURE — 1159F MED LIST DOCD IN RCRD: CPT | Mod: S$GLB,,, | Performed by: INTERNAL MEDICINE

## 2020-08-07 PROCEDURE — 99214 OFFICE O/P EST MOD 30 MIN: CPT | Mod: S$GLB,,, | Performed by: INTERNAL MEDICINE

## 2020-08-07 PROCEDURE — 3074F SYST BP LT 130 MM HG: CPT | Mod: CPTII,S$GLB,, | Performed by: INTERNAL MEDICINE

## 2020-08-07 PROCEDURE — 1159F PR MEDICATION LIST DOCUMENTED IN MEDICAL RECORD: ICD-10-PCS | Mod: S$GLB,,, | Performed by: INTERNAL MEDICINE

## 2020-08-07 PROCEDURE — 1126F AMNT PAIN NOTED NONE PRSNT: CPT | Mod: S$GLB,,, | Performed by: INTERNAL MEDICINE

## 2020-08-07 PROCEDURE — 3074F PR MOST RECENT SYSTOLIC BLOOD PRESSURE < 130 MM HG: ICD-10-PCS | Mod: CPTII,S$GLB,, | Performed by: INTERNAL MEDICINE

## 2020-08-07 PROCEDURE — 3008F PR BODY MASS INDEX (BMI) DOCUMENTED: ICD-10-PCS | Mod: CPTII,S$GLB,, | Performed by: INTERNAL MEDICINE

## 2020-08-07 PROCEDURE — 99499 RISK ADDL DX/OHS AUDIT: ICD-10-PCS | Mod: S$GLB,,, | Performed by: INTERNAL MEDICINE

## 2020-08-07 PROCEDURE — 99999 PR PBB SHADOW E&M-EST. PATIENT-LVL V: CPT | Mod: PBBFAC,,, | Performed by: INTERNAL MEDICINE

## 2020-08-07 PROCEDURE — 1126F PR PAIN SEVERITY QUANTIFIED, NO PAIN PRESENT: ICD-10-PCS | Mod: S$GLB,,, | Performed by: INTERNAL MEDICINE

## 2020-08-07 PROCEDURE — 1101F PR PT FALLS ASSESS DOC 0-1 FALLS W/OUT INJ PAST YR: ICD-10-PCS | Mod: CPTII,S$GLB,, | Performed by: INTERNAL MEDICINE

## 2020-08-07 RX ORDER — METFORMIN HYDROCHLORIDE 1000 MG/1
1000 TABLET ORAL 2 TIMES DAILY WITH MEALS
Qty: 180 TABLET | Refills: 3 | Status: SHIPPED | OUTPATIENT
Start: 2020-08-07 | End: 2021-08-04 | Stop reason: SDUPTHER
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
## 2020-08-07 NOTE — ASSESSMENT & PLAN NOTE
Recent sugars above goal fasting  Increase metformin to 1000 mg BID  Refer to DM education to review diet  Discussed increased activity as she has not been exercising regularly

## 2020-08-07 NOTE — ASSESSMENT & PLAN NOTE
Some recent symptoms which are suggestive of hypocalcemia  Check calcium today with calcium titration pending result  encouraged her to take extra calcium pill with next symptoms to assess for improvement

## 2020-08-07 NOTE — PATIENT INSTRUCTIONS
For one week take metformin 1000 mg in the morning, 500 mg at night. Then increase to 1000 mg twice a day.always take food    Labs in September and then again in 6 months

## 2020-08-07 NOTE — ASSESSMENT & PLAN NOTE
S/p total thyroidectomy  Staging: pT1b pN0 pMX. Post-operative TG 0.2 with suppressed TSH  Low risk disease. Discussed option of remnant ablation but no proven benefit for recurrence or mortality. She does not wish to have MALNOEY at this time  Goal TSH low-normal range  Repeat TG 6 months with US at that time

## 2020-08-14 ENCOUNTER — CLINICAL SUPPORT (OUTPATIENT)
Dept: DIABETES | Facility: CLINIC | Age: 65
End: 2020-08-14
Payer: MEDICARE

## 2020-08-14 DIAGNOSIS — E11.65 TYPE 2 DIABETES MELLITUS WITH HYPERGLYCEMIA, WITHOUT LONG-TERM CURRENT USE OF INSULIN: ICD-10-CM

## 2020-08-14 PROCEDURE — 99999 PR PBB SHADOW E&M-EST. PATIENT-LVL I: CPT | Mod: PBBFAC,,, | Performed by: DIETITIAN, REGISTERED

## 2020-08-14 PROCEDURE — 99999 PR PBB SHADOW E&M-EST. PATIENT-LVL I: ICD-10-PCS | Mod: PBBFAC,,, | Performed by: DIETITIAN, REGISTERED

## 2020-08-14 PROCEDURE — G0108 DIAB MANAGE TRN  PER INDIV: HCPCS | Mod: S$GLB,,, | Performed by: DIETITIAN, REGISTERED

## 2020-08-14 PROCEDURE — G0108 PR DIAB MANAGE TRN  PER INDIV: ICD-10-PCS | Mod: S$GLB,,, | Performed by: DIETITIAN, REGISTERED

## 2020-08-14 NOTE — PROGRESS NOTES
Diabetes Education  Author: Pratima George RD  Date: 8/14/2020    Diabetes Care Management Summary  Diabetes Education Record Assessment/Progress: Initial  Current Diabetes Risk Level: Moderate     Last A1c:   Lab Results   Component Value Date    HGBA1C 6.8 (H) 06/11/2020     Last visit with Diabetes Educator: Last Education Visit: Not Found      Diabetes Type  Diabetes Type : Type II    Diabetes History  Diabetes Diagnosis: >10 years  Current Treatment: Oral Medication(Metformin)  Reviewed Problem List with Patient: Yes    Health Maintenance was reviewed today with patient. Discussed with patient importance of routine eye exams, foot exams/foot care, blood work (i.e.: A1c, microalbumin, and lipid), dental visits, yearly flu vaccine, and pneumonia vaccine as indicated by PCP. Patient verbalized understanding.     Health Maintenance Topics with due status: Not Due       Topic Last Completion Date    TETANUS VACCINE 03/05/2018    DEXA SCAN 05/07/2019    Influenza Vaccine 08/30/2019    Mammogram 10/11/2019    Colorectal Cancer Screening 01/13/2020    Lipid Panel 02/26/2020    Hemoglobin A1c 06/11/2020    Low Dose Statin 08/07/2020     Health Maintenance Due   Topic Date Due    Shingles Vaccine (1 of 2) 08/22/2003    Foot Exam  08/29/2020    Eye Exam  08/29/2020       Nutrition  What type of beverages do you drink?: water, diet soda/tea(diet cranberry juice)  Meal Plan 24 Hour Recall - Breakfast: egg w/ slice of bread  Meal Plan 24 Hour Recall - Lunch: red beans (no rice) w/ sausage  Meal Plan 24 Hour Recall - Dinner: same as lunch, popeyes chicken and biscuits  Meal Plan 24 Hour Recall - Snack: banana, apple, blueberries, strawberries    Monitoring   Self Monitoring : Once Daily, 138 Fasting today  Blood Glucose Logs: No  Do you use a personal continuous glucose monitor?: No  In the last month, how often have you had a low blood sugar reaction?: never  Can you tell when your blood sugar is too high?:  sometimes(Tired)    Exercise   Exercise Type: use exercise equipment, walking  Intensity: Low  Frequency: 3-5 Times per week  Duration: (2mi)    Current Diabetes Treatment   Current Treatment: Oral Medication(Metformin)    Social History  Preferred Learning Method: Face to Face  Primary Support: Self      Barriers to Change  Barriers to Change: None  Learning Challenges : None    Readiness to Learn   Readiness to Learn : Acceptance    Cultural Influences  Cultural Influences: None    Diabetes Education Assessment/Progress  Diabetes Disease Process (diabetes disease process and treatment options): Written Materials Provided, Discussion, Individual Session, Demonstrates Understanding/Competency(verbalizes/demonstrates), Comprehends Key Points, Instructed  Nutrition (Incorporating nutritional management into one's lifestyle): Written Materials Provided, Discussion, Individual Session, Comprehends Key Points, Demonstrates Understanding/Competency (verbalizes/demonstrates), Instructed  Physical Activity (incorporating physical activity into one's lifestyle): Written Materials Provided, Discussion, Individual Session, Demonstrates Understanding/Competency (verbalizes/demonstrates), Comprehends Key Points, Instructed  Medications (states correct name, dose, onset, peak, duration, side effects & timing of meds): Written Materials Provided, Discussion, Individual Session, Demonstrates Understanding/Competency(verbalizes/demonstrates), Comprehends Key Points, Instructed  Monitoring (monitoring blood glucose/other parameters & using results): Written Materials Provided, Discussion, Individual Session, Demonstrates Understanding/Competency (verbalizes/demonstrates), Comprehends Key Points, Instructed  Acute Complications (preventing, detecting, and treating acute complications): Written Materials Provided, Discussion, Individual Session, Demonstrates Understanding/Competency (verbalizes/demonstrates), Comprehends Key Points,  Instructed  Chronic Complications (preventing, detecting, and treating chronic complications): Written Materials Provided, Discussion, Individual Session, Demonstrates Understanding/Competency (verbalizes/demonstrates), Comprehends Key Points, Instructed  Clinical (diabetes, other pertinent medical history, and relevant comorbidities reviewed during visit): Discussion, Comprehends Key Points  Cognitive (knowledge of self-management skills, functional health literacy): Discussion, Comprehends Key Points  Psychosocial (emotional response to diabetes): Discussion, Comprehends Key Points  Diabetes Distress and Support Systems: Not Covered/Deferred  Behavioral (readiness for change, lifestyle practices, self-care behaviors): Discussion, Comprehends Key Points    Goals  Patient has selected/evaluated goals during today's session: Yes, selected(Patient will limit CHO intake at meals to have bg wnl)  Healthy Eating: Set  Start Date: 08/14/20         Diabetes Care Plan/Intervention  Education Plan/Intervention: Individual Follow-Up DSMT    Diabetes Meal Plan  Restrictions: Restricted Carbohydrate    Today's Self-Management Care Plan was developed with the patient's input and is based on barriers identified during today's assessment.    The long and short-term goals in the care plan were written with the patient/caregiver's input. The patient has agreed to work toward these goals to improve her overall diabetes control.      The patient received a copy of today's self-management plan and verbalized understanding of the care plan, goals, and all of today's instructions.      The patient was encouraged to communicate with her physician and care team regarding her condition(s) and treatment.  I provided the patient with my contact information today and encouraged her to contact me via phone or patient portal as needed.     Education Units of Time   Time Spent: 60 min

## 2020-09-08 RX ORDER — IBUPROFEN 200 MG
1 CAPSULE ORAL 2 TIMES DAILY
Qty: 200 STRIP | Refills: 3 | Status: SHIPPED | OUTPATIENT
Start: 2020-09-08 | End: 2022-02-15 | Stop reason: SDUPTHER

## 2020-09-08 RX ORDER — ISOPROPYL ALCOHOL 70 ML/100ML
1 SWAB TOPICAL 3 TIMES DAILY
Qty: 200 EACH | Refills: 3 | Status: SHIPPED | OUTPATIENT
Start: 2020-09-08

## 2020-09-08 RX ORDER — LANCETS
1 EACH MISCELLANEOUS 2 TIMES DAILY
Qty: 200 EACH | Refills: 3 | Status: SHIPPED | OUTPATIENT
Start: 2020-09-08 | End: 2022-02-15 | Stop reason: SDUPTHER

## 2020-09-08 NOTE — TELEPHONE ENCOUNTER
----- Message from Layla Lubin sent at 9/8/2020  9:16 AM CDT -----  Regarding: call back  Contact: [phone]  Patient is calling to get refills on her Diabetes supplies sent to [facility] [phone]    1. blood sugar diagnostic (BLOOD GLUCOSE TEST) Strp 200 strip     2. lancets Misc 200 each     3. BD alcohol Swabs

## 2020-09-15 ENCOUNTER — PATIENT OUTREACH (OUTPATIENT)
Dept: ADMINISTRATIVE | Facility: OTHER | Age: 65
End: 2020-09-15

## 2020-09-15 DIAGNOSIS — E11.65 TYPE 2 DIABETES MELLITUS WITH HYPERGLYCEMIA, WITHOUT LONG-TERM CURRENT USE OF INSULIN: Primary | ICD-10-CM

## 2020-09-15 NOTE — PROGRESS NOTES
Health Maintenance Due   Topic Date Due    Shingles Vaccine (1 of 2) 08/22/2003    Foot Exam  08/29/2020    Eye Exam  08/29/2020     Updates were requested from care everywhere.  Chart was reviewed for overdue Proactive [facility] Encounters (SATISH) topics (CRS, Breast Cancer Screening, Eye exam)  Health Maintenance has been updated.  LIN[facility] immunization registry triggered.  Immunizations were reconciled.  Order placed for diabetic eye screening photo.

## 2020-09-18 ENCOUNTER — NUTRITION (OUTPATIENT)
Dept: DIABETES | Facility: CLINIC | Age: 65
End: 2020-09-18
Payer: MEDICARE

## 2020-09-18 DIAGNOSIS — E11.65 TYPE 2 DIABETES MELLITUS WITH HYPERGLYCEMIA, WITHOUT LONG-TERM CURRENT USE OF INSULIN: ICD-10-CM

## 2020-09-18 PROCEDURE — G0108 PR DIAB MANAGE TRN  PER INDIV: ICD-10-PCS | Mod: S$GLB,,, | Performed by: DIETITIAN, REGISTERED

## 2020-09-18 PROCEDURE — G0108 DIAB MANAGE TRN  PER INDIV: HCPCS | Mod: S$GLB,,, | Performed by: DIETITIAN, REGISTERED

## 2020-09-18 NOTE — PROGRESS NOTES
Diabetes Education  Author: Pratima George RD  Date: 9/28/2020    Diabetes Care Management Summary  Diabetes Education Record Assessment/Progress: Post Program/Follow-up  Current Diabetes Risk Level: Moderate     Last A1c:   Lab Results   Component Value Date    HGBA1C 6.8 (H) 06/11/2020     Last visit with Diabetes Educator: Last Education Visit: Not Found      Diabetes Type  Diabetes Type : Type II     24hr Recall:   B - egg, 2 slices of bread   L - beans, sour cream    S - pb sandwich, fruit  Water throughout the day    Walking 2 days a week, 30mins      Testing once daily   Fasting - 130 today, 111 yesterday    Patient presents in office for follow up. Reviewed 24hr Recall, dm diet, cho counting. Patient denies issues, states she is following diet and exercising regularly. Encouraged to continue and get A1c done soon. Patient verbalizes understanding.     Health Maintenance was reviewed today with patient. Discussed with patient importance of routine eye exams, foot exams/foot care, blood work (i.e.: A1c, microalbumin, and lipid), dental visits, yearly flu vaccine, and pneumonia vaccine as indicated by PCP. Patient verbalized understanding.     Health Maintenance Topics with due status: Not Due       Topic Last Completion Date    TETANUS VACCINE 03/05/2018    DEXA SCAN 05/07/2019    Mammogram 10/11/2019    Colorectal Cancer Screening 01/13/2020    Lipid Panel 02/26/2020    Hemoglobin A1c 07/17/2020    Low Dose Statin 08/07/2020     Health Maintenance Due   Topic Date Due    Shingles Vaccine (1 of 2) 08/22/2003    Foot Exam  08/29/2020    Eye Exam  08/29/2020              Today's Self-Management Care Plan was developed with the patient's input and is based on barriers identified during today's assessment.    The long and short-term goals in the care plan were written with the patient/caregiver's input. The patient has agreed to work toward these goals to improve her overall diabetes control.      The  patient received a copy of today's self-management plan and verbalized understanding of the care plan, goals, and all of today's instructions.      The patient was encouraged to communicate with her physician and care team regarding her condition(s) and treatment.  I provided the patient with my contact information today and encouraged her to contact me via phone or patient portal as needed.

## 2020-10-05 ENCOUNTER — PATIENT MESSAGE (OUTPATIENT)
Dept: ADMINISTRATIVE | Facility: HOSPITAL | Age: 65
End: 2020-10-05

## 2020-10-15 NOTE — DISCHARGE INSTRUCTIONS
Pippa Oleary is here for Initial evaluation of potential COVID-19 exposure.      You will need to schedule a lab appointment to be tested for COVID-19.  Your lab appointment was scheduled during today's call.  You are to remain off work and out of public places except to seek medical care, and complete the symptom tracker daily on [employer].  YOU MAY NOT RETURN TO WORK WITHOUT CLEARANCE FROM EMPLOYEE HEALTH.    Off work start date: 10/15/20    Talked with TM about a red exposure report from 10/14/20. TM has numerous symptoms and sent for testing.TM states that TM was wearing appropriate PPE so no risk exposure.    CB on 10/17/20 to assess RTW status, test results and symptom monitoring?    Per your recent telephone screening with Employee Health:  ?  Dear Team Member,    Per your recent telephone screening with Employee Health:    Your lab appointment was scheduled during today's call.    You are to remain off work and isolate yourself from  public places except to seek medical care    Complete the symptom tracker daily on [employer] to comply with your actively monitoring status    YOU MAY NOT RETURN TO WORK WITHOUT CLEARANCE FROM EMPLOYEE HEALTH.  It is not necessary to call Employee Health.  The Central COVID Team will contact you at the appropriate interval for clearance.    Off work start date: 10/15/20        Notify Employee Health if you have a marked increase  in symptomswhile you are off.[phone] or email [email or web address].    For [place] Employees, please follow this link to view a copy of the consent form:  [place] Cons[place] Form     For [place] Employees, please follow this link to view a copy of the consent form:   [place] Consent Form     Thank you,    Employee Health Central COVID Team    [phone]    Hours: M-F [id number] Saturday - please answer your phone if an outside line is calling, regardless of hours we work 7days a week and will follow up as  Discharge Instructions:    Do not drive a car, operate heavy equipment, care for a young child, etc for the next 12-24 hours.   Avoid drinking alcohol for 24 hours.  Do not make any important decisions for 24 hours.    Drink fluids to keep hydrated. Resume your usual diet as tolerated.     Rest for today then activity as tolerated.   Do not lift anything over 5 pounds for the first 3 days after procedure.    Remove dressing tomorrow then may shower with warm soapy water. Do not scrub site. Pat dry.   May apply bandaid for 2 days.  No tub baths.  Do not submerge wound in water for 3 days.     Call MD for any unrelieved pain, excessive nausea or vomiting, redness around site, bleeding, or pus or foul smelling drainage, or any other questions or concerns.    Go to the ER for any difficulty breathing or chest pain.      If site swells or bleeds, hold direct pressure to area for 10 full minutes.   If site continues to bleed, continue to hold pressure to site and have someone bring you to the ER.      Follow any additional instructions given to you by MD.      Call MD to schedule a follow up appointment, or follow up as instructed.      Sedación para procedimiento (adulto)  A usted le bhtati dado medicamentos intravenosos para sedarlo blanquita august procedimiento de hoy. Es probable que le hayan dado un medicamento contra el dolor y otro para dormir. La mayor parte del efecto de estos medicamentos ya ha desaparecido, padmini es posible que continúe teniendo somnolencia blanquita las próximas 6-8 horas.  Cuidados en la casa  · Es importante que haya un adulto responsable a august lado blanquita las próximas ocho horas para vigilar si se produce un empeoramiento de august estado.  · No tome medicamentos orales contra el dolor o para dormir blanquita las próximas cuatro horas, ya que esto puede reaccionar con los medicamentos que le dieron en el hospital y provocar cedric respuesta mucho más sadie que la habitual.  · No rea nada de alcohol blanquita  appropriate.     [email or web address]    Cc: Manager     las próximas 24 horas.  · No maneje ni opere maquinaria peligrosa, ni tampoco tome decisiones importantes de negocios o personales blanquita las siguientes 24 horas.   Visita de control  Programe cedric visita de control con august médico o con mary centro si no se siente monika despierto y no ha recuperado august nivel normal de actividad en un plazo de doce horas.  ¿Cuándo debe buscar atención médica?  Llame de inmediato al proveedor de atención médica si ocurre cualquiera de las siguientes situaciones:  · Somnolencia (adormecimiento) que va en aumento  · Debilidad o mareo en aumento  · Vómito persistente  · Si no pueden despertarlo  Date Last Reviewed: 5/22/2014  © 2698-8417 [employer]. [address] [place] [address]. Todos los derechos reservados. Esta información no pretende sustituir la atención médica profesional. Sólo august médico puede diagnosticar y tratar un problema de jorge.  Understanding Transradial Cardiac Catheterization    Cardiac catheterization (cardiac cath) is a common, non-surgical procedure. During the procedure, your doctor will insert a long, thin tube (catheter) into an artery and move it up into your heart. Transradial means the catheter is inserted into an artery in the wrist (the radial artery). This procedure can be used to diagnose and treat certain heart problems.  Why do I need a transradial cardiac cath?  You may need a cardiac cath if signs indicate a problem with your heart. These may include:  · Symptoms of chest pain, tightness, or heaviness (known as angina). This is a common symptom of blocked heart arteries, known as coronary artery disease.  · Symptoms of weakness, dizziness, trouble breathing, or swollen legs or feet. These may be symptoms of a problem with a heart valve or the heart muscle.  · Other test results show heart problems. Tests may include stress tests, heart scans, and echocardiography.  During a cardiac cath, your doctor can see the condition of the  coronary arteries and heart valves. He or she can also check how well the heart pumps and the flow of blood through the heart. Your doctor can also measure pressures and take blood samples. And, if needed, he or she can open blocked arteries. This can help reduce symptoms of angina.  Cardiac cath is often done using a catheter inserted into an artery in the groin. During transradial cardiac cath, the catheter is inserted into an artery in the wrist. This can mean less bleeding and a faster recovery. Some people may have blockages in the groin arteries as well as in the heart arteries, making it difficult to reach the heart. The transradial approach can be used to get around this problem.   What happens during a transradial cardiac cath?  The procedure is done in the hospital or a surgery center. First, an IV line is put in your arm or hand to deliver fluids and medicines. You will likely be given medicine to relax you and make you drowsy. When the procedure begins:  · You lie on an X-ray table.  · The skin over the insertion site in your wrist is numbed.  · The doctor makes a tiny puncture or incision into the artery in the wrist. He or she then inserts a catheter and threads it through the blood vessel into your heart.    · The doctor may inject a contrast fluid through the catheter into the arteries. This fluid makes the arteries show up better on X-rays.  · Tests may be done to check the condition of your heart and arteries. If needed, the doctor can clear blockages in the arteries or do other repairs.  · When the doctor is finished, he or she will remove the catheter and put direct pressure on the site to prevent bleeding.  · You will stay for a time to recover, and then go home.  What are the risks of transradial cardiac cath?  These include:  · Bleeding, bruising, infection, or blood clots  · Damage to the radial artery that may cause injury to the hand  · Allergic reaction to the contrast fluid  · Abnormal  heartbeat (arrhythmia)  · Damage to blood vessels or tissues  · Kidney damage or failure  · The need for emergency heart surgery  · Heart attack, stroke, or death  Date Last Reviewed: 5/1/2016  © 1103-2983 [employer]. [address] [place] [address], [place] [address]. All rights reserved. This information is not intended as a substitute for professional medical care. Always follow your healthcare professional's instructions.

## 2020-10-16 ENCOUNTER — PATIENT OUTREACH (OUTPATIENT)
Dept: FAMILY MEDICINE | Facility: CLINIC | Age: 65
End: 2020-10-16

## 2020-10-16 ENCOUNTER — PATIENT OUTREACH (OUTPATIENT)
Dept: ADMINISTRATIVE | Facility: HOSPITAL | Age: 65
End: 2020-10-16

## 2020-11-04 ENCOUNTER — LAB VISIT (OUTPATIENT)
Dept: LAB | Facility: HOSPITAL | Age: 65
End: 2020-11-04
Attending: FAMILY MEDICINE
Payer: MEDICARE

## 2020-11-04 DIAGNOSIS — E20.9 HYPOPARATHYROIDISM, UNSPECIFIED HYPOPARATHYROIDISM TYPE: ICD-10-CM

## 2020-11-04 DIAGNOSIS — K76.0 FATTY LIVER: ICD-10-CM

## 2020-11-04 DIAGNOSIS — I10 ESSENTIAL HYPERTENSION: ICD-10-CM

## 2020-11-04 DIAGNOSIS — E78.5 DYSLIPIDEMIA: ICD-10-CM

## 2020-11-04 DIAGNOSIS — E11.65 TYPE 2 DIABETES MELLITUS WITH HYPERGLYCEMIA, WITHOUT LONG-TERM CURRENT USE OF INSULIN: ICD-10-CM

## 2020-11-04 DIAGNOSIS — E89.0 POSTOPERATIVE HYPOTHYROIDISM: ICD-10-CM

## 2020-11-04 LAB
ALBUMIN SERPL BCP-MCNC: 4.4 G/DL (ref 3.5–5.2)
ALBUMIN SERPL BCP-MCNC: 4.4 G/DL (ref 3.5–5.2)
ALP SERPL-CCNC: 78 U/L (ref 38–126)
ALT SERPL W/O P-5'-P-CCNC: 34 U/L (ref 10–44)
ANION GAP SERPL CALC-SCNC: 8 MMOL/L (ref 8–16)
ANION GAP SERPL CALC-SCNC: 8 MMOL/L (ref 8–16)
AST SERPL-CCNC: 34 U/L (ref 15–46)
BASOPHILS # BLD AUTO: 0.03 K/UL (ref 0–0.2)
BASOPHILS NFR BLD: 0.4 % (ref 0–1.9)
BILIRUB SERPL-MCNC: 0.8 MG/DL (ref 0.1–1)
BUN SERPL-MCNC: 20 MG/DL (ref 7–17)
BUN SERPL-MCNC: 20 MG/DL (ref 7–17)
CALCIUM SERPL-MCNC: 9.1 MG/DL (ref 8.7–10.5)
CALCIUM SERPL-MCNC: 9.1 MG/DL (ref 8.7–10.5)
CHLORIDE SERPL-SCNC: 95 MMOL/L (ref 95–110)
CHLORIDE SERPL-SCNC: 95 MMOL/L (ref 95–110)
CHOLEST SERPL-MCNC: 167 MG/DL (ref 120–199)
CHOLEST/HDLC SERPL: 3.7 {RATIO} (ref 2–5)
CO2 SERPL-SCNC: 30 MMOL/L (ref 23–29)
CO2 SERPL-SCNC: 30 MMOL/L (ref 23–29)
CREAT SERPL-MCNC: 0.75 MG/DL (ref 0.5–1.4)
CREAT SERPL-MCNC: 0.75 MG/DL (ref 0.5–1.4)
DIFFERENTIAL METHOD: NORMAL
EOSINOPHIL # BLD AUTO: 0.2 K/UL (ref 0–0.5)
EOSINOPHIL NFR BLD: 2.8 % (ref 0–8)
ERYTHROCYTE [DISTWIDTH] IN BLOOD BY AUTOMATED COUNT: 12.2 % (ref 11.5–14.5)
EST. GFR  (AFRICAN AMERICAN): >60 ML/MIN/1.73 M^2
EST. GFR  (AFRICAN AMERICAN): >60 ML/MIN/1.73 M^2
EST. GFR  (NON AFRICAN AMERICAN): >60 ML/MIN/1.73 M^2
EST. GFR  (NON AFRICAN AMERICAN): >60 ML/MIN/1.73 M^2
ESTIMATED AVG GLUCOSE: 137 MG/DL (ref 68–131)
GLUCOSE SERPL-MCNC: 119 MG/DL (ref 70–110)
GLUCOSE SERPL-MCNC: 119 MG/DL (ref 70–110)
HBA1C MFR BLD HPLC: 6.4 % (ref 4–5.6)
HCT VFR BLD AUTO: 39.7 % (ref 37–48.5)
HDLC SERPL-MCNC: 45 MG/DL (ref 40–75)
HDLC SERPL: 26.9 % (ref 20–50)
HGB BLD-MCNC: 13.1 G/DL (ref 12–16)
IMM GRANULOCYTES # BLD AUTO: 0.02 K/UL (ref 0–0.04)
IMM GRANULOCYTES NFR BLD AUTO: 0.2 % (ref 0–0.5)
LDLC SERPL CALC-MCNC: 91.6 MG/DL (ref 63–159)
LYMPHOCYTES # BLD AUTO: 2.6 K/UL (ref 1–4.8)
LYMPHOCYTES NFR BLD: 31.9 % (ref 18–48)
MCH RBC QN AUTO: 28.2 PG (ref 27–31)
MCHC RBC AUTO-ENTMCNC: 33 G/DL (ref 32–36)
MCV RBC AUTO: 86 FL (ref 82–98)
MONOCYTES # BLD AUTO: 0.6 K/UL (ref 0.3–1)
MONOCYTES NFR BLD: 6.7 % (ref 4–15)
NEUTROPHILS # BLD AUTO: 4.8 K/UL (ref 1.8–7.7)
NEUTROPHILS NFR BLD: 58 % (ref 38–73)
NONHDLC SERPL-MCNC: 122 MG/DL
NRBC BLD-RTO: 0 /100 WBC
PHOSPHATE SERPL-MCNC: 4 MG/DL (ref 2.7–4.5)
PLATELET # BLD AUTO: 299 K/UL (ref 150–350)
PMV BLD AUTO: 10.5 FL (ref 9.2–12.9)
POTASSIUM SERPL-SCNC: 4.8 MMOL/L (ref 3.5–5.1)
POTASSIUM SERPL-SCNC: 4.8 MMOL/L (ref 3.5–5.1)
PROT SERPL-MCNC: 7.6 G/DL (ref 6–8.4)
RBC # BLD AUTO: 4.64 M/UL (ref 4–5.4)
SODIUM SERPL-SCNC: 133 MMOL/L (ref 136–145)
SODIUM SERPL-SCNC: 133 MMOL/L (ref 136–145)
T4 FREE SERPL-MCNC: 1.63 NG/DL (ref 0.71–1.51)
TRIGL SERPL-MCNC: 152 MG/DL (ref 30–150)
TSH SERPL DL<=0.005 MIU/L-ACNC: <0.026 UIU/ML (ref 0.4–4)
WBC # BLD AUTO: 8.25 K/UL (ref 3.9–12.7)

## 2020-11-04 PROCEDURE — 36415 COLL VENOUS BLD VENIPUNCTURE: CPT | Mod: PO

## 2020-11-04 PROCEDURE — 85025 COMPLETE CBC W/AUTO DIFF WBC: CPT | Mod: PO

## 2020-11-04 PROCEDURE — 83036 HEMOGLOBIN GLYCOSYLATED A1C: CPT

## 2020-11-04 PROCEDURE — 80061 LIPID PANEL: CPT

## 2020-11-04 PROCEDURE — 84443 ASSAY THYROID STIM HORMONE: CPT | Mod: PO

## 2020-11-04 PROCEDURE — 84439 ASSAY OF FREE THYROXINE: CPT

## 2020-11-04 PROCEDURE — 80053 COMPREHEN METABOLIC PANEL: CPT | Mod: PO

## 2020-11-04 PROCEDURE — 80069 RENAL FUNCTION PANEL: CPT | Mod: PO

## 2020-11-05 ENCOUNTER — PATIENT MESSAGE (OUTPATIENT)
Dept: ENDOCRINOLOGY | Facility: CLINIC | Age: 65
End: 2020-11-05

## 2020-11-05 DIAGNOSIS — E89.0 POSTOPERATIVE HYPOTHYROIDISM: Primary | ICD-10-CM

## 2020-11-05 DIAGNOSIS — E20.9 HYPOPARATHYROIDISM, UNSPECIFIED HYPOPARATHYROIDISM TYPE: Primary | ICD-10-CM

## 2020-11-05 RX ORDER — LEVOTHYROXINE SODIUM 75 UG/1
75 TABLET ORAL
Qty: 30 TABLET | Refills: 11 | Status: SHIPPED | OUTPATIENT
Start: 2020-11-05 | End: 2021-10-25 | Stop reason: SDUPTHER

## 2020-11-09 ENCOUNTER — OFFICE VISIT (OUTPATIENT)
Dept: FAMILY MEDICINE | Facility: CLINIC | Age: 65
End: 2020-11-09
Payer: MEDICARE

## 2020-11-09 VITALS
HEART RATE: 86 BPM | OXYGEN SATURATION: 98 % | TEMPERATURE: 98 F | BODY MASS INDEX: 22.19 KG/M2 | DIASTOLIC BLOOD PRESSURE: 70 MMHG | HEIGHT: 63 IN | WEIGHT: 125.25 LBS | SYSTOLIC BLOOD PRESSURE: 120 MMHG

## 2020-11-09 DIAGNOSIS — I10 ESSENTIAL HYPERTENSION: ICD-10-CM

## 2020-11-09 DIAGNOSIS — E03.9 HYPOTHYROIDISM, UNSPECIFIED TYPE: ICD-10-CM

## 2020-11-09 DIAGNOSIS — R92.1 MAMMOGRAPHIC CALCIFICATION FOUND ON DIAGNOSTIC IMAGING OF BREAST: ICD-10-CM

## 2020-11-09 DIAGNOSIS — E78.5 DYSLIPIDEMIA: ICD-10-CM

## 2020-11-09 DIAGNOSIS — E11.65 TYPE 2 DIABETES MELLITUS WITH HYPERGLYCEMIA, WITHOUT LONG-TERM CURRENT USE OF INSULIN: ICD-10-CM

## 2020-11-09 DIAGNOSIS — Z12.31 ENCOUNTER FOR SCREENING MAMMOGRAM FOR MALIGNANT NEOPLASM OF BREAST: ICD-10-CM

## 2020-11-09 DIAGNOSIS — Z12.31 ENCOUNTER FOR SCREENING MAMMOGRAM FOR BREAST CANCER: ICD-10-CM

## 2020-11-09 DIAGNOSIS — L03.012 PARONYCHIA OF FINGER OF LEFT HAND: ICD-10-CM

## 2020-11-09 DIAGNOSIS — Z00.00 ANNUAL PHYSICAL EXAM: Primary | ICD-10-CM

## 2020-11-09 PROCEDURE — 3078F PR MOST RECENT DIASTOLIC BLOOD PRESSURE < 80 MM HG: ICD-10-PCS | Mod: CPTII,S$GLB,, | Performed by: FAMILY MEDICINE

## 2020-11-09 PROCEDURE — 3078F DIAST BP <80 MM HG: CPT | Mod: CPTII,S$GLB,, | Performed by: FAMILY MEDICINE

## 2020-11-09 PROCEDURE — 99999 PR PBB SHADOW E&M-EST. PATIENT-LVL V: ICD-10-PCS | Mod: PBBFAC,,, | Performed by: FAMILY MEDICINE

## 2020-11-09 PROCEDURE — 99499 UNLISTED E&M SERVICE: CPT | Mod: S$GLB,,, | Performed by: FAMILY MEDICINE

## 2020-11-09 PROCEDURE — 99499 RISK ADDL DX/OHS AUDIT: ICD-10-PCS | Mod: S$GLB,,, | Performed by: FAMILY MEDICINE

## 2020-11-09 PROCEDURE — 3074F SYST BP LT 130 MM HG: CPT | Mod: CPTII,S$GLB,, | Performed by: FAMILY MEDICINE

## 2020-11-09 PROCEDURE — 99999 PR PBB SHADOW E&M-EST. PATIENT-LVL V: CPT | Mod: PBBFAC,,, | Performed by: FAMILY MEDICINE

## 2020-11-09 PROCEDURE — 99214 PR OFFICE/OUTPT VISIT, EST, LEVL IV, 30-39 MIN: ICD-10-PCS | Mod: S$GLB,,, | Performed by: FAMILY MEDICINE

## 2020-11-09 PROCEDURE — 3074F PR MOST RECENT SYSTOLIC BLOOD PRESSURE < 130 MM HG: ICD-10-PCS | Mod: CPTII,S$GLB,, | Performed by: FAMILY MEDICINE

## 2020-11-09 PROCEDURE — 3044F HG A1C LEVEL LT 7.0%: CPT | Mod: CPTII,S$GLB,, | Performed by: FAMILY MEDICINE

## 2020-11-09 PROCEDURE — 3044F PR MOST RECENT HEMOGLOBIN A1C LEVEL <7.0%: ICD-10-PCS | Mod: CPTII,S$GLB,, | Performed by: FAMILY MEDICINE

## 2020-11-09 PROCEDURE — 99214 OFFICE O/P EST MOD 30 MIN: CPT | Mod: S$GLB,,, | Performed by: FAMILY MEDICINE

## 2020-11-09 RX ORDER — LOSARTAN POTASSIUM 100 MG/1
100 TABLET ORAL DAILY
Qty: 90 TABLET | Refills: 3 | Status: SHIPPED | OUTPATIENT
Start: 2020-11-09 | End: 2021-10-25 | Stop reason: SDUPTHER

## 2020-11-09 RX ORDER — LOVASTATIN 20 MG/1
20 TABLET ORAL NIGHTLY
Qty: 90 TABLET | Refills: 3 | Status: SHIPPED | OUTPATIENT
Start: 2020-11-09 | End: 2021-10-25 | Stop reason: SDUPTHER

## 2020-11-09 RX ORDER — HYDROCHLOROTHIAZIDE 25 MG/1
25 TABLET ORAL DAILY
Qty: 90 TABLET | Refills: 3 | Status: SHIPPED | OUTPATIENT
Start: 2020-11-09 | End: 2021-05-13 | Stop reason: SDUPTHER

## 2020-11-09 RX ORDER — DOXYCYCLINE 100 MG/1
100 CAPSULE ORAL 2 TIMES DAILY
Qty: 20 CAPSULE | Refills: 0 | Status: SHIPPED | OUTPATIENT
Start: 2020-11-09 | End: 2020-11-19

## 2020-11-09 NOTE — PROGRESS NOTES
Subjective:       Patient ID: Tomeka Burns is a 65 y.o. female.    Chief Complaint: Annual Exam    65 years old female came to clinic for her physical examination.  Blood pressure today stable .  No chest pain, palpitation orthopnea or PND.  Last TSH was low.  Medicine for the thyroid was recently adjusted.  Last A1c was stable.  No polyuria, polydipsia or polyphagia.  Patient due for her mammogram.  She reports previous breast calcification before. Patient with left hand 3rd finger paronychia for the last couple of months.  Patient reports significant improvement with antibiotic regimen before.    Review of Systems   Constitutional: Negative.    HENT: Negative.    Eyes: Negative.    Respiratory: Negative.    Cardiovascular: Negative for chest pain, palpitations, leg swelling and claudication.   Gastrointestinal: Negative.    Endocrine: Negative for cold intolerance, heat intolerance, polydipsia, polyphagia and polyuria.   Genitourinary: Negative.    Musculoskeletal: Negative.    Neurological: Negative.    Psychiatric/Behavioral: Negative.          Objective:      Physical Exam  Constitutional:       General: She is not in acute distress.     Appearance: She is well-developed. She is not diaphoretic.   HENT:      Head: Normocephalic and atraumatic.      Right Ear: External ear normal.      Left Ear: External ear normal.      Nose: Nose normal.      Mouth/Throat:      Pharynx: No oropharyngeal exudate.   Eyes:      General: No scleral icterus.        Right eye: No discharge.         Left eye: No discharge.      Conjunctiva/sclera: Conjunctivae normal.      Pupils: Pupils are equal, round, and reactive to light.   Neck:      Musculoskeletal: Normal range of motion and neck supple.      Thyroid: No thyromegaly.      Vascular: No JVD.      Trachea: No tracheal deviation.   Cardiovascular:      Rate and Rhythm: Normal rate and regular rhythm.      Heart sounds: Normal heart sounds. No murmur. No friction rub. No  gallop.    Pulmonary:      Effort: Pulmonary effort is normal. No respiratory distress.      Breath sounds: Normal breath sounds. No stridor. No wheezing or rales.   Chest:      Chest wall: No tenderness.   Abdominal:      General: Bowel sounds are normal. There is no distension.      Palpations: Abdomen is soft. There is no mass.      Tenderness: There is no abdominal tenderness. There is no guarding or rebound.   Musculoskeletal: Normal range of motion.         General: No tenderness.   Feet:      Right foot:      Protective Sensation: 10 sites tested. 10 sites sensed.      Skin integrity: No ulcer, blister, skin breakdown, erythema, warmth, callus, dry skin or fissure.      Left foot:      Protective Sensation: 10 sites tested. 10 sites sensed.      Skin integrity: No ulcer, blister, skin breakdown, erythema, warmth, callus, dry skin or fissure.   Lymphadenopathy:      Cervical: No cervical adenopathy.   Skin:     General: Skin is warm and dry.      Coloration: Skin is not pale.      Findings: No erythema or rash.          Neurological:      Mental Status: She is alert and oriented to person, place, and time.      Cranial Nerves: No cranial nerve deficit.      Motor: No abnormal muscle tone.      Coordination: Coordination normal.      Deep Tendon Reflexes: Reflexes are normal and symmetric.   Psychiatric:         Behavior: Behavior normal.         Thought Content: Thought content normal.         Judgment: Judgment normal.         Assessment:       1. Annual physical exam    2. Essential hypertension    3. Hypothyroidism, unspecified type    4. Dyslipidemia    5. Encounter for screening mammogram for breast cancer    6. Encounter for screening mammogram for malignant neoplasm of breast    7. Paronychia of finger of left hand    8. Type 2 diabetes mellitus with hyperglycemia, without long-term current use of insulin    9. Mammographic calcification found on diagnostic imaging of breast         Plan:         Aura  was seen today for annual exam.    Diagnoses and all orders for this visit:    Annual physical exam  -     CBC Auto Differential; Future  -     Comprehensive Metabolic Panel; Future  -     Lipid Panel; Future  -     Hemoglobin A1C; Future  -     Microalbumin/Creatinine Ratio, Urine; Future    Essential hypertension  -     CBC Auto Differential; Future  -     Microalbumin/Creatinine Ratio, Urine; Future  -     hydroCHLOROthiazide (HYDRODIURIL) 25 MG tablet; Take 1 tablet (25 mg total) by mouth once daily.  -     losartan (COZAAR) 100 MG tablet; Take 1 tablet (100 mg total) by mouth once daily.    Hypothyroidism, unspecified type  -     Comprehensive Metabolic Panel; Future  -     Lipid Panel; Future    Dyslipidemia  -     Comprehensive Metabolic Panel; Future  -     Lipid Panel; Future  -     lovastatin (MEVACOR) 20 MG tablet; Take 1 tablet (20 mg total) by mouth every evening.    Encounter for screening mammogram for breast cancer  -     Mammo Digital Diagnostic Bilat; Future    Encounter for screening mammogram for malignant neoplasm of breast    Paronychia of finger of left hand  -     doxycycline (MONODOX) 100 MG capsule; Take 1 capsule (100 mg total) by mouth 2 (two) times daily. for 10 days    Type 2 diabetes mellitus with hyperglycemia, without long-term current use of insulin  -     Hemoglobin A1C; Future    Mammographic calcification found on diagnostic imaging of breast   -     Mammo Digital Diagnostic Bilat; Future    Continue monitoring blood sugar at home,ADA diet.      Continue monitoring blood pressure at home, low sodium diet.

## 2020-12-08 ENCOUNTER — TELEPHONE (OUTPATIENT)
Dept: INTERNAL MEDICINE | Facility: CLINIC | Age: 65
End: 2020-12-08

## 2020-12-08 DIAGNOSIS — R92.1 BREAST CALCIFICATION SEEN ON MAMMOGRAM: Primary | ICD-10-CM

## 2020-12-11 ENCOUNTER — PATIENT OUTREACH (OUTPATIENT)
Dept: ADMINISTRATIVE | Facility: HOSPITAL | Age: 65
End: 2020-12-11

## 2020-12-17 ENCOUNTER — LAB VISIT (OUTPATIENT)
Dept: LAB | Facility: HOSPITAL | Age: 65
End: 2020-12-17
Attending: INTERNAL MEDICINE
Payer: MEDICARE

## 2020-12-17 DIAGNOSIS — E89.0 POSTOPERATIVE HYPOTHYROIDISM: ICD-10-CM

## 2020-12-17 DIAGNOSIS — E20.9 HYPOPARATHYROIDISM, UNSPECIFIED HYPOPARATHYROIDISM TYPE: ICD-10-CM

## 2020-12-17 LAB
ALBUMIN SERPL BCP-MCNC: 4.4 G/DL (ref 3.5–5.2)
ANION GAP SERPL CALC-SCNC: 11 MMOL/L (ref 8–16)
CALCIUM SERPL-MCNC: 9 MG/DL (ref 8.7–10.5)
CHLORIDE SERPL-SCNC: 96 MMOL/L (ref 95–110)
CO2 SERPL-SCNC: 29 MMOL/L (ref 23–29)
CREAT SERPL-MCNC: 0.74 MG/DL (ref 0.5–1.4)
EST. GFR  (AFRICAN AMERICAN): >60 ML/MIN/1.73 M^2
EST. GFR  (NON AFRICAN AMERICAN): >60 ML/MIN/1.73 M^2
GLUCOSE SERPL-MCNC: 177 MG/DL (ref 70–110)
PHOSPHATE SERPL-MCNC: 4.6 MG/DL (ref 2.7–4.5)
POTASSIUM SERPL-SCNC: 4.8 MMOL/L (ref 3.5–5.1)
SODIUM SERPL-SCNC: 136 MMOL/L (ref 136–145)
TSH SERPL DL<=0.005 MIU/L-ACNC: 1.1 UIU/ML (ref 0.4–4)
UUN UR-MCNC: 20 MG/DL (ref 7–17)

## 2020-12-17 PROCEDURE — 36415 COLL VENOUS BLD VENIPUNCTURE: CPT | Mod: PO

## 2020-12-17 PROCEDURE — 84443 ASSAY THYROID STIM HORMONE: CPT | Mod: PO

## 2020-12-17 PROCEDURE — 80069 RENAL FUNCTION PANEL: CPT | Mod: PO

## 2020-12-18 ENCOUNTER — PATIENT MESSAGE (OUTPATIENT)
Dept: ENDOCRINOLOGY | Facility: CLINIC | Age: 65
End: 2020-12-18

## 2021-01-01 ENCOUNTER — PATIENT MESSAGE (OUTPATIENT)
Dept: FAMILY MEDICINE | Facility: CLINIC | Age: 66
End: 2021-01-01

## 2021-01-02 ENCOUNTER — OFFICE VISIT (OUTPATIENT)
Dept: URGENT CARE | Facility: CLINIC | Age: 66
End: 2021-01-02
Payer: MEDICARE

## 2021-01-02 VITALS
SYSTOLIC BLOOD PRESSURE: 120 MMHG | WEIGHT: 120 LBS | TEMPERATURE: 100 F | RESPIRATION RATE: 18 BRPM | HEIGHT: 62 IN | BODY MASS INDEX: 22.08 KG/M2 | HEART RATE: 100 BPM | OXYGEN SATURATION: 98 % | DIASTOLIC BLOOD PRESSURE: 72 MMHG

## 2021-01-02 DIAGNOSIS — U07.1 COVID-19: Primary | ICD-10-CM

## 2021-01-02 LAB
CTP QC/QA: YES
SARS-COV-2 RDRP RESP QL NAA+PROBE: POSITIVE

## 2021-01-02 PROCEDURE — 99213 PR OFFICE/OUTPT VISIT, EST, LEVL III, 20-29 MIN: ICD-10-PCS | Mod: S$GLB,,, | Performed by: PHYSICIAN ASSISTANT

## 2021-01-02 PROCEDURE — 1125F AMNT PAIN NOTED PAIN PRSNT: CPT | Mod: S$GLB,,, | Performed by: PHYSICIAN ASSISTANT

## 2021-01-02 PROCEDURE — 1125F PR PAIN SEVERITY QUANTIFIED, PAIN PRESENT: ICD-10-PCS | Mod: S$GLB,,, | Performed by: PHYSICIAN ASSISTANT

## 2021-01-02 PROCEDURE — 3008F PR BODY MASS INDEX (BMI) DOCUMENTED: ICD-10-PCS | Mod: CPTII,S$GLB,, | Performed by: PHYSICIAN ASSISTANT

## 2021-01-02 PROCEDURE — 99213 OFFICE O/P EST LOW 20 MIN: CPT | Mod: S$GLB,,, | Performed by: PHYSICIAN ASSISTANT

## 2021-01-02 PROCEDURE — 3008F BODY MASS INDEX DOCD: CPT | Mod: CPTII,S$GLB,, | Performed by: PHYSICIAN ASSISTANT

## 2021-01-02 PROCEDURE — U0002: ICD-10-PCS | Mod: QW,S$GLB,, | Performed by: PHYSICIAN ASSISTANT

## 2021-01-02 PROCEDURE — U0002 COVID-19 LAB TEST NON-CDC: HCPCS | Mod: QW,S$GLB,, | Performed by: PHYSICIAN ASSISTANT

## 2021-01-02 RX ORDER — BENZONATATE 100 MG/1
200 CAPSULE ORAL 3 TIMES DAILY PRN
Qty: 20 CAPSULE | Refills: 1 | Status: SHIPPED | OUTPATIENT
Start: 2021-01-02 | End: 2021-09-17

## 2021-01-04 ENCOUNTER — PATIENT MESSAGE (OUTPATIENT)
Dept: FAMILY MEDICINE | Facility: CLINIC | Age: 66
End: 2021-01-04

## 2021-01-04 ENCOUNTER — TELEPHONE (OUTPATIENT)
Dept: FAMILY MEDICINE | Facility: CLINIC | Age: 66
End: 2021-01-04

## 2021-01-04 DIAGNOSIS — U07.1 COVID-19 VIRUS INFECTION: Primary | ICD-10-CM

## 2021-01-04 RX ORDER — AZITHROMYCIN 250 MG/1
TABLET, FILM COATED ORAL
Qty: 6 TABLET | Refills: 0 | Status: SHIPPED | OUTPATIENT
Start: 2021-01-04 | End: 2021-09-17

## 2021-01-04 RX ORDER — HYDROCODONE POLISTIREX AND CHLORPHENIRAMINE POLISTIREX 10; 8 MG/5ML; MG/5ML
5 SUSPENSION, EXTENDED RELEASE ORAL EVERY 12 HOURS PRN
Qty: 115 ML | Refills: 0 | Status: SHIPPED | OUTPATIENT
Start: 2021-01-04 | End: 2021-01-05

## 2021-01-05 ENCOUNTER — PATIENT MESSAGE (OUTPATIENT)
Dept: FAMILY MEDICINE | Facility: CLINIC | Age: 66
End: 2021-01-05

## 2021-01-05 RX ORDER — PROMETHAZINE HYDROCHLORIDE AND DEXTROMETHORPHAN HYDROBROMIDE 6.25; 15 MG/5ML; MG/5ML
5 SYRUP ORAL 4 TIMES DAILY PRN
Qty: 180 ML | Refills: 0 | Status: SHIPPED | OUTPATIENT
Start: 2021-01-05 | End: 2021-01-15

## 2021-01-06 ENCOUNTER — HOSPITAL ENCOUNTER (OUTPATIENT)
Dept: RADIOLOGY | Facility: HOSPITAL | Age: 66
Discharge: HOME | End: 2021-01-06
Attending: FAMILY MEDICINE
Payer: MEDICARE

## 2021-01-06 DIAGNOSIS — U07.1 COVID-19 VIRUS INFECTION: ICD-10-CM

## 2021-01-06 PROCEDURE — 71046 X-RAY EXAM CHEST 2 VIEWS: CPT | Mod: TC,FY,PO

## 2021-01-07 ENCOUNTER — INFUSION (OUTPATIENT)
Dept: INFECTIOUS DISEASES | Facility: HOSPITAL | Age: 66
End: 2021-01-07
Attending: PHYSICIAN ASSISTANT
Payer: MEDICARE

## 2021-01-07 VITALS
WEIGHT: 124 LBS | HEIGHT: 63 IN | BODY MASS INDEX: 21.97 KG/M2 | TEMPERATURE: 100 F | OXYGEN SATURATION: 97 % | RESPIRATION RATE: 28 BRPM | HEART RATE: 96 BPM | DIASTOLIC BLOOD PRESSURE: 66 MMHG | SYSTOLIC BLOOD PRESSURE: 153 MMHG

## 2021-01-07 DIAGNOSIS — U07.1 COVID-19 VIRUS INFECTION: ICD-10-CM

## 2021-01-07 PROCEDURE — M0239 BAMLANIVIMAB-XXXX INFUSION: HCPCS | Performed by: INTERNAL MEDICINE

## 2021-01-07 PROCEDURE — 63600175 PHARM REV CODE 636 W HCPCS: Performed by: INTERNAL MEDICINE

## 2021-01-07 PROCEDURE — 25000003 PHARM REV CODE 250: Performed by: INTERNAL MEDICINE

## 2021-01-07 RX ORDER — EPINEPHRINE 0.1 MG/ML
0.3 INJECTION INTRAVENOUS
Status: DISCONTINUED | OUTPATIENT
Start: 2021-01-07 | End: 2021-09-17

## 2021-01-07 RX ORDER — DIPHENHYDRAMINE HYDROCHLORIDE 50 MG/ML
25 INJECTION INTRAMUSCULAR; INTRAVENOUS ONCE AS NEEDED
Status: DISCONTINUED | OUTPATIENT
Start: 2021-01-07 | End: 2021-09-17

## 2021-01-07 RX ORDER — ACETAMINOPHEN 325 MG/1
650 TABLET ORAL ONCE AS NEEDED
Status: DISCONTINUED | OUTPATIENT
Start: 2021-01-07 | End: 2022-02-23

## 2021-01-07 RX ORDER — ONDANSETRON 4 MG/1
4 TABLET, ORALLY DISINTEGRATING ORAL ONCE AS NEEDED
Status: DISCONTINUED | OUTPATIENT
Start: 2021-01-07 | End: 2021-09-17

## 2021-01-07 RX ORDER — SODIUM CHLORIDE 0.9 % (FLUSH) 0.9 %
10 SYRINGE (ML) INJECTION
Status: DISCONTINUED | OUTPATIENT
Start: 2021-01-07 | End: 2021-05-13

## 2021-01-07 RX ORDER — ALBUTEROL SULFATE 90 UG/1
2 AEROSOL, METERED RESPIRATORY (INHALATION)
Status: DISCONTINUED | OUTPATIENT
Start: 2021-01-07 | End: 2022-02-23

## 2021-01-07 RX ADMIN — SODIUM CHLORIDE 700 MG: 0.9 INJECTION, SOLUTION INTRAVENOUS at 12:01

## 2021-01-09 ENCOUNTER — TELEPHONE (OUTPATIENT)
Dept: ADMINISTRATIVE | Facility: CLINIC | Age: 66
End: 2021-01-09

## 2021-01-12 ENCOUNTER — NURSE TRIAGE (OUTPATIENT)
Dept: ADMINISTRATIVE | Facility: CLINIC | Age: 66
End: 2021-01-12

## 2021-01-12 ENCOUNTER — TELEPHONE (OUTPATIENT)
Dept: ADMINISTRATIVE | Facility: CLINIC | Age: 66
End: 2021-01-12

## 2021-01-13 ENCOUNTER — TELEPHONE (OUTPATIENT)
Dept: FAMILY MEDICINE | Facility: CLINIC | Age: 66
End: 2021-01-13

## 2021-01-13 DIAGNOSIS — J12.82 PNEUMONIA DUE TO COVID-19 VIRUS: Primary | ICD-10-CM

## 2021-01-13 DIAGNOSIS — U07.1 PNEUMONIA DUE TO COVID-19 VIRUS: Primary | ICD-10-CM

## 2021-01-19 ENCOUNTER — TELEPHONE (OUTPATIENT)
Dept: FAMILY MEDICINE | Facility: CLINIC | Age: 66
End: 2021-01-19

## 2021-01-19 ENCOUNTER — HOSPITAL ENCOUNTER (OUTPATIENT)
Dept: RADIOLOGY | Facility: HOSPITAL | Age: 66
Discharge: HOME | End: 2021-01-19
Attending: FAMILY MEDICINE
Payer: MEDICARE

## 2021-01-19 DIAGNOSIS — U07.1 PNEUMONIA DUE TO COVID-19 VIRUS: ICD-10-CM

## 2021-01-19 DIAGNOSIS — J12.82 PNEUMONIA DUE TO COVID-19 VIRUS: ICD-10-CM

## 2021-01-19 DIAGNOSIS — J18.0 BRONCHOPNEUMONIA: Primary | ICD-10-CM

## 2021-01-19 PROCEDURE — 71046 X-RAY EXAM CHEST 2 VIEWS: CPT | Mod: TC,FY,PO

## 2021-01-19 RX ORDER — DOXYCYCLINE 100 MG/1
100 CAPSULE ORAL 2 TIMES DAILY WITH MEALS
Qty: 20 CAPSULE | Refills: 0 | Status: SHIPPED | OUTPATIENT
Start: 2021-01-19 | End: 2021-01-29

## 2021-01-29 ENCOUNTER — TELEPHONE (OUTPATIENT)
Dept: ENDOCRINOLOGY | Facility: CLINIC | Age: 66
End: 2021-01-29

## 2021-02-01 ENCOUNTER — LAB VISIT (OUTPATIENT)
Dept: LAB | Facility: HOSPITAL | Age: 66
End: 2021-02-01
Attending: INTERNAL MEDICINE
Payer: MEDICARE

## 2021-02-01 ENCOUNTER — OFFICE VISIT (OUTPATIENT)
Dept: ENDOCRINOLOGY | Facility: CLINIC | Age: 66
End: 2021-02-01
Payer: MEDICARE

## 2021-02-01 VITALS
HEART RATE: 102 BPM | RESPIRATION RATE: 17 BRPM | DIASTOLIC BLOOD PRESSURE: 88 MMHG | HEIGHT: 62 IN | BODY MASS INDEX: 23.06 KG/M2 | OXYGEN SATURATION: 96 % | WEIGHT: 125.31 LBS | SYSTOLIC BLOOD PRESSURE: 152 MMHG

## 2021-02-01 DIAGNOSIS — E89.2 POSTSURGICAL HYPOPARATHYROIDISM: ICD-10-CM

## 2021-02-01 DIAGNOSIS — E78.5 HYPERLIPIDEMIA, UNSPECIFIED HYPERLIPIDEMIA TYPE: ICD-10-CM

## 2021-02-01 DIAGNOSIS — E89.0 POSTSURGICAL HYPOTHYROIDISM: ICD-10-CM

## 2021-02-01 DIAGNOSIS — C73 PAPILLARY THYROID CARCINOMA: ICD-10-CM

## 2021-02-01 DIAGNOSIS — E11.65 TYPE 2 DIABETES MELLITUS WITH HYPERGLYCEMIA, WITHOUT LONG-TERM CURRENT USE OF INSULIN: ICD-10-CM

## 2021-02-01 DIAGNOSIS — C73 PAPILLARY THYROID CARCINOMA: Primary | ICD-10-CM

## 2021-02-01 DIAGNOSIS — I10 ESSENTIAL HYPERTENSION: ICD-10-CM

## 2021-02-01 LAB
ALBUMIN SERPL BCP-MCNC: 4 G/DL (ref 3.5–5.2)
ANION GAP SERPL CALC-SCNC: 12 MMOL/L (ref 8–16)
BUN SERPL-MCNC: 16 MG/DL (ref 8–23)
CALCIUM SERPL-MCNC: 9.2 MG/DL (ref 8.7–10.5)
CHLORIDE SERPL-SCNC: 99 MMOL/L (ref 95–110)
CO2 SERPL-SCNC: 26 MMOL/L (ref 23–29)
CREAT SERPL-MCNC: 0.8 MG/DL (ref 0.5–1.4)
EST. GFR  (AFRICAN AMERICAN): >60 ML/MIN/1.73 M^2
EST. GFR  (NON AFRICAN AMERICAN): >60 ML/MIN/1.73 M^2
GLUCOSE SERPL-MCNC: 139 MG/DL (ref 70–110)
PHOSPHATE SERPL-MCNC: 3.5 MG/DL (ref 2.7–4.5)
POTASSIUM SERPL-SCNC: 4.5 MMOL/L (ref 3.5–5.1)
SODIUM SERPL-SCNC: 137 MMOL/L (ref 136–145)
TSH SERPL DL<=0.005 MIU/L-ACNC: 0.68 UIU/ML (ref 0.4–4)

## 2021-02-01 PROCEDURE — 99499 UNLISTED E&M SERVICE: CPT | Mod: S$GLB,,, | Performed by: INTERNAL MEDICINE

## 2021-02-01 PROCEDURE — 84443 ASSAY THYROID STIM HORMONE: CPT

## 2021-02-01 PROCEDURE — 3008F PR BODY MASS INDEX (BMI) DOCUMENTED: ICD-10-PCS | Mod: CPTII,S$GLB,, | Performed by: INTERNAL MEDICINE

## 2021-02-01 PROCEDURE — 1126F PR PAIN SEVERITY QUANTIFIED, NO PAIN PRESENT: ICD-10-PCS | Mod: S$GLB,,, | Performed by: INTERNAL MEDICINE

## 2021-02-01 PROCEDURE — 3077F SYST BP >= 140 MM HG: CPT | Mod: CPTII,S$GLB,, | Performed by: INTERNAL MEDICINE

## 2021-02-01 PROCEDURE — 1159F MED LIST DOCD IN RCRD: CPT | Mod: S$GLB,,, | Performed by: INTERNAL MEDICINE

## 2021-02-01 PROCEDURE — 99499 RISK ADDL DX/OHS AUDIT: ICD-10-PCS | Mod: S$GLB,,, | Performed by: INTERNAL MEDICINE

## 2021-02-01 PROCEDURE — 36415 COLL VENOUS BLD VENIPUNCTURE: CPT

## 2021-02-01 PROCEDURE — 1101F PR PT FALLS ASSESS DOC 0-1 FALLS W/OUT INJ PAST YR: ICD-10-PCS | Mod: CPTII,S$GLB,, | Performed by: INTERNAL MEDICINE

## 2021-02-01 PROCEDURE — 99214 OFFICE O/P EST MOD 30 MIN: CPT | Mod: S$GLB,,, | Performed by: INTERNAL MEDICINE

## 2021-02-01 PROCEDURE — 3288F FALL RISK ASSESSMENT DOCD: CPT | Mod: CPTII,S$GLB,, | Performed by: INTERNAL MEDICINE

## 2021-02-01 PROCEDURE — 84432 ASSAY OF THYROGLOBULIN: CPT

## 2021-02-01 PROCEDURE — 99999 PR PBB SHADOW E&M-EST. PATIENT-LVL V: CPT | Mod: PBBFAC,,, | Performed by: INTERNAL MEDICINE

## 2021-02-01 PROCEDURE — 99214 PR OFFICE/OUTPT VISIT, EST, LEVL IV, 30-39 MIN: ICD-10-PCS | Mod: S$GLB,,, | Performed by: INTERNAL MEDICINE

## 2021-02-01 PROCEDURE — 3079F DIAST BP 80-89 MM HG: CPT | Mod: CPTII,S$GLB,, | Performed by: INTERNAL MEDICINE

## 2021-02-01 PROCEDURE — 3288F PR FALLS RISK ASSESSMENT DOCUMENTED: ICD-10-PCS | Mod: CPTII,S$GLB,, | Performed by: INTERNAL MEDICINE

## 2021-02-01 PROCEDURE — 3044F PR MOST RECENT HEMOGLOBIN A1C LEVEL <7.0%: ICD-10-PCS | Mod: CPTII,S$GLB,, | Performed by: INTERNAL MEDICINE

## 2021-02-01 PROCEDURE — 3008F BODY MASS INDEX DOCD: CPT | Mod: CPTII,S$GLB,, | Performed by: INTERNAL MEDICINE

## 2021-02-01 PROCEDURE — 3079F PR MOST RECENT DIASTOLIC BLOOD PRESSURE 80-89 MM HG: ICD-10-PCS | Mod: CPTII,S$GLB,, | Performed by: INTERNAL MEDICINE

## 2021-02-01 PROCEDURE — 3077F PR MOST RECENT SYSTOLIC BLOOD PRESSURE >= 140 MM HG: ICD-10-PCS | Mod: CPTII,S$GLB,, | Performed by: INTERNAL MEDICINE

## 2021-02-01 PROCEDURE — 1159F PR MEDICATION LIST DOCUMENTED IN MEDICAL RECORD: ICD-10-PCS | Mod: S$GLB,,, | Performed by: INTERNAL MEDICINE

## 2021-02-01 PROCEDURE — 3044F HG A1C LEVEL LT 7.0%: CPT | Mod: CPTII,S$GLB,, | Performed by: INTERNAL MEDICINE

## 2021-02-01 PROCEDURE — 1101F PT FALLS ASSESS-DOCD LE1/YR: CPT | Mod: CPTII,S$GLB,, | Performed by: INTERNAL MEDICINE

## 2021-02-01 PROCEDURE — 1126F AMNT PAIN NOTED NONE PRSNT: CPT | Mod: S$GLB,,, | Performed by: INTERNAL MEDICINE

## 2021-02-01 PROCEDURE — 99999 PR PBB SHADOW E&M-EST. PATIENT-LVL V: ICD-10-PCS | Mod: PBBFAC,,, | Performed by: INTERNAL MEDICINE

## 2021-02-01 PROCEDURE — 80069 RENAL FUNCTION PANEL: CPT

## 2021-02-02 LAB
THRYOGLOBULIN INTERPRETATION: ABNORMAL
THYROGLOB AB SERPL-ACNC: <1.8 IU/ML
THYROGLOB SERPL-MCNC: 0.1 NG/ML

## 2021-02-03 ENCOUNTER — PATIENT MESSAGE (OUTPATIENT)
Dept: ENDOCRINOLOGY | Facility: CLINIC | Age: 66
End: 2021-02-03

## 2021-02-04 ENCOUNTER — HOSPITAL ENCOUNTER (OUTPATIENT)
Dept: RADIOLOGY | Facility: HOSPITAL | Age: 66
Discharge: HOME | End: 2021-02-04
Attending: FAMILY MEDICINE
Payer: MEDICARE

## 2021-02-04 DIAGNOSIS — J18.0 BRONCHOPNEUMONIA: ICD-10-CM

## 2021-02-04 PROCEDURE — 71046 X-RAY EXAM CHEST 2 VIEWS: CPT | Mod: TC,FY,PO

## 2021-02-06 ENCOUNTER — PATIENT MESSAGE (OUTPATIENT)
Dept: FAMILY MEDICINE | Facility: CLINIC | Age: 66
End: 2021-02-06

## 2021-02-08 ENCOUNTER — PATIENT MESSAGE (OUTPATIENT)
Dept: FAMILY MEDICINE | Facility: CLINIC | Age: 66
End: 2021-02-08

## 2021-02-11 ENCOUNTER — PATIENT MESSAGE (OUTPATIENT)
Dept: FAMILY MEDICINE | Facility: CLINIC | Age: 66
End: 2021-02-11

## 2021-02-11 ENCOUNTER — TELEPHONE (OUTPATIENT)
Dept: FAMILY MEDICINE | Facility: CLINIC | Age: 66
End: 2021-02-11

## 2021-03-18 ENCOUNTER — PATIENT MESSAGE (OUTPATIENT)
Dept: ENDOCRINOLOGY | Facility: CLINIC | Age: 66
End: 2021-03-18

## 2021-03-18 ENCOUNTER — HOSPITAL ENCOUNTER (OUTPATIENT)
Dept: ENDOCRINOLOGY | Facility: CLINIC | Age: 66
Discharge: HOME | End: 2021-03-18
Attending: INTERNAL MEDICINE
Payer: MEDICARE

## 2021-03-18 DIAGNOSIS — C73 PAPILLARY THYROID CARCINOMA: ICD-10-CM

## 2021-03-18 PROCEDURE — 76536 US SOFT TISSUE HEAD NECK THYROID: ICD-10-PCS | Mod: S$GLB,,, | Performed by: INTERNAL MEDICINE

## 2021-03-18 PROCEDURE — 76536 US EXAM OF HEAD AND NECK: CPT | Mod: S$GLB,,, | Performed by: INTERNAL MEDICINE

## 2021-05-07 ENCOUNTER — LAB VISIT (OUTPATIENT)
Dept: LAB | Facility: HOSPITAL | Age: 66
End: 2021-05-07
Attending: FAMILY MEDICINE
Payer: MEDICARE

## 2021-05-07 ENCOUNTER — TELEPHONE (OUTPATIENT)
Dept: FAMILY MEDICINE | Facility: CLINIC | Age: 66
End: 2021-05-07

## 2021-05-07 DIAGNOSIS — E87.5 HYPERKALEMIA: Primary | ICD-10-CM

## 2021-05-07 DIAGNOSIS — I10 ESSENTIAL HYPERTENSION: ICD-10-CM

## 2021-05-07 DIAGNOSIS — E03.9 HYPOTHYROIDISM, UNSPECIFIED TYPE: ICD-10-CM

## 2021-05-07 DIAGNOSIS — E78.5 DYSLIPIDEMIA: ICD-10-CM

## 2021-05-07 DIAGNOSIS — E11.65 TYPE 2 DIABETES MELLITUS WITH HYPERGLYCEMIA, WITHOUT LONG-TERM CURRENT USE OF INSULIN: ICD-10-CM

## 2021-05-07 DIAGNOSIS — Z00.00 ANNUAL PHYSICAL EXAM: ICD-10-CM

## 2021-05-07 LAB
ALBUMIN SERPL BCP-MCNC: 4.5 G/DL (ref 3.5–5.2)
ALP SERPL-CCNC: 85 U/L (ref 38–126)
ALT SERPL W/O P-5'-P-CCNC: 53 U/L (ref 10–44)
ANION GAP SERPL CALC-SCNC: 7 MMOL/L (ref 8–16)
AST SERPL-CCNC: 43 U/L (ref 15–46)
BASOPHILS # BLD AUTO: 0.05 K/UL (ref 0–0.2)
BASOPHILS NFR BLD: 0.7 % (ref 0–1.9)
BILIRUB SERPL-MCNC: 0.6 MG/DL (ref 0.1–1)
CALCIUM SERPL-MCNC: 9 MG/DL (ref 8.7–10.5)
CHLORIDE SERPL-SCNC: 99 MMOL/L (ref 95–110)
CHOLEST SERPL-MCNC: 150 MG/DL (ref 120–199)
CHOLEST/HDLC SERPL: 4.1 {RATIO} (ref 2–5)
CO2 SERPL-SCNC: 30 MMOL/L (ref 23–29)
CREAT SERPL-MCNC: 0.8 MG/DL (ref 0.5–1.4)
DIFFERENTIAL METHOD: NORMAL
EOSINOPHIL # BLD AUTO: 0.3 K/UL (ref 0–0.5)
EOSINOPHIL NFR BLD: 4.5 % (ref 0–8)
ERYTHROCYTE [DISTWIDTH] IN BLOOD BY AUTOMATED COUNT: 12 % (ref 11.5–14.5)
EST. GFR  (AFRICAN AMERICAN): >60 ML/MIN/1.73 M^2
EST. GFR  (NON AFRICAN AMERICAN): >60 ML/MIN/1.73 M^2
ESTIMATED AVG GLUCOSE: 151 MG/DL (ref 68–131)
GLUCOSE SERPL-MCNC: 132 MG/DL (ref 70–110)
HBA1C MFR BLD: 6.9 % (ref 4–5.6)
HCT VFR BLD AUTO: 39.8 % (ref 37–48.5)
HDLC SERPL-MCNC: 37 MG/DL (ref 40–75)
HDLC SERPL: 24.7 % (ref 20–50)
HGB BLD-MCNC: 12.9 G/DL (ref 12–16)
IMM GRANULOCYTES # BLD AUTO: 0.01 K/UL (ref 0–0.04)
IMM GRANULOCYTES NFR BLD AUTO: 0.1 % (ref 0–0.5)
LDLC SERPL CALC-MCNC: 73.2 MG/DL (ref 63–159)
LYMPHOCYTES # BLD AUTO: 3.2 K/UL (ref 1–4.8)
LYMPHOCYTES NFR BLD: 44.8 % (ref 18–48)
MCH RBC QN AUTO: 28.4 PG (ref 27–31)
MCHC RBC AUTO-ENTMCNC: 32.4 G/DL (ref 32–36)
MCV RBC AUTO: 88 FL (ref 82–98)
MONOCYTES # BLD AUTO: 0.5 K/UL (ref 0.3–1)
MONOCYTES NFR BLD: 6.8 % (ref 4–15)
NEUTROPHILS # BLD AUTO: 3.1 K/UL (ref 1.8–7.7)
NEUTROPHILS NFR BLD: 43.1 % (ref 38–73)
NONHDLC SERPL-MCNC: 113 MG/DL
NRBC BLD-RTO: 0 /100 WBC
PLATELET # BLD AUTO: 308 K/UL (ref 150–450)
PMV BLD AUTO: 10.5 FL (ref 9.2–12.9)
POTASSIUM SERPL-SCNC: 5.4 MMOL/L (ref 3.5–5.1)
PROT SERPL-MCNC: 8 G/DL (ref 6–8.4)
RBC # BLD AUTO: 4.55 M/UL (ref 4–5.4)
SODIUM SERPL-SCNC: 136 MMOL/L (ref 136–145)
TRIGL SERPL-MCNC: 199 MG/DL (ref 30–150)
UUN UR-MCNC: 15 MG/DL (ref 7–17)
WBC # BLD AUTO: 7.17 K/UL (ref 3.9–12.7)

## 2021-05-07 PROCEDURE — 85025 COMPLETE CBC W/AUTO DIFF WBC: CPT | Mod: PO | Performed by: FAMILY MEDICINE

## 2021-05-07 PROCEDURE — 80053 COMPREHEN METABOLIC PANEL: CPT | Mod: PO | Performed by: FAMILY MEDICINE

## 2021-05-07 PROCEDURE — 80061 LIPID PANEL: CPT | Performed by: FAMILY MEDICINE

## 2021-05-07 PROCEDURE — 36415 COLL VENOUS BLD VENIPUNCTURE: CPT | Mod: PO | Performed by: FAMILY MEDICINE

## 2021-05-07 PROCEDURE — 83036 HEMOGLOBIN GLYCOSYLATED A1C: CPT | Performed by: FAMILY MEDICINE

## 2021-05-12 ENCOUNTER — LAB VISIT (OUTPATIENT)
Dept: LAB | Facility: HOSPITAL | Age: 66
End: 2021-05-12
Attending: FAMILY MEDICINE
Payer: MEDICARE

## 2021-05-12 DIAGNOSIS — E87.5 HYPERKALEMIA: ICD-10-CM

## 2021-05-12 LAB
ANION GAP SERPL CALC-SCNC: 8 MMOL/L (ref 8–16)
CALCIUM SERPL-MCNC: 9.4 MG/DL (ref 8.7–10.5)
CHLORIDE SERPL-SCNC: 100 MMOL/L (ref 95–110)
CO2 SERPL-SCNC: 28 MMOL/L (ref 23–29)
CREAT SERPL-MCNC: 0.82 MG/DL (ref 0.5–1.4)
EST. GFR  (AFRICAN AMERICAN): >60 ML/MIN/1.73 M^2
EST. GFR  (NON AFRICAN AMERICAN): >60 ML/MIN/1.73 M^2
GLUCOSE SERPL-MCNC: 141 MG/DL (ref 70–110)
POTASSIUM SERPL-SCNC: 5.1 MMOL/L (ref 3.5–5.1)
SODIUM SERPL-SCNC: 136 MMOL/L (ref 136–145)
UUN UR-MCNC: 21 MG/DL (ref 7–17)

## 2021-05-12 PROCEDURE — 80048 BASIC METABOLIC PNL TOTAL CA: CPT | Mod: PO | Performed by: FAMILY MEDICINE

## 2021-05-12 PROCEDURE — 36415 COLL VENOUS BLD VENIPUNCTURE: CPT | Mod: PO | Performed by: FAMILY MEDICINE

## 2021-05-13 ENCOUNTER — OFFICE VISIT (OUTPATIENT)
Dept: FAMILY MEDICINE | Facility: CLINIC | Age: 66
End: 2021-05-13
Payer: MEDICARE

## 2021-05-13 VITALS
SYSTOLIC BLOOD PRESSURE: 152 MMHG | BODY MASS INDEX: 23.53 KG/M2 | HEIGHT: 62 IN | HEART RATE: 85 BPM | OXYGEN SATURATION: 99 % | DIASTOLIC BLOOD PRESSURE: 80 MMHG | WEIGHT: 127.88 LBS

## 2021-05-13 DIAGNOSIS — E11.65 TYPE 2 DIABETES MELLITUS WITH HYPERGLYCEMIA, WITHOUT LONG-TERM CURRENT USE OF INSULIN: ICD-10-CM

## 2021-05-13 DIAGNOSIS — I10 ESSENTIAL HYPERTENSION: ICD-10-CM

## 2021-05-13 DIAGNOSIS — M26.621 ARTHRALGIA OF RIGHT TEMPOROMANDIBULAR JOINT: Primary | ICD-10-CM

## 2021-05-13 DIAGNOSIS — G89.29 CHRONIC PAIN OF LEFT KNEE: ICD-10-CM

## 2021-05-13 DIAGNOSIS — M25.562 CHRONIC PAIN OF LEFT KNEE: ICD-10-CM

## 2021-05-13 PROCEDURE — 99215 PR OFFICE/OUTPT VISIT, EST, LEVL V, 40-54 MIN: ICD-10-PCS | Mod: S$GLB,,, | Performed by: FAMILY MEDICINE

## 2021-05-13 PROCEDURE — 1159F PR MEDICATION LIST DOCUMENTED IN MEDICAL RECORD: ICD-10-PCS | Mod: S$GLB,,, | Performed by: FAMILY MEDICINE

## 2021-05-13 PROCEDURE — 3288F PR FALLS RISK ASSESSMENT DOCUMENTED: ICD-10-PCS | Mod: CPTII,S$GLB,, | Performed by: FAMILY MEDICINE

## 2021-05-13 PROCEDURE — 1159F MED LIST DOCD IN RCRD: CPT | Mod: S$GLB,,, | Performed by: FAMILY MEDICINE

## 2021-05-13 PROCEDURE — 99999 PR PBB SHADOW E&M-EST. PATIENT-LVL V: CPT | Mod: PBBFAC,,, | Performed by: FAMILY MEDICINE

## 2021-05-13 PROCEDURE — 99499 RISK ADDL DX/OHS AUDIT: ICD-10-PCS | Mod: S$GLB,,, | Performed by: FAMILY MEDICINE

## 2021-05-13 PROCEDURE — 99215 OFFICE O/P EST HI 40 MIN: CPT | Mod: S$GLB,,, | Performed by: FAMILY MEDICINE

## 2021-05-13 PROCEDURE — 1125F PR PAIN SEVERITY QUANTIFIED, PAIN PRESENT: ICD-10-PCS | Mod: S$GLB,,, | Performed by: FAMILY MEDICINE

## 2021-05-13 PROCEDURE — 1125F AMNT PAIN NOTED PAIN PRSNT: CPT | Mod: S$GLB,,, | Performed by: FAMILY MEDICINE

## 2021-05-13 PROCEDURE — 99499 UNLISTED E&M SERVICE: CPT | Mod: S$GLB,,, | Performed by: FAMILY MEDICINE

## 2021-05-13 PROCEDURE — 3008F PR BODY MASS INDEX (BMI) DOCUMENTED: ICD-10-PCS | Mod: CPTII,S$GLB,, | Performed by: FAMILY MEDICINE

## 2021-05-13 PROCEDURE — 3288F FALL RISK ASSESSMENT DOCD: CPT | Mod: CPTII,S$GLB,, | Performed by: FAMILY MEDICINE

## 2021-05-13 PROCEDURE — 1101F PR PT FALLS ASSESS DOC 0-1 FALLS W/OUT INJ PAST YR: ICD-10-PCS | Mod: CPTII,S$GLB,, | Performed by: FAMILY MEDICINE

## 2021-05-13 PROCEDURE — 3008F BODY MASS INDEX DOCD: CPT | Mod: CPTII,S$GLB,, | Performed by: FAMILY MEDICINE

## 2021-05-13 PROCEDURE — 1101F PT FALLS ASSESS-DOCD LE1/YR: CPT | Mod: CPTII,S$GLB,, | Performed by: FAMILY MEDICINE

## 2021-05-13 PROCEDURE — 99999 PR PBB SHADOW E&M-EST. PATIENT-LVL V: ICD-10-PCS | Mod: PBBFAC,,, | Performed by: FAMILY MEDICINE

## 2021-05-13 RX ORDER — HYDROCHLOROTHIAZIDE 12.5 MG/1
12.5 TABLET ORAL DAILY
Qty: 90 TABLET | Refills: 3 | Status: SHIPPED | OUTPATIENT
Start: 2021-05-13 | End: 2021-09-17

## 2021-05-13 RX ORDER — AMLODIPINE BESYLATE 5 MG/1
5 TABLET ORAL DAILY
Qty: 90 TABLET | Refills: 3 | Status: SHIPPED | OUTPATIENT
Start: 2021-05-13 | End: 2022-07-07 | Stop reason: SDUPTHER

## 2021-05-14 ENCOUNTER — TELEPHONE (OUTPATIENT)
Dept: ORTHOPEDICS | Facility: CLINIC | Age: 66
End: 2021-05-14

## 2021-05-14 ENCOUNTER — HOSPITAL ENCOUNTER (OUTPATIENT)
Dept: RADIOLOGY | Facility: HOSPITAL | Age: 66
Discharge: HOME | End: 2021-05-14
Attending: ORTHOPAEDIC SURGERY
Payer: MEDICARE

## 2021-05-14 ENCOUNTER — OFFICE VISIT (OUTPATIENT)
Dept: ORTHOPEDICS | Facility: CLINIC | Age: 66
End: 2021-05-14
Payer: MEDICARE

## 2021-05-14 VITALS
WEIGHT: 127 LBS | SYSTOLIC BLOOD PRESSURE: 146 MMHG | DIASTOLIC BLOOD PRESSURE: 78 MMHG | HEIGHT: 62 IN | BODY MASS INDEX: 23.37 KG/M2 | HEART RATE: 101 BPM

## 2021-05-14 DIAGNOSIS — M25.569 CHRONIC KNEE PAIN, UNSPECIFIED LATERALITY: Primary | ICD-10-CM

## 2021-05-14 DIAGNOSIS — G89.29 CHRONIC KNEE PAIN, UNSPECIFIED LATERALITY: ICD-10-CM

## 2021-05-14 DIAGNOSIS — M25.562 CHRONIC PAIN OF LEFT KNEE: ICD-10-CM

## 2021-05-14 DIAGNOSIS — G89.29 CHRONIC PAIN OF LEFT KNEE: ICD-10-CM

## 2021-05-14 DIAGNOSIS — M25.569 CHRONIC KNEE PAIN, UNSPECIFIED LATERALITY: ICD-10-CM

## 2021-05-14 DIAGNOSIS — G89.29 CHRONIC KNEE PAIN, UNSPECIFIED LATERALITY: Primary | ICD-10-CM

## 2021-05-14 DIAGNOSIS — M17.12 PRIMARY OSTEOARTHRITIS OF LEFT KNEE: Primary | ICD-10-CM

## 2021-05-14 PROCEDURE — 3008F BODY MASS INDEX DOCD: CPT | Mod: CPTII,S$GLB,, | Performed by: ORTHOPAEDIC SURGERY

## 2021-05-14 PROCEDURE — 73564 XR KNEE ORTHO BILAT WITH FLEXION: ICD-10-PCS | Mod: 26,RT,, | Performed by: RADIOLOGY

## 2021-05-14 PROCEDURE — 1125F PR PAIN SEVERITY QUANTIFIED, PAIN PRESENT: ICD-10-PCS | Mod: S$GLB,,, | Performed by: ORTHOPAEDIC SURGERY

## 2021-05-14 PROCEDURE — 99213 PR OFFICE/OUTPT VISIT, EST, LEVL III, 20-29 MIN: ICD-10-PCS | Mod: 25,S$GLB,, | Performed by: ORTHOPAEDIC SURGERY

## 2021-05-14 PROCEDURE — 1101F PR PT FALLS ASSESS DOC 0-1 FALLS W/OUT INJ PAST YR: ICD-10-PCS | Mod: CPTII,S$GLB,, | Performed by: ORTHOPAEDIC SURGERY

## 2021-05-14 PROCEDURE — 1125F AMNT PAIN NOTED PAIN PRSNT: CPT | Mod: S$GLB,,, | Performed by: ORTHOPAEDIC SURGERY

## 2021-05-14 PROCEDURE — 3008F PR BODY MASS INDEX (BMI) DOCUMENTED: ICD-10-PCS | Mod: CPTII,S$GLB,, | Performed by: ORTHOPAEDIC SURGERY

## 2021-05-14 PROCEDURE — 3288F FALL RISK ASSESSMENT DOCD: CPT | Mod: CPTII,S$GLB,, | Performed by: ORTHOPAEDIC SURGERY

## 2021-05-14 PROCEDURE — 3288F PR FALLS RISK ASSESSMENT DOCUMENTED: ICD-10-PCS | Mod: CPTII,S$GLB,, | Performed by: ORTHOPAEDIC SURGERY

## 2021-05-14 PROCEDURE — 20610 DRAIN/INJ JOINT/BURSA W/O US: CPT | Mod: LT,S$GLB,, | Performed by: ORTHOPAEDIC SURGERY

## 2021-05-14 PROCEDURE — 73564 X-RAY EXAM KNEE 4 OR MORE: CPT | Mod: TC,50,PN

## 2021-05-14 PROCEDURE — 99999 PR PBB SHADOW E&M-EST. PATIENT-LVL V: ICD-10-PCS | Mod: PBBFAC,,, | Performed by: ORTHOPAEDIC SURGERY

## 2021-05-14 PROCEDURE — 73564 X-RAY EXAM KNEE 4 OR MORE: CPT | Mod: 26,LT,, | Performed by: RADIOLOGY

## 2021-05-14 PROCEDURE — 99213 OFFICE O/P EST LOW 20 MIN: CPT | Mod: 25,S$GLB,, | Performed by: ORTHOPAEDIC SURGERY

## 2021-05-14 PROCEDURE — 1159F PR MEDICATION LIST DOCUMENTED IN MEDICAL RECORD: ICD-10-PCS | Mod: S$GLB,,, | Performed by: ORTHOPAEDIC SURGERY

## 2021-05-14 PROCEDURE — 1101F PT FALLS ASSESS-DOCD LE1/YR: CPT | Mod: CPTII,S$GLB,, | Performed by: ORTHOPAEDIC SURGERY

## 2021-05-14 PROCEDURE — 73564 X-RAY EXAM KNEE 4 OR MORE: CPT | Mod: 26,RT,, | Performed by: RADIOLOGY

## 2021-05-14 PROCEDURE — 99999 PR PBB SHADOW E&M-EST. PATIENT-LVL V: CPT | Mod: PBBFAC,,, | Performed by: ORTHOPAEDIC SURGERY

## 2021-05-14 PROCEDURE — 1159F MED LIST DOCD IN RCRD: CPT | Mod: S$GLB,,, | Performed by: ORTHOPAEDIC SURGERY

## 2021-05-14 PROCEDURE — 20610 PR DRAIN/INJECT LARGE JOINT/BURSA: ICD-10-PCS | Mod: LT,S$GLB,, | Performed by: ORTHOPAEDIC SURGERY

## 2021-05-14 RX ORDER — TRIAMCINOLONE ACETONIDE 40 MG/ML
40 INJECTION, SUSPENSION INTRA-ARTICULAR; INTRAMUSCULAR
Status: DISCONTINUED | OUTPATIENT
Start: 2021-05-14 | End: 2021-05-14 | Stop reason: HOSPADM

## 2021-05-14 RX ORDER — NAPROXEN 500 MG/1
500 TABLET ORAL 2 TIMES DAILY WITH MEALS
Qty: 60 TABLET | Refills: 1 | Status: SHIPPED | OUTPATIENT
Start: 2021-05-14 | End: 2021-09-17

## 2021-05-14 RX ADMIN — TRIAMCINOLONE ACETONIDE 40 MG: 40 INJECTION, SUSPENSION INTRA-ARTICULAR; INTRAMUSCULAR at 02:05

## 2021-05-19 ENCOUNTER — TELEPHONE (OUTPATIENT)
Dept: FAMILY MEDICINE | Facility: CLINIC | Age: 66
End: 2021-05-19

## 2021-05-19 DIAGNOSIS — R92.8 OTHER ABNORMAL AND INCONCLUSIVE FINDINGS ON DIAGNOSTIC IMAGING OF BREAST: ICD-10-CM

## 2021-05-19 DIAGNOSIS — Z12.31 ENCOUNTER FOR SCREENING MAMMOGRAM FOR BREAST CANCER: Primary | ICD-10-CM

## 2021-05-24 ENCOUNTER — TELEPHONE (OUTPATIENT)
Dept: FAMILY MEDICINE | Facility: CLINIC | Age: 66
End: 2021-05-24

## 2021-05-24 DIAGNOSIS — R92.8 OTHER ABNORMAL AND INCONCLUSIVE FINDINGS ON DIAGNOSTIC IMAGING OF BREAST: ICD-10-CM

## 2021-05-24 DIAGNOSIS — Z12.31 ENCOUNTER FOR SCREENING MAMMOGRAM FOR BREAST CANCER: Primary | ICD-10-CM

## 2021-05-25 ENCOUNTER — TELEPHONE (OUTPATIENT)
Dept: FAMILY MEDICINE | Facility: CLINIC | Age: 66
End: 2021-05-25

## 2021-05-25 DIAGNOSIS — Z12.31 ENCOUNTER FOR SCREENING MAMMOGRAM FOR BREAST CANCER: ICD-10-CM

## 2021-05-25 DIAGNOSIS — R92.8 ABNORMAL MAMMOGRAM: Primary | ICD-10-CM

## 2021-05-28 ENCOUNTER — PATIENT MESSAGE (OUTPATIENT)
Dept: FAMILY MEDICINE | Facility: CLINIC | Age: 66
End: 2021-05-28

## 2021-06-05 ENCOUNTER — PATIENT MESSAGE (OUTPATIENT)
Dept: FAMILY MEDICINE | Facility: CLINIC | Age: 66
End: 2021-06-05

## 2021-06-07 ENCOUNTER — TELEPHONE (OUTPATIENT)
Dept: INTERNAL MEDICINE | Facility: CLINIC | Age: 66
End: 2021-06-07

## 2021-06-07 ENCOUNTER — TELEPHONE (OUTPATIENT)
Dept: FAMILY MEDICINE | Facility: CLINIC | Age: 66
End: 2021-06-07

## 2021-06-07 DIAGNOSIS — R92.8 ABNORMAL MAMMOGRAM: Primary | ICD-10-CM

## 2021-06-16 ENCOUNTER — PATIENT OUTREACH (OUTPATIENT)
Dept: ADMINISTRATIVE | Facility: HOSPITAL | Age: 66
End: 2021-06-16

## 2021-07-01 ENCOUNTER — OFFICE VISIT (OUTPATIENT)
Dept: CARDIOLOGY | Facility: CLINIC | Age: 66
End: 2021-07-01
Payer: MEDICARE

## 2021-07-01 VITALS
HEART RATE: 82 BPM | WEIGHT: 129.31 LBS | HEIGHT: 62 IN | OXYGEN SATURATION: 98 % | BODY MASS INDEX: 23.79 KG/M2 | SYSTOLIC BLOOD PRESSURE: 140 MMHG | DIASTOLIC BLOOD PRESSURE: 92 MMHG

## 2021-07-01 DIAGNOSIS — I10 ESSENTIAL HYPERTENSION: ICD-10-CM

## 2021-07-01 DIAGNOSIS — R06.02 SOB (SHORTNESS OF BREATH): ICD-10-CM

## 2021-07-01 DIAGNOSIS — E78.5 HYPERLIPIDEMIA, UNSPECIFIED HYPERLIPIDEMIA TYPE: ICD-10-CM

## 2021-07-01 DIAGNOSIS — I44.7 LEFT BUNDLE BRANCH BLOCK: Primary | ICD-10-CM

## 2021-07-01 DIAGNOSIS — E11.65 TYPE 2 DIABETES MELLITUS WITH HYPERGLYCEMIA, WITHOUT LONG-TERM CURRENT USE OF INSULIN: ICD-10-CM

## 2021-07-01 PROCEDURE — 3008F PR BODY MASS INDEX (BMI) DOCUMENTED: ICD-10-PCS | Mod: CPTII,S$GLB,, | Performed by: INTERNAL MEDICINE

## 2021-07-01 PROCEDURE — 1159F PR MEDICATION LIST DOCUMENTED IN MEDICAL RECORD: ICD-10-PCS | Mod: S$GLB,,, | Performed by: INTERNAL MEDICINE

## 2021-07-01 PROCEDURE — 99999 PR PBB SHADOW E&M-EST. PATIENT-LVL IV: CPT | Mod: PBBFAC,,, | Performed by: INTERNAL MEDICINE

## 2021-07-01 PROCEDURE — 99999 PR PBB SHADOW E&M-EST. PATIENT-LVL IV: ICD-10-PCS | Mod: PBBFAC,,, | Performed by: INTERNAL MEDICINE

## 2021-07-01 PROCEDURE — 99214 PR OFFICE/OUTPT VISIT, EST, LEVL IV, 30-39 MIN: ICD-10-PCS | Mod: S$GLB,,, | Performed by: INTERNAL MEDICINE

## 2021-07-01 PROCEDURE — 3008F BODY MASS INDEX DOCD: CPT | Mod: CPTII,S$GLB,, | Performed by: INTERNAL MEDICINE

## 2021-07-01 PROCEDURE — 1159F MED LIST DOCD IN RCRD: CPT | Mod: S$GLB,,, | Performed by: INTERNAL MEDICINE

## 2021-07-01 PROCEDURE — 3288F PR FALLS RISK ASSESSMENT DOCUMENTED: ICD-10-PCS | Mod: CPTII,S$GLB,, | Performed by: INTERNAL MEDICINE

## 2021-07-01 PROCEDURE — 99214 OFFICE O/P EST MOD 30 MIN: CPT | Mod: S$GLB,,, | Performed by: INTERNAL MEDICINE

## 2021-07-01 PROCEDURE — 1101F PT FALLS ASSESS-DOCD LE1/YR: CPT | Mod: CPTII,S$GLB,, | Performed by: INTERNAL MEDICINE

## 2021-07-01 PROCEDURE — 1101F PR PT FALLS ASSESS DOC 0-1 FALLS W/OUT INJ PAST YR: ICD-10-PCS | Mod: CPTII,S$GLB,, | Performed by: INTERNAL MEDICINE

## 2021-07-01 PROCEDURE — 99499 RISK ADDL DX/OHS AUDIT: ICD-10-PCS | Mod: S$GLB,,, | Performed by: INTERNAL MEDICINE

## 2021-07-01 PROCEDURE — 3288F FALL RISK ASSESSMENT DOCD: CPT | Mod: CPTII,S$GLB,, | Performed by: INTERNAL MEDICINE

## 2021-07-01 PROCEDURE — 99499 UNLISTED E&M SERVICE: CPT | Mod: S$GLB,,, | Performed by: INTERNAL MEDICINE

## 2021-07-15 ENCOUNTER — HOSPITAL ENCOUNTER (OUTPATIENT)
Dept: CARDIOLOGY | Facility: HOSPITAL | Age: 66
Discharge: HOME | End: 2021-07-15
Attending: INTERNAL MEDICINE
Payer: MEDICARE

## 2021-07-15 VITALS — WEIGHT: 129 LBS | HEIGHT: 62 IN | BODY MASS INDEX: 23.74 KG/M2

## 2021-07-15 DIAGNOSIS — R06.02 SOB (SHORTNESS OF BREATH): ICD-10-CM

## 2021-07-15 DIAGNOSIS — I10 ESSENTIAL HYPERTENSION: ICD-10-CM

## 2021-07-15 DIAGNOSIS — I44.7 LEFT BUNDLE BRANCH BLOCK: ICD-10-CM

## 2021-07-15 LAB
AORTIC ROOT ANNULUS: 2.58 CM
AORTIC VALVE CUSP SEPERATION: 1.58 CM
AV INDEX (PROSTH): 0.8
AV MEAN GRADIENT: 5 MMHG
AV PEAK GRADIENT: 9 MMHG
AV VALVE AREA: 2.14 CM2
AV VELOCITY RATIO: 0.72
BSA FOR ECHO PROCEDURE: 1.6 M2
CV ECHO LV RWT: 0.55 CM
DOP CALC AO PEAK VEL: 1.49 M/S
DOP CALC AO VTI: 27.59 CM
DOP CALC LVOT AREA: 2.7 CM2
DOP CALC LVOT DIAMETER: 1.85 CM
DOP CALC LVOT PEAK VEL: 1.08 M/S
DOP CALC LVOT STROKE VOLUME: 58.95 CM3
DOP CALCLVOT PEAK VEL VTI: 21.94 CM
ECHO LV POSTERIOR WALL: 1.04 CM (ref 0.6–1.1)
EJECTION FRACTION: 55 %
FRACTIONAL SHORTENING: 35 % (ref 28–44)
INTERVENTRICULAR SEPTUM: 0.92 CM (ref 0.6–1.1)
LA MAJOR: 4.7 CM
LA MINOR: 4.29 CM
LA WIDTH: 3.55 CM
LEFT ATRIUM SIZE: 3.16 CM
LEFT ATRIUM VOLUME INDEX MOD: 19.7 ML/M2
LEFT ATRIUM VOLUME INDEX: 26.9 ML/M2
LEFT ATRIUM VOLUME MOD: 31.39 CM3
LEFT ATRIUM VOLUME: 42.77 CM3
LEFT INTERNAL DIMENSION IN SYSTOLE: 2.45 CM (ref 2.1–4)
LEFT VENTRICLE DIASTOLIC VOLUME INDEX: 38.08 ML/M2
LEFT VENTRICLE DIASTOLIC VOLUME: 60.55 ML
LEFT VENTRICLE MASS INDEX: 70 G/M2
LEFT VENTRICLE SYSTOLIC VOLUME INDEX: 13.4 ML/M2
LEFT VENTRICLE SYSTOLIC VOLUME: 21.28 ML
LEFT VENTRICULAR INTERNAL DIMENSION IN DIASTOLE: 3.76 CM (ref 3.5–6)
LEFT VENTRICULAR MASS: 112.08 G
MV A" WAVE DURATION": 12.46 MSEC
MV MEAN GRADIENT: 1 MMHG
MV PEAK GRADIENT: 6 MMHG
PULM VEIN S/D RATIO: 2.33
PV PEAK D VEL: 0.3 M/S
PV PEAK S VEL: 0.7 M/S
PV PEAK VELOCITY: 1.4 CM/S
RA MAJOR: 3.62 CM
RA PRESSURE: 3 MMHG
RA WIDTH: 2.52 CM
RIGHT VENTRICULAR END-DIASTOLIC DIMENSION: 2.02 CM
RV TISSUE DOPPLER FREE WALL SYSTOLIC VELOCITY 1 (APICAL 4 CHAMBER VIEW): 16.09 CM/S
SINUS: 2.41 CM
TDI LATERAL: 0.06 M/S
TDI SEPTAL: 0.06 M/S
TDI: 0.06 M/S
TRICUSPID ANNULAR PLANE SYSTOLIC EXCURSION: 2.47 CM

## 2021-07-15 PROCEDURE — 93306 ECHO (CUPID ONLY): ICD-10-PCS | Mod: 26,,, | Performed by: INTERNAL MEDICINE

## 2021-07-15 PROCEDURE — 93306 TTE W/DOPPLER COMPLETE: CPT | Mod: PO

## 2021-07-15 PROCEDURE — 93306 TTE W/DOPPLER COMPLETE: CPT | Mod: 26,,, | Performed by: INTERNAL MEDICINE

## 2021-08-04 RX ORDER — METFORMIN HYDROCHLORIDE 1000 MG/1
1000 TABLET ORAL 2 TIMES DAILY WITH MEALS
Qty: 180 TABLET | Refills: 1 | Status: SHIPPED | OUTPATIENT
Start: 2021-08-04 | End: 2022-02-04 | Stop reason: SDUPTHER

## 2021-08-11 ENCOUNTER — PATIENT OUTREACH (OUTPATIENT)
Dept: ADMINISTRATIVE | Facility: OTHER | Age: 66
End: 2021-08-11

## 2021-08-13 ENCOUNTER — OFFICE VISIT (OUTPATIENT)
Dept: ORTHOPEDICS | Facility: CLINIC | Age: 66
End: 2021-08-13
Payer: MEDICARE

## 2021-08-13 VITALS
SYSTOLIC BLOOD PRESSURE: 143 MMHG | DIASTOLIC BLOOD PRESSURE: 74 MMHG | WEIGHT: 129 LBS | HEART RATE: 84 BPM | BODY MASS INDEX: 23.59 KG/M2

## 2021-08-13 DIAGNOSIS — M17.12 PRIMARY OSTEOARTHRITIS OF LEFT KNEE: Primary | ICD-10-CM

## 2021-08-13 PROCEDURE — 1160F RVW MEDS BY RX/DR IN RCRD: CPT | Mod: CPTII,S$GLB,, | Performed by: ORTHOPAEDIC SURGERY

## 2021-08-13 PROCEDURE — 99212 OFFICE O/P EST SF 10 MIN: CPT | Mod: S$GLB,,, | Performed by: ORTHOPAEDIC SURGERY

## 2021-08-13 PROCEDURE — 3044F PR MOST RECENT HEMOGLOBIN A1C LEVEL <7.0%: ICD-10-PCS | Mod: CPTII,S$GLB,, | Performed by: ORTHOPAEDIC SURGERY

## 2021-08-13 PROCEDURE — 3078F PR MOST RECENT DIASTOLIC BLOOD PRESSURE < 80 MM HG: ICD-10-PCS | Mod: CPTII,S$GLB,, | Performed by: ORTHOPAEDIC SURGERY

## 2021-08-13 PROCEDURE — 3008F PR BODY MASS INDEX (BMI) DOCUMENTED: ICD-10-PCS | Mod: CPTII,S$GLB,, | Performed by: ORTHOPAEDIC SURGERY

## 2021-08-13 PROCEDURE — 3078F DIAST BP <80 MM HG: CPT | Mod: CPTII,S$GLB,, | Performed by: ORTHOPAEDIC SURGERY

## 2021-08-13 PROCEDURE — 1159F MED LIST DOCD IN RCRD: CPT | Mod: CPTII,S$GLB,, | Performed by: ORTHOPAEDIC SURGERY

## 2021-08-13 PROCEDURE — 99999 PR PBB SHADOW E&M-EST. PATIENT-LVL IV: ICD-10-PCS | Mod: PBBFAC,,, | Performed by: ORTHOPAEDIC SURGERY

## 2021-08-13 PROCEDURE — 3077F SYST BP >= 140 MM HG: CPT | Mod: CPTII,S$GLB,, | Performed by: ORTHOPAEDIC SURGERY

## 2021-08-13 PROCEDURE — 99212 PR OFFICE/OUTPT VISIT, EST, LEVL II, 10-19 MIN: ICD-10-PCS | Mod: S$GLB,,, | Performed by: ORTHOPAEDIC SURGERY

## 2021-08-13 PROCEDURE — 1125F PR PAIN SEVERITY QUANTIFIED, PAIN PRESENT: ICD-10-PCS | Mod: CPTII,S$GLB,, | Performed by: ORTHOPAEDIC SURGERY

## 2021-08-13 PROCEDURE — 3288F PR FALLS RISK ASSESSMENT DOCUMENTED: ICD-10-PCS | Mod: CPTII,S$GLB,, | Performed by: ORTHOPAEDIC SURGERY

## 2021-08-13 PROCEDURE — 3077F PR MOST RECENT SYSTOLIC BLOOD PRESSURE >= 140 MM HG: ICD-10-PCS | Mod: CPTII,S$GLB,, | Performed by: ORTHOPAEDIC SURGERY

## 2021-08-13 PROCEDURE — 1125F AMNT PAIN NOTED PAIN PRSNT: CPT | Mod: CPTII,S$GLB,, | Performed by: ORTHOPAEDIC SURGERY

## 2021-08-13 PROCEDURE — 3008F BODY MASS INDEX DOCD: CPT | Mod: CPTII,S$GLB,, | Performed by: ORTHOPAEDIC SURGERY

## 2021-08-13 PROCEDURE — 3044F HG A1C LEVEL LT 7.0%: CPT | Mod: CPTII,S$GLB,, | Performed by: ORTHOPAEDIC SURGERY

## 2021-08-13 PROCEDURE — 1101F PT FALLS ASSESS-DOCD LE1/YR: CPT | Mod: CPTII,S$GLB,, | Performed by: ORTHOPAEDIC SURGERY

## 2021-08-13 PROCEDURE — 1160F PR REVIEW ALL MEDS BY PRESCRIBER/CLIN PHARMACIST DOCUMENTED: ICD-10-PCS | Mod: CPTII,S$GLB,, | Performed by: ORTHOPAEDIC SURGERY

## 2021-08-13 PROCEDURE — 99999 PR PBB SHADOW E&M-EST. PATIENT-LVL IV: CPT | Mod: PBBFAC,,, | Performed by: ORTHOPAEDIC SURGERY

## 2021-08-13 PROCEDURE — 3288F FALL RISK ASSESSMENT DOCD: CPT | Mod: CPTII,S$GLB,, | Performed by: ORTHOPAEDIC SURGERY

## 2021-08-13 PROCEDURE — 1101F PR PT FALLS ASSESS DOC 0-1 FALLS W/OUT INJ PAST YR: ICD-10-PCS | Mod: CPTII,S$GLB,, | Performed by: ORTHOPAEDIC SURGERY

## 2021-08-13 PROCEDURE — 1159F PR MEDICATION LIST DOCUMENTED IN MEDICAL RECORD: ICD-10-PCS | Mod: CPTII,S$GLB,, | Performed by: ORTHOPAEDIC SURGERY

## 2021-09-10 ENCOUNTER — LAB VISIT (OUTPATIENT)
Dept: LAB | Facility: HOSPITAL | Age: 66
End: 2021-09-10
Attending: FAMILY MEDICINE
Payer: MEDICARE

## 2021-09-10 ENCOUNTER — TELEPHONE (OUTPATIENT)
Dept: FAMILY MEDICINE | Facility: CLINIC | Age: 66
End: 2021-09-10

## 2021-09-10 DIAGNOSIS — I10 ESSENTIAL HYPERTENSION: ICD-10-CM

## 2021-09-10 DIAGNOSIS — E87.5 HYPERKALEMIA: Primary | ICD-10-CM

## 2021-09-10 DIAGNOSIS — E11.65 TYPE 2 DIABETES MELLITUS WITH HYPERGLYCEMIA, WITHOUT LONG-TERM CURRENT USE OF INSULIN: ICD-10-CM

## 2021-09-10 LAB
ALBUMIN SERPL BCP-MCNC: 4.4 G/DL (ref 3.5–5.2)
ALP SERPL-CCNC: 79 U/L (ref 38–126)
ALT SERPL W/O P-5'-P-CCNC: 49 U/L (ref 10–44)
ANION GAP SERPL CALC-SCNC: 11 MMOL/L (ref 8–16)
AST SERPL-CCNC: 43 U/L (ref 15–46)
BILIRUB SERPL-MCNC: 0.8 MG/DL (ref 0.1–1)
CALCIUM SERPL-MCNC: 9 MG/DL (ref 8.7–10.5)
CHLORIDE SERPL-SCNC: 96 MMOL/L (ref 95–110)
CHOLEST SERPL-MCNC: 158 MG/DL (ref 120–199)
CHOLEST/HDLC SERPL: 3.5 {RATIO} (ref 2–5)
CO2 SERPL-SCNC: 27 MMOL/L (ref 23–29)
CREAT SERPL-MCNC: 0.72 MG/DL (ref 0.5–1.4)
EST. GFR  (AFRICAN AMERICAN): >60 ML/MIN/1.73 M^2
EST. GFR  (NON AFRICAN AMERICAN): >60 ML/MIN/1.73 M^2
ESTIMATED AVG GLUCOSE: 137 MG/DL (ref 68–131)
GLUCOSE SERPL-MCNC: 127 MG/DL (ref 70–110)
HBA1C MFR BLD: 6.4 % (ref 4–5.6)
HDLC SERPL-MCNC: 45 MG/DL (ref 40–75)
HDLC SERPL: 28.5 % (ref 20–50)
LDLC SERPL CALC-MCNC: 83.8 MG/DL (ref 63–159)
NONHDLC SERPL-MCNC: 113 MG/DL
POTASSIUM SERPL-SCNC: 5.2 MMOL/L (ref 3.5–5.1)
PROT SERPL-MCNC: 7.6 G/DL (ref 6–8.4)
SODIUM SERPL-SCNC: 134 MMOL/L (ref 136–145)
TRIGL SERPL-MCNC: 146 MG/DL (ref 30–150)
UUN UR-MCNC: 13 MG/DL (ref 7–17)

## 2021-09-10 PROCEDURE — 36415 COLL VENOUS BLD VENIPUNCTURE: CPT | Mod: PO | Performed by: FAMILY MEDICINE

## 2021-09-10 PROCEDURE — 80061 LIPID PANEL: CPT | Performed by: FAMILY MEDICINE

## 2021-09-10 PROCEDURE — 80053 COMPREHEN METABOLIC PANEL: CPT | Mod: PO | Performed by: FAMILY MEDICINE

## 2021-09-10 PROCEDURE — 83036 HEMOGLOBIN GLYCOSYLATED A1C: CPT | Performed by: FAMILY MEDICINE

## 2021-09-13 ENCOUNTER — LAB VISIT (OUTPATIENT)
Dept: LAB | Facility: HOSPITAL | Age: 66
End: 2021-09-13
Attending: FAMILY MEDICINE
Payer: MEDICARE

## 2021-09-13 DIAGNOSIS — E87.5 HYPERKALEMIA: ICD-10-CM

## 2021-09-13 LAB
ANION GAP SERPL CALC-SCNC: 9 MMOL/L (ref 8–16)
CALCIUM SERPL-MCNC: 8.6 MG/DL (ref 8.7–10.5)
CHLORIDE SERPL-SCNC: 101 MMOL/L (ref 95–110)
CO2 SERPL-SCNC: 29 MMOL/L (ref 23–29)
CREAT SERPL-MCNC: 0.85 MG/DL (ref 0.5–1.4)
EST. GFR  (AFRICAN AMERICAN): >60 ML/MIN/1.73 M^2
EST. GFR  (NON AFRICAN AMERICAN): >60 ML/MIN/1.73 M^2
GLUCOSE SERPL-MCNC: 149 MG/DL (ref 70–110)
POTASSIUM SERPL-SCNC: 5 MMOL/L (ref 3.5–5.1)
SODIUM SERPL-SCNC: 139 MMOL/L (ref 136–145)
UUN UR-MCNC: 20 MG/DL (ref 7–17)

## 2021-09-13 PROCEDURE — 36415 COLL VENOUS BLD VENIPUNCTURE: CPT | Mod: PO | Performed by: FAMILY MEDICINE

## 2021-09-13 PROCEDURE — 80048 BASIC METABOLIC PNL TOTAL CA: CPT | Mod: PO | Performed by: FAMILY MEDICINE

## 2021-09-17 ENCOUNTER — LAB VISIT (OUTPATIENT)
Dept: LAB | Facility: HOSPITAL | Age: 66
End: 2021-09-17
Attending: FAMILY MEDICINE
Payer: MEDICARE

## 2021-09-17 ENCOUNTER — OFFICE VISIT (OUTPATIENT)
Dept: FAMILY MEDICINE | Facility: CLINIC | Age: 66
End: 2021-09-17
Payer: MEDICARE

## 2021-09-17 VITALS
DIASTOLIC BLOOD PRESSURE: 76 MMHG | BODY MASS INDEX: 23.74 KG/M2 | OXYGEN SATURATION: 98 % | HEIGHT: 62 IN | WEIGHT: 129 LBS | SYSTOLIC BLOOD PRESSURE: 130 MMHG | HEART RATE: 89 BPM

## 2021-09-17 DIAGNOSIS — I10 ESSENTIAL HYPERTENSION: Primary | ICD-10-CM

## 2021-09-17 DIAGNOSIS — R79.89 ABNORMAL LIVER FUNCTION TEST: ICD-10-CM

## 2021-09-17 DIAGNOSIS — L03.012 PARONYCHIA OF FINGER OF LEFT HAND: ICD-10-CM

## 2021-09-17 DIAGNOSIS — E11.65 TYPE 2 DIABETES MELLITUS WITH HYPERGLYCEMIA, WITHOUT LONG-TERM CURRENT USE OF INSULIN: ICD-10-CM

## 2021-09-17 PROCEDURE — 36415 COLL VENOUS BLD VENIPUNCTURE: CPT | Mod: PO | Performed by: FAMILY MEDICINE

## 2021-09-17 PROCEDURE — 3066F NEPHROPATHY DOC TX: CPT | Mod: CPTII,S$GLB,, | Performed by: FAMILY MEDICINE

## 2021-09-17 PROCEDURE — 1125F PR PAIN SEVERITY QUANTIFIED, PAIN PRESENT: ICD-10-PCS | Mod: CPTII,S$GLB,, | Performed by: FAMILY MEDICINE

## 2021-09-17 PROCEDURE — 99214 OFFICE O/P EST MOD 30 MIN: CPT | Mod: S$GLB,,, | Performed by: FAMILY MEDICINE

## 2021-09-17 PROCEDURE — 3078F PR MOST RECENT DIASTOLIC BLOOD PRESSURE < 80 MM HG: ICD-10-PCS | Mod: CPTII,S$GLB,, | Performed by: FAMILY MEDICINE

## 2021-09-17 PROCEDURE — 3061F NEG MICROALBUMINURIA REV: CPT | Mod: CPTII,S$GLB,, | Performed by: FAMILY MEDICINE

## 2021-09-17 PROCEDURE — 4010F PR ACE/ARB THEARPY RXD/TAKEN: ICD-10-PCS | Mod: CPTII,S$GLB,, | Performed by: FAMILY MEDICINE

## 2021-09-17 PROCEDURE — 3078F DIAST BP <80 MM HG: CPT | Mod: CPTII,S$GLB,, | Performed by: FAMILY MEDICINE

## 2021-09-17 PROCEDURE — 3066F PR DOCUMENTATION OF TREATMENT FOR NEPHROPATHY: ICD-10-PCS | Mod: CPTII,S$GLB,, | Performed by: FAMILY MEDICINE

## 2021-09-17 PROCEDURE — 3061F PR NEG MICROALBUMINURIA RESULT DOCUMENTED/REVIEW: ICD-10-PCS | Mod: CPTII,S$GLB,, | Performed by: FAMILY MEDICINE

## 2021-09-17 PROCEDURE — 3075F SYST BP GE 130 - 139MM HG: CPT | Mod: CPTII,S$GLB,, | Performed by: FAMILY MEDICINE

## 2021-09-17 PROCEDURE — 99214 PR OFFICE/OUTPT VISIT, EST, LEVL IV, 30-39 MIN: ICD-10-PCS | Mod: S$GLB,,, | Performed by: FAMILY MEDICINE

## 2021-09-17 PROCEDURE — 3288F FALL RISK ASSESSMENT DOCD: CPT | Mod: CPTII,S$GLB,, | Performed by: FAMILY MEDICINE

## 2021-09-17 PROCEDURE — 1101F PR PT FALLS ASSESS DOC 0-1 FALLS W/OUT INJ PAST YR: ICD-10-PCS | Mod: CPTII,S$GLB,, | Performed by: FAMILY MEDICINE

## 2021-09-17 PROCEDURE — 3044F HG A1C LEVEL LT 7.0%: CPT | Mod: CPTII,S$GLB,, | Performed by: FAMILY MEDICINE

## 2021-09-17 PROCEDURE — 3008F PR BODY MASS INDEX (BMI) DOCUMENTED: ICD-10-PCS | Mod: CPTII,S$GLB,, | Performed by: FAMILY MEDICINE

## 2021-09-17 PROCEDURE — 80074 ACUTE HEPATITIS PANEL: CPT | Performed by: FAMILY MEDICINE

## 2021-09-17 PROCEDURE — 99999 PR PBB SHADOW E&M-EST. PATIENT-LVL III: CPT | Mod: PBBFAC,,, | Performed by: FAMILY MEDICINE

## 2021-09-17 PROCEDURE — 3044F PR MOST RECENT HEMOGLOBIN A1C LEVEL <7.0%: ICD-10-PCS | Mod: CPTII,S$GLB,, | Performed by: FAMILY MEDICINE

## 2021-09-17 PROCEDURE — 1125F AMNT PAIN NOTED PAIN PRSNT: CPT | Mod: CPTII,S$GLB,, | Performed by: FAMILY MEDICINE

## 2021-09-17 PROCEDURE — 1101F PT FALLS ASSESS-DOCD LE1/YR: CPT | Mod: CPTII,S$GLB,, | Performed by: FAMILY MEDICINE

## 2021-09-17 PROCEDURE — 3288F PR FALLS RISK ASSESSMENT DOCUMENTED: ICD-10-PCS | Mod: CPTII,S$GLB,, | Performed by: FAMILY MEDICINE

## 2021-09-17 PROCEDURE — 4010F ACE/ARB THERAPY RXD/TAKEN: CPT | Mod: CPTII,S$GLB,, | Performed by: FAMILY MEDICINE

## 2021-09-17 PROCEDURE — 99999 PR PBB SHADOW E&M-EST. PATIENT-LVL III: ICD-10-PCS | Mod: PBBFAC,,, | Performed by: FAMILY MEDICINE

## 2021-09-17 PROCEDURE — 3075F PR MOST RECENT SYSTOLIC BLOOD PRESS GE 130-139MM HG: ICD-10-PCS | Mod: CPTII,S$GLB,, | Performed by: FAMILY MEDICINE

## 2021-09-17 PROCEDURE — 3008F BODY MASS INDEX DOCD: CPT | Mod: CPTII,S$GLB,, | Performed by: FAMILY MEDICINE

## 2021-09-17 RX ORDER — DOXYCYCLINE 100 MG/1
100 CAPSULE ORAL 2 TIMES DAILY
Qty: 20 CAPSULE | Refills: 0 | Status: SHIPPED | OUTPATIENT
Start: 2021-09-17 | End: 2021-09-27

## 2021-09-20 LAB
HAV IGM SERPL QL IA: NEGATIVE
HBV CORE IGM SERPL QL IA: NEGATIVE
HBV SURFACE AG SERPL QL IA: NEGATIVE
HCV AB SERPL QL IA: NEGATIVE

## 2021-09-22 ENCOUNTER — HOSPITAL ENCOUNTER (OUTPATIENT)
Dept: RADIOLOGY | Facility: HOSPITAL | Age: 66
Discharge: HOME | End: 2021-09-22
Attending: FAMILY MEDICINE
Payer: MEDICARE

## 2021-09-22 DIAGNOSIS — R79.89 ABNORMAL LIVER FUNCTION TEST: ICD-10-CM

## 2021-09-22 PROCEDURE — 76700 US EXAM ABDOM COMPLETE: CPT | Mod: TC,PO

## 2021-10-04 ENCOUNTER — PATIENT MESSAGE (OUTPATIENT)
Dept: ADMINISTRATIVE | Facility: HOSPITAL | Age: 66
End: 2021-10-04

## 2021-10-08 ENCOUNTER — PES CALL (OUTPATIENT)
Dept: ADMINISTRATIVE | Facility: CLINIC | Age: 66
End: 2021-10-08

## 2021-10-08 DIAGNOSIS — K76.0 NAFLD (NONALCOHOLIC FATTY LIVER DISEASE): Primary | ICD-10-CM

## 2021-10-13 DIAGNOSIS — E11.9 TYPE 2 DIABETES MELLITUS WITHOUT COMPLICATION, UNSPECIFIED WHETHER LONG TERM INSULIN USE: ICD-10-CM

## 2021-10-20 ENCOUNTER — PES CALL (OUTPATIENT)
Dept: ADMINISTRATIVE | Facility: CLINIC | Age: 66
End: 2021-10-20

## 2021-10-21 ENCOUNTER — TELEPHONE (OUTPATIENT)
Dept: HEPATOLOGY | Facility: CLINIC | Age: 66
End: 2021-10-21

## 2021-10-25 RX ORDER — LEVOTHYROXINE SODIUM 75 UG/1
75 TABLET ORAL
Qty: 30 TABLET | Refills: 3 | Status: SHIPPED | OUTPATIENT
Start: 2021-10-25 | End: 2022-02-24 | Stop reason: SDUPTHER

## 2022-01-07 ENCOUNTER — PES CALL (OUTPATIENT)
Dept: ADMINISTRATIVE | Facility: CLINIC | Age: 67
End: 2022-01-07
Payer: MEDICARE

## 2022-01-10 ENCOUNTER — PES CALL (OUTPATIENT)
Dept: ADMINISTRATIVE | Facility: CLINIC | Age: 67
End: 2022-01-10
Payer: MEDICARE

## 2022-01-13 ENCOUNTER — PATIENT MESSAGE (OUTPATIENT)
Dept: OTOLARYNGOLOGY | Facility: CLINIC | Age: 67
End: 2022-01-13
Payer: MEDICARE

## 2022-01-14 ENCOUNTER — PATIENT MESSAGE (OUTPATIENT)
Dept: OTOLARYNGOLOGY | Facility: CLINIC | Age: 67
End: 2022-01-14
Payer: MEDICARE

## 2022-02-03 ENCOUNTER — OFFICE VISIT (OUTPATIENT)
Dept: OTOLARYNGOLOGY | Facility: CLINIC | Age: 67
End: 2022-02-03
Payer: MEDICARE

## 2022-02-03 ENCOUNTER — CLINICAL SUPPORT (OUTPATIENT)
Dept: OTOLARYNGOLOGY | Facility: CLINIC | Age: 67
End: 2022-02-03
Payer: MEDICARE

## 2022-02-03 VITALS
TEMPERATURE: 99 F | SYSTOLIC BLOOD PRESSURE: 142 MMHG | HEIGHT: 62 IN | WEIGHT: 127.63 LBS | DIASTOLIC BLOOD PRESSURE: 80 MMHG | BODY MASS INDEX: 23.49 KG/M2 | HEART RATE: 94 BPM

## 2022-02-03 DIAGNOSIS — M26.621 ARTHRALGIA OF RIGHT TEMPOROMANDIBULAR JOINT: ICD-10-CM

## 2022-02-03 DIAGNOSIS — J30.1 NON-SEASONAL ALLERGIC RHINITIS DUE TO POLLEN: Chronic | ICD-10-CM

## 2022-02-03 DIAGNOSIS — H90.3 SENSORINEURAL HEARING LOSS (SNHL) OF BOTH EARS: ICD-10-CM

## 2022-02-03 DIAGNOSIS — H90.3 SENSORINEURAL HEARING LOSS, BILATERAL: Primary | ICD-10-CM

## 2022-02-03 DIAGNOSIS — H91.8X3 ASYMMETRICAL HEARING LOSS: ICD-10-CM

## 2022-02-03 DIAGNOSIS — H93.292 IMPAIRMENT OF AUDITORY DISCRIMINATION OF LEFT EAR: ICD-10-CM

## 2022-02-03 DIAGNOSIS — H92.01 OTALGIA OF RIGHT EAR: ICD-10-CM

## 2022-02-03 DIAGNOSIS — H93.13 TINNITUS OF BOTH EARS: Primary | ICD-10-CM

## 2022-02-03 PROCEDURE — 1126F AMNT PAIN NOTED NONE PRSNT: CPT | Mod: CPTII,S$GLB,, | Performed by: OTOLARYNGOLOGY

## 2022-02-03 PROCEDURE — 99204 OFFICE O/P NEW MOD 45 MIN: CPT | Mod: S$GLB,,, | Performed by: OTOLARYNGOLOGY

## 2022-02-03 PROCEDURE — 92567 PR TYMPA2METRY: ICD-10-PCS | Mod: S$GLB,,, | Performed by: PHYSICIAN ASSISTANT

## 2022-02-03 PROCEDURE — 1159F MED LIST DOCD IN RCRD: CPT | Mod: CPTII,S$GLB,, | Performed by: OTOLARYNGOLOGY

## 2022-02-03 PROCEDURE — 1101F PT FALLS ASSESS-DOCD LE1/YR: CPT | Mod: CPTII,S$GLB,, | Performed by: OTOLARYNGOLOGY

## 2022-02-03 PROCEDURE — 3288F PR FALLS RISK ASSESSMENT DOCUMENTED: ICD-10-PCS | Mod: CPTII,S$GLB,, | Performed by: OTOLARYNGOLOGY

## 2022-02-03 PROCEDURE — 3008F BODY MASS INDEX DOCD: CPT | Mod: CPTII,S$GLB,, | Performed by: OTOLARYNGOLOGY

## 2022-02-03 PROCEDURE — 3077F SYST BP >= 140 MM HG: CPT | Mod: CPTII,S$GLB,, | Performed by: OTOLARYNGOLOGY

## 2022-02-03 PROCEDURE — 1160F RVW MEDS BY RX/DR IN RCRD: CPT | Mod: CPTII,S$GLB,, | Performed by: OTOLARYNGOLOGY

## 2022-02-03 PROCEDURE — 92557 COMPREHENSIVE HEARING TEST: CPT | Mod: S$GLB,,, | Performed by: PHYSICIAN ASSISTANT

## 2022-02-03 PROCEDURE — 1101F PR PT FALLS ASSESS DOC 0-1 FALLS W/OUT INJ PAST YR: ICD-10-PCS | Mod: CPTII,S$GLB,, | Performed by: OTOLARYNGOLOGY

## 2022-02-03 PROCEDURE — 3079F DIAST BP 80-89 MM HG: CPT | Mod: CPTII,S$GLB,, | Performed by: OTOLARYNGOLOGY

## 2022-02-03 PROCEDURE — 1126F PR PAIN SEVERITY QUANTIFIED, NO PAIN PRESENT: ICD-10-PCS | Mod: CPTII,S$GLB,, | Performed by: OTOLARYNGOLOGY

## 2022-02-03 PROCEDURE — 1159F PR MEDICATION LIST DOCUMENTED IN MEDICAL RECORD: ICD-10-PCS | Mod: CPTII,S$GLB,, | Performed by: OTOLARYNGOLOGY

## 2022-02-03 PROCEDURE — 92567 TYMPANOMETRY: CPT | Mod: S$GLB,,, | Performed by: PHYSICIAN ASSISTANT

## 2022-02-03 PROCEDURE — 3008F PR BODY MASS INDEX (BMI) DOCUMENTED: ICD-10-PCS | Mod: CPTII,S$GLB,, | Performed by: OTOLARYNGOLOGY

## 2022-02-03 PROCEDURE — 99204 PR OFFICE/OUTPT VISIT, NEW, LEVL IV, 45-59 MIN: ICD-10-PCS | Mod: S$GLB,,, | Performed by: OTOLARYNGOLOGY

## 2022-02-03 PROCEDURE — 1160F PR REVIEW ALL MEDS BY PRESCRIBER/CLIN PHARMACIST DOCUMENTED: ICD-10-PCS | Mod: CPTII,S$GLB,, | Performed by: OTOLARYNGOLOGY

## 2022-02-03 PROCEDURE — 3077F PR MOST RECENT SYSTOLIC BLOOD PRESSURE >= 140 MM HG: ICD-10-PCS | Mod: CPTII,S$GLB,, | Performed by: OTOLARYNGOLOGY

## 2022-02-03 PROCEDURE — 3288F FALL RISK ASSESSMENT DOCD: CPT | Mod: CPTII,S$GLB,, | Performed by: OTOLARYNGOLOGY

## 2022-02-03 PROCEDURE — 92557 PR COMPREHENSIVE HEARING TEST: ICD-10-PCS | Mod: S$GLB,,, | Performed by: PHYSICIAN ASSISTANT

## 2022-02-03 PROCEDURE — 99999 PR PBB SHADOW E&M-EST. PATIENT-LVL V: CPT | Mod: PBBFAC,,, | Performed by: OTOLARYNGOLOGY

## 2022-02-03 PROCEDURE — 99999 PR PBB SHADOW E&M-EST. PATIENT-LVL V: ICD-10-PCS | Mod: PBBFAC,,, | Performed by: OTOLARYNGOLOGY

## 2022-02-03 PROCEDURE — 3079F PR MOST RECENT DIASTOLIC BLOOD PRESSURE 80-89 MM HG: ICD-10-PCS | Mod: CPTII,S$GLB,, | Performed by: OTOLARYNGOLOGY

## 2022-02-03 RX ORDER — LEVOCETIRIZINE DIHYDROCHLORIDE 5 MG/1
5 TABLET, FILM COATED ORAL NIGHTLY
COMMUNITY

## 2022-02-03 RX ORDER — AZELASTINE 1 MG/ML
1 SPRAY, METERED NASAL 2 TIMES DAILY
Qty: 30 ML | Refills: 11 | Status: SHIPPED | OUTPATIENT
Start: 2022-02-03 | End: 2023-09-28

## 2022-02-03 RX ORDER — FLUTICASONE PROPIONATE 50 MCG
1 SPRAY, SUSPENSION (ML) NASAL 2 TIMES DAILY
Qty: 11.1 ML | Refills: 11 | Status: SHIPPED | OUTPATIENT
Start: 2022-02-03

## 2022-02-03 NOTE — PROGRESS NOTES
Tomeka Burns was seen today in the clinic for an audiologic evaluation.  Patient's main complaints were hearing loss and tinnitus, mainly in left ear.  She reported having hearing loss in her left ear for approximately 30 years.  She also reported a 'throbbing' sensation in her right ear that started about 3 months ago.    Audiogram results revealed a mild/moderate sensorineural hearing loss in the right ear from 250-8k Hz, rising to normal at 1k Hz and moderate to severe sensorinearual hearing loss in the left ear from 250-8k Hz.  Speech reception thresholds were noted at 20 dB in the right ear and 45 dB in the left ear.  Speech discrimination scores were 92% in the right ear and 24% in the left ear.  Tympanometry revealed Type A in the right ear and Type A in the left ear.     Recommendations:  1. Otologic evaluation  2. Annual audiogram  3. Hearing protection when in noise  4. Hearing aid consultation following medical clearance

## 2022-02-03 NOTE — PATIENT INSTRUCTIONS
We reviewed the recent audiogram, and the fact that there is a distinct asymmetry of at least 10 dB across a few frequencies.  We discussed that as humans we are not entirely symmetric, and that many people have one ear that hears better than the other.  However, considering the degree of asymmetry, I would recommend and MRI of the IAC with contrast to evaluate for any retrocochlear lesions.  If that is normal, the patient will continue to follow with annual audiograms.        The patient was given a prescription for a nasal steroid and/or nasal antihistamine nasal spray and we discussed in detail the proper mechanism of use directing the spray away from the nasal septum.  The patient was also instructed to take a daily oral antihistamine (Claritin, Zyrtec, Allegra, or Xyzal).    In addition, we also discussed that it will take 3-4 weeks of daily use to achieve maximal effectiveness.  The patient will please call in 3-4 weeks with their progress.  If allergy symptoms persist at that time, we could consider additional medications and possibly allergy testing.    How do you use a Nasal Corticosteroid Spray?    Make sure you understand your dosing instructions. Spray only the number of prescribed sprays in each nostril. Read the package instructions before using your spray the first time.    Most corticosteroid sprays suggest the following steps:    Wash your hands well.    Gently blow your nose to clear the passageway.    Shake the container several times.    Tilt your head slightly downward.  Use the opposite hand from the nostril you will be spraying to hold the spray bottle.    Block one nostril with your finger.  Insert the nasal applicator into the other nostril.    Aim the spray toward the outer wall of the nostril.  Inhale slowly through the nose and press the .    Breathe out and repeat to apply the prescribed number of sprays.  Repeat these steps for the other nostril.     Avoid sneezing or  blowing your nose right after spraying.

## 2022-02-03 NOTE — PROGRESS NOTES
Chief Complaint   Patient presents with    Tinnitus     Left ear ringing has been ringing since she was young; right ear is humming for last 3 months and when chewing her jaw will hurt        HPI: Patient is a very pleasant 66 y.o. female here to see me today for the first time for evaluation of tinnitus. reports tinnitus that has been gradually progressing over the last 3month(s).  She notes that it is primarily right. She states that the tinnitus does interfere with concentration. She  also admits to hearing loss that has been gradually progressing over the last many years.  Of longstanding hearing loss on the left side for which she has had tinnitus since I younger age, but the right-sided tinnitus is worse in the last 3 months.  It began at the end of November, just after she received her COVID booster.  She has noted any difference in hearing between the ears, with the right ear being the better hearing ear. She has not had any recent issues with ear pain or ear drainage. She denies a family history of hearing loss, and has not had any previous otologic surgery. She denies any history of significant loud noise exposure.She denies issues with dizziness.  She describes the tinnitus as a humming and pulsatile noise.  She also notes a history of allergic rhinitis for which she had allergy testing by Dr. Ruvalcaba.  She currently takes Xyzal but stopped using nasal sprays due to some nasal irritation.  She describes right-sided jaw pain as well as some TMJ type symptoms and otalgia as well.        Past Medical History:   Diagnosis Date    Diabetes mellitus, type 2     Encounter for blood transfusion 1980    GERD (gastroesophageal reflux disease)     Hyperlipidemia     Hypertension     Multiple thyroid nodules 1/21/2019     Social History     Socioeconomic History    Marital status:    Tobacco Use    Smoking status: Never Smoker    Smokeless tobacco: Never Used   Substance and Sexual Activity     Alcohol use: No    Drug use: No    Sexual activity: Yes     Partners: Male     Social Determinants of Health     Financial Resource Strain: Low Risk     Difficulty of Paying Living Expenses: Not hard at all   Food Insecurity: No Food Insecurity    Worried About Running Out of Food in the Last Year: Never true    Ran Out of Food in the Last Year: Never true   Transportation Needs: No Transportation Needs    Lack of Transportation (Medical): No    Lack of Transportation (Non-Medical): No   Physical Activity: Unknown    Days of Exercise per Week: 2 days   Stress: Stress Concern Present    Feeling of Stress : To some extent   Social Connections: Unknown    Frequency of Communication with Friends and Family: More than three times a week    Frequency of Social Gatherings with Friends and Family: Once a week    Active Member of Clubs or Organizations: No    Attends Club or Organization Meetings: Never    Marital Status:      Past Surgical History:   Procedure Laterality Date    ANGIOCARDIOGRAPHY OF LEFT VENTRICLE N/A 6/17/2019    Procedure: Left ventriculography;  Surgeon: Gab Calderon MD;  Location: [facility] CATH LAB/EP;  Service: Cardiology;  Laterality: N/A;    CORONARY ANGIOGRAPHY N/A 6/17/2019    Procedure: ANGIOGRAM, CORONARY ARTERY;  Surgeon: Gab Calderon MD;  Location: [facility] CATH LAB/EP;  Service: Cardiology;  Laterality: N/A;    HYSTERECTOMY      LEFT HEART CATHETERIZATION N/A 6/17/2019    Procedure: Left heart cath;  Surgeon: Gab Calderon MD;  Location: [facility] CATH LAB/EP;  Service: Cardiology;  Laterality: N/A;    THYROIDECTOMY Bilateral 6/16/2020    Procedure: THYROIDECTOMY;  Surgeon: Hermelinda Zuniga MD;  Location: [facility];  Service: General;  Laterality: Bilateral;    TONSILLECTOMY       History reviewed. No pertinent family history.        Review of Systems  General: negative for chills, fever or weight loss  Psychological: negative for mood changes or  depression  Ophthalmic: negative for blurry vision, photophobia or eye pain  ENT: see HPI  Respiratory: no cough, shortness of breath, or wheezing  Cardiovascular: no chest pain or dyspnea on exertion  Gastrointestinal: no abdominal pain, change in bowel habits, or black/ bloody stools  Musculoskeletal: negative for gait disturbance or muscular weakness  Neurological: no syncope or seizures; no ataxia  Dermatological: negative for pruritis,  rash and jaundice  Hematologic/lymphatic: no easy bruising, no new adenopathy      Physical Exam:    Vitals:    02/03/22 0909   BP: (!) 142/80   Pulse: 94   Temp: 98.6 °F (37 °C)       Constitutional: Well appearing / communicating without difficutly.  NAD.  Eyes: EOM I Bilaterally  Head/Face: Normocephalic.  Negative paranasal sinus pressure/tenderness.  Salivary glands WNL.  House Brackmann I Bilaterally.  Tenderness noted over right TMJ.  Upon jaw opening crepitance noted in both TM joints.    Right Ear: Auricle normal appearance. External Auditory Canal within normal limits no lesions or masses,TM w/o masses/lesions/perforations. TM mobility noted.   Left Ear: Auricle normal appearance. External Auditory Canal within normal limits no lesions or masses,TM w/o masses/lesions/perforations. TM mobility noted.  Nose: No gross nasal septal deviation. Inferior Turbinates 3+ bilaterally.  Allergic mucosa throughout.  No septal perforation. No masses/lesions. External nasal skin appears normal without masses/lesions.  Oral Cavity: Gingiva/lips within normal limits.  Dentition/gingiva healthy appearing. Mucus membranes moist. Floor of mouth soft, no masses palpated. Oral Tongue mobile. Hard Palate appears normal.  Tenderness over right pterygoid area and retromolar trigone on bimanual palpation.  Oropharynx: Base of tongue appears normal. No masses/lesions noted. Tonsillar fossa/pharyngeal wall without lesions. Posterior oropharynx WNL.  Soft palate without masses. Midline uvula.    Neck/Lymphatic: No LAD I-VI bilaterally.  No thyromegaly.  No masses noted on exam.    Mirror laryngoscopy/nasopharyngoscopy: Active gag reflex.  Unable to perform.    Neuro/Psychiatric: AOx3.  Normal mood and affect.   Cardiovascular: Normal carotid pulses bilaterally, no increasing jugular venous distention noted at cervical region bilaterally.    Respiratory: Normal respiratory effort, no stridor, no retractions noted.    Diagnostic Studies:  Audiogram reviewed personally by myself and in detail with the patient today.  Asymmetric sensorineural hearing loss worse on left.  Tympanograms normal bilaterally.  Right side with moderate hearing loss low frequencies sloping to normal hearing in mid frequencies and back to moderate to severe hearing loss in high frequencies.            Assessment:    ICD-10-CM ICD-9-CM    1. Tinnitus of both ears  H93.13 388.30 MRI IAC/Temporal Bones W W/O Contrast   2. Sensorineural hearing loss (SNHL) of both ears  H90.3 389.18 MRI IAC/Temporal Bones W W/O Contrast   3. Asymmetrical hearing loss  H91.8X9 389.9 MRI IAC/Temporal Bones W W/O Contrast   4. Non-seasonal allergic rhinitis due to pollen  J30.1 477.0    5. Arthralgia of right temporomandibular joint  M26.621 524.62    6. Otalgia of right ear  H92.01 388.70      The primary encounter diagnosis was Tinnitus of both ears. Diagnoses of Sensorineural hearing loss (SNHL) of both ears, Asymmetrical hearing loss, Non-seasonal allergic rhinitis due to pollen, Arthralgia of right temporomandibular joint, and Otalgia of right ear were also pertinent to this visit.      Plan:  Orders Placed This Encounter   Procedures    MRI IAC/Temporal Bones W W/O Contrast       We reviewed the audiogram together in detail.  We also discussed that tinnitus is most often caused by a hearing loss, and that as the hair cells are damaged, either genetic or as a result of loud noise exposure, they then cause tinnitus.  Tinnitus tends to be louder in times  of stress and fatigue, and may decrease with time.  Hearing aids are helpful if patient is a good hearing aid candidate. Sound machines may also be an effective masking technique if needed at night. I will give the patient a comprehensive guide on managing tinnitus today with reference materials to further learn about tinnitus and coping mechanisms.     We had a long discussion regarding the underlying pathology of temporomandibular joint dysfunction (TMD) as the cause of ear pain.  We further discussed conservative measures to treat TMD including avoiding gum and other foods that require lots of chewing, warm compresses, and scheduled antinflammatories (such as Motrin, Ibuprofen, or Aleve).  The patient should also wear a  (purchased OTC or see dentist for custom), which prevents additional pressure on the TM joint.  Stress can also exacerbate TMJ symptoms.    If the pain persists, the patient will then schedule an appointment with a dentist for further evaluation.    I gave the patient prescriptions for azelastine and Flonase and instructed her on proper technique of use.  Also instructed her that it will take consistent use over a few weeks to hit maximal efficacy.  She will also use saline nasal spray to help prevent any irritation.    I will order an MRI due to the asymmetry as well as the pulsatile and new nature of the tinnitus.  This will help rule out any vascular anomalies or retrocochlear pathology.    Annual audiograms recommended as well as hearing protection in noise.  Patient was given information about pursuing hearing aids.    I will follow-up in 6 months for her general allergy issues and also let her know results of MRI when they are available.      Melissa Neely MD

## 2022-02-10 ENCOUNTER — HOSPITAL ENCOUNTER (OUTPATIENT)
Dept: RADIOLOGY | Facility: HOSPITAL | Age: 67
Discharge: HOME | End: 2022-02-10
Attending: OTOLARYNGOLOGY
Payer: MEDICARE

## 2022-02-10 ENCOUNTER — PATIENT MESSAGE (OUTPATIENT)
Dept: OTOLARYNGOLOGY | Facility: CLINIC | Age: 67
End: 2022-02-10
Payer: MEDICARE

## 2022-02-10 DIAGNOSIS — H93.19 TINNITUS, UNSPECIFIED LATERALITY: ICD-10-CM

## 2022-02-10 PROCEDURE — 70553 MRI BRAIN STEM W/O & W/DYE: CPT | Mod: TC,PO

## 2022-02-10 PROCEDURE — 25500020 PHARM REV CODE 255: Mod: PO | Performed by: OTOLARYNGOLOGY

## 2022-02-10 PROCEDURE — A9585 GADOBUTROL INJECTION: HCPCS | Mod: PO | Performed by: OTOLARYNGOLOGY

## 2022-02-10 RX ORDER — GADOBUTROL 604.72 MG/ML
10 INJECTION INTRAVENOUS
Status: COMPLETED | OUTPATIENT
Start: 2022-02-10 | End: 2022-02-10

## 2022-02-10 RX ADMIN — GADOBUTROL 10 ML: 604.72 INJECTION INTRAVENOUS at 02:02

## 2022-02-10 NOTE — TELEPHONE ENCOUNTER
MRI reviewed.  Concern based on imaging for possible labyrinthitis ossificans so CT of the temporal bones recommended by radiologist.    Will order CT and follow-up results.    Melissa Neely MD  [facility]

## 2022-02-15 ENCOUNTER — PATIENT MESSAGE (OUTPATIENT)
Dept: FAMILY MEDICINE | Facility: CLINIC | Age: 67
End: 2022-02-15
Payer: MEDICARE

## 2022-02-15 NOTE — TELEPHONE ENCOUNTER
No new care gaps identified.  Powered by [employer]. Reference number: [id number].   2/15/2022 4:21:35 PM CST

## 2022-02-16 RX ORDER — LANCETS
1 EACH MISCELLANEOUS 2 TIMES DAILY
Qty: 200 EACH | Refills: 3 | Status: SHIPPED | OUTPATIENT
Start: 2022-02-16 | End: 2022-02-18 | Stop reason: SDUPTHER

## 2022-02-17 ENCOUNTER — HOSPITAL ENCOUNTER (OUTPATIENT)
Dept: RADIOLOGY | Facility: HOSPITAL | Age: 67
Discharge: HOME | End: 2022-02-17
Attending: OTOLARYNGOLOGY
Payer: MEDICARE

## 2022-02-17 DIAGNOSIS — H93.19 TINNITUS, UNSPECIFIED LATERALITY: ICD-10-CM

## 2022-02-17 PROCEDURE — 70480 CT ORBIT/EAR/FOSSA W/O DYE: CPT | Mod: TC,PO

## 2022-02-18 ENCOUNTER — TELEPHONE (OUTPATIENT)
Dept: FAMILY MEDICINE | Facility: CLINIC | Age: 67
End: 2022-02-18
Payer: MEDICARE

## 2022-02-18 DIAGNOSIS — E11.65 TYPE 2 DIABETES MELLITUS WITH HYPERGLYCEMIA, WITHOUT LONG-TERM CURRENT USE OF INSULIN: Primary | ICD-10-CM

## 2022-02-18 RX ORDER — INSULIN PUMP SYRINGE, 3 ML
EACH MISCELLANEOUS
Qty: 1 EACH | Refills: 0 | Status: SHIPPED | OUTPATIENT
Start: 2022-02-18 | End: 2024-04-02 | Stop reason: SDUPTHER

## 2022-02-18 RX ORDER — LANCETS
1 EACH MISCELLANEOUS 2 TIMES DAILY
Qty: 200 EACH | Refills: 3 | Status: SHIPPED | OUTPATIENT
Start: 2022-02-18 | End: 2024-04-02 | Stop reason: SDUPTHER

## 2022-02-23 ENCOUNTER — LAB VISIT (OUTPATIENT)
Dept: LAB | Facility: HOSPITAL | Age: 67
End: 2022-02-23
Attending: INTERNAL MEDICINE
Payer: MEDICARE

## 2022-02-23 ENCOUNTER — OFFICE VISIT (OUTPATIENT)
Dept: ENDOCRINOLOGY | Facility: CLINIC | Age: 67
End: 2022-02-23
Payer: MEDICARE

## 2022-02-23 VITALS
WEIGHT: 126.63 LBS | DIASTOLIC BLOOD PRESSURE: 72 MMHG | HEART RATE: 83 BPM | OXYGEN SATURATION: 98 % | HEIGHT: 62 IN | BODY MASS INDEX: 23.3 KG/M2 | SYSTOLIC BLOOD PRESSURE: 138 MMHG

## 2022-02-23 DIAGNOSIS — C73 PAPILLARY THYROID CARCINOMA: ICD-10-CM

## 2022-02-23 DIAGNOSIS — E89.40 ASYMPTOMATIC POSTSURGICAL MENOPAUSE: ICD-10-CM

## 2022-02-23 DIAGNOSIS — C73 PAPILLARY THYROID CARCINOMA: Primary | ICD-10-CM

## 2022-02-23 DIAGNOSIS — E89.2 POSTSURGICAL HYPOPARATHYROIDISM: ICD-10-CM

## 2022-02-23 DIAGNOSIS — E89.0 POSTSURGICAL HYPOTHYROIDISM: ICD-10-CM

## 2022-02-23 LAB
ALBUMIN SERPL BCP-MCNC: 3.9 G/DL (ref 3.5–5.2)
ANION GAP SERPL CALC-SCNC: 9 MMOL/L (ref 8–16)
BUN SERPL-MCNC: 16 MG/DL (ref 8–23)
CALCIUM SERPL-MCNC: 9 MG/DL (ref 8.7–10.5)
CHLORIDE SERPL-SCNC: 101 MMOL/L (ref 95–110)
CO2 SERPL-SCNC: 26 MMOL/L (ref 23–29)
CREAT SERPL-MCNC: 0.9 MG/DL (ref 0.5–1.4)
EST. GFR  (AFRICAN AMERICAN): >60 ML/MIN/1.73 M^2
EST. GFR  (NON AFRICAN AMERICAN): >60 ML/MIN/1.73 M^2
GLUCOSE SERPL-MCNC: 224 MG/DL (ref 70–110)
PHOSPHATE SERPL-MCNC: 3 MG/DL (ref 2.7–4.5)
POTASSIUM SERPL-SCNC: 4.3 MMOL/L (ref 3.5–5.1)
PTH-INTACT SERPL-MCNC: 32.2 PG/ML (ref 9–77)
SODIUM SERPL-SCNC: 136 MMOL/L (ref 136–145)
TSH SERPL DL<=0.005 MIU/L-ACNC: 0.96 UIU/ML (ref 0.4–4)

## 2022-02-23 PROCEDURE — 86800 THYROGLOBULIN ANTIBODY: CPT | Performed by: INTERNAL MEDICINE

## 2022-02-23 PROCEDURE — 99214 PR OFFICE/OUTPT VISIT, EST, LEVL IV, 30-39 MIN: ICD-10-PCS | Mod: S$GLB,,, | Performed by: INTERNAL MEDICINE

## 2022-02-23 PROCEDURE — 3288F PR FALLS RISK ASSESSMENT DOCUMENTED: ICD-10-PCS | Mod: CPTII,S$GLB,, | Performed by: INTERNAL MEDICINE

## 2022-02-23 PROCEDURE — 3008F PR BODY MASS INDEX (BMI) DOCUMENTED: ICD-10-PCS | Mod: CPTII,S$GLB,, | Performed by: INTERNAL MEDICINE

## 2022-02-23 PROCEDURE — 3075F SYST BP GE 130 - 139MM HG: CPT | Mod: CPTII,S$GLB,, | Performed by: INTERNAL MEDICINE

## 2022-02-23 PROCEDURE — 1101F PR PT FALLS ASSESS DOC 0-1 FALLS W/OUT INJ PAST YR: ICD-10-PCS | Mod: CPTII,S$GLB,, | Performed by: INTERNAL MEDICINE

## 2022-02-23 PROCEDURE — 84443 ASSAY THYROID STIM HORMONE: CPT | Performed by: INTERNAL MEDICINE

## 2022-02-23 PROCEDURE — 1160F RVW MEDS BY RX/DR IN RCRD: CPT | Mod: CPTII,S$GLB,, | Performed by: INTERNAL MEDICINE

## 2022-02-23 PROCEDURE — 1125F PR PAIN SEVERITY QUANTIFIED, PAIN PRESENT: ICD-10-PCS | Mod: CPTII,S$GLB,, | Performed by: INTERNAL MEDICINE

## 2022-02-23 PROCEDURE — 3075F PR MOST RECENT SYSTOLIC BLOOD PRESS GE 130-139MM HG: ICD-10-PCS | Mod: CPTII,S$GLB,, | Performed by: INTERNAL MEDICINE

## 2022-02-23 PROCEDURE — 3078F DIAST BP <80 MM HG: CPT | Mod: CPTII,S$GLB,, | Performed by: INTERNAL MEDICINE

## 2022-02-23 PROCEDURE — 3078F PR MOST RECENT DIASTOLIC BLOOD PRESSURE < 80 MM HG: ICD-10-PCS | Mod: CPTII,S$GLB,, | Performed by: INTERNAL MEDICINE

## 2022-02-23 PROCEDURE — 1160F PR REVIEW ALL MEDS BY PRESCRIBER/CLIN PHARMACIST DOCUMENTED: ICD-10-PCS | Mod: CPTII,S$GLB,, | Performed by: INTERNAL MEDICINE

## 2022-02-23 PROCEDURE — 1159F PR MEDICATION LIST DOCUMENTED IN MEDICAL RECORD: ICD-10-PCS | Mod: CPTII,S$GLB,, | Performed by: INTERNAL MEDICINE

## 2022-02-23 PROCEDURE — 4010F PR ACE/ARB THEARPY RXD/TAKEN: ICD-10-PCS | Mod: CPTII,S$GLB,, | Performed by: INTERNAL MEDICINE

## 2022-02-23 PROCEDURE — 80069 RENAL FUNCTION PANEL: CPT | Performed by: INTERNAL MEDICINE

## 2022-02-23 PROCEDURE — 99214 OFFICE O/P EST MOD 30 MIN: CPT | Mod: S$GLB,,, | Performed by: INTERNAL MEDICINE

## 2022-02-23 PROCEDURE — 3288F FALL RISK ASSESSMENT DOCD: CPT | Mod: CPTII,S$GLB,, | Performed by: INTERNAL MEDICINE

## 2022-02-23 PROCEDURE — 1125F AMNT PAIN NOTED PAIN PRSNT: CPT | Mod: CPTII,S$GLB,, | Performed by: INTERNAL MEDICINE

## 2022-02-23 PROCEDURE — 3008F BODY MASS INDEX DOCD: CPT | Mod: CPTII,S$GLB,, | Performed by: INTERNAL MEDICINE

## 2022-02-23 PROCEDURE — 99999 PR PBB SHADOW E&M-EST. PATIENT-LVL IV: ICD-10-PCS | Mod: PBBFAC,,, | Performed by: INTERNAL MEDICINE

## 2022-02-23 PROCEDURE — 4010F ACE/ARB THERAPY RXD/TAKEN: CPT | Mod: CPTII,S$GLB,, | Performed by: INTERNAL MEDICINE

## 2022-02-23 PROCEDURE — 1159F MED LIST DOCD IN RCRD: CPT | Mod: CPTII,S$GLB,, | Performed by: INTERNAL MEDICINE

## 2022-02-23 PROCEDURE — 99999 PR PBB SHADOW E&M-EST. PATIENT-LVL IV: CPT | Mod: PBBFAC,,, | Performed by: INTERNAL MEDICINE

## 2022-02-23 PROCEDURE — 1101F PT FALLS ASSESS-DOCD LE1/YR: CPT | Mod: CPTII,S$GLB,, | Performed by: INTERNAL MEDICINE

## 2022-02-23 PROCEDURE — 83970 ASSAY OF PARATHORMONE: CPT | Performed by: INTERNAL MEDICINE

## 2022-02-23 NOTE — ASSESSMENT & PLAN NOTE
Reports some symptoms which may be due to hypocalcemia and last serum calcium a bit low although no paired albumin  Check renal panel today  Recommend resuming at least calcium carbonate 600 mg daily along with intake from diet  Can take additional dose if needed for symptoms

## 2022-02-23 NOTE — ASSESSMENT & PLAN NOTE
S/p total thyroidectomy  Staging: pT1b pN0 pMX. Post-operative TG 0.2 with suppressed TSH  Low risk disease. Discussed option of remnant ablation but no proven benefit for recurrence or mortality. She does not wish to have MALONEY at this time  Goal TSH low-normal range  Repeat TG, TSH now  Last US without evidence of disease. If rise in thyroglobulin repeat now but otherwise check with visit next year
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
## 2022-02-24 ENCOUNTER — PATIENT MESSAGE (OUTPATIENT)
Dept: ENDOCRINOLOGY | Facility: CLINIC | Age: 67
End: 2022-02-24
Payer: MEDICARE

## 2022-02-24 RX ORDER — LEVOTHYROXINE SODIUM 75 UG/1
75 TABLET ORAL
Qty: 90 TABLET | Refills: 3 | Status: SHIPPED | OUTPATIENT
Start: 2022-02-24 | End: 2022-02-24

## 2022-03-03 ENCOUNTER — OFFICE VISIT (OUTPATIENT)
Dept: ORTHOPEDICS | Facility: CLINIC | Age: 67
End: 2022-03-03
Payer: MEDICARE

## 2022-03-03 VITALS — HEIGHT: 62 IN | BODY MASS INDEX: 23.32 KG/M2 | WEIGHT: 126.75 LBS

## 2022-03-03 DIAGNOSIS — M17.12 PRIMARY OSTEOARTHRITIS OF LEFT KNEE: Primary | ICD-10-CM

## 2022-03-03 PROCEDURE — 99999 PR PBB SHADOW E&M-EST. PATIENT-LVL III: CPT | Mod: PBBFAC,,, | Performed by: ORTHOPAEDIC SURGERY

## 2022-03-03 PROCEDURE — 3288F PR FALLS RISK ASSESSMENT DOCUMENTED: ICD-10-PCS | Mod: CPTII,S$GLB,, | Performed by: ORTHOPAEDIC SURGERY

## 2022-03-03 PROCEDURE — 3288F FALL RISK ASSESSMENT DOCD: CPT | Mod: CPTII,S$GLB,, | Performed by: ORTHOPAEDIC SURGERY

## 2022-03-03 PROCEDURE — 99213 PR OFFICE/OUTPT VISIT, EST, LEVL III, 20-29 MIN: ICD-10-PCS | Mod: 25,S$GLB,, | Performed by: ORTHOPAEDIC SURGERY

## 2022-03-03 PROCEDURE — 4010F PR ACE/ARB THEARPY RXD/TAKEN: ICD-10-PCS | Mod: CPTII,S$GLB,, | Performed by: ORTHOPAEDIC SURGERY

## 2022-03-03 PROCEDURE — 20610 DRAIN/INJ JOINT/BURSA W/O US: CPT | Mod: LT,S$GLB,, | Performed by: ORTHOPAEDIC SURGERY

## 2022-03-03 PROCEDURE — 1125F PR PAIN SEVERITY QUANTIFIED, PAIN PRESENT: ICD-10-PCS | Mod: CPTII,S$GLB,, | Performed by: ORTHOPAEDIC SURGERY

## 2022-03-03 PROCEDURE — 1159F PR MEDICATION LIST DOCUMENTED IN MEDICAL RECORD: ICD-10-PCS | Mod: CPTII,S$GLB,, | Performed by: ORTHOPAEDIC SURGERY

## 2022-03-03 PROCEDURE — 3008F BODY MASS INDEX DOCD: CPT | Mod: CPTII,S$GLB,, | Performed by: ORTHOPAEDIC SURGERY

## 2022-03-03 PROCEDURE — 1160F PR REVIEW ALL MEDS BY PRESCRIBER/CLIN PHARMACIST DOCUMENTED: ICD-10-PCS | Mod: CPTII,S$GLB,, | Performed by: ORTHOPAEDIC SURGERY

## 2022-03-03 PROCEDURE — 20610 LARGE JOINT ASPIRATION/INJECTION: L KNEE: ICD-10-PCS | Mod: LT,S$GLB,, | Performed by: ORTHOPAEDIC SURGERY

## 2022-03-03 PROCEDURE — 3008F PR BODY MASS INDEX (BMI) DOCUMENTED: ICD-10-PCS | Mod: CPTII,S$GLB,, | Performed by: ORTHOPAEDIC SURGERY

## 2022-03-03 PROCEDURE — 1101F PR PT FALLS ASSESS DOC 0-1 FALLS W/OUT INJ PAST YR: ICD-10-PCS | Mod: CPTII,S$GLB,, | Performed by: ORTHOPAEDIC SURGERY

## 2022-03-03 PROCEDURE — 99999 PR PBB SHADOW E&M-EST. PATIENT-LVL III: ICD-10-PCS | Mod: PBBFAC,,, | Performed by: ORTHOPAEDIC SURGERY

## 2022-03-03 PROCEDURE — 99213 OFFICE O/P EST LOW 20 MIN: CPT | Mod: 25,S$GLB,, | Performed by: ORTHOPAEDIC SURGERY

## 2022-03-03 PROCEDURE — 1159F MED LIST DOCD IN RCRD: CPT | Mod: CPTII,S$GLB,, | Performed by: ORTHOPAEDIC SURGERY

## 2022-03-03 PROCEDURE — 1101F PT FALLS ASSESS-DOCD LE1/YR: CPT | Mod: CPTII,S$GLB,, | Performed by: ORTHOPAEDIC SURGERY

## 2022-03-03 PROCEDURE — 1125F AMNT PAIN NOTED PAIN PRSNT: CPT | Mod: CPTII,S$GLB,, | Performed by: ORTHOPAEDIC SURGERY

## 2022-03-03 PROCEDURE — 1160F RVW MEDS BY RX/DR IN RCRD: CPT | Mod: CPTII,S$GLB,, | Performed by: ORTHOPAEDIC SURGERY

## 2022-03-03 PROCEDURE — 4010F ACE/ARB THERAPY RXD/TAKEN: CPT | Mod: CPTII,S$GLB,, | Performed by: ORTHOPAEDIC SURGERY

## 2022-03-03 RX ORDER — HYDROCHLOROTHIAZIDE 12.5 MG/1
12.5 TABLET ORAL DAILY
COMMUNITY
Start: 2022-02-24 | End: 2022-03-23

## 2022-03-03 RX ORDER — TRIAMCINOLONE ACETONIDE 40 MG/ML
40 INJECTION, SUSPENSION INTRA-ARTICULAR; INTRAMUSCULAR
Status: DISCONTINUED | OUTPATIENT
Start: 2022-03-03 | End: 2022-03-03 | Stop reason: HOSPADM

## 2022-03-03 RX ADMIN — TRIAMCINOLONE ACETONIDE 40 MG: 40 INJECTION, SUSPENSION INTRA-ARTICULAR; INTRAMUSCULAR at 10:03

## 2022-03-03 NOTE — PROGRESS NOTES
Subjective:      Patient ID: Tomeka Burns is a 66 y.o. female.    Chief Complaint:  Left knee pain    HPI    Follow-up for osteoarthritis.  The patient reports several weeks of increasing left knee pain.  She denies any specific aggravation.  She works standing up, which may aggravate her symptoms.  She is now wearing a brace.  She reports that past injections were quite helpful.    The patient does acknowledge chronic right knee pain which she says is mild and does not require intervention at this time.        Review of Systems   Constitutional: Negative for fever and weight loss.   HENT: Negative for congestion.    Eyes: Negative for visual disturbance.   Cardiovascular: Negative for chest pain.   Respiratory: Negative for shortness of breath.    Hematologic/Lymphatic: Negative for bleeding problem. Does not bruise/bleed easily.   Skin: Negative for poor wound healing.   Musculoskeletal: Positive for joint pain.   Gastrointestinal: Negative for abdominal pain.   Genitourinary: Negative for dysuria.   Neurological: Negative for seizures.   Psychiatric/Behavioral: Negative for altered mental status.   Allergic/Immunologic: Negative for persistent infections.         Objective:      Ortho/SPM Exam      Left knee     The patient is not in acute distress.   Body habitus is normal.   Sclera appear normal  No respiratory distress  The patient walks with a slight limp  Resisted SLR negative.   The skin over the knee is intact.  Knee effusion 0  Tendernes is located absent.  Range of motion- Flexion full, Extension full.   Ligament exam:              MCL 1+              Lachman intact              Post sag intact              LCL intact  Patellar apprehension negative.  Popliteal cyst positive  Patellar crepitation absent.  Flexion/pinch negative.  Pulses DP present, PT present.  Motor normal 5/5 strength in all tested muscle groups.   Sensory normal.              Assessment:       Encounter Diagnosis   Name Primary?     Primary osteoarthritis of left knee Yes      Based on past radiographs and current examination, the condition appears to be structurally moderate.  Progression may have occurred since last radiographs.          Plan:       Tomeka was seen today for knee pain.    Diagnoses and all orders for this visit:    Primary osteoarthritis of left knee          I explained my diagnostic impression and the reasoning behind it in detail, using layman's terms.  Models and/or pictures were used to help in the explanation.    Injection recommended and consent given    Continued use of brace, ice and over-the-counter analgesics recommended    I explained the potential role of surgery in the treatment of this condition to the patient.  They understand that if nonsurgical measures do not adequately control symptoms, surgery will be considered in the future.    X-ray next visit

## 2022-03-03 NOTE — PROCEDURES
Large Joint Aspiration/Injection: L knee    Date/Time: 3/3/2022 10:45 AM  Performed by: Fernie Moeller MD  Authorized by: Fernie Moeller MD     Location:  Knee  Site:  L knee  Medications:  40 mg triamcinolone acetonide 40 mg/mL     After obtaining verbal informed consent the patient's left knee was prepped aseptically and injected through an inferior lateral approach using 40 mg of triamcinolone and 1 cc of 1% plain Xylocaine.  The patient was warned about postinjection flare and how to manage it with ice, rest and over-the-counter analgesics.  They're advised to contact me for any severe, uncontrolled pain.

## 2022-03-11 ENCOUNTER — PES CALL (OUTPATIENT)
Dept: ADMINISTRATIVE | Facility: CLINIC | Age: 67
End: 2022-03-11
Payer: MEDICARE

## 2022-03-21 ENCOUNTER — LAB VISIT (OUTPATIENT)
Dept: LAB | Facility: HOSPITAL | Age: 67
End: 2022-03-21
Attending: FAMILY MEDICINE
Payer: MEDICARE

## 2022-03-21 DIAGNOSIS — E11.65 TYPE 2 DIABETES MELLITUS WITH HYPERGLYCEMIA, WITHOUT LONG-TERM CURRENT USE OF INSULIN: ICD-10-CM

## 2022-03-21 LAB
ALBUMIN/CREAT UR: 146.4 UG/MG (ref 0–30)
CREAT UR-MCNC: 56 MG/DL (ref 15–325)
MICROALBUMIN UR DL<=1MG/L-MCNC: 82 UG/ML

## 2022-03-21 PROCEDURE — 82570 ASSAY OF URINE CREATININE: CPT | Mod: PO | Performed by: FAMILY MEDICINE

## 2022-03-23 ENCOUNTER — LAB VISIT (OUTPATIENT)
Dept: LAB | Facility: HOSPITAL | Age: 67
End: 2022-03-23
Attending: FAMILY MEDICINE
Payer: MEDICARE

## 2022-03-23 ENCOUNTER — OFFICE VISIT (OUTPATIENT)
Dept: FAMILY MEDICINE | Facility: CLINIC | Age: 67
End: 2022-03-23
Payer: MEDICARE

## 2022-03-23 VITALS
DIASTOLIC BLOOD PRESSURE: 74 MMHG | SYSTOLIC BLOOD PRESSURE: 138 MMHG | OXYGEN SATURATION: 98 % | WEIGHT: 123 LBS | BODY MASS INDEX: 22.63 KG/M2 | HEIGHT: 62 IN | HEART RATE: 85 BPM

## 2022-03-23 DIAGNOSIS — E11.65 TYPE 2 DIABETES MELLITUS WITH HYPERGLYCEMIA, WITHOUT LONG-TERM CURRENT USE OF INSULIN: ICD-10-CM

## 2022-03-23 DIAGNOSIS — E87.5 HYPERKALEMIA: ICD-10-CM

## 2022-03-23 DIAGNOSIS — I10 ESSENTIAL HYPERTENSION: Primary | ICD-10-CM

## 2022-03-23 LAB
ANION GAP SERPL CALC-SCNC: 9 MMOL/L (ref 8–16)
BUN SERPL-MCNC: 23 MG/DL (ref 8–23)
CALCIUM SERPL-MCNC: 9.6 MG/DL (ref 8.7–10.5)
CHLORIDE SERPL-SCNC: 103 MMOL/L (ref 95–110)
CO2 SERPL-SCNC: 27 MMOL/L (ref 23–29)
CREAT SERPL-MCNC: 1 MG/DL (ref 0.5–1.4)
EST. GFR  (AFRICAN AMERICAN): >60 ML/MIN/1.73 M^2
EST. GFR  (NON AFRICAN AMERICAN): 58 ML/MIN/1.73 M^2
GLUCOSE SERPL-MCNC: 112 MG/DL (ref 70–110)
POTASSIUM SERPL-SCNC: 5.1 MMOL/L (ref 3.5–5.1)
SODIUM SERPL-SCNC: 139 MMOL/L (ref 136–145)

## 2022-03-23 PROCEDURE — 4010F ACE/ARB THERAPY RXD/TAKEN: CPT | Mod: CPTII,S$GLB,, | Performed by: FAMILY MEDICINE

## 2022-03-23 PROCEDURE — 1126F AMNT PAIN NOTED NONE PRSNT: CPT | Mod: CPTII,S$GLB,, | Performed by: FAMILY MEDICINE

## 2022-03-23 PROCEDURE — 1101F PT FALLS ASSESS-DOCD LE1/YR: CPT | Mod: CPTII,S$GLB,, | Performed by: FAMILY MEDICINE

## 2022-03-23 PROCEDURE — 1159F PR MEDICATION LIST DOCUMENTED IN MEDICAL RECORD: ICD-10-PCS | Mod: CPTII,S$GLB,, | Performed by: FAMILY MEDICINE

## 2022-03-23 PROCEDURE — 3051F HG A1C>EQUAL 7.0%<8.0%: CPT | Mod: CPTII,S$GLB,, | Performed by: FAMILY MEDICINE

## 2022-03-23 PROCEDURE — 3066F NEPHROPATHY DOC TX: CPT | Mod: CPTII,S$GLB,, | Performed by: FAMILY MEDICINE

## 2022-03-23 PROCEDURE — 3066F PR DOCUMENTATION OF TREATMENT FOR NEPHROPATHY: ICD-10-PCS | Mod: CPTII,S$GLB,, | Performed by: FAMILY MEDICINE

## 2022-03-23 PROCEDURE — 3008F PR BODY MASS INDEX (BMI) DOCUMENTED: ICD-10-PCS | Mod: CPTII,S$GLB,, | Performed by: FAMILY MEDICINE

## 2022-03-23 PROCEDURE — 99999 PR PBB SHADOW E&M-EST. PATIENT-LVL V: CPT | Mod: PBBFAC,,, | Performed by: FAMILY MEDICINE

## 2022-03-23 PROCEDURE — 3075F PR MOST RECENT SYSTOLIC BLOOD PRESS GE 130-139MM HG: ICD-10-PCS | Mod: CPTII,S$GLB,, | Performed by: FAMILY MEDICINE

## 2022-03-23 PROCEDURE — 3060F POS MICROALBUMINURIA REV: CPT | Mod: CPTII,S$GLB,, | Performed by: FAMILY MEDICINE

## 2022-03-23 PROCEDURE — 99214 OFFICE O/P EST MOD 30 MIN: CPT | Mod: S$GLB,,, | Performed by: FAMILY MEDICINE

## 2022-03-23 PROCEDURE — 3078F PR MOST RECENT DIASTOLIC BLOOD PRESSURE < 80 MM HG: ICD-10-PCS | Mod: CPTII,S$GLB,, | Performed by: FAMILY MEDICINE

## 2022-03-23 PROCEDURE — 3078F DIAST BP <80 MM HG: CPT | Mod: CPTII,S$GLB,, | Performed by: FAMILY MEDICINE

## 2022-03-23 PROCEDURE — 1126F PR PAIN SEVERITY QUANTIFIED, NO PAIN PRESENT: ICD-10-PCS | Mod: CPTII,S$GLB,, | Performed by: FAMILY MEDICINE

## 2022-03-23 PROCEDURE — 3051F PR MOST RECENT HEMOGLOBIN A1C LEVEL 7.0 - < 8.0%: ICD-10-PCS | Mod: CPTII,S$GLB,, | Performed by: FAMILY MEDICINE

## 2022-03-23 PROCEDURE — 3008F BODY MASS INDEX DOCD: CPT | Mod: CPTII,S$GLB,, | Performed by: FAMILY MEDICINE

## 2022-03-23 PROCEDURE — 3060F PR POS MICROALBUMINURIA RESULT DOCUMENTED/REVIEW: ICD-10-PCS | Mod: CPTII,S$GLB,, | Performed by: FAMILY MEDICINE

## 2022-03-23 PROCEDURE — 1101F PR PT FALLS ASSESS DOC 0-1 FALLS W/OUT INJ PAST YR: ICD-10-PCS | Mod: CPTII,S$GLB,, | Performed by: FAMILY MEDICINE

## 2022-03-23 PROCEDURE — 3288F PR FALLS RISK ASSESSMENT DOCUMENTED: ICD-10-PCS | Mod: CPTII,S$GLB,, | Performed by: FAMILY MEDICINE

## 2022-03-23 PROCEDURE — 4010F PR ACE/ARB THEARPY RXD/TAKEN: ICD-10-PCS | Mod: CPTII,S$GLB,, | Performed by: FAMILY MEDICINE

## 2022-03-23 PROCEDURE — 99214 PR OFFICE/OUTPT VISIT, EST, LEVL IV, 30-39 MIN: ICD-10-PCS | Mod: S$GLB,,, | Performed by: FAMILY MEDICINE

## 2022-03-23 PROCEDURE — 1159F MED LIST DOCD IN RCRD: CPT | Mod: CPTII,S$GLB,, | Performed by: FAMILY MEDICINE

## 2022-03-23 PROCEDURE — 80048 BASIC METABOLIC PNL TOTAL CA: CPT | Performed by: FAMILY MEDICINE

## 2022-03-23 PROCEDURE — 3288F FALL RISK ASSESSMENT DOCD: CPT | Mod: CPTII,S$GLB,, | Performed by: FAMILY MEDICINE

## 2022-03-23 PROCEDURE — 99999 PR PBB SHADOW E&M-EST. PATIENT-LVL V: ICD-10-PCS | Mod: PBBFAC,,, | Performed by: FAMILY MEDICINE

## 2022-03-23 PROCEDURE — 3075F SYST BP GE 130 - 139MM HG: CPT | Mod: CPTII,S$GLB,, | Performed by: FAMILY MEDICINE

## 2022-03-23 PROCEDURE — 36415 COLL VENOUS BLD VENIPUNCTURE: CPT | Mod: PO | Performed by: FAMILY MEDICINE

## 2022-03-23 RX ORDER — HYDROCHLOROTHIAZIDE 12.5 MG/1
12.5 TABLET ORAL DAILY
Qty: 90 TABLET | Refills: 3
Start: 2022-03-23 | End: 2022-09-28

## 2022-03-23 NOTE — PROGRESS NOTES
Subjective:       Patient ID: Tomeka Burns is a 66 y.o. female.    Chief Complaint: Follow-up, Diabetes, and Hypertension    66 years old female came to the clinic with elevated blood pressure today.  Patient was not taking hydrochlorothiazide.  No chest pain, palpitation, orthopnea or PND.  Last A1c was elevated.  No Polyuria, polydipsia or polyphagia.  Patient is requesting evaluation by podiatric medicine.  Last cholesterol was elevated.  Patient is planning to do some lifestyle changes to improve cholesterol and blood sugar.    Review of Systems   Constitutional: Negative.    HENT: Negative.    Eyes: Negative.    Respiratory: Negative.    Cardiovascular: Negative for chest pain, palpitations, leg swelling and claudication.   Gastrointestinal: Negative.    Endocrine: Negative for polydipsia, polyphagia and polyuria.   Genitourinary: Negative.    Musculoskeletal: Negative.    Neurological: Negative.    Psychiatric/Behavioral: Negative.          Objective:      Physical Exam  Constitutional:       General: She is not in acute distress.     Appearance: She is well-developed. She is not diaphoretic.   HENT:      Head: Normocephalic and atraumatic.      Right Ear: External ear normal.      Left Ear: External ear normal.      Nose: Nose normal.      Mouth/Throat:      Pharynx: No oropharyngeal exudate.   Eyes:      General: No scleral icterus.        Right eye: No discharge.         Left eye: No discharge.      Conjunctiva/sclera: Conjunctivae normal.      Pupils: Pupils are equal, round, and reactive to light.   Neck:      Thyroid: No thyromegaly.      Vascular: No JVD.      Trachea: No tracheal deviation.   Cardiovascular:      Rate and Rhythm: Normal rate and regular rhythm.      Heart sounds: Normal heart sounds. No murmur heard.    No friction rub. No gallop.   Pulmonary:      Effort: Pulmonary effort is normal. No respiratory distress.      Breath sounds: Normal breath sounds. No stridor. No wheezing or rales.    Chest:      Chest wall: No tenderness.   Abdominal:      General: Bowel sounds are normal. There is no distension.      Palpations: Abdomen is soft. There is no mass.      Tenderness: There is no abdominal tenderness. There is no guarding or rebound.   Musculoskeletal:         General: No tenderness. Normal range of motion.      Cervical back: Normal range of motion and neck supple.   Lymphadenopathy:      Cervical: No cervical adenopathy.   Skin:     General: Skin is warm and dry.      Coloration: Skin is not pale.      Findings: No erythema or rash.   Neurological:      Mental Status: She is alert and oriented to person, place, and time.      Cranial Nerves: No cranial nerve deficit.      Motor: No abnormal muscle tone.      Coordination: Coordination normal.      Deep Tendon Reflexes: Reflexes are normal and symmetric.   Psychiatric:         Behavior: Behavior normal.         Thought Content: Thought content normal.         Judgment: Judgment normal.         Assessment:       Problem List Items Addressed This Visit     Type 2 diabetes mellitus with hyperglycemia, without long-term current use of insulin    Relevant Orders    Hemoglobin A1C    Ambulatory referral/consult to Podiatry    Essential hypertension - Primary    Relevant Medications    hydroCHLOROthiazide (HYDRODIURIL) 12.5 MG Tab    Other Relevant Orders    Comprehensive Metabolic Panel    Lipid Panel    Hemoglobin A1C      Other Visit Diagnoses     Hyperkalemia        Relevant Orders    Basic Metabolic Panel          Plan:         Tomeka was seen today for follow-up, diabetes and hypertension.    Diagnoses and all orders for this visit:    Essential hypertension  -     Comprehensive Metabolic Panel; Future  -     Lipid Panel; Future  -     Hemoglobin A1C; Future  -     hydroCHLOROthiazide (HYDRODIURIL) 12.5 MG Tab; Take 1 tablet (12.5 mg total) by mouth once daily.    Type 2 diabetes mellitus with hyperglycemia, without long-term current use of  insulin  -     Hemoglobin A1C; Future  -     Ambulatory referral/consult to Podiatry; Future    Hyperkalemia  -     Basic Metabolic Panel; Future      Continue monitoring blood pressure at home, low sodium diet.  Continue monitoring blood sugar at home,ADA diet.

## 2022-03-24 ENCOUNTER — TELEPHONE (OUTPATIENT)
Dept: ADMINISTRATIVE | Facility: OTHER | Age: 67
End: 2022-03-24
Payer: MEDICARE

## 2022-03-24 ENCOUNTER — TELEPHONE (OUTPATIENT)
Dept: FAMILY MEDICINE | Facility: CLINIC | Age: 67
End: 2022-03-24
Payer: MEDICARE

## 2022-03-24 DIAGNOSIS — E11.65 TYPE 2 DIABETES MELLITUS WITH HYPERGLYCEMIA, WITHOUT LONG-TERM CURRENT USE OF INSULIN: Primary | ICD-10-CM

## 2022-03-24 NOTE — TELEPHONE ENCOUNTER
----- Message from Mona Posada MA sent at 3/24/2022  4:32 PM CDT -----  Regarding: Please advise    ----- Message -----  From: Selene Batres  Sent: 3/24/2022   4:25 PM CDT  To: [facility] Staff    Good afternoon,  I called this patient to schedule her referral to Podiatry.  When I try to schedule it declined the scheduling because the referral states that she is going for a Wound.  I told the patient that I was going to sent a message to the Department so she could be schedule, but patient told me that she does not have a wound on her feet. That she is just going to have her feet check because of her Diabetes.   Could you please ask Dr Hernández?  If patient is correct, can he please make a new referral.   Thank you!!

## 2022-03-30 ENCOUNTER — HOSPITAL ENCOUNTER (OUTPATIENT)
Dept: RADIOLOGY | Facility: HOSPITAL | Age: 67
Discharge: HOME | End: 2022-03-30
Attending: INTERNAL MEDICINE
Payer: MEDICARE

## 2022-03-30 DIAGNOSIS — E89.40 ASYMPTOMATIC POSTSURGICAL MENOPAUSE: ICD-10-CM

## 2022-03-30 PROCEDURE — 77080 DXA BONE DENSITY AXIAL: CPT | Mod: 26,,, | Performed by: RADIOLOGY

## 2022-03-30 PROCEDURE — 77080 DEXA BONE DENSITY SPINE HIP: ICD-10-PCS | Mod: 26,,, | Performed by: RADIOLOGY

## 2022-03-30 PROCEDURE — 77080 DXA BONE DENSITY AXIAL: CPT | Mod: TC

## 2022-04-06 ENCOUNTER — PATIENT MESSAGE (OUTPATIENT)
Dept: ENDOCRINOLOGY | Facility: CLINIC | Age: 67
End: 2022-04-06
Payer: MEDICARE

## 2022-04-14 ENCOUNTER — HOSPITAL ENCOUNTER (OUTPATIENT)
Dept: RADIOLOGY | Facility: HOSPITAL | Age: 67
Discharge: HOME | End: 2022-04-14
Attending: ORTHOPAEDIC SURGERY
Payer: MEDICARE

## 2022-04-14 DIAGNOSIS — M17.12 PRIMARY OSTEOARTHRITIS OF LEFT KNEE: ICD-10-CM

## 2022-04-14 PROCEDURE — 73564 X-RAY EXAM KNEE 4 OR MORE: CPT | Mod: TC,50,FY,PO

## 2022-04-21 ENCOUNTER — OFFICE VISIT (OUTPATIENT)
Dept: ORTHOPEDICS | Facility: CLINIC | Age: 67
End: 2022-04-21
Payer: MEDICARE

## 2022-04-21 VITALS — WEIGHT: 123 LBS | BODY MASS INDEX: 22.63 KG/M2 | HEIGHT: 62 IN

## 2022-04-21 DIAGNOSIS — M17.0 PRIMARY OSTEOARTHRITIS OF BOTH KNEES: Primary | ICD-10-CM

## 2022-04-21 PROCEDURE — 99999 PR PBB SHADOW E&M-EST. PATIENT-LVL III: ICD-10-PCS | Mod: PBBFAC,,, | Performed by: ORTHOPAEDIC SURGERY

## 2022-04-21 PROCEDURE — 99213 PR OFFICE/OUTPT VISIT, EST, LEVL III, 20-29 MIN: ICD-10-PCS | Mod: S$GLB,,, | Performed by: ORTHOPAEDIC SURGERY

## 2022-04-21 PROCEDURE — 3008F PR BODY MASS INDEX (BMI) DOCUMENTED: ICD-10-PCS | Mod: CPTII,S$GLB,, | Performed by: ORTHOPAEDIC SURGERY

## 2022-04-21 PROCEDURE — 1159F PR MEDICATION LIST DOCUMENTED IN MEDICAL RECORD: ICD-10-PCS | Mod: CPTII,S$GLB,, | Performed by: ORTHOPAEDIC SURGERY

## 2022-04-21 PROCEDURE — 1159F MED LIST DOCD IN RCRD: CPT | Mod: CPTII,S$GLB,, | Performed by: ORTHOPAEDIC SURGERY

## 2022-04-21 PROCEDURE — 3288F PR FALLS RISK ASSESSMENT DOCUMENTED: ICD-10-PCS | Mod: CPTII,S$GLB,, | Performed by: ORTHOPAEDIC SURGERY

## 2022-04-21 PROCEDURE — 3288F FALL RISK ASSESSMENT DOCD: CPT | Mod: CPTII,S$GLB,, | Performed by: ORTHOPAEDIC SURGERY

## 2022-04-21 PROCEDURE — 99999 PR PBB SHADOW E&M-EST. PATIENT-LVL III: CPT | Mod: PBBFAC,,, | Performed by: ORTHOPAEDIC SURGERY

## 2022-04-21 PROCEDURE — 3060F POS MICROALBUMINURIA REV: CPT | Mod: CPTII,S$GLB,, | Performed by: ORTHOPAEDIC SURGERY

## 2022-04-21 PROCEDURE — 3051F HG A1C>EQUAL 7.0%<8.0%: CPT | Mod: CPTII,S$GLB,, | Performed by: ORTHOPAEDIC SURGERY

## 2022-04-21 PROCEDURE — 1160F PR REVIEW ALL MEDS BY PRESCRIBER/CLIN PHARMACIST DOCUMENTED: ICD-10-PCS | Mod: CPTII,S$GLB,, | Performed by: ORTHOPAEDIC SURGERY

## 2022-04-21 PROCEDURE — 1101F PT FALLS ASSESS-DOCD LE1/YR: CPT | Mod: CPTII,S$GLB,, | Performed by: ORTHOPAEDIC SURGERY

## 2022-04-21 PROCEDURE — 3051F PR MOST RECENT HEMOGLOBIN A1C LEVEL 7.0 - < 8.0%: ICD-10-PCS | Mod: CPTII,S$GLB,, | Performed by: ORTHOPAEDIC SURGERY

## 2022-04-21 PROCEDURE — 1125F AMNT PAIN NOTED PAIN PRSNT: CPT | Mod: CPTII,S$GLB,, | Performed by: ORTHOPAEDIC SURGERY

## 2022-04-21 PROCEDURE — 1160F RVW MEDS BY RX/DR IN RCRD: CPT | Mod: CPTII,S$GLB,, | Performed by: ORTHOPAEDIC SURGERY

## 2022-04-21 PROCEDURE — 3008F BODY MASS INDEX DOCD: CPT | Mod: CPTII,S$GLB,, | Performed by: ORTHOPAEDIC SURGERY

## 2022-04-21 PROCEDURE — 1125F PR PAIN SEVERITY QUANTIFIED, PAIN PRESENT: ICD-10-PCS | Mod: CPTII,S$GLB,, | Performed by: ORTHOPAEDIC SURGERY

## 2022-04-21 PROCEDURE — 4010F ACE/ARB THERAPY RXD/TAKEN: CPT | Mod: CPTII,S$GLB,, | Performed by: ORTHOPAEDIC SURGERY

## 2022-04-21 PROCEDURE — 3066F NEPHROPATHY DOC TX: CPT | Mod: CPTII,S$GLB,, | Performed by: ORTHOPAEDIC SURGERY

## 2022-04-21 PROCEDURE — 4010F PR ACE/ARB THEARPY RXD/TAKEN: ICD-10-PCS | Mod: CPTII,S$GLB,, | Performed by: ORTHOPAEDIC SURGERY

## 2022-04-21 PROCEDURE — 1101F PR PT FALLS ASSESS DOC 0-1 FALLS W/OUT INJ PAST YR: ICD-10-PCS | Mod: CPTII,S$GLB,, | Performed by: ORTHOPAEDIC SURGERY

## 2022-04-21 PROCEDURE — 99213 OFFICE O/P EST LOW 20 MIN: CPT | Mod: S$GLB,,, | Performed by: ORTHOPAEDIC SURGERY

## 2022-04-21 PROCEDURE — 3066F PR DOCUMENTATION OF TREATMENT FOR NEPHROPATHY: ICD-10-PCS | Mod: CPTII,S$GLB,, | Performed by: ORTHOPAEDIC SURGERY

## 2022-04-21 PROCEDURE — 3060F PR POS MICROALBUMINURIA RESULT DOCUMENTED/REVIEW: ICD-10-PCS | Mod: CPTII,S$GLB,, | Performed by: ORTHOPAEDIC SURGERY

## 2022-04-21 NOTE — PROGRESS NOTES
Subjective:      Patient ID: Tomeka Burns is a 66 y.o. female.    Chief Complaint: Follow-up (6 wk f/up) and Knee Pain (bilateral knee )    HPI  Follow-up for osteoarthritis.  The left knee was injected last visit.  The patient reports that she is dramatically better with minimal symptoms in both knees.  She has some discomfort after working 6 hour shifts and standing all day.  She finds it tolerable and does not feel that intervention is indicated at this time.          Review of Systems   Constitutional: Negative for fever and weight loss.   HENT: Negative for congestion.    Eyes: Negative for visual disturbance.   Cardiovascular: Negative for chest pain.   Respiratory: Negative for shortness of breath.    Hematologic/Lymphatic: Negative for bleeding problem. Does not bruise/bleed easily.   Skin: Negative for poor wound healing.   Gastrointestinal: Negative for abdominal pain.   Genitourinary: Negative for dysuria.   Neurological: Negative for seizures.   Psychiatric/Behavioral: Negative for altered mental status.   Allergic/Immunologic: Negative for persistent infections.         Objective:      Ortho/SPM Exam      Left knee    The patient is not in acute distress.   Body habitus is normal.   Sclera appear normal  No respiratory distress  The patient walks without a limp.  Resisted SLR negative.   The skin over the knee is intact.  Knee effusion 0  Tendernes is located absent.  Range of motion- Flexion full, Extension full.   Ligament exam:   MCL trace laxity   Lachman intact              Post sag intact    LCL intact  Patellar apprehension negative.  Popliteal cyst negative  Patellar crepitation present  Flexion/pinch negative.  Pulses DP present, PT present.  Motor normal 5/5 strength in all tested muscle groups.   Sensory normal.    The right knee is identical    I reviewed the relevant imaging for the patient's condition:  Radiographs of both knees show 50-70% medial narrowing with extensive patellofemoral  narrowing on the left and mild on the right.        Assessment:       Encounter Diagnosis   Name Primary?    Primary osteoarthritis of both knees Yes        Although the patient has significant radiographic findings, she does not have any advanced physical findings and does not have severe pain with limitation of functional capacity.          Plan:       Tomeka was seen today for follow-up and knee pain.    Diagnoses and all orders for this visit:    Primary osteoarthritis of both knees          I explained my diagnostic impression and the reasoning behind it in detail, using layman's terms.  Models and/or pictures were used to help in the explanation.    Periodic injections are reasonable for flare ups-I explained this    I explained the potential role of surgery in the treatment of this condition to the patient.  They understand that if nonsurgical measures do not adequately control symptoms, surgery will be considered in the future.    The patient prefers to follow up p.r.n.

## 2022-05-02 ENCOUNTER — TELEPHONE (OUTPATIENT)
Dept: PODIATRY | Facility: CLINIC | Age: 67
End: 2022-05-02
Payer: MEDICARE

## 2022-05-02 NOTE — TELEPHONE ENCOUNTER
Spoke with Ms. Burns to reschedule appt with Dr. Kessler. Due to living in [place], pt accepted new appt date and time with Dr. Thompson on 6/2/22 at 8:15 am for a routine diabetic foot exam. No other needs voiced. Verbalized understanding that she would be seeing Dr. Thompson in [place]. Encouraged to call if further assistance is needed.

## 2022-05-05 ENCOUNTER — OFFICE VISIT (OUTPATIENT)
Dept: PODIATRY | Facility: CLINIC | Age: 67
End: 2022-05-05
Payer: MEDICARE

## 2022-05-05 VITALS — BODY MASS INDEX: 22.5 KG/M2 | HEIGHT: 62 IN

## 2022-05-05 DIAGNOSIS — E11.65 TYPE 2 DIABETES MELLITUS WITH HYPERGLYCEMIA, WITHOUT LONG-TERM CURRENT USE OF INSULIN: Primary | ICD-10-CM

## 2022-05-05 DIAGNOSIS — E11.9 ENCOUNTER FOR DIABETIC FOOT EXAM: ICD-10-CM

## 2022-05-05 DIAGNOSIS — E08.42 DIABETIC POLYNEUROPATHY ASSOCIATED WITH DIABETES MELLITUS DUE TO UNDERLYING CONDITION: ICD-10-CM

## 2022-05-05 PROCEDURE — 3060F POS MICROALBUMINURIA REV: CPT | Mod: CPTII,S$GLB,, | Performed by: STUDENT IN AN ORGANIZED HEALTH CARE EDUCATION/TRAINING PROGRAM

## 2022-05-05 PROCEDURE — 99999 PR PBB SHADOW E&M-EST. PATIENT-LVL III: CPT | Mod: PBBFAC,,, | Performed by: STUDENT IN AN ORGANIZED HEALTH CARE EDUCATION/TRAINING PROGRAM

## 2022-05-05 PROCEDURE — 3066F NEPHROPATHY DOC TX: CPT | Mod: CPTII,S$GLB,, | Performed by: STUDENT IN AN ORGANIZED HEALTH CARE EDUCATION/TRAINING PROGRAM

## 2022-05-05 PROCEDURE — 1126F AMNT PAIN NOTED NONE PRSNT: CPT | Mod: CPTII,S$GLB,, | Performed by: STUDENT IN AN ORGANIZED HEALTH CARE EDUCATION/TRAINING PROGRAM

## 2022-05-05 PROCEDURE — 99203 PR OFFICE/OUTPT VISIT, NEW, LEVL III, 30-44 MIN: ICD-10-PCS | Mod: S$GLB,,, | Performed by: STUDENT IN AN ORGANIZED HEALTH CARE EDUCATION/TRAINING PROGRAM

## 2022-05-05 PROCEDURE — 1159F MED LIST DOCD IN RCRD: CPT | Mod: CPTII,S$GLB,, | Performed by: STUDENT IN AN ORGANIZED HEALTH CARE EDUCATION/TRAINING PROGRAM

## 2022-05-05 PROCEDURE — 4010F ACE/ARB THERAPY RXD/TAKEN: CPT | Mod: CPTII,S$GLB,, | Performed by: STUDENT IN AN ORGANIZED HEALTH CARE EDUCATION/TRAINING PROGRAM

## 2022-05-05 PROCEDURE — 3051F PR MOST RECENT HEMOGLOBIN A1C LEVEL 7.0 - < 8.0%: ICD-10-PCS | Mod: CPTII,S$GLB,, | Performed by: STUDENT IN AN ORGANIZED HEALTH CARE EDUCATION/TRAINING PROGRAM

## 2022-05-05 PROCEDURE — 1126F PR PAIN SEVERITY QUANTIFIED, NO PAIN PRESENT: ICD-10-PCS | Mod: CPTII,S$GLB,, | Performed by: STUDENT IN AN ORGANIZED HEALTH CARE EDUCATION/TRAINING PROGRAM

## 2022-05-05 PROCEDURE — 3008F BODY MASS INDEX DOCD: CPT | Mod: CPTII,S$GLB,, | Performed by: STUDENT IN AN ORGANIZED HEALTH CARE EDUCATION/TRAINING PROGRAM

## 2022-05-05 PROCEDURE — 3066F PR DOCUMENTATION OF TREATMENT FOR NEPHROPATHY: ICD-10-PCS | Mod: CPTII,S$GLB,, | Performed by: STUDENT IN AN ORGANIZED HEALTH CARE EDUCATION/TRAINING PROGRAM

## 2022-05-05 PROCEDURE — 4010F PR ACE/ARB THEARPY RXD/TAKEN: ICD-10-PCS | Mod: CPTII,S$GLB,, | Performed by: STUDENT IN AN ORGANIZED HEALTH CARE EDUCATION/TRAINING PROGRAM

## 2022-05-05 PROCEDURE — 1159F PR MEDICATION LIST DOCUMENTED IN MEDICAL RECORD: ICD-10-PCS | Mod: CPTII,S$GLB,, | Performed by: STUDENT IN AN ORGANIZED HEALTH CARE EDUCATION/TRAINING PROGRAM

## 2022-05-05 PROCEDURE — 3051F HG A1C>EQUAL 7.0%<8.0%: CPT | Mod: CPTII,S$GLB,, | Performed by: STUDENT IN AN ORGANIZED HEALTH CARE EDUCATION/TRAINING PROGRAM

## 2022-05-05 PROCEDURE — 99203 OFFICE O/P NEW LOW 30 MIN: CPT | Mod: S$GLB,,, | Performed by: STUDENT IN AN ORGANIZED HEALTH CARE EDUCATION/TRAINING PROGRAM

## 2022-05-05 PROCEDURE — 99999 PR PBB SHADOW E&M-EST. PATIENT-LVL III: ICD-10-PCS | Mod: PBBFAC,,, | Performed by: STUDENT IN AN ORGANIZED HEALTH CARE EDUCATION/TRAINING PROGRAM

## 2022-05-05 PROCEDURE — 3008F PR BODY MASS INDEX (BMI) DOCUMENTED: ICD-10-PCS | Mod: CPTII,S$GLB,, | Performed by: STUDENT IN AN ORGANIZED HEALTH CARE EDUCATION/TRAINING PROGRAM

## 2022-05-05 PROCEDURE — 3060F PR POS MICROALBUMINURIA RESULT DOCUMENTED/REVIEW: ICD-10-PCS | Mod: CPTII,S$GLB,, | Performed by: STUDENT IN AN ORGANIZED HEALTH CARE EDUCATION/TRAINING PROGRAM

## 2022-05-05 NOTE — PROGRESS NOTES
Subjective:      Patient ID: Tomeka Burns is a 66 y.o. female.    Chief Complaint: Diabetes Mellitus (PCP Dr. Miller 3/23/22), Diabetic Foot Exam, Routine Foot Care, and Peripheral Neuropathy (Feels like needles in her toes, sometimes tingling in her feet)    Tomeka is a 66 y.o. female who presents to the clinic upon referral from    for evaluation and treatment of diabetic feet. Tomeka has a past medical history of Diabetes mellitus, type 2, Encounter for blood transfusion (1980), GERD (gastroesophageal reflux disease), Hyperlipidemia, Hypertension, Hypothyroidism, and Multiple thyroid nodules (1/21/2019). Patient relates no major problem with feet. Only complaints today consist of here for a diabetic foot exam. Relates to tingling/burning occasionally to her feet.    PCP: Andrei Miller MD    Date Last Seen by PCP:3/23/22    Current shoe gear: Casual shoes    Hemoglobin A1C   Date Value Ref Range Status   03/21/2022 7.5 (H) 4.0 - 5.6 % Final     Comment:     ADA Screening Guidelines:  5.7-6.4%  Consistent with prediabetes  >or=6.5%  Consistent with diabetes    High levels of fetal hemoglobin interfere with the HbA1C  assay. Heterozygous hemoglobin variants (HbS, HgC, etc)do  not significantly interfere with this assay.   However, presence of multiple variants may affect accuracy.     09/10/2021 6.4 (H) 4.0 - 5.6 % Final     Comment:     ADA Screening Guidelines:  5.7-6.4%  Consistent with prediabetes  >or=6.5%  Consistent with diabetes    High levels of fetal hemoglobin interfere with the HbA1C  assay. Heterozygous hemoglobin variants (HbS, HgC, etc)do  not significantly interfere with this assay.   However, presence of multiple variants may affect accuracy.     05/07/2021 6.9 (H) 4.0 - 5.6 % Final     Comment:     ADA Screening Guidelines:  5.7-6.4%  Consistent with prediabetes  >or=6.5%  Consistent with diabetes    High levels of fetal hemoglobin interfere with the HbA1C  assay. Heterozygous  hemoglobin variants (HbS, HgC, etc)do  not significantly interfere with this assay.   However, presence of multiple variants may affect accuracy.             Review of Systems   Constitutional: Negative for chills, decreased appetite, diaphoresis and fever.   HENT: Negative for congestion and hearing loss.    Cardiovascular: Negative for chest pain, claudication, leg swelling and syncope.   Respiratory: Negative for cough and shortness of breath.    Skin: Negative for color change, dry skin, flushing, itching, nail changes, poor wound healing and rash.   Musculoskeletal: Negative for arthritis, back pain, joint pain and joint swelling.   Gastrointestinal: Negative for nausea and vomiting.   Neurological: Positive for numbness. Negative for focal weakness, paresthesias and weakness.   Psychiatric/Behavioral: Negative for altered mental status. The patient is not nervous/anxious.            Objective:      Physical Exam  Constitutional:       General: She is not in acute distress.     Appearance: She is well-developed. She is not diaphoretic.   Cardiovascular:      Comments: Dorsalis pedis and posterior tibial pulses are within normal limits. Skin temperature is within normal limits. Toes are cool to touch and feet are warm proximally. Hair growth is within normal limits. Skin is normotrophic and without hyperpigmentation. No edema noted. No spider veins or varicosities noted, bilaterally.       Musculoskeletal:         General: No tenderness.      Comments: Adequate joint range of motion without pain, limitation, nor crepitation to bilateral feet and ankle joints. Muscle strength is 5/5 in all groups bilaterally.    Pes cavus foot type, bilaterally    Lymphadenopathy:      Comments: Negative lymphangitic streaking    Skin:     General: Skin is warm and dry.      Findings: No lesion.      Comments: Skin is warm and dry, no acute signs of infection noted. No open wounds, macerations or hyperkeratotic lesions,  bilaterally.     Toenails are well trimmed and of normal morphology, bilaterally.      Neurological:      Mental Status: She is alert and oriented to person, place, and time.      Sensory: No sensory deficit.      Motor: No abnormal muscle tone.      Comments: Light touch within normal limits. Protem-Gregory 5.07 monofilamant testing is within normal limits. Vibratory sensation within normal limits, bilaterally.      Psychiatric:         Behavior: Behavior normal.         Thought Content: Thought content normal.         Judgment: Judgment normal.               Assessment:       Encounter Diagnoses   Name Primary?    Type 2 diabetes mellitus with hyperglycemia, without long-term current use of insulin Yes    Diabetic polyneuropathy associated with diabetes mellitus due to underlying condition     Encounter for diabetic foot exam          Plan:       Tomeka was seen today for diabetes mellitus, diabetic foot exam, routine foot care and peripheral neuropathy.    Diagnoses and all orders for this visit:    Type 2 diabetes mellitus with hyperglycemia, without long-term current use of insulin  -     Ambulatory referral/consult to Podiatry    Diabetic polyneuropathy associated with diabetes mellitus due to underlying condition    Encounter for diabetic foot exam      I counseled the patient on her conditions, their implications and medical management.    Recommend supportive and protective tennis shoes or otc diabetic shoes at all times while ambulating. She declines rx diabetic shoes.     Shoe inspection. Diabetic Foot Education. Patient reminded of the importance of good nutrition and blood sugar control to help prevent podiatric complications of diabetes. Patient instructed on proper foot hygeine. We discussed wearing proper shoe gear, daily foot inspections, never walking without protective shoe gear, never putting sharp instruments to feet, routine podiatric nail visits      RTC in 1 year, sooner PRN

## 2022-06-20 ENCOUNTER — LAB VISIT (OUTPATIENT)
Dept: LAB | Facility: HOSPITAL | Age: 67
End: 2022-06-20
Attending: FAMILY MEDICINE
Payer: MEDICARE

## 2022-06-20 DIAGNOSIS — I10 ESSENTIAL HYPERTENSION: ICD-10-CM

## 2022-06-20 DIAGNOSIS — E11.65 TYPE 2 DIABETES MELLITUS WITH HYPERGLYCEMIA, WITHOUT LONG-TERM CURRENT USE OF INSULIN: ICD-10-CM

## 2022-06-20 LAB
ALBUMIN SERPL BCP-MCNC: 4.5 G/DL (ref 3.5–5.2)
ALP SERPL-CCNC: 93 U/L (ref 38–126)
ALT SERPL W/O P-5'-P-CCNC: 33 U/L (ref 10–44)
ANION GAP SERPL CALC-SCNC: 8 MMOL/L (ref 8–16)
AST SERPL-CCNC: 32 U/L (ref 15–46)
BILIRUB SERPL-MCNC: 0.5 MG/DL (ref 0.1–1)
CALCIUM SERPL-MCNC: 8.7 MG/DL (ref 8.7–10.5)
CHLORIDE SERPL-SCNC: 98 MMOL/L (ref 95–110)
CHOLEST SERPL-MCNC: 157 MG/DL (ref 120–199)
CHOLEST/HDLC SERPL: 3.5 {RATIO} (ref 2–5)
CO2 SERPL-SCNC: 27 MMOL/L (ref 23–29)
CREAT SERPL-MCNC: 0.91 MG/DL (ref 0.5–1.4)
EST. GFR  (AFRICAN AMERICAN): >60 ML/MIN/1.73 M^2
EST. GFR  (NON AFRICAN AMERICAN): >60 ML/MIN/1.73 M^2
ESTIMATED AVG GLUCOSE: 171 MG/DL (ref 68–131)
GLUCOSE SERPL-MCNC: 138 MG/DL (ref 70–110)
HBA1C MFR BLD: 7.6 % (ref 4–5.6)
HDLC SERPL-MCNC: 45 MG/DL (ref 40–75)
HDLC SERPL: 28.7 % (ref 20–50)
LDLC SERPL CALC-MCNC: 76.6 MG/DL (ref 63–159)
NONHDLC SERPL-MCNC: 112 MG/DL
POTASSIUM SERPL-SCNC: 4.5 MMOL/L (ref 3.5–5.1)
PROT SERPL-MCNC: 7.5 G/DL (ref 6–8.4)
SODIUM SERPL-SCNC: 133 MMOL/L (ref 136–145)
TRIGL SERPL-MCNC: 177 MG/DL (ref 30–150)
UUN UR-MCNC: 18 MG/DL (ref 7–17)

## 2022-06-20 PROCEDURE — 80061 LIPID PANEL: CPT | Performed by: FAMILY MEDICINE

## 2022-06-20 PROCEDURE — 83036 HEMOGLOBIN GLYCOSYLATED A1C: CPT | Performed by: FAMILY MEDICINE

## 2022-06-20 PROCEDURE — 80053 COMPREHEN METABOLIC PANEL: CPT | Mod: PO | Performed by: FAMILY MEDICINE

## 2022-06-20 PROCEDURE — 36415 COLL VENOUS BLD VENIPUNCTURE: CPT | Mod: PO | Performed by: FAMILY MEDICINE

## 2022-06-30 ENCOUNTER — OFFICE VISIT (OUTPATIENT)
Dept: FAMILY MEDICINE | Facility: CLINIC | Age: 67
End: 2022-06-30
Payer: MEDICARE

## 2022-06-30 VITALS
HEART RATE: 100 BPM | BODY MASS INDEX: 23.61 KG/M2 | DIASTOLIC BLOOD PRESSURE: 72 MMHG | SYSTOLIC BLOOD PRESSURE: 132 MMHG | HEIGHT: 62 IN | OXYGEN SATURATION: 98 % | WEIGHT: 128.31 LBS

## 2022-06-30 DIAGNOSIS — I10 ESSENTIAL HYPERTENSION: Primary | ICD-10-CM

## 2022-06-30 DIAGNOSIS — E11.65 TYPE 2 DIABETES MELLITUS WITH HYPERGLYCEMIA, WITHOUT LONG-TERM CURRENT USE OF INSULIN: ICD-10-CM

## 2022-06-30 DIAGNOSIS — Z12.31 ENCOUNTER FOR SCREENING MAMMOGRAM FOR BREAST CANCER: ICD-10-CM

## 2022-06-30 PROCEDURE — 1125F PR PAIN SEVERITY QUANTIFIED, PAIN PRESENT: ICD-10-PCS | Mod: CPTII,S$GLB,, | Performed by: FAMILY MEDICINE

## 2022-06-30 PROCEDURE — 3075F SYST BP GE 130 - 139MM HG: CPT | Mod: CPTII,S$GLB,, | Performed by: FAMILY MEDICINE

## 2022-06-30 PROCEDURE — 3075F PR MOST RECENT SYSTOLIC BLOOD PRESS GE 130-139MM HG: ICD-10-PCS | Mod: CPTII,S$GLB,, | Performed by: FAMILY MEDICINE

## 2022-06-30 PROCEDURE — 3066F NEPHROPATHY DOC TX: CPT | Mod: CPTII,S$GLB,, | Performed by: FAMILY MEDICINE

## 2022-06-30 PROCEDURE — 99999 PR PBB SHADOW E&M-EST. PATIENT-LVL IV: ICD-10-PCS | Mod: PBBFAC,,, | Performed by: FAMILY MEDICINE

## 2022-06-30 PROCEDURE — 1159F MED LIST DOCD IN RCRD: CPT | Mod: CPTII,S$GLB,, | Performed by: FAMILY MEDICINE

## 2022-06-30 PROCEDURE — 3060F PR POS MICROALBUMINURIA RESULT DOCUMENTED/REVIEW: ICD-10-PCS | Mod: CPTII,S$GLB,, | Performed by: FAMILY MEDICINE

## 2022-06-30 PROCEDURE — 3078F DIAST BP <80 MM HG: CPT | Mod: CPTII,S$GLB,, | Performed by: FAMILY MEDICINE

## 2022-06-30 PROCEDURE — 3060F POS MICROALBUMINURIA REV: CPT | Mod: CPTII,S$GLB,, | Performed by: FAMILY MEDICINE

## 2022-06-30 PROCEDURE — 3051F PR MOST RECENT HEMOGLOBIN A1C LEVEL 7.0 - < 8.0%: ICD-10-PCS | Mod: CPTII,S$GLB,, | Performed by: FAMILY MEDICINE

## 2022-06-30 PROCEDURE — 4010F PR ACE/ARB THEARPY RXD/TAKEN: ICD-10-PCS | Mod: CPTII,S$GLB,, | Performed by: FAMILY MEDICINE

## 2022-06-30 PROCEDURE — 1101F PR PT FALLS ASSESS DOC 0-1 FALLS W/OUT INJ PAST YR: ICD-10-PCS | Mod: CPTII,S$GLB,, | Performed by: FAMILY MEDICINE

## 2022-06-30 PROCEDURE — 1159F PR MEDICATION LIST DOCUMENTED IN MEDICAL RECORD: ICD-10-PCS | Mod: CPTII,S$GLB,, | Performed by: FAMILY MEDICINE

## 2022-06-30 PROCEDURE — 3288F FALL RISK ASSESSMENT DOCD: CPT | Mod: CPTII,S$GLB,, | Performed by: FAMILY MEDICINE

## 2022-06-30 PROCEDURE — 99214 PR OFFICE/OUTPT VISIT, EST, LEVL IV, 30-39 MIN: ICD-10-PCS | Mod: S$GLB,,, | Performed by: FAMILY MEDICINE

## 2022-06-30 PROCEDURE — 99214 OFFICE O/P EST MOD 30 MIN: CPT | Mod: S$GLB,,, | Performed by: FAMILY MEDICINE

## 2022-06-30 PROCEDURE — 1125F AMNT PAIN NOTED PAIN PRSNT: CPT | Mod: CPTII,S$GLB,, | Performed by: FAMILY MEDICINE

## 2022-06-30 PROCEDURE — 3078F PR MOST RECENT DIASTOLIC BLOOD PRESSURE < 80 MM HG: ICD-10-PCS | Mod: CPTII,S$GLB,, | Performed by: FAMILY MEDICINE

## 2022-06-30 PROCEDURE — 1160F PR REVIEW ALL MEDS BY PRESCRIBER/CLIN PHARMACIST DOCUMENTED: ICD-10-PCS | Mod: CPTII,S$GLB,, | Performed by: FAMILY MEDICINE

## 2022-06-30 PROCEDURE — 3051F HG A1C>EQUAL 7.0%<8.0%: CPT | Mod: CPTII,S$GLB,, | Performed by: FAMILY MEDICINE

## 2022-06-30 PROCEDURE — 99999 PR PBB SHADOW E&M-EST. PATIENT-LVL IV: CPT | Mod: PBBFAC,,, | Performed by: FAMILY MEDICINE

## 2022-06-30 PROCEDURE — 3288F PR FALLS RISK ASSESSMENT DOCUMENTED: ICD-10-PCS | Mod: CPTII,S$GLB,, | Performed by: FAMILY MEDICINE

## 2022-06-30 PROCEDURE — 3008F BODY MASS INDEX DOCD: CPT | Mod: CPTII,S$GLB,, | Performed by: FAMILY MEDICINE

## 2022-06-30 PROCEDURE — 1160F RVW MEDS BY RX/DR IN RCRD: CPT | Mod: CPTII,S$GLB,, | Performed by: FAMILY MEDICINE

## 2022-06-30 PROCEDURE — 1101F PT FALLS ASSESS-DOCD LE1/YR: CPT | Mod: CPTII,S$GLB,, | Performed by: FAMILY MEDICINE

## 2022-06-30 PROCEDURE — 3008F PR BODY MASS INDEX (BMI) DOCUMENTED: ICD-10-PCS | Mod: CPTII,S$GLB,, | Performed by: FAMILY MEDICINE

## 2022-06-30 PROCEDURE — 4010F ACE/ARB THERAPY RXD/TAKEN: CPT | Mod: CPTII,S$GLB,, | Performed by: FAMILY MEDICINE

## 2022-06-30 PROCEDURE — 3066F PR DOCUMENTATION OF TREATMENT FOR NEPHROPATHY: ICD-10-PCS | Mod: CPTII,S$GLB,, | Performed by: FAMILY MEDICINE

## 2022-08-05 ENCOUNTER — OFFICE VISIT (OUTPATIENT)
Dept: ORTHOPEDICS | Facility: CLINIC | Age: 67
End: 2022-08-05
Payer: MEDICARE

## 2022-08-05 VITALS — WEIGHT: 128 LBS | BODY MASS INDEX: 23.55 KG/M2 | HEIGHT: 62 IN

## 2022-08-05 DIAGNOSIS — M17.12 PRIMARY OSTEOARTHRITIS OF LEFT KNEE: Primary | ICD-10-CM

## 2022-08-05 PROCEDURE — 3051F HG A1C>EQUAL 7.0%<8.0%: CPT | Mod: CPTII,S$GLB,, | Performed by: ORTHOPAEDIC SURGERY

## 2022-08-05 PROCEDURE — 1101F PT FALLS ASSESS-DOCD LE1/YR: CPT | Mod: CPTII,S$GLB,, | Performed by: ORTHOPAEDIC SURGERY

## 2022-08-05 PROCEDURE — 99213 OFFICE O/P EST LOW 20 MIN: CPT | Mod: 25,S$GLB,, | Performed by: ORTHOPAEDIC SURGERY

## 2022-08-05 PROCEDURE — 3060F POS MICROALBUMINURIA REV: CPT | Mod: CPTII,S$GLB,, | Performed by: ORTHOPAEDIC SURGERY

## 2022-08-05 PROCEDURE — 3008F BODY MASS INDEX DOCD: CPT | Mod: CPTII,S$GLB,, | Performed by: ORTHOPAEDIC SURGERY

## 2022-08-05 PROCEDURE — 1159F PR MEDICATION LIST DOCUMENTED IN MEDICAL RECORD: ICD-10-PCS | Mod: CPTII,S$GLB,, | Performed by: ORTHOPAEDIC SURGERY

## 2022-08-05 PROCEDURE — 3051F PR MOST RECENT HEMOGLOBIN A1C LEVEL 7.0 - < 8.0%: ICD-10-PCS | Mod: CPTII,S$GLB,, | Performed by: ORTHOPAEDIC SURGERY

## 2022-08-05 PROCEDURE — 3288F PR FALLS RISK ASSESSMENT DOCUMENTED: ICD-10-PCS | Mod: CPTII,S$GLB,, | Performed by: ORTHOPAEDIC SURGERY

## 2022-08-05 PROCEDURE — 20610 LARGE JOINT ASPIRATION/INJECTION: L KNEE: ICD-10-PCS | Mod: LT,S$GLB,, | Performed by: ORTHOPAEDIC SURGERY

## 2022-08-05 PROCEDURE — 4010F PR ACE/ARB THEARPY RXD/TAKEN: ICD-10-PCS | Mod: CPTII,S$GLB,, | Performed by: ORTHOPAEDIC SURGERY

## 2022-08-05 PROCEDURE — 3008F PR BODY MASS INDEX (BMI) DOCUMENTED: ICD-10-PCS | Mod: CPTII,S$GLB,, | Performed by: ORTHOPAEDIC SURGERY

## 2022-08-05 PROCEDURE — 1160F RVW MEDS BY RX/DR IN RCRD: CPT | Mod: CPTII,S$GLB,, | Performed by: ORTHOPAEDIC SURGERY

## 2022-08-05 PROCEDURE — 1160F PR REVIEW ALL MEDS BY PRESCRIBER/CLIN PHARMACIST DOCUMENTED: ICD-10-PCS | Mod: CPTII,S$GLB,, | Performed by: ORTHOPAEDIC SURGERY

## 2022-08-05 PROCEDURE — 3066F PR DOCUMENTATION OF TREATMENT FOR NEPHROPATHY: ICD-10-PCS | Mod: CPTII,S$GLB,, | Performed by: ORTHOPAEDIC SURGERY

## 2022-08-05 PROCEDURE — 1159F MED LIST DOCD IN RCRD: CPT | Mod: CPTII,S$GLB,, | Performed by: ORTHOPAEDIC SURGERY

## 2022-08-05 PROCEDURE — 3288F FALL RISK ASSESSMENT DOCD: CPT | Mod: CPTII,S$GLB,, | Performed by: ORTHOPAEDIC SURGERY

## 2022-08-05 PROCEDURE — 1125F AMNT PAIN NOTED PAIN PRSNT: CPT | Mod: CPTII,S$GLB,, | Performed by: ORTHOPAEDIC SURGERY

## 2022-08-05 PROCEDURE — 3060F PR POS MICROALBUMINURIA RESULT DOCUMENTED/REVIEW: ICD-10-PCS | Mod: CPTII,S$GLB,, | Performed by: ORTHOPAEDIC SURGERY

## 2022-08-05 PROCEDURE — 99999 PR PBB SHADOW E&M-EST. PATIENT-LVL III: ICD-10-PCS | Mod: PBBFAC,,, | Performed by: ORTHOPAEDIC SURGERY

## 2022-08-05 PROCEDURE — 4010F ACE/ARB THERAPY RXD/TAKEN: CPT | Mod: CPTII,S$GLB,, | Performed by: ORTHOPAEDIC SURGERY

## 2022-08-05 PROCEDURE — 1101F PR PT FALLS ASSESS DOC 0-1 FALLS W/OUT INJ PAST YR: ICD-10-PCS | Mod: CPTII,S$GLB,, | Performed by: ORTHOPAEDIC SURGERY

## 2022-08-05 PROCEDURE — 99999 PR PBB SHADOW E&M-EST. PATIENT-LVL III: CPT | Mod: PBBFAC,,, | Performed by: ORTHOPAEDIC SURGERY

## 2022-08-05 PROCEDURE — 1125F PR PAIN SEVERITY QUANTIFIED, PAIN PRESENT: ICD-10-PCS | Mod: CPTII,S$GLB,, | Performed by: ORTHOPAEDIC SURGERY

## 2022-08-05 PROCEDURE — 3066F NEPHROPATHY DOC TX: CPT | Mod: CPTII,S$GLB,, | Performed by: ORTHOPAEDIC SURGERY

## 2022-08-05 PROCEDURE — 99213 PR OFFICE/OUTPT VISIT, EST, LEVL III, 20-29 MIN: ICD-10-PCS | Mod: 25,S$GLB,, | Performed by: ORTHOPAEDIC SURGERY

## 2022-08-05 PROCEDURE — 20610 DRAIN/INJ JOINT/BURSA W/O US: CPT | Mod: LT,S$GLB,, | Performed by: ORTHOPAEDIC SURGERY

## 2022-08-05 RX ORDER — TRIAMCINOLONE ACETONIDE 40 MG/ML
40 INJECTION, SUSPENSION INTRA-ARTICULAR; INTRAMUSCULAR
Status: DISCONTINUED | OUTPATIENT
Start: 2022-08-05 | End: 2022-08-05 | Stop reason: HOSPADM

## 2022-08-05 RX ADMIN — TRIAMCINOLONE ACETONIDE 40 MG: 40 INJECTION, SUSPENSION INTRA-ARTICULAR; INTRAMUSCULAR at 03:08

## 2022-08-05 NOTE — PROGRESS NOTES
Subjective:      Patient ID: Tomeka Burns is a 66 y.o. female.    Chief Complaint: Follow-up of the Left Knee    HPI  Follow-up for osteoarthritis.  The patient reports 5 months of good relief with the last injection.  She complains of recent increasing left knee pain, aggravated by walking.  She uses a brace which she finds helpful.            Review of Systems   Constitutional: Negative for fever and weight loss.   HENT: Negative for congestion.    Eyes: Negative for visual disturbance.   Cardiovascular: Negative for chest pain.   Respiratory: Negative for shortness of breath.    Hematologic/Lymphatic: Negative for bleeding problem. Does not bruise/bleed easily.   Skin: Negative for poor wound healing.   Musculoskeletal: Positive for joint pain.   Gastrointestinal: Negative for abdominal pain.   Genitourinary: Negative for dysuria.   Neurological: Negative for seizures.   Psychiatric/Behavioral: Negative for altered mental status.   Allergic/Immunologic: Negative for persistent infections.         Objective:      Ortho/SPM Exam      Left knee     The patient is not in acute distress.   Body habitus is normal.   Sclera appear normal  No respiratory distress  The patient walks without a limp.  Resisted SLR negative.   The skin over the knee is intact.  Knee effusion 0  Tendernes is located absent.  Range of motion- Flexion full, Extension full.   Ligament exam:              MCL trace laxity              Lachman intact              Post sag intact              LCL intact  Patellar apprehension negative.  Popliteal cyst negative  Patellar crepitation present  Flexion/pinch negative.  Pulses DP present, PT present.  Motor normal 5/5 strength in all tested muscle groups.   Sensory normal.      Previous radiographs show advanced DJD of the left knee        Assessment:       No diagnosis found.     Despite advanced radiographic findings, the patient's symptoms are controlled with periodic injections for now.  Progression  over time is likely.          Plan:       There are no diagnoses linked to this encounter.      I explained my diagnostic impression and the reasoning behind it in detail, using layman's terms.  Models and/or pictures were used to help in the explanation.    Injection requested consent given    I explained the potential role of surgery in the treatment of this condition to the patient.  They understand that if nonsurgical measures do not adequately control symptoms, surgery will be considered in the future.    X-ray next visit

## 2022-08-05 NOTE — PROCEDURES
Large Joint Aspiration/Injection: L knee    Date/Time: 8/5/2022 3:30 PM  Performed by: Fernie Moeller MD  Authorized by: Fernie Moeller MD     Location:  Knee  Site:  L knee  Medications:  40 mg triamcinolone acetonide 40 mg/mL     After obtaining verbal informed consent the patient's left knee was prepped aseptically and injected through an inferior lateral approach using 40 mg of triamcinolone and 1 cc of 1% plain Xylocaine.  The patient was warned about postinjection flare and how to manage it with ice, rest and over-the-counter analgesics.  They're advised to contact me for any severe, uncontrolled pain.

## 2022-08-12 ENCOUNTER — HOSPITAL ENCOUNTER (OUTPATIENT)
Dept: RADIOLOGY | Facility: HOSPITAL | Age: 67
Discharge: HOME | End: 2022-08-12
Attending: FAMILY MEDICINE
Payer: MEDICARE

## 2022-08-12 DIAGNOSIS — Z12.31 ENCOUNTER FOR SCREENING MAMMOGRAM FOR BREAST CANCER: ICD-10-CM

## 2022-08-12 PROCEDURE — 77067 SCR MAMMO BI INCL CAD: CPT | Mod: TC,PO

## 2022-08-12 PROCEDURE — 77063 BREAST TOMOSYNTHESIS BI: CPT | Mod: TC,PO

## 2022-09-26 ENCOUNTER — LAB VISIT (OUTPATIENT)
Dept: LAB | Facility: HOSPITAL | Age: 67
End: 2022-09-26
Attending: FAMILY MEDICINE
Payer: MEDICARE

## 2022-09-26 DIAGNOSIS — E11.65 TYPE 2 DIABETES MELLITUS WITH HYPERGLYCEMIA, WITHOUT LONG-TERM CURRENT USE OF INSULIN: ICD-10-CM

## 2022-09-26 LAB
ALBUMIN/CREAT UR: 32.6 UG/MG (ref 0–30)
CREAT UR-MCNC: 46 MG/DL (ref 15–325)
MICROALBUMIN UR DL<=1MG/L-MCNC: 15 UG/ML

## 2022-09-26 PROCEDURE — 82043 UR ALBUMIN QUANTITATIVE: CPT | Mod: PO | Performed by: FAMILY MEDICINE

## 2022-09-26 PROCEDURE — 82570 ASSAY OF URINE CREATININE: CPT | Mod: PO | Performed by: FAMILY MEDICINE

## 2022-09-28 ENCOUNTER — OFFICE VISIT (OUTPATIENT)
Dept: FAMILY MEDICINE | Facility: CLINIC | Age: 67
End: 2022-09-28
Payer: MEDICARE

## 2022-09-28 VITALS
OXYGEN SATURATION: 98 % | HEART RATE: 79 BPM | DIASTOLIC BLOOD PRESSURE: 68 MMHG | HEIGHT: 62 IN | WEIGHT: 126.75 LBS | SYSTOLIC BLOOD PRESSURE: 128 MMHG | BODY MASS INDEX: 23.32 KG/M2

## 2022-09-28 DIAGNOSIS — E11.65 TYPE 2 DIABETES MELLITUS WITH HYPERGLYCEMIA, WITHOUT LONG-TERM CURRENT USE OF INSULIN: ICD-10-CM

## 2022-09-28 DIAGNOSIS — M17.12 PRIMARY OSTEOARTHRITIS OF LEFT KNEE: Primary | ICD-10-CM

## 2022-09-28 DIAGNOSIS — I10 ESSENTIAL HYPERTENSION: ICD-10-CM

## 2022-09-28 PROCEDURE — 3074F SYST BP LT 130 MM HG: CPT | Mod: CPTII,S$GLB,, | Performed by: FAMILY MEDICINE

## 2022-09-28 PROCEDURE — 3066F NEPHROPATHY DOC TX: CPT | Mod: CPTII,S$GLB,, | Performed by: FAMILY MEDICINE

## 2022-09-28 PROCEDURE — 99215 OFFICE O/P EST HI 40 MIN: CPT | Mod: S$GLB,,, | Performed by: FAMILY MEDICINE

## 2022-09-28 PROCEDURE — 3008F BODY MASS INDEX DOCD: CPT | Mod: CPTII,S$GLB,, | Performed by: FAMILY MEDICINE

## 2022-09-28 PROCEDURE — 3078F PR MOST RECENT DIASTOLIC BLOOD PRESSURE < 80 MM HG: ICD-10-PCS | Mod: CPTII,S$GLB,, | Performed by: FAMILY MEDICINE

## 2022-09-28 PROCEDURE — 1101F PR PT FALLS ASSESS DOC 0-1 FALLS W/OUT INJ PAST YR: ICD-10-PCS | Mod: CPTII,S$GLB,, | Performed by: FAMILY MEDICINE

## 2022-09-28 PROCEDURE — 3044F HG A1C LEVEL LT 7.0%: CPT | Mod: CPTII,S$GLB,, | Performed by: FAMILY MEDICINE

## 2022-09-28 PROCEDURE — 1160F PR REVIEW ALL MEDS BY PRESCRIBER/CLIN PHARMACIST DOCUMENTED: ICD-10-PCS | Mod: CPTII,S$GLB,, | Performed by: FAMILY MEDICINE

## 2022-09-28 PROCEDURE — 1160F RVW MEDS BY RX/DR IN RCRD: CPT | Mod: CPTII,S$GLB,, | Performed by: FAMILY MEDICINE

## 2022-09-28 PROCEDURE — 3074F PR MOST RECENT SYSTOLIC BLOOD PRESSURE < 130 MM HG: ICD-10-PCS | Mod: CPTII,S$GLB,, | Performed by: FAMILY MEDICINE

## 2022-09-28 PROCEDURE — 99215 PR OFFICE/OUTPT VISIT, EST, LEVL V, 40-54 MIN: ICD-10-PCS | Mod: S$GLB,,, | Performed by: FAMILY MEDICINE

## 2022-09-28 PROCEDURE — 3060F PR POS MICROALBUMINURIA RESULT DOCUMENTED/REVIEW: ICD-10-PCS | Mod: CPTII,S$GLB,, | Performed by: FAMILY MEDICINE

## 2022-09-28 PROCEDURE — 3060F POS MICROALBUMINURIA REV: CPT | Mod: CPTII,S$GLB,, | Performed by: FAMILY MEDICINE

## 2022-09-28 PROCEDURE — 3066F PR DOCUMENTATION OF TREATMENT FOR NEPHROPATHY: ICD-10-PCS | Mod: CPTII,S$GLB,, | Performed by: FAMILY MEDICINE

## 2022-09-28 PROCEDURE — 3288F FALL RISK ASSESSMENT DOCD: CPT | Mod: CPTII,S$GLB,, | Performed by: FAMILY MEDICINE

## 2022-09-28 PROCEDURE — 1101F PT FALLS ASSESS-DOCD LE1/YR: CPT | Mod: CPTII,S$GLB,, | Performed by: FAMILY MEDICINE

## 2022-09-28 PROCEDURE — 3288F PR FALLS RISK ASSESSMENT DOCUMENTED: ICD-10-PCS | Mod: CPTII,S$GLB,, | Performed by: FAMILY MEDICINE

## 2022-09-28 PROCEDURE — 3044F PR MOST RECENT HEMOGLOBIN A1C LEVEL <7.0%: ICD-10-PCS | Mod: CPTII,S$GLB,, | Performed by: FAMILY MEDICINE

## 2022-09-28 PROCEDURE — 4010F ACE/ARB THERAPY RXD/TAKEN: CPT | Mod: CPTII,S$GLB,, | Performed by: FAMILY MEDICINE

## 2022-09-28 PROCEDURE — 1125F AMNT PAIN NOTED PAIN PRSNT: CPT | Mod: CPTII,S$GLB,, | Performed by: FAMILY MEDICINE

## 2022-09-28 PROCEDURE — 99999 PR PBB SHADOW E&M-EST. PATIENT-LVL IV: CPT | Mod: PBBFAC,,, | Performed by: FAMILY MEDICINE

## 2022-09-28 PROCEDURE — 1125F PR PAIN SEVERITY QUANTIFIED, PAIN PRESENT: ICD-10-PCS | Mod: CPTII,S$GLB,, | Performed by: FAMILY MEDICINE

## 2022-09-28 PROCEDURE — 3008F PR BODY MASS INDEX (BMI) DOCUMENTED: ICD-10-PCS | Mod: CPTII,S$GLB,, | Performed by: FAMILY MEDICINE

## 2022-09-28 PROCEDURE — 99999 PR PBB SHADOW E&M-EST. PATIENT-LVL IV: ICD-10-PCS | Mod: PBBFAC,,, | Performed by: FAMILY MEDICINE

## 2022-09-28 PROCEDURE — 3078F DIAST BP <80 MM HG: CPT | Mod: CPTII,S$GLB,, | Performed by: FAMILY MEDICINE

## 2022-09-28 PROCEDURE — 1159F MED LIST DOCD IN RCRD: CPT | Mod: CPTII,S$GLB,, | Performed by: FAMILY MEDICINE

## 2022-09-28 PROCEDURE — 4010F PR ACE/ARB THEARPY RXD/TAKEN: ICD-10-PCS | Mod: CPTII,S$GLB,, | Performed by: FAMILY MEDICINE

## 2022-09-28 PROCEDURE — 1159F PR MEDICATION LIST DOCUMENTED IN MEDICAL RECORD: ICD-10-PCS | Mod: CPTII,S$GLB,, | Performed by: FAMILY MEDICINE

## 2022-09-28 RX ORDER — CELECOXIB 100 MG/1
100 CAPSULE ORAL 2 TIMES DAILY PRN
Qty: 60 CAPSULE | Refills: 0 | Status: SHIPPED | OUTPATIENT
Start: 2022-09-28

## 2022-11-05 ENCOUNTER — HOSPITAL ENCOUNTER (OUTPATIENT)
Dept: RADIOLOGY | Facility: HOSPITAL | Age: 67
Discharge: HOME | End: 2022-11-05
Attending: ORTHOPAEDIC SURGERY
Payer: MEDICARE

## 2022-11-05 DIAGNOSIS — M17.12 PRIMARY OSTEOARTHRITIS OF LEFT KNEE: ICD-10-CM

## 2022-11-05 PROCEDURE — 73564 XR KNEE ORTHO BILAT WITH FLEXION: ICD-10-PCS | Mod: 26,50,, | Performed by: RADIOLOGY

## 2022-11-05 PROCEDURE — 73564 X-RAY EXAM KNEE 4 OR MORE: CPT | Mod: 26,50,, | Performed by: RADIOLOGY

## 2022-11-05 PROCEDURE — 73564 X-RAY EXAM KNEE 4 OR MORE: CPT | Mod: TC,50,FY

## 2022-11-10 ENCOUNTER — OFFICE VISIT (OUTPATIENT)
Dept: ORTHOPEDICS | Facility: CLINIC | Age: 67
End: 2022-11-10
Payer: MEDICARE

## 2022-11-10 VITALS — HEIGHT: 62 IN | WEIGHT: 126.75 LBS | BODY MASS INDEX: 23.32 KG/M2

## 2022-11-10 DIAGNOSIS — M17.12 PRIMARY OSTEOARTHRITIS OF LEFT KNEE: Primary | ICD-10-CM

## 2022-11-10 PROCEDURE — 99213 PR OFFICE/OUTPT VISIT, EST, LEVL III, 20-29 MIN: ICD-10-PCS | Mod: S$GLB,,, | Performed by: ORTHOPAEDIC SURGERY

## 2022-11-10 PROCEDURE — 3008F BODY MASS INDEX DOCD: CPT | Mod: CPTII,S$GLB,, | Performed by: ORTHOPAEDIC SURGERY

## 2022-11-10 PROCEDURE — 1159F PR MEDICATION LIST DOCUMENTED IN MEDICAL RECORD: ICD-10-PCS | Mod: CPTII,S$GLB,, | Performed by: ORTHOPAEDIC SURGERY

## 2022-11-10 PROCEDURE — 1159F MED LIST DOCD IN RCRD: CPT | Mod: CPTII,S$GLB,, | Performed by: ORTHOPAEDIC SURGERY

## 2022-11-10 PROCEDURE — 1101F PT FALLS ASSESS-DOCD LE1/YR: CPT | Mod: CPTII,S$GLB,, | Performed by: ORTHOPAEDIC SURGERY

## 2022-11-10 PROCEDURE — 99213 OFFICE O/P EST LOW 20 MIN: CPT | Mod: S$GLB,,, | Performed by: ORTHOPAEDIC SURGERY

## 2022-11-10 PROCEDURE — 3060F POS MICROALBUMINURIA REV: CPT | Mod: CPTII,S$GLB,, | Performed by: ORTHOPAEDIC SURGERY

## 2022-11-10 PROCEDURE — 1126F PR PAIN SEVERITY QUANTIFIED, NO PAIN PRESENT: ICD-10-PCS | Mod: CPTII,S$GLB,, | Performed by: ORTHOPAEDIC SURGERY

## 2022-11-10 PROCEDURE — 1160F RVW MEDS BY RX/DR IN RCRD: CPT | Mod: CPTII,S$GLB,, | Performed by: ORTHOPAEDIC SURGERY

## 2022-11-10 PROCEDURE — 3044F PR MOST RECENT HEMOGLOBIN A1C LEVEL <7.0%: ICD-10-PCS | Mod: CPTII,S$GLB,, | Performed by: ORTHOPAEDIC SURGERY

## 2022-11-10 PROCEDURE — 3288F FALL RISK ASSESSMENT DOCD: CPT | Mod: CPTII,S$GLB,, | Performed by: ORTHOPAEDIC SURGERY

## 2022-11-10 PROCEDURE — 3066F NEPHROPATHY DOC TX: CPT | Mod: CPTII,S$GLB,, | Performed by: ORTHOPAEDIC SURGERY

## 2022-11-10 PROCEDURE — 1126F AMNT PAIN NOTED NONE PRSNT: CPT | Mod: CPTII,S$GLB,, | Performed by: ORTHOPAEDIC SURGERY

## 2022-11-10 PROCEDURE — 3066F PR DOCUMENTATION OF TREATMENT FOR NEPHROPATHY: ICD-10-PCS | Mod: CPTII,S$GLB,, | Performed by: ORTHOPAEDIC SURGERY

## 2022-11-10 PROCEDURE — 1101F PR PT FALLS ASSESS DOC 0-1 FALLS W/OUT INJ PAST YR: ICD-10-PCS | Mod: CPTII,S$GLB,, | Performed by: ORTHOPAEDIC SURGERY

## 2022-11-10 PROCEDURE — 99999 PR PBB SHADOW E&M-EST. PATIENT-LVL III: CPT | Mod: PBBFAC,,, | Performed by: ORTHOPAEDIC SURGERY

## 2022-11-10 PROCEDURE — 1160F PR REVIEW ALL MEDS BY PRESCRIBER/CLIN PHARMACIST DOCUMENTED: ICD-10-PCS | Mod: CPTII,S$GLB,, | Performed by: ORTHOPAEDIC SURGERY

## 2022-11-10 PROCEDURE — 3288F PR FALLS RISK ASSESSMENT DOCUMENTED: ICD-10-PCS | Mod: CPTII,S$GLB,, | Performed by: ORTHOPAEDIC SURGERY

## 2022-11-10 PROCEDURE — 3008F PR BODY MASS INDEX (BMI) DOCUMENTED: ICD-10-PCS | Mod: CPTII,S$GLB,, | Performed by: ORTHOPAEDIC SURGERY

## 2022-11-10 PROCEDURE — 99999 PR PBB SHADOW E&M-EST. PATIENT-LVL III: ICD-10-PCS | Mod: PBBFAC,,, | Performed by: ORTHOPAEDIC SURGERY

## 2022-11-10 PROCEDURE — 3044F HG A1C LEVEL LT 7.0%: CPT | Mod: CPTII,S$GLB,, | Performed by: ORTHOPAEDIC SURGERY

## 2022-11-10 PROCEDURE — 4010F PR ACE/ARB THEARPY RXD/TAKEN: ICD-10-PCS | Mod: CPTII,S$GLB,, | Performed by: ORTHOPAEDIC SURGERY

## 2022-11-10 PROCEDURE — 4010F ACE/ARB THERAPY RXD/TAKEN: CPT | Mod: CPTII,S$GLB,, | Performed by: ORTHOPAEDIC SURGERY

## 2022-11-10 PROCEDURE — 3060F PR POS MICROALBUMINURIA RESULT DOCUMENTED/REVIEW: ICD-10-PCS | Mod: CPTII,S$GLB,, | Performed by: ORTHOPAEDIC SURGERY

## 2022-11-10 NOTE — PROGRESS NOTES
Subjective:      Patient ID: Tomeka Burns is a 67 y.o. female.    Chief Complaint: Knee Pain (LEFT )    HPI    Follow-up for osteoarthritis.  The patient reports that the last injection was not helpful.  She is started on Celebrex from her PCP and now feels much better.  She denies any severe knee pain or functional limitation at this time.        Review of Systems   Constitutional: Negative for fever and weight loss.   HENT:  Negative for congestion.    Eyes:  Negative for visual disturbance.   Cardiovascular:  Negative for chest pain.   Respiratory:  Negative for shortness of breath.    Hematologic/Lymphatic: Negative for bleeding problem. Does not bruise/bleed easily.   Skin:  Negative for poor wound healing.   Musculoskeletal:  Positive for arthritis.   Gastrointestinal:  Negative for abdominal pain.   Genitourinary:  Negative for dysuria.   Neurological:  Negative for seizures.   Psychiatric/Behavioral:  Negative for altered mental status.    Allergic/Immunologic: Negative for persistent infections.       Objective:      Ortho/SPM Exam      Left knee     The patient is not in acute distress.   Body habitus is normal.   Sclera appear normal  No respiratory distress  The patient walks without a limp.  Resisted SLR negative.   The skin over the knee is intact.  Knee effusion 0  Tendernes is located absent.  Range of motion- Flexion full, Extension full.   Ligament exam:              MCL 1+ laxity              Lachman intact              Post sag intact              LCL intact  Patellar apprehension negative.  Popliteal cyst negative  Patellar crepitation present  Flexion/pinch negative.  Pulses DP present, PT present.  Motor normal 5/5 strength in all tested muscle groups.   Sensory normal.    I reviewed the relevant imaging for the patient's condition:  Left knee films show about 80% medial narrowing with small osteophytes.  The right knee is noted to be similar          Assessment:       Encounter Diagnosis    Name Primary?    Primary osteoarthritis of left knee Yes        Although the patient has at least moderately advanced radiographic findings, her symptoms remain well controlled with nonsurgical measures.    Future progression of symptoms is fairly likely.          Plan:       Tomeka was seen today for knee pain.    Diagnoses and all orders for this visit:    Primary osteoarthritis of left knee        I explained my diagnostic impression and the reasoning behind it in detail, using layman's terms.  Models and/or pictures were used to help in the explanation.    Continue NSAIDs per PCP    Appropriate exercise and activity levels were explained    I explained the potential role of surgery in the treatment of this condition to the patient.  They understand that if nonsurgical measures do not adequately control symptoms, surgery will be considered in the future.

## 2022-11-16 ENCOUNTER — PES CALL (OUTPATIENT)
Dept: ADMINISTRATIVE | Facility: CLINIC | Age: 67
End: 2022-11-16
Payer: MEDICARE

## 2022-12-15 ENCOUNTER — PES CALL (OUTPATIENT)
Dept: ADMINISTRATIVE | Facility: CLINIC | Age: 67
End: 2022-12-15
Payer: MEDICARE

## 2022-12-27 ENCOUNTER — TELEPHONE (OUTPATIENT)
Dept: FAMILY MEDICINE | Facility: CLINIC | Age: 67
End: 2022-12-27
Payer: MEDICARE

## 2022-12-29 ENCOUNTER — TELEPHONE (OUTPATIENT)
Dept: FAMILY MEDICINE | Facility: CLINIC | Age: 67
End: 2022-12-29
Payer: MEDICARE

## 2023-01-05 ENCOUNTER — PATIENT MESSAGE (OUTPATIENT)
Dept: ADMINISTRATIVE | Facility: HOSPITAL | Age: 68
End: 2023-01-05
Payer: MEDICARE

## 2023-01-05 ENCOUNTER — PATIENT OUTREACH (OUTPATIENT)
Dept: ADMINISTRATIVE | Facility: HOSPITAL | Age: 68
End: 2023-01-05
Payer: MEDICARE

## 2023-01-05 ENCOUNTER — OFFICE VISIT (OUTPATIENT)
Dept: CARDIOLOGY | Facility: CLINIC | Age: 68
End: 2023-01-05
Payer: MEDICARE

## 2023-01-05 VITALS
OXYGEN SATURATION: 98 % | WEIGHT: 126 LBS | BODY MASS INDEX: 23.19 KG/M2 | HEART RATE: 88 BPM | DIASTOLIC BLOOD PRESSURE: 77 MMHG | SYSTOLIC BLOOD PRESSURE: 153 MMHG | HEIGHT: 62 IN

## 2023-01-05 DIAGNOSIS — E11.65 TYPE 2 DIABETES MELLITUS WITH HYPERGLYCEMIA, WITHOUT LONG-TERM CURRENT USE OF INSULIN: ICD-10-CM

## 2023-01-05 DIAGNOSIS — E78.5 HYPERLIPIDEMIA, UNSPECIFIED HYPERLIPIDEMIA TYPE: ICD-10-CM

## 2023-01-05 DIAGNOSIS — I10 ESSENTIAL HYPERTENSION: ICD-10-CM

## 2023-01-05 DIAGNOSIS — I44.7 LEFT BUNDLE BRANCH BLOCK: Primary | ICD-10-CM

## 2023-01-05 PROCEDURE — 3008F BODY MASS INDEX DOCD: CPT | Mod: CPTII,S$GLB,, | Performed by: INTERNAL MEDICINE

## 2023-01-05 PROCEDURE — 3077F PR MOST RECENT SYSTOLIC BLOOD PRESSURE >= 140 MM HG: ICD-10-PCS | Mod: CPTII,S$GLB,, | Performed by: INTERNAL MEDICINE

## 2023-01-05 PROCEDURE — 99999 PR PBB SHADOW E&M-EST. PATIENT-LVL IV: CPT | Mod: PBBFAC,,, | Performed by: INTERNAL MEDICINE

## 2023-01-05 PROCEDURE — 1159F PR MEDICATION LIST DOCUMENTED IN MEDICAL RECORD: ICD-10-PCS | Mod: CPTII,S$GLB,, | Performed by: INTERNAL MEDICINE

## 2023-01-05 PROCEDURE — 1101F PR PT FALLS ASSESS DOC 0-1 FALLS W/OUT INJ PAST YR: ICD-10-PCS | Mod: CPTII,S$GLB,, | Performed by: INTERNAL MEDICINE

## 2023-01-05 PROCEDURE — 3008F PR BODY MASS INDEX (BMI) DOCUMENTED: ICD-10-PCS | Mod: CPTII,S$GLB,, | Performed by: INTERNAL MEDICINE

## 2023-01-05 PROCEDURE — 1159F MED LIST DOCD IN RCRD: CPT | Mod: CPTII,S$GLB,, | Performed by: INTERNAL MEDICINE

## 2023-01-05 PROCEDURE — 1126F PR PAIN SEVERITY QUANTIFIED, NO PAIN PRESENT: ICD-10-PCS | Mod: CPTII,S$GLB,, | Performed by: INTERNAL MEDICINE

## 2023-01-05 PROCEDURE — 3288F PR FALLS RISK ASSESSMENT DOCUMENTED: ICD-10-PCS | Mod: CPTII,S$GLB,, | Performed by: INTERNAL MEDICINE

## 2023-01-05 PROCEDURE — 1101F PT FALLS ASSESS-DOCD LE1/YR: CPT | Mod: CPTII,S$GLB,, | Performed by: INTERNAL MEDICINE

## 2023-01-05 PROCEDURE — 1126F AMNT PAIN NOTED NONE PRSNT: CPT | Mod: CPTII,S$GLB,, | Performed by: INTERNAL MEDICINE

## 2023-01-05 PROCEDURE — 3288F FALL RISK ASSESSMENT DOCD: CPT | Mod: CPTII,S$GLB,, | Performed by: INTERNAL MEDICINE

## 2023-01-05 PROCEDURE — 99999 PR PBB SHADOW E&M-EST. PATIENT-LVL IV: ICD-10-PCS | Mod: PBBFAC,,, | Performed by: INTERNAL MEDICINE

## 2023-01-05 PROCEDURE — 1160F RVW MEDS BY RX/DR IN RCRD: CPT | Mod: CPTII,S$GLB,, | Performed by: INTERNAL MEDICINE

## 2023-01-05 PROCEDURE — 3078F DIAST BP <80 MM HG: CPT | Mod: CPTII,S$GLB,, | Performed by: INTERNAL MEDICINE

## 2023-01-05 PROCEDURE — 3077F SYST BP >= 140 MM HG: CPT | Mod: CPTII,S$GLB,, | Performed by: INTERNAL MEDICINE

## 2023-01-05 PROCEDURE — 99214 PR OFFICE/OUTPT VISIT, EST, LEVL IV, 30-39 MIN: ICD-10-PCS | Mod: S$GLB,,, | Performed by: INTERNAL MEDICINE

## 2023-01-05 PROCEDURE — 3078F PR MOST RECENT DIASTOLIC BLOOD PRESSURE < 80 MM HG: ICD-10-PCS | Mod: CPTII,S$GLB,, | Performed by: INTERNAL MEDICINE

## 2023-01-05 PROCEDURE — 1160F PR REVIEW ALL MEDS BY PRESCRIBER/CLIN PHARMACIST DOCUMENTED: ICD-10-PCS | Mod: CPTII,S$GLB,, | Performed by: INTERNAL MEDICINE

## 2023-01-05 PROCEDURE — 99214 OFFICE O/P EST MOD 30 MIN: CPT | Mod: S$GLB,,, | Performed by: INTERNAL MEDICINE

## 2023-01-05 NOTE — PROGRESS NOTES
Subjective:    Patient ID:  Tomeka Burns is a 67 y.o. female who presents for follow-up of LBBB    HPI    68 y/o Stateless speaking female with hx of HTN, DM, LBBB, hx of Covid infection, who presents for f/u. Had developed mild nonspecific, left sided sharp chest pain, unrelated to exertion, sent for exercise stress echo, however, found to have LBBB and stress portion stopped. 2DE with normal EF, DD, no WMA's. Claims to have had a stress test years ago and was told she had a mild blockage. Has some intermittent TROTTER and palps. Had nuclear SPECT with fixed perfusion defect in the septal wall of the left ventricle and moderate apical thinning. Had LHC with normal coronaries, normal EF and DD. Holter with no significant abnormalities. Has had extensive cardiac w/u. Was started on HCTZ. Elevated in clinic today, but controlled at home.   Had Covid infection 1/2021 with PNA, no hospitalization. Has since had TROTTER that is episodic.  Denies orthopnea, PND, syncope. Compliant with meds. Non smoker. No fam hx of early CAD, although mother had valve replaced at about 56 y/o.     1/5/2023:  Doing well since last visit. Continues to have mild TROTTER since Covid. Has not been exercising and has fatigue and unchanged fatigue. Denies orthopnea, PND, syncope.    Review of Systems   Constitutional: Negative for malaise/fatigue.   HENT:  Negative for congestion.    Eyes:  Negative for blurred vision.   Cardiovascular:  Positive for dyspnea on exertion. Negative for chest pain, claudication, cyanosis, irregular heartbeat, leg swelling, near-syncope, orthopnea, palpitations, paroxysmal nocturnal dyspnea and syncope.   Respiratory:  Negative for shortness of breath.    Endocrine: Negative for polyuria.   Hematologic/Lymphatic: Negative for bleeding problem.   Skin:  Negative for itching and rash.   Musculoskeletal:  Negative for joint swelling, muscle cramps and muscle weakness.   Gastrointestinal:  Negative for abdominal pain, hematemesis,  hematochezia, melena, nausea and vomiting.   Genitourinary:  Negative for dysuria and hematuria.   Neurological:  Negative for dizziness, focal weakness, headaches, light-headedness, loss of balance and weakness.   Psychiatric/Behavioral:  Negative for depression. The patient is not nervous/anxious.       Objective:    Physical Exam  Constitutional:       Appearance: She is well-developed.   HENT:      Head: Normocephalic and atraumatic.   Neck:      Vascular: No JVD.   Cardiovascular:      Rate and Rhythm: Normal rate and regular rhythm.      Pulses:           Carotid pulses are 2+ on the right side and 2+ on the left side.       Radial pulses are 2+ on the right side and 2+ on the left side.        Femoral pulses are 2+ on the right side and 2+ on the left side.       Dorsalis pedis pulses are 2+ on the right side and 2+ on the left side.        Posterior tibial pulses are 2+ on the right side and 2+ on the left side.      Heart sounds: Murmur heard.   Systolic murmur is present with a grade of 2/6.   Pulmonary:      Effort: Pulmonary effort is normal.      Breath sounds: Normal breath sounds.   Abdominal:      General: Bowel sounds are normal.      Palpations: Abdomen is soft.   Musculoskeletal:      Cervical back: Neck supple.   Skin:     General: Skin is warm and dry.   Neurological:      Mental Status: She is alert and oriented to person, place, and time.   Psychiatric:         Behavior: Behavior normal.         Thought Content: Thought content normal.         Assessment:       1. Left bundle branch block    2. Essential hypertension    3. Hyperlipidemia, unspecified hyperlipidemia type    4. Type 2 diabetes mellitus with hyperglycemia, without long-term current use of insulin      66 y/o pt with hx and presentation as above. Doing well from a cardiac perspective and compensated from a HF perspective. Needs to be more active. Discussed the etiology, evaluation, and management of HTN, HLD, DM, and LBBB.  Discussed the importance of med compliance, heart healthy diet, and regular exercise.       Plan:       -Continue current medical management  -f/u in 1 year

## 2023-01-11 DIAGNOSIS — E11.9 TYPE 2 DIABETES MELLITUS WITHOUT COMPLICATION, UNSPECIFIED WHETHER LONG TERM INSULIN USE: ICD-10-CM

## 2023-01-17 ENCOUNTER — PATIENT MESSAGE (OUTPATIENT)
Dept: ADMINISTRATIVE | Facility: HOSPITAL | Age: 68
End: 2023-01-17
Payer: MEDICARE

## 2023-01-20 ENCOUNTER — OFFICE VISIT (OUTPATIENT)
Dept: INTERNAL MEDICINE | Facility: CLINIC | Age: 68
End: 2023-01-20
Payer: MEDICARE

## 2023-01-20 VITALS
DIASTOLIC BLOOD PRESSURE: 79 MMHG | BODY MASS INDEX: 23.32 KG/M2 | RESPIRATION RATE: 20 BRPM | WEIGHT: 126.75 LBS | HEART RATE: 94 BPM | SYSTOLIC BLOOD PRESSURE: 155 MMHG | HEIGHT: 62 IN

## 2023-01-20 DIAGNOSIS — M25.562 CHRONIC PAIN OF LEFT KNEE: ICD-10-CM

## 2023-01-20 DIAGNOSIS — K63.5 COLON POLYPOSIS: ICD-10-CM

## 2023-01-20 DIAGNOSIS — E04.1 THYROID NODULE: ICD-10-CM

## 2023-01-20 DIAGNOSIS — E11.65 TYPE 2 DIABETES MELLITUS WITH HYPERGLYCEMIA, WITHOUT LONG-TERM CURRENT USE OF INSULIN: ICD-10-CM

## 2023-01-20 DIAGNOSIS — M62.81 MUSCLE WEAKNESS OF LOWER EXTREMITY: ICD-10-CM

## 2023-01-20 DIAGNOSIS — M25.552 PAIN IN LEFT HIP: ICD-10-CM

## 2023-01-20 DIAGNOSIS — E89.2 POSTSURGICAL HYPOPARATHYROIDISM: ICD-10-CM

## 2023-01-20 DIAGNOSIS — Z00.00 ENCOUNTER FOR PREVENTIVE HEALTH EXAMINATION: Primary | ICD-10-CM

## 2023-01-20 DIAGNOSIS — E78.5 HYPERLIPIDEMIA, UNSPECIFIED HYPERLIPIDEMIA TYPE: ICD-10-CM

## 2023-01-20 DIAGNOSIS — G89.29 CHRONIC PAIN OF LEFT KNEE: ICD-10-CM

## 2023-01-20 DIAGNOSIS — K76.0 NAFLD (NONALCOHOLIC FATTY LIVER DISEASE): ICD-10-CM

## 2023-01-20 DIAGNOSIS — E89.0 POSTSURGICAL HYPOTHYROIDISM: ICD-10-CM

## 2023-01-20 DIAGNOSIS — C73 PAPILLARY THYROID CARCINOMA: ICD-10-CM

## 2023-01-20 DIAGNOSIS — I44.7 LEFT BUNDLE BRANCH BLOCK: ICD-10-CM

## 2023-01-20 DIAGNOSIS — C73 THYROID CANCER: Chronic | ICD-10-CM

## 2023-01-20 DIAGNOSIS — I10 ESSENTIAL HYPERTENSION: ICD-10-CM

## 2023-01-20 PROCEDURE — 4010F PR ACE/ARB THEARPY RXD/TAKEN: ICD-10-PCS | Mod: CPTII,S$GLB,, | Performed by: NURSE PRACTITIONER

## 2023-01-20 PROCEDURE — 3078F PR MOST RECENT DIASTOLIC BLOOD PRESSURE < 80 MM HG: ICD-10-PCS | Mod: CPTII,S$GLB,, | Performed by: NURSE PRACTITIONER

## 2023-01-20 PROCEDURE — 1160F RVW MEDS BY RX/DR IN RCRD: CPT | Mod: CPTII,S$GLB,, | Performed by: NURSE PRACTITIONER

## 2023-01-20 PROCEDURE — 3008F PR BODY MASS INDEX (BMI) DOCUMENTED: ICD-10-PCS | Mod: CPTII,S$GLB,, | Performed by: NURSE PRACTITIONER

## 2023-01-20 PROCEDURE — G0439 PR MEDICARE ANNUAL WELLNESS SUBSEQUENT VISIT: ICD-10-PCS | Mod: S$GLB,,, | Performed by: NURSE PRACTITIONER

## 2023-01-20 PROCEDURE — 99999 PR PBB SHADOW E&M-EST. PATIENT-LVL V: CPT | Mod: PBBFAC,,, | Performed by: NURSE PRACTITIONER

## 2023-01-20 PROCEDURE — 4010F ACE/ARB THERAPY RXD/TAKEN: CPT | Mod: CPTII,S$GLB,, | Performed by: NURSE PRACTITIONER

## 2023-01-20 PROCEDURE — 3077F SYST BP >= 140 MM HG: CPT | Mod: CPTII,S$GLB,, | Performed by: NURSE PRACTITIONER

## 2023-01-20 PROCEDURE — 3288F PR FALLS RISK ASSESSMENT DOCUMENTED: ICD-10-PCS | Mod: CPTII,S$GLB,, | Performed by: NURSE PRACTITIONER

## 2023-01-20 PROCEDURE — G0439 PPPS, SUBSEQ VISIT: HCPCS | Mod: S$GLB,,, | Performed by: NURSE PRACTITIONER

## 2023-01-20 PROCEDURE — 3288F FALL RISK ASSESSMENT DOCD: CPT | Mod: CPTII,S$GLB,, | Performed by: NURSE PRACTITIONER

## 2023-01-20 PROCEDURE — 3008F BODY MASS INDEX DOCD: CPT | Mod: CPTII,S$GLB,, | Performed by: NURSE PRACTITIONER

## 2023-01-20 PROCEDURE — 1159F MED LIST DOCD IN RCRD: CPT | Mod: CPTII,S$GLB,, | Performed by: NURSE PRACTITIONER

## 2023-01-20 PROCEDURE — 1101F PR PT FALLS ASSESS DOC 0-1 FALLS W/OUT INJ PAST YR: ICD-10-PCS | Mod: CPTII,S$GLB,, | Performed by: NURSE PRACTITIONER

## 2023-01-20 PROCEDURE — 3078F DIAST BP <80 MM HG: CPT | Mod: CPTII,S$GLB,, | Performed by: NURSE PRACTITIONER

## 2023-01-20 PROCEDURE — 1159F PR MEDICATION LIST DOCUMENTED IN MEDICAL RECORD: ICD-10-PCS | Mod: CPTII,S$GLB,, | Performed by: NURSE PRACTITIONER

## 2023-01-20 PROCEDURE — 3077F PR MOST RECENT SYSTOLIC BLOOD PRESSURE >= 140 MM HG: ICD-10-PCS | Mod: CPTII,S$GLB,, | Performed by: NURSE PRACTITIONER

## 2023-01-20 PROCEDURE — 1160F PR REVIEW ALL MEDS BY PRESCRIBER/CLIN PHARMACIST DOCUMENTED: ICD-10-PCS | Mod: CPTII,S$GLB,, | Performed by: NURSE PRACTITIONER

## 2023-01-20 PROCEDURE — 99999 PR PBB SHADOW E&M-EST. PATIENT-LVL V: ICD-10-PCS | Mod: PBBFAC,,, | Performed by: NURSE PRACTITIONER

## 2023-01-20 PROCEDURE — 1101F PT FALLS ASSESS-DOCD LE1/YR: CPT | Mod: CPTII,S$GLB,, | Performed by: NURSE PRACTITIONER

## 2023-01-20 NOTE — PROGRESS NOTES
"  Tomeka Burns presented for a  Medicare AWV and comprehensive Health Risk Assessment today. The following components were reviewed and updated:    Medical history  Family History  Social history  Allergies and Current Medications  Health Risk Assessment  Health Maintenance  Care Team         ** See Completed Assessments for Annual Wellness Visit within the encounter summary.**         The following assessments were completed:  Living Situation  CAGE  Depression Screening  Timed Get Up and Go  Whisper Test  Cognitive Function Screening  Nutrition Screening  ADL Screening  PAQ Screening        Vitals:    01/20/23 1309   BP: (!) 155/79   BP Location: Right arm   Patient Position: Sitting   BP Method: Medium (Automatic)   Pulse: 94   Resp: 20   Weight: 57.5 kg (126 lb 12.2 oz)   Height: 5' 2" (1.575 m)     Body mass index is 23.19 kg/m².  Physical Exam  Vitals and nursing note reviewed.   Constitutional:       General: She is not in acute distress.     Appearance: She is well-developed and normal weight. She is not ill-appearing.   HENT:      Head: Normocephalic and atraumatic.   Eyes:      Pupils: Pupils are equal, round, and reactive to light.   Cardiovascular:      Rate and Rhythm: Normal rate.      Heart sounds: Normal heart sounds.   Pulmonary:      Effort: Pulmonary effort is normal. No respiratory distress.      Breath sounds: Normal breath sounds.   Musculoskeletal:         General: Normal range of motion.      Cervical back: Normal range of motion.   Skin:     General: Skin is warm and dry.   Neurological:      Mental Status: She is alert and oriented to person, place, and time.      Coordination: Coordination normal.   Psychiatric:         Mood and Affect: Mood normal.         Behavior: Behavior normal.         Thought Content: Thought content normal.         Judgment: Judgment normal.             Diagnoses and health risks identified today and associated recommendations/orders:    1. Encounter for preventive " health examination    2. Papillary thyroid carcinoma  Chronic; stable. Continue current treatment plan as previously prescribed by PCP.     3. Thyroid cancer  Chronic; stable. Continue current treatment plan as previously prescribed by PCP.     4. Type 2 diabetes mellitus with hyperglycemia, without long-term current use of insulin  Chronic; stable. Continue current treatment plan as previously prescribed by PCP.     5. Thyroid nodule  Chronic; stable. Continue current treatment plan as previously prescribed by PCP.     6. Postsurgical hypothyroidism  Chronic; stable. Continue current treatment plan as previously prescribed by PCP.     7. Postsurgical hypoparathyroidism  Chronic; stable. Continue current treatment plan as previously prescribed by PCP.     8. NAFLD (nonalcoholic fatty liver disease)  Chronic; stable. Continue current treatment plan as previously prescribed by PCP.     9. Colon polyposis  Chronic; stable. Continue current treatment plan as previously prescribed by PCP.    10. Pain in left hip  Chronic; stable. Continue current treatment plan as previously prescribed by PCP.    11. Chronic pain of left knee  Chronic; stable. Continue current treatment plan as previously prescribed by PCP.    12. Muscle weakness of lower extremity  Chronic; stable. Continue current treatment plan as previously prescribed by PCP.    13. Essential hypertension  Chronic; stable. Continue current treatment plan as previously prescribed by PCP.    14. Hyperlipidemia, unspecified hyperlipidemia type  Chronic; stable. Continue current treatment plan as previously prescribed by PCP.    15. Left bundle branch block  Chronic; stable. Continue current treatment plan as previously prescribed by PCP.      Provided Aura with a 5-10 year written screening schedule and personal prevention plan. Recommendations were developed using the USPSTF age appropriate recommendations. Education, counseling, and referrals were provided as needed.  After Visit Summary printed and given to patient which includes a list of additional screenings\tests needed.    Review for Opioid Screening: Patient does not have rx for Opioids.  Review for Substance Use Disorders: Patient does not use substance.    NOTE:  present via Contact[employer] device to assist with translation during certain parts of the visit when patient needed assistance.    Follow up for AWV in 1 year.    Kvng Lockett NP      I offered to discuss advanced care planning, including how to pick a person who would make decisions for you if you were unable to make them for yourself, called a health care power of , and what kind of decisions you might make such as use of life sustaining treatments such as ventilators and tube feeding when faced with a life limiting illness recorded on a living will that they will need to know. (How you want to be cared for as you near the end of your natural life)     X Patient is interested in learning more about how to make advanced directives.  I provided them paperwork and offered to discuss this with them.

## 2023-01-20 NOTE — PATIENT INSTRUCTIONS
Counseling and Referral of Other Preventative  (Italic type indicates deductible and co-insurance are waived)    Patient Name: Tomeka Burns  Today's Date: 1/20/2023    Health Maintenance         Date Due Completion Date    Foot Exam 11/09/2021 11/9/2020 (Done)    Override on 11/9/2020: Done    Override on 8/29/2019: Done    Eye Exam 12/21/2022 12/21/2021    Colorectal Cancer Screening 01/13/2023 1/13/2020    Hemoglobin A1c 03/26/2023 9/26/2022    Diabetes Urine Screening 09/26/2023 9/26/2022    Lipid Panel 09/26/2023 9/26/2022    Low Dose Statin 01/05/2024 1/5/2023    Mammogram 08/12/2024 8/12/2022    DEXA Scan 04/22/2025 4/22/2022    TETANUS VACCINE 03/05/2028 3/5/2018 (Done)    Override on 3/5/2018: Done          No orders of the defined types were placed in this encounter.    The following information is provided to all patients.  This information is to help you find resources for any of the problems found today that may be affecting your health:                Living healthy guide: www.[facility].[place].gov      Understanding Diabetes: www.diabetes.org      Eating healthy: www.cdc.gov/healthyweight      CDC home safety checklist: www.cdc.gov/steadi/patient.html      Agency on Aging: www.goea.[place].[facility]      Alcoholics anonymous (AA): www.aa.org      Physical Activity: www.yordy.nih.gov/nu1xvav      Tobacco use: www.quitwithusla.org

## 2023-03-03 ENCOUNTER — OFFICE VISIT (OUTPATIENT)
Dept: ORTHOPEDICS | Facility: CLINIC | Age: 68
End: 2023-03-03
Payer: MEDICARE

## 2023-03-03 VITALS — HEIGHT: 62 IN | WEIGHT: 126.75 LBS | BODY MASS INDEX: 23.32 KG/M2

## 2023-03-03 DIAGNOSIS — M17.12 PRIMARY OSTEOARTHRITIS OF LEFT KNEE: Primary | ICD-10-CM

## 2023-03-03 PROCEDURE — 3288F PR FALLS RISK ASSESSMENT DOCUMENTED: ICD-10-PCS | Mod: CPTII,S$GLB,, | Performed by: ORTHOPAEDIC SURGERY

## 2023-03-03 PROCEDURE — 99999 PR PBB SHADOW E&M-EST. PATIENT-LVL IV: ICD-10-PCS | Mod: PBBFAC,,, | Performed by: ORTHOPAEDIC SURGERY

## 2023-03-03 PROCEDURE — 20610 LARGE JOINT ASPIRATION/INJECTION: L KNEE: ICD-10-PCS | Mod: LT,S$GLB,, | Performed by: ORTHOPAEDIC SURGERY

## 2023-03-03 PROCEDURE — 3008F PR BODY MASS INDEX (BMI) DOCUMENTED: ICD-10-PCS | Mod: CPTII,S$GLB,, | Performed by: ORTHOPAEDIC SURGERY

## 2023-03-03 PROCEDURE — 1101F PR PT FALLS ASSESS DOC 0-1 FALLS W/OUT INJ PAST YR: ICD-10-PCS | Mod: CPTII,S$GLB,, | Performed by: ORTHOPAEDIC SURGERY

## 2023-03-03 PROCEDURE — 1101F PT FALLS ASSESS-DOCD LE1/YR: CPT | Mod: CPTII,S$GLB,, | Performed by: ORTHOPAEDIC SURGERY

## 2023-03-03 PROCEDURE — 4010F ACE/ARB THERAPY RXD/TAKEN: CPT | Mod: CPTII,S$GLB,, | Performed by: ORTHOPAEDIC SURGERY

## 2023-03-03 PROCEDURE — 99214 PR OFFICE/OUTPT VISIT, EST, LEVL IV, 30-39 MIN: ICD-10-PCS | Mod: 25,S$GLB,, | Performed by: ORTHOPAEDIC SURGERY

## 2023-03-03 PROCEDURE — 1125F AMNT PAIN NOTED PAIN PRSNT: CPT | Mod: CPTII,S$GLB,, | Performed by: ORTHOPAEDIC SURGERY

## 2023-03-03 PROCEDURE — 3008F BODY MASS INDEX DOCD: CPT | Mod: CPTII,S$GLB,, | Performed by: ORTHOPAEDIC SURGERY

## 2023-03-03 PROCEDURE — 1160F RVW MEDS BY RX/DR IN RCRD: CPT | Mod: CPTII,S$GLB,, | Performed by: ORTHOPAEDIC SURGERY

## 2023-03-03 PROCEDURE — 1159F MED LIST DOCD IN RCRD: CPT | Mod: CPTII,S$GLB,, | Performed by: ORTHOPAEDIC SURGERY

## 2023-03-03 PROCEDURE — 20610 DRAIN/INJ JOINT/BURSA W/O US: CPT | Mod: LT,S$GLB,, | Performed by: ORTHOPAEDIC SURGERY

## 2023-03-03 PROCEDURE — 1160F PR REVIEW ALL MEDS BY PRESCRIBER/CLIN PHARMACIST DOCUMENTED: ICD-10-PCS | Mod: CPTII,S$GLB,, | Performed by: ORTHOPAEDIC SURGERY

## 2023-03-03 PROCEDURE — 1159F PR MEDICATION LIST DOCUMENTED IN MEDICAL RECORD: ICD-10-PCS | Mod: CPTII,S$GLB,, | Performed by: ORTHOPAEDIC SURGERY

## 2023-03-03 PROCEDURE — 4010F PR ACE/ARB THEARPY RXD/TAKEN: ICD-10-PCS | Mod: CPTII,S$GLB,, | Performed by: ORTHOPAEDIC SURGERY

## 2023-03-03 PROCEDURE — 1125F PR PAIN SEVERITY QUANTIFIED, PAIN PRESENT: ICD-10-PCS | Mod: CPTII,S$GLB,, | Performed by: ORTHOPAEDIC SURGERY

## 2023-03-03 PROCEDURE — 99999 PR PBB SHADOW E&M-EST. PATIENT-LVL IV: CPT | Mod: PBBFAC,,, | Performed by: ORTHOPAEDIC SURGERY

## 2023-03-03 PROCEDURE — 99214 OFFICE O/P EST MOD 30 MIN: CPT | Mod: 25,S$GLB,, | Performed by: ORTHOPAEDIC SURGERY

## 2023-03-03 PROCEDURE — 3288F FALL RISK ASSESSMENT DOCD: CPT | Mod: CPTII,S$GLB,, | Performed by: ORTHOPAEDIC SURGERY

## 2023-03-03 RX ORDER — TRIAMCINOLONE ACETONIDE 40 MG/ML
40 INJECTION, SUSPENSION INTRA-ARTICULAR; INTRAMUSCULAR
Status: DISCONTINUED | OUTPATIENT
Start: 2023-03-03 | End: 2023-03-03 | Stop reason: HOSPADM

## 2023-03-03 RX ADMIN — TRIAMCINOLONE ACETONIDE 40 MG: 40 INJECTION, SUSPENSION INTRA-ARTICULAR; INTRAMUSCULAR at 03:03

## 2023-03-03 NOTE — PROCEDURES
Large Joint Aspiration/Injection: L knee    Date/Time: 3/3/2023 3:15 PM  Performed by: Fernie Moeller MD  Authorized by: Fernie Moeller MD     Location:  Knee  Site:  L knee  Medications:  40 mg triamcinolone acetonide 40 mg/mL     After obtaining verbal informed consent the patient's left knee was prepped aseptically and injected through an inferior lateral approach using 40 mg of triamcinolone and 1 cc of 1% plain Xylocaine.  The patient was warned about postinjection flare and how to manage it with ice, rest and over-the-counter analgesics.  They're advised to contact me for any severe, uncontrolled pain.

## 2023-03-03 NOTE — PROGRESS NOTES
Subjective:      Patient ID: Tomeka Burns is a 67 y.o. female.    Chief Complaint:  Left knee pain  HPI    Follow-up for osteoarthritis.  The patient reports that Celebrex is no longer helpful.  She complains of pain in the left hip and knee, aggravated by prolonged ambulation.  She really denies severe pain and functional incapacity.  She is also using a brace at this time.          Review of Systems   Constitutional: Negative for fever and weight loss.   HENT:  Negative for congestion.    Eyes:  Negative for visual disturbance.   Cardiovascular:  Negative for chest pain.   Respiratory:  Negative for shortness of breath.    Hematologic/Lymphatic: Negative for bleeding problem. Does not bruise/bleed easily.   Skin:  Negative for poor wound healing.   Musculoskeletal:  Positive for joint pain.   Gastrointestinal:  Negative for abdominal pain.   Genitourinary:  Negative for dysuria.   Neurological:  Negative for seizures.   Psychiatric/Behavioral:  Negative for altered mental status.    Allergic/Immunologic: Negative for persistent infections.       Objective:      Ortho/SPM Exam      Left knee     The patient is not in acute distress.   Body habitus is normal.   Sclera appear normal  No respiratory distress  The patient walks without a limp.  Resisted SLR equivocal  The skin over the knee is intact.  Knee effusion 0  Tendernes is located absent.  Range of motion- Flexion full, Extension full.   Ligament exam:              MCL 1+ laxity              Lachman intact              Post sag intact              LCL intact  Patellar apprehension negative.  Popliteal cyst negative  Patellar crepitation present  Flexion/pinch negative.  Pulses DP present, PT present.  Motor normal 5/5 strength in all tested muscle groups.   Sensory normal.    Previous left knee radiographs show at least moderate medial OA          Assessment:       Encounter Diagnosis   Name Primary?    Primary osteoarthritis of left knee Yes        The  patient is showing signs of clinical progression of osteoarthritis.  She is not really responding well to Celebrex this time and her brace is not satisfactory either.  I injected her once before with minimal benefit.    I am also concerned about the possibility of referred hip pain          Plan:       Tomeka was seen today for knee pain.    Diagnoses and all orders for this visit:    Primary osteoarthritis of left knee          I explained my diagnostic impression and the reasoning behind it in detail, using layman's terms.  Models and/or pictures were used to help in the explanation.    Reinjection recommended and consent was given    I explained the role of knee replaced in the treatment of this condition.  I also explained the typical clinical course.  The usual complications that that can occur were also discussed.  The patient hopes to hold off on any consideration of surgery for now.  The patient I agree to discuss this again at follow-up, if clinically warranted.    Discontinue Celebrex and start diclofenac    X-ray hip and knee next visit

## 2023-03-07 ENCOUNTER — TELEPHONE (OUTPATIENT)
Dept: ORTHOPEDICS | Facility: CLINIC | Age: 68
End: 2023-03-07
Payer: MEDICARE

## 2023-03-07 DIAGNOSIS — M17.0 PRIMARY OSTEOARTHRITIS OF BOTH KNEES: Primary | ICD-10-CM

## 2023-03-07 RX ORDER — DICLOFENAC SODIUM 75 MG/1
75 TABLET, DELAYED RELEASE ORAL 2 TIMES DAILY
Qty: 60 TABLET | Refills: 1 | Status: SHIPPED | OUTPATIENT
Start: 2023-03-07

## 2023-03-07 NOTE — TELEPHONE ENCOUNTER
----- Message from Maria Eugenia Hyatt sent at 3/7/2023 12:20 PM CST -----  .Type:  Needs Medical Advice    Who Called: pt  Pharmacy name and phone #:  [facility] - [facility] [address] [facility]   Phone:  [phone]  Would the patient rather a call back or a response via [facility]? call  Best Call Back Number: [phone]  Additional Information:       Pt says she never received the pain medication that was supposed to be prescribed for her at her last visit.

## 2023-03-21 LAB
LEFT EYE DM RETINOPATHY: NEGATIVE
RIGHT EYE DM RETINOPATHY: NEGATIVE

## 2023-03-30 ENCOUNTER — LAB VISIT (OUTPATIENT)
Dept: LAB | Facility: HOSPITAL | Age: 68
End: 2023-03-30
Attending: FAMILY MEDICINE
Payer: MEDICARE

## 2023-03-30 DIAGNOSIS — E11.65 TYPE 2 DIABETES MELLITUS WITH HYPERGLYCEMIA, WITHOUT LONG-TERM CURRENT USE OF INSULIN: ICD-10-CM

## 2023-03-30 LAB
ALBUMIN/CREAT UR: 65.7 UG/MG (ref 0–30)
CREAT UR-MCNC: 67 MG/DL (ref 15–325)
MICROALBUMIN UR DL<=1MG/L-MCNC: 44 UG/ML

## 2023-03-30 PROCEDURE — 82570 ASSAY OF URINE CREATININE: CPT | Mod: PO | Performed by: FAMILY MEDICINE

## 2023-03-31 ENCOUNTER — PATIENT OUTREACH (OUTPATIENT)
Dept: ADMINISTRATIVE | Facility: HOSPITAL | Age: 68
End: 2023-03-31
Payer: MEDICARE

## 2023-03-31 NOTE — PROGRESS NOTES
Non-compliant GAP report chart review -  03/31/2023  Chart review completed for the following  test if overdue  ( Dilated EYE EXAM)   Care Everywhere and Media reports - updates requested and reviewed.    HM updated with external   DM EYE  EXAM  2023    Health Maintenance Due   Topic Date Due    Foot Exam  11/09/2021    Colorectal Cancer Screening  01/13/2023

## 2023-04-05 ENCOUNTER — OFFICE VISIT (OUTPATIENT)
Dept: FAMILY MEDICINE | Facility: CLINIC | Age: 68
End: 2023-04-05
Payer: MEDICARE

## 2023-04-05 VITALS
SYSTOLIC BLOOD PRESSURE: 134 MMHG | DIASTOLIC BLOOD PRESSURE: 80 MMHG | HEIGHT: 62 IN | BODY MASS INDEX: 23 KG/M2 | OXYGEN SATURATION: 98 % | WEIGHT: 125 LBS

## 2023-04-05 DIAGNOSIS — I10 ESSENTIAL HYPERTENSION: Primary | ICD-10-CM

## 2023-04-05 DIAGNOSIS — E11.65 TYPE 2 DIABETES MELLITUS WITH HYPERGLYCEMIA, WITHOUT LONG-TERM CURRENT USE OF INSULIN: ICD-10-CM

## 2023-04-05 DIAGNOSIS — E78.5 HYPERLIPIDEMIA, UNSPECIFIED HYPERLIPIDEMIA TYPE: ICD-10-CM

## 2023-04-05 DIAGNOSIS — K76.0 NAFLD (NONALCOHOLIC FATTY LIVER DISEASE): ICD-10-CM

## 2023-04-05 DIAGNOSIS — I70.0 AORTIC ATHEROSCLEROSIS: ICD-10-CM

## 2023-04-05 PROCEDURE — 1160F PR REVIEW ALL MEDS BY PRESCRIBER/CLIN PHARMACIST DOCUMENTED: ICD-10-PCS | Mod: CPTII,S$GLB,, | Performed by: FAMILY MEDICINE

## 2023-04-05 PROCEDURE — 3066F PR DOCUMENTATION OF TREATMENT FOR NEPHROPATHY: ICD-10-PCS | Mod: CPTII,S$GLB,, | Performed by: FAMILY MEDICINE

## 2023-04-05 PROCEDURE — 1159F PR MEDICATION LIST DOCUMENTED IN MEDICAL RECORD: ICD-10-PCS | Mod: CPTII,S$GLB,, | Performed by: FAMILY MEDICINE

## 2023-04-05 PROCEDURE — 4010F ACE/ARB THERAPY RXD/TAKEN: CPT | Mod: CPTII,S$GLB,, | Performed by: FAMILY MEDICINE

## 2023-04-05 PROCEDURE — 1159F MED LIST DOCD IN RCRD: CPT | Mod: CPTII,S$GLB,, | Performed by: FAMILY MEDICINE

## 2023-04-05 PROCEDURE — 99999 PR PBB SHADOW E&M-EST. PATIENT-LVL IV: ICD-10-PCS | Mod: PBBFAC,,, | Performed by: FAMILY MEDICINE

## 2023-04-05 PROCEDURE — 3079F PR MOST RECENT DIASTOLIC BLOOD PRESSURE 80-89 MM HG: ICD-10-PCS | Mod: CPTII,S$GLB,, | Performed by: FAMILY MEDICINE

## 2023-04-05 PROCEDURE — 3051F HG A1C>EQUAL 7.0%<8.0%: CPT | Mod: CPTII,S$GLB,, | Performed by: FAMILY MEDICINE

## 2023-04-05 PROCEDURE — 3060F POS MICROALBUMINURIA REV: CPT | Mod: CPTII,S$GLB,, | Performed by: FAMILY MEDICINE

## 2023-04-05 PROCEDURE — 3008F BODY MASS INDEX DOCD: CPT | Mod: CPTII,S$GLB,, | Performed by: FAMILY MEDICINE

## 2023-04-05 PROCEDURE — 1101F PT FALLS ASSESS-DOCD LE1/YR: CPT | Mod: CPTII,S$GLB,, | Performed by: FAMILY MEDICINE

## 2023-04-05 PROCEDURE — 99215 OFFICE O/P EST HI 40 MIN: CPT | Mod: S$GLB,,, | Performed by: FAMILY MEDICINE

## 2023-04-05 PROCEDURE — 99215 PR OFFICE/OUTPT VISIT, EST, LEVL V, 40-54 MIN: ICD-10-PCS | Mod: S$GLB,,, | Performed by: FAMILY MEDICINE

## 2023-04-05 PROCEDURE — 3288F PR FALLS RISK ASSESSMENT DOCUMENTED: ICD-10-PCS | Mod: CPTII,S$GLB,, | Performed by: FAMILY MEDICINE

## 2023-04-05 PROCEDURE — 3075F SYST BP GE 130 - 139MM HG: CPT | Mod: CPTII,S$GLB,, | Performed by: FAMILY MEDICINE

## 2023-04-05 PROCEDURE — 3288F FALL RISK ASSESSMENT DOCD: CPT | Mod: CPTII,S$GLB,, | Performed by: FAMILY MEDICINE

## 2023-04-05 PROCEDURE — 1160F RVW MEDS BY RX/DR IN RCRD: CPT | Mod: CPTII,S$GLB,, | Performed by: FAMILY MEDICINE

## 2023-04-05 PROCEDURE — 3060F PR POS MICROALBUMINURIA RESULT DOCUMENTED/REVIEW: ICD-10-PCS | Mod: CPTII,S$GLB,, | Performed by: FAMILY MEDICINE

## 2023-04-05 PROCEDURE — 3066F NEPHROPATHY DOC TX: CPT | Mod: CPTII,S$GLB,, | Performed by: FAMILY MEDICINE

## 2023-04-05 PROCEDURE — 3008F PR BODY MASS INDEX (BMI) DOCUMENTED: ICD-10-PCS | Mod: CPTII,S$GLB,, | Performed by: FAMILY MEDICINE

## 2023-04-05 PROCEDURE — 3075F PR MOST RECENT SYSTOLIC BLOOD PRESS GE 130-139MM HG: ICD-10-PCS | Mod: CPTII,S$GLB,, | Performed by: FAMILY MEDICINE

## 2023-04-05 PROCEDURE — 99999 PR PBB SHADOW E&M-EST. PATIENT-LVL IV: CPT | Mod: PBBFAC,,, | Performed by: FAMILY MEDICINE

## 2023-04-05 PROCEDURE — 3079F DIAST BP 80-89 MM HG: CPT | Mod: CPTII,S$GLB,, | Performed by: FAMILY MEDICINE

## 2023-04-05 PROCEDURE — 3051F PR MOST RECENT HEMOGLOBIN A1C LEVEL 7.0 - < 8.0%: ICD-10-PCS | Mod: CPTII,S$GLB,, | Performed by: FAMILY MEDICINE

## 2023-04-05 PROCEDURE — 4010F PR ACE/ARB THEARPY RXD/TAKEN: ICD-10-PCS | Mod: CPTII,S$GLB,, | Performed by: FAMILY MEDICINE

## 2023-04-05 PROCEDURE — 1101F PR PT FALLS ASSESS DOC 0-1 FALLS W/OUT INJ PAST YR: ICD-10-PCS | Mod: CPTII,S$GLB,, | Performed by: FAMILY MEDICINE

## 2023-04-05 NOTE — PROGRESS NOTES
Subjective     Patient ID: Tomeka Burns is a 67 y.o. female.    Chief Complaint: Follow-up and Results    67 years old female came to the clinic for blood pressure check.  Blood pressure today was stable.  No Chest pain, palpitation, orthopnea or PND.  Last A1c and cholesterol were elevated.  No Polyuria, polydipsia or polyphagia.  Patient is planning to change her diet to improve her blood work.  Patient with slightly elevated liver enzymes.    Follow-up  Pertinent negatives include no abdominal pain, arthralgias, chest pain, congestion, coughing, fatigue, fever, nausea, sore throat or vomiting.   Review of Systems   Constitutional: Negative.  Negative for activity change, fatigue and fever.   HENT: Negative.  Negative for nasal congestion, dental problem, ear discharge, ear pain, hearing loss, postnasal drip, rhinorrhea, sore throat and voice change.    Eyes: Negative.  Negative for pain, discharge, itching and visual disturbance.   Respiratory: Negative.  Negative for cough, chest tightness, shortness of breath and wheezing.    Cardiovascular:  Negative for chest pain, palpitations, leg swelling and claudication.   Gastrointestinal: Negative.  Negative for abdominal pain, blood in stool, diarrhea, nausea and vomiting.   Endocrine: Negative for polydipsia, polyphagia and polyuria.   Genitourinary: Negative.  Negative for difficulty urinating, dyspareunia, dysuria, hematuria, menstrual problem, pelvic pain, urgency, vaginal bleeding, vaginal discharge and vaginal pain.   Musculoskeletal: Negative.  Negative for arthralgias and gait problem.   Integumentary:  Negative for wound.   Neurological: Negative.  Negative for dizziness and seizures.   Hematological:  Negative for adenopathy. Does not bruise/bleed easily.   Psychiatric/Behavioral: Negative.  Negative for behavioral problems, decreased concentration, sleep disturbance and suicidal ideas. The patient is not nervous/anxious.         Objective     Physical  Exam  Constitutional:       General: She is not in acute distress.     Appearance: She is well-developed. She is not diaphoretic.   HENT:      Head: Normocephalic and atraumatic.      Right Ear: External ear normal.      Left Ear: External ear normal.      Nose: Nose normal.      Mouth/Throat:      Pharynx: No oropharyngeal exudate.   Eyes:      General: No scleral icterus.        Right eye: No discharge.         Left eye: No discharge.      Conjunctiva/sclera: Conjunctivae normal.      Pupils: Pupils are equal, round, and reactive to light.   Neck:      Thyroid: No thyromegaly.      Vascular: No JVD.      Trachea: No tracheal deviation.   Cardiovascular:      Rate and Rhythm: Normal rate and regular rhythm.      Heart sounds: Normal heart sounds. No murmur heard.    No friction rub. No gallop.   Pulmonary:      Effort: Pulmonary effort is normal. No respiratory distress.      Breath sounds: Normal breath sounds. No stridor. No wheezing or rales.   Chest:      Chest wall: No tenderness.   Abdominal:      General: Bowel sounds are normal. There is no distension.      Palpations: Abdomen is soft. There is no mass.      Tenderness: There is no abdominal tenderness. There is no guarding or rebound.   Musculoskeletal:         General: No tenderness. Normal range of motion.      Cervical back: Normal range of motion and neck supple.   Lymphadenopathy:      Cervical: No cervical adenopathy.   Skin:     General: Skin is warm and dry.      Coloration: Skin is not pale.      Findings: No erythema or rash.   Neurological:      Mental Status: She is alert and oriented to person, place, and time.      Cranial Nerves: No cranial nerve deficit.      Motor: No abnormal muscle tone.      Coordination: Coordination normal.      Deep Tendon Reflexes: Reflexes are normal and symmetric.   Psychiatric:         Behavior: Behavior normal.         Thought Content: Thought content normal.         Judgment: Judgment normal.           Assessment and Plan     Problem List Items Addressed This Visit       Type 2 diabetes mellitus with hyperglycemia, without long-term current use of insulin    Relevant Orders    Comprehensive Metabolic Panel    Hemoglobin A1C    Lipid Panel    Essential hypertension - Primary    Relevant Orders    Comprehensive Metabolic Panel    Lipid Panel    HLD (hyperlipidemia)    Relevant Orders    Lipid Panel    NAFLD (nonalcoholic fatty liver disease)    Relevant Orders    Comprehensive Metabolic Panel    Aortic atherosclerosis    Relevant Orders    Lipid Panel           Aura was seen today for follow-up and results.    Diagnoses and all orders for this visit:    Essential hypertension  -     Comprehensive Metabolic Panel; Future  -     Lipid Panel; Future    Aortic atherosclerosis  -     Lipid Panel; Future    Type 2 diabetes mellitus with hyperglycemia, without long-term current use of insulin  -     Comprehensive Metabolic Panel; Future  -     Hemoglobin A1C; Future  -     Lipid Panel; Future    Hyperlipidemia, unspecified hyperlipidemia type  -     Lipid Panel; Future    NAFLD (nonalcoholic fatty liver disease)  -     Comprehensive Metabolic Panel; Future     Continue monitoring blood pressure at home, low sodium diet.   Continue monitoring blood sugar at home,ADA diet.

## 2023-04-29 DIAGNOSIS — E89.0 POSTSURGICAL HYPOTHYROIDISM: Primary | ICD-10-CM

## 2023-05-01 RX ORDER — LEVOTHYROXINE SODIUM 75 UG/1
75 TABLET ORAL
Qty: 90 TABLET | Refills: 3 | Status: SHIPPED | OUTPATIENT
Start: 2023-05-01 | End: 2023-05-19 | Stop reason: SDUPTHER

## 2023-05-19 ENCOUNTER — PATIENT MESSAGE (OUTPATIENT)
Dept: ENDOCRINOLOGY | Facility: CLINIC | Age: 68
End: 2023-05-19

## 2023-05-19 ENCOUNTER — LAB VISIT (OUTPATIENT)
Dept: LAB | Facility: HOSPITAL | Age: 68
End: 2023-05-19
Attending: INTERNAL MEDICINE
Payer: MEDICARE

## 2023-05-19 ENCOUNTER — OFFICE VISIT (OUTPATIENT)
Dept: ENDOCRINOLOGY | Facility: CLINIC | Age: 68
End: 2023-05-19
Payer: MEDICARE

## 2023-05-19 VITALS
HEART RATE: 88 BPM | HEIGHT: 62 IN | OXYGEN SATURATION: 97 % | BODY MASS INDEX: 22.92 KG/M2 | WEIGHT: 124.56 LBS | DIASTOLIC BLOOD PRESSURE: 80 MMHG | SYSTOLIC BLOOD PRESSURE: 158 MMHG

## 2023-05-19 DIAGNOSIS — C73 PAPILLARY THYROID CARCINOMA: Primary | ICD-10-CM

## 2023-05-19 DIAGNOSIS — K76.0 NAFLD (NONALCOHOLIC FATTY LIVER DISEASE): ICD-10-CM

## 2023-05-19 DIAGNOSIS — C73 PAPILLARY THYROID CARCINOMA: ICD-10-CM

## 2023-05-19 DIAGNOSIS — E89.0 POSTSURGICAL HYPOTHYROIDISM: ICD-10-CM

## 2023-05-19 DIAGNOSIS — E11.65 TYPE 2 DIABETES MELLITUS WITH HYPERGLYCEMIA, WITHOUT LONG-TERM CURRENT USE OF INSULIN: ICD-10-CM

## 2023-05-19 DIAGNOSIS — E89.2 POSTSURGICAL HYPOPARATHYROIDISM: ICD-10-CM

## 2023-05-19 DIAGNOSIS — I10 ESSENTIAL HYPERTENSION: ICD-10-CM

## 2023-05-19 PROBLEM — E04.1 THYROID NODULE: Status: RESOLVED | Noted: 2020-06-16 | Resolved: 2023-05-19

## 2023-05-19 LAB
T4 FREE SERPL-MCNC: 1.27 NG/DL (ref 0.71–1.51)
TSH SERPL DL<=0.005 MIU/L-ACNC: 5.92 UIU/ML (ref 0.4–4)

## 2023-05-19 PROCEDURE — 3060F POS MICROALBUMINURIA REV: CPT | Mod: CPTII,,, | Performed by: INTERNAL MEDICINE

## 2023-05-19 PROCEDURE — 3060F PR POS MICROALBUMINURIA RESULT DOCUMENTED/REVIEW: ICD-10-PCS | Mod: CPTII,,, | Performed by: INTERNAL MEDICINE

## 2023-05-19 PROCEDURE — 1160F PR REVIEW ALL MEDS BY PRESCRIBER/CLIN PHARMACIST DOCUMENTED: ICD-10-PCS | Mod: CPTII,,, | Performed by: INTERNAL MEDICINE

## 2023-05-19 PROCEDURE — 1159F PR MEDICATION LIST DOCUMENTED IN MEDICAL RECORD: ICD-10-PCS | Mod: CPTII,,, | Performed by: INTERNAL MEDICINE

## 2023-05-19 PROCEDURE — 1126F PR PAIN SEVERITY QUANTIFIED, NO PAIN PRESENT: ICD-10-PCS | Mod: CPTII,,, | Performed by: INTERNAL MEDICINE

## 2023-05-19 PROCEDURE — 3077F SYST BP >= 140 MM HG: CPT | Mod: CPTII,,, | Performed by: INTERNAL MEDICINE

## 2023-05-19 PROCEDURE — 3066F PR DOCUMENTATION OF TREATMENT FOR NEPHROPATHY: ICD-10-PCS | Mod: CPTII,,, | Performed by: INTERNAL MEDICINE

## 2023-05-19 PROCEDURE — 3066F NEPHROPATHY DOC TX: CPT | Mod: CPTII,,, | Performed by: INTERNAL MEDICINE

## 2023-05-19 PROCEDURE — 99999 PR PBB SHADOW E&M-EST. PATIENT-LVL V: CPT | Mod: PBBFAC,,, | Performed by: INTERNAL MEDICINE

## 2023-05-19 PROCEDURE — 4010F PR ACE/ARB THEARPY RXD/TAKEN: ICD-10-PCS | Mod: CPTII,,, | Performed by: INTERNAL MEDICINE

## 2023-05-19 PROCEDURE — 3079F DIAST BP 80-89 MM HG: CPT | Mod: CPTII,,, | Performed by: INTERNAL MEDICINE

## 2023-05-19 PROCEDURE — 1160F RVW MEDS BY RX/DR IN RCRD: CPT | Mod: CPTII,,, | Performed by: INTERNAL MEDICINE

## 2023-05-19 PROCEDURE — 3077F PR MOST RECENT SYSTOLIC BLOOD PRESSURE >= 140 MM HG: ICD-10-PCS | Mod: CPTII,,, | Performed by: INTERNAL MEDICINE

## 2023-05-19 PROCEDURE — 3051F HG A1C>EQUAL 7.0%<8.0%: CPT | Mod: CPTII,,, | Performed by: INTERNAL MEDICINE

## 2023-05-19 PROCEDURE — 4010F ACE/ARB THERAPY RXD/TAKEN: CPT | Mod: CPTII,,, | Performed by: INTERNAL MEDICINE

## 2023-05-19 PROCEDURE — 3008F BODY MASS INDEX DOCD: CPT | Mod: CPTII,,, | Performed by: INTERNAL MEDICINE

## 2023-05-19 PROCEDURE — 3008F PR BODY MASS INDEX (BMI) DOCUMENTED: ICD-10-PCS | Mod: CPTII,,, | Performed by: INTERNAL MEDICINE

## 2023-05-19 PROCEDURE — 84439 ASSAY OF FREE THYROXINE: CPT | Performed by: INTERNAL MEDICINE

## 2023-05-19 PROCEDURE — 3079F PR MOST RECENT DIASTOLIC BLOOD PRESSURE 80-89 MM HG: ICD-10-PCS | Mod: CPTII,,, | Performed by: INTERNAL MEDICINE

## 2023-05-19 PROCEDURE — 84432 ASSAY OF THYROGLOBULIN: CPT | Performed by: INTERNAL MEDICINE

## 2023-05-19 PROCEDURE — 99214 OFFICE O/P EST MOD 30 MIN: CPT | Mod: ,,, | Performed by: INTERNAL MEDICINE

## 2023-05-19 PROCEDURE — 1126F AMNT PAIN NOTED NONE PRSNT: CPT | Mod: CPTII,,, | Performed by: INTERNAL MEDICINE

## 2023-05-19 PROCEDURE — 3288F FALL RISK ASSESSMENT DOCD: CPT | Mod: CPTII,,, | Performed by: INTERNAL MEDICINE

## 2023-05-19 PROCEDURE — 99214 PR OFFICE/OUTPT VISIT, EST, LEVL IV, 30-39 MIN: ICD-10-PCS | Mod: ,,, | Performed by: INTERNAL MEDICINE

## 2023-05-19 PROCEDURE — 84443 ASSAY THYROID STIM HORMONE: CPT | Performed by: INTERNAL MEDICINE

## 2023-05-19 PROCEDURE — 1101F PR PT FALLS ASSESS DOC 0-1 FALLS W/OUT INJ PAST YR: ICD-10-PCS | Mod: CPTII,,, | Performed by: INTERNAL MEDICINE

## 2023-05-19 PROCEDURE — 36415 COLL VENOUS BLD VENIPUNCTURE: CPT | Performed by: INTERNAL MEDICINE

## 2023-05-19 PROCEDURE — 3051F PR MOST RECENT HEMOGLOBIN A1C LEVEL 7.0 - < 8.0%: ICD-10-PCS | Mod: CPTII,,, | Performed by: INTERNAL MEDICINE

## 2023-05-19 PROCEDURE — 99999 PR PBB SHADOW E&M-EST. PATIENT-LVL V: ICD-10-PCS | Mod: PBBFAC,,, | Performed by: INTERNAL MEDICINE

## 2023-05-19 PROCEDURE — 3288F PR FALLS RISK ASSESSMENT DOCUMENTED: ICD-10-PCS | Mod: CPTII,,, | Performed by: INTERNAL MEDICINE

## 2023-05-19 PROCEDURE — 1159F MED LIST DOCD IN RCRD: CPT | Mod: CPTII,,, | Performed by: INTERNAL MEDICINE

## 2023-05-19 PROCEDURE — 99215 OFFICE O/P EST HI 40 MIN: CPT | Performed by: INTERNAL MEDICINE

## 2023-05-19 PROCEDURE — 1101F PT FALLS ASSESS-DOCD LE1/YR: CPT | Mod: CPTII,,, | Performed by: INTERNAL MEDICINE

## 2023-05-19 RX ORDER — LEVOTHYROXINE SODIUM 75 UG/1
75 TABLET ORAL
Qty: 96 TABLET | Refills: 3 | Status: SHIPPED | OUTPATIENT
Start: 2023-05-19 | End: 2023-06-30 | Stop reason: SDUPTHER

## 2023-05-19 RX ORDER — DULAGLUTIDE 0.75 MG/.5ML
0.75 INJECTION, SOLUTION SUBCUTANEOUS
Qty: 4 PEN | Refills: 5 | Status: SHIPPED | OUTPATIENT
Start: 2023-05-19 | End: 2023-09-07

## 2023-05-19 RX ORDER — HYDROCHLOROTHIAZIDE 12.5 MG/1
12.5 TABLET ORAL DAILY
Qty: 30 TABLET | Refills: 11 | Status: SHIPPED | OUTPATIENT
Start: 2023-05-19 | End: 2024-05-18

## 2023-05-19 NOTE — PATIENT INSTRUCTIONS
We will start Trulicity for diabetes. Continue metformin as well    Trulicity start  We will start Trulicity at 0.75 mg weekly. After 4 weeks if you tolerate it well, we typically increase to 1.5 mg weekly for improved blood sugar control as well as added weight loss benefit.     Potential Side Effects of Trulicity:   One of the ways this medication works is by decreasing how fast your stomach empties, which makes you feel full faster after you eat and should help you lose weight. The main side-effects are related to GI upset, such as nausea, bloating and abdominal cramps. You can usually avoid this by eating slowly (take half of your normal portion and eat it over 30 minutes). If you do experience nausea, it does tend to get better after the first few weeks. The most severe reaction is acute pancreatitis which can cause severe abdominal pain radiating to your back. If you experience this, stop the medication and go to the ER. Fortunately this is very rare.      We will start HCTZ 12.5 mg daily for blood pressure    Please be sure to take your calcium pills daily

## 2023-05-19 NOTE — ASSESSMENT & PLAN NOTE
[address] [facility]    NAME: Margot Tam  AGE: 23 y o  SEX: male  : 2004     DATE: 2020     Assessment and Plan:     Problem List Items Addressed This Visit     None      Visit Diagnoses     Annual physical exam    -  Primary    Encounter for administration of vaccine        Relevant Orders    MENINGOCOCCAL CONJUGATE VACCINE MCV4P IM (Completed)          Immunizations and preventive care screenings were discussed with patient today  Appropriate education was printed on patient's after visit summary  Counseling:  Alcohol/drug use: discussed moderation in alcohol intake, the recommendations for healthy alcohol use, and avoidance of illicit drug use  Dental Health: discussed importance of regular tooth brushing, flossing, and dental visits  Injury prevention: discussed safety/seat belts, safety helmets, smoke detectors, carbon dioxide detectors, and smoking near bedding or upholstery  Sexual health: discussed sexually transmitted diseases, partner selection, use of condoms, avoidance of unintended pregnancy, and contraceptive alternatives  · Exercise: the importance of regular exercise/physical activity was discussed  Recommend exercise 3-5 times per week for at least 30 minutes  BMI Counseling: Body mass index is 33 84 kg/m²  The BMI is above normal  Nutrition recommendations include decreasing portion sizes and encouraging healthy choices of fruits and vegetables  Exercise recommendations include moderate physical activity 150 minutes/week  No pharmacotherapy was ordered  No follow-ups on file  Chief Complaint:     Chief Complaint   Patient presents with    Annual Exam     patient is here for physical exam      History of Present Illness:     Adult Annual Physical   Patient here for a comprehensive physical exam  The patient reports no problems    Starting at [employer] this year as a freshman BP elevated  Start HCTZ 12.5 mg daily   Studying health sciences     Diet and Physical Activity  · Diet/Nutrition: well balanced diet and consuming 3-5 servings of fruits/vegetables daily  · Exercise: moderate cardiovascular exercise and 3-4 times a week on average  Depression Screening  PHQ-9 Depression Screening    PHQ-9:    Frequency of the following problems over the past two weeks:       Little interest or pleasure in doing things:  0 - not at all  Feeling down, depressed, or hopeless:  0 - not at all  PHQ-2 Score:  0       General Health  · Sleep: sleeps well  · Hearing: normal - bilateral   · Vision: most recent eye exam >1 year ago  · Dental: regular dental visits and brushes teeth twice daily   Health  · History of STDs?: no      Review of Systems:     Review of Systems   Constitutional: Negative  HENT: Negative  Eyes: Negative  Respiratory: Negative  Cardiovascular: Negative  Gastrointestinal: Negative  Endocrine: Negative  Genitourinary: Negative  Musculoskeletal: Negative  Skin: Negative  Allergic/Immunologic: Negative  Neurological: Negative  Hematological: Negative  Psychiatric/Behavioral: Negative  Past Medical History:     History reviewed  No pertinent past medical history     Past Surgical History:     Past Surgical History:   Procedure Laterality Date    NO PAST SURGERIES        Social History:        Social History     Socioeconomic History    Marital status: Single     Spouse name: None    Number of children: None    Years of education: None    Highest education level: None   Occupational History    None   Social Needs    Financial resource strain: None    Food insecurity     Worry: None     Inability: None    Transportation needs     Medical: None     Non-medical: None   Tobacco Use    Smoking status: Never Smoker    Smokeless tobacco: Never Used   Substance and Sexual Activity    Alcohol use: No    Drug use: No    Sexual activity: None   Lifestyle    Physical activity     Days per week: None     Minutes per session: None    Stress: None   Relationships    Social connections     Talks on phone: None     Gets together: None     Attends Holiness service: None     Active member of club or organization: None     Attends meetings of clubs or organizations: None     Relationship status: None    Intimate partner violence     Fear of current or ex partner: None     Emotionally abused: None     Physically abused: None     Forced sexual activity: None   Other Topics Concern    None   Social History Narrative    None      Family History:     Family History   Problem Relation Age of Onset    No Known Problems Mother     No Known Problems Father     Breast cancer Maternal Grandmother       Current Medications:     No current outpatient medications on file  No current facility-administered medications for this visit  Allergies: Allergies   Allergen Reactions    Nuts      almonds    Other Itching     Annotation - [id number]: peaches only    Prunus Persica Hives      Physical Exam:     /90 (BP Location: Left arm, Patient Position: Sitting, Cuff Size: Large)   Pulse 67   Temp (!) 97 1 °F (36 2 °C) (Temporal)   Resp 18   Ht 5' 8 86" (1 749 m)   Wt 104 kg (228 lb 3 2 oz)   SpO2 98%   BMI 33 84 kg/m²     Physical Exam   Constitutional: He is oriented to person, place, and time  He appears well-developed  HENT:   Head: Normocephalic and atraumatic  Right Ear: Tympanic membrane normal    Left Ear: Tympanic membrane normal    Nose: Nose normal    Mouth/Throat: No oropharyngeal exudate or posterior oropharyngeal erythema  Eyes: Pupils are equal, round, and reactive to light  Neck: Normal range of motion  Neck supple  No thyromegaly present  Cardiovascular: Normal rate and regular rhythm  No murmur heard  Pulmonary/Chest: Effort normal and breath sounds normal    Abdominal: Soft   Bowel sounds are normal    Musculoskeletal: Normal range of motion  General: No deformity  Neurological: He is alert and oriented to person, place, and time  He has normal reflexes  No cranial nerve deficit or sensory deficit  Skin: Skin is warm  No rash noted  No erythema     Psychiatric: His behavior is normal  Mood normal        Vmial Cohen MD   [facility]

## 2023-05-19 NOTE — ASSESSMENT & PLAN NOTE
S/p total thyroidectomy  Staging: pT1b pN0 pMX. Post-operative TG 0.2 with suppressed TSH  Low risk disease. Discussed option of remnant ablation but no proven benefit for recurrence or mortality. She does not wish to have MALONEY at this time  Goal TSH low-normal range  Repeat TG, TSH and US now

## 2023-05-19 NOTE — ASSESSMENT & PLAN NOTE
A1c above goal. She has noted higher sugars and some weight gain  Recommend Trulicity and she is open to this if price allows  Other options would be jardiance or SFU  Cont metformin
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
## 2023-05-20 LAB
THRYOGLOBULIN INTERPRETATION: ABNORMAL
THYROGLOB AB SERPL-ACNC: <1.8 IU/ML
THYROGLOB SERPL-MCNC: 0.6 NG/ML

## 2023-05-24 ENCOUNTER — HOSPITAL ENCOUNTER (OUTPATIENT)
Dept: RADIOLOGY | Facility: HOSPITAL | Age: 68
Discharge: HOME | End: 2023-05-24
Attending: INTERNAL MEDICINE
Payer: MEDICARE

## 2023-05-24 DIAGNOSIS — C73 PAPILLARY THYROID CARCINOMA: ICD-10-CM

## 2023-05-24 PROCEDURE — 76536 US EXAM OF HEAD AND NECK: CPT | Mod: 26,,, | Performed by: RADIOLOGY

## 2023-05-24 PROCEDURE — 76536 US EXAM OF HEAD AND NECK: CPT | Mod: TC,PO

## 2023-05-24 PROCEDURE — 76536 US SOFT TISSUE HEAD NECK THYROID: ICD-10-PCS | Mod: 26,,, | Performed by: RADIOLOGY

## 2023-06-08 ENCOUNTER — HOSPITAL ENCOUNTER (OUTPATIENT)
Dept: RADIOLOGY | Facility: HOSPITAL | Age: 68
Discharge: HOME | End: 2023-06-08
Attending: ORTHOPAEDIC SURGERY
Payer: MEDICARE

## 2023-06-08 DIAGNOSIS — M17.12 PRIMARY OSTEOARTHRITIS OF LEFT KNEE: ICD-10-CM

## 2023-06-08 PROCEDURE — 73502 XR HIP WITH PELVIS WHEN PERFORMED, 2 OR 3 VIEWS LEFT: ICD-10-PCS | Mod: 26,LT,, | Performed by: RADIOLOGY

## 2023-06-08 PROCEDURE — 73564 X-RAY EXAM KNEE 4 OR MORE: CPT | Mod: TC,50,FY,PO

## 2023-06-08 PROCEDURE — 73502 X-RAY EXAM HIP UNI 2-3 VIEWS: CPT | Mod: 26,LT,, | Performed by: RADIOLOGY

## 2023-06-08 PROCEDURE — 73502 X-RAY EXAM HIP UNI 2-3 VIEWS: CPT | Mod: TC,FY,PO,LT

## 2023-06-08 PROCEDURE — 73564 X-RAY EXAM KNEE 4 OR MORE: CPT | Mod: 26,50,, | Performed by: RADIOLOGY

## 2023-06-08 PROCEDURE — 73564 XR KNEE ORTHO BILAT WITH FLEXION: ICD-10-PCS | Mod: 26,50,, | Performed by: RADIOLOGY

## 2023-06-15 ENCOUNTER — OFFICE VISIT (OUTPATIENT)
Dept: CARDIOLOGY | Facility: CLINIC | Age: 68
End: 2023-06-15
Payer: MEDICARE

## 2023-06-15 ENCOUNTER — OFFICE VISIT (OUTPATIENT)
Dept: ORTHOPEDICS | Facility: CLINIC | Age: 68
End: 2023-06-15
Payer: MEDICARE

## 2023-06-15 VITALS
BODY MASS INDEX: 23.53 KG/M2 | HEART RATE: 86 BPM | OXYGEN SATURATION: 97 % | DIASTOLIC BLOOD PRESSURE: 75 MMHG | WEIGHT: 127.88 LBS | HEIGHT: 62 IN | SYSTOLIC BLOOD PRESSURE: 147 MMHG

## 2023-06-15 VITALS — BODY MASS INDEX: 22.92 KG/M2 | WEIGHT: 124.56 LBS | HEIGHT: 62 IN

## 2023-06-15 DIAGNOSIS — I10 ESSENTIAL HYPERTENSION: ICD-10-CM

## 2023-06-15 DIAGNOSIS — M17.12 PRIMARY OSTEOARTHRITIS OF LEFT KNEE: Primary | ICD-10-CM

## 2023-06-15 DIAGNOSIS — I70.0 AORTIC ATHEROSCLEROSIS: ICD-10-CM

## 2023-06-15 DIAGNOSIS — I44.7 LEFT BUNDLE BRANCH BLOCK: Primary | ICD-10-CM

## 2023-06-15 DIAGNOSIS — E78.5 HYPERLIPIDEMIA, UNSPECIFIED HYPERLIPIDEMIA TYPE: ICD-10-CM

## 2023-06-15 DIAGNOSIS — E11.65 TYPE 2 DIABETES MELLITUS WITH HYPERGLYCEMIA, WITHOUT LONG-TERM CURRENT USE OF INSULIN: ICD-10-CM

## 2023-06-15 DIAGNOSIS — C73 PAPILLARY THYROID CARCINOMA: ICD-10-CM

## 2023-06-15 DIAGNOSIS — M89.8X5 LYTIC BONE LESION OF HIP: ICD-10-CM

## 2023-06-15 PROCEDURE — 99999 PR PBB SHADOW E&M-EST. PATIENT-LVL IV: ICD-10-PCS | Mod: PBBFAC,,, | Performed by: INTERNAL MEDICINE

## 2023-06-15 PROCEDURE — 93000 EKG 12-LEAD: ICD-10-PCS | Mod: S$GLB,,, | Performed by: INTERNAL MEDICINE

## 2023-06-15 PROCEDURE — 3008F PR BODY MASS INDEX (BMI) DOCUMENTED: ICD-10-PCS | Mod: CPTII,S$GLB,, | Performed by: INTERNAL MEDICINE

## 2023-06-15 PROCEDURE — 3066F PR DOCUMENTATION OF TREATMENT FOR NEPHROPATHY: ICD-10-PCS | Mod: CPTII,S$GLB,, | Performed by: ORTHOPAEDIC SURGERY

## 2023-06-15 PROCEDURE — 4010F ACE/ARB THERAPY RXD/TAKEN: CPT | Mod: CPTII,S$GLB,, | Performed by: INTERNAL MEDICINE

## 2023-06-15 PROCEDURE — 99214 PR OFFICE/OUTPT VISIT, EST, LEVL IV, 30-39 MIN: ICD-10-PCS | Mod: 25,S$GLB,, | Performed by: ORTHOPAEDIC SURGERY

## 2023-06-15 PROCEDURE — 93000 ELECTROCARDIOGRAM COMPLETE: CPT | Mod: S$GLB,,, | Performed by: INTERNAL MEDICINE

## 2023-06-15 PROCEDURE — 1125F AMNT PAIN NOTED PAIN PRSNT: CPT | Mod: CPTII,S$GLB,, | Performed by: ORTHOPAEDIC SURGERY

## 2023-06-15 PROCEDURE — 1159F MED LIST DOCD IN RCRD: CPT | Mod: CPTII,S$GLB,, | Performed by: INTERNAL MEDICINE

## 2023-06-15 PROCEDURE — 3060F PR POS MICROALBUMINURIA RESULT DOCUMENTED/REVIEW: ICD-10-PCS | Mod: CPTII,S$GLB,, | Performed by: INTERNAL MEDICINE

## 2023-06-15 PROCEDURE — 1159F PR MEDICATION LIST DOCUMENTED IN MEDICAL RECORD: ICD-10-PCS | Mod: CPTII,S$GLB,, | Performed by: ORTHOPAEDIC SURGERY

## 2023-06-15 PROCEDURE — 99214 OFFICE O/P EST MOD 30 MIN: CPT | Mod: S$GLB,,, | Performed by: INTERNAL MEDICINE

## 2023-06-15 PROCEDURE — 3008F BODY MASS INDEX DOCD: CPT | Mod: CPTII,S$GLB,, | Performed by: ORTHOPAEDIC SURGERY

## 2023-06-15 PROCEDURE — 99999 PR PBB SHADOW E&M-EST. PATIENT-LVL III: ICD-10-PCS | Mod: PBBFAC,,, | Performed by: ORTHOPAEDIC SURGERY

## 2023-06-15 PROCEDURE — 3060F PR POS MICROALBUMINURIA RESULT DOCUMENTED/REVIEW: ICD-10-PCS | Mod: CPTII,S$GLB,, | Performed by: ORTHOPAEDIC SURGERY

## 2023-06-15 PROCEDURE — 1125F PR PAIN SEVERITY QUANTIFIED, PAIN PRESENT: ICD-10-PCS | Mod: CPTII,S$GLB,, | Performed by: ORTHOPAEDIC SURGERY

## 2023-06-15 PROCEDURE — 99214 OFFICE O/P EST MOD 30 MIN: CPT | Mod: 25,S$GLB,, | Performed by: ORTHOPAEDIC SURGERY

## 2023-06-15 PROCEDURE — 1159F MED LIST DOCD IN RCRD: CPT | Mod: CPTII,S$GLB,, | Performed by: ORTHOPAEDIC SURGERY

## 2023-06-15 PROCEDURE — 1101F PT FALLS ASSESS-DOCD LE1/YR: CPT | Mod: CPTII,S$GLB,, | Performed by: ORTHOPAEDIC SURGERY

## 2023-06-15 PROCEDURE — 4010F ACE/ARB THERAPY RXD/TAKEN: CPT | Mod: CPTII,S$GLB,, | Performed by: ORTHOPAEDIC SURGERY

## 2023-06-15 PROCEDURE — 4010F PR ACE/ARB THEARPY RXD/TAKEN: ICD-10-PCS | Mod: CPTII,S$GLB,, | Performed by: ORTHOPAEDIC SURGERY

## 2023-06-15 PROCEDURE — 1160F RVW MEDS BY RX/DR IN RCRD: CPT | Mod: CPTII,S$GLB,, | Performed by: INTERNAL MEDICINE

## 2023-06-15 PROCEDURE — 1160F PR REVIEW ALL MEDS BY PRESCRIBER/CLIN PHARMACIST DOCUMENTED: ICD-10-PCS | Mod: CPTII,S$GLB,, | Performed by: INTERNAL MEDICINE

## 2023-06-15 PROCEDURE — 3051F PR MOST RECENT HEMOGLOBIN A1C LEVEL 7.0 - < 8.0%: ICD-10-PCS | Mod: CPTII,S$GLB,, | Performed by: ORTHOPAEDIC SURGERY

## 2023-06-15 PROCEDURE — 3078F PR MOST RECENT DIASTOLIC BLOOD PRESSURE < 80 MM HG: ICD-10-PCS | Mod: CPTII,S$GLB,, | Performed by: INTERNAL MEDICINE

## 2023-06-15 PROCEDURE — 20610 DRAIN/INJ JOINT/BURSA W/O US: CPT | Mod: LT,S$GLB,, | Performed by: ORTHOPAEDIC SURGERY

## 2023-06-15 PROCEDURE — 3288F PR FALLS RISK ASSESSMENT DOCUMENTED: ICD-10-PCS | Mod: CPTII,S$GLB,, | Performed by: ORTHOPAEDIC SURGERY

## 2023-06-15 PROCEDURE — 3051F HG A1C>EQUAL 7.0%<8.0%: CPT | Mod: CPTII,S$GLB,, | Performed by: ORTHOPAEDIC SURGERY

## 2023-06-15 PROCEDURE — 3288F FALL RISK ASSESSMENT DOCD: CPT | Mod: CPTII,S$GLB,, | Performed by: ORTHOPAEDIC SURGERY

## 2023-06-15 PROCEDURE — 3008F BODY MASS INDEX DOCD: CPT | Mod: CPTII,S$GLB,, | Performed by: INTERNAL MEDICINE

## 2023-06-15 PROCEDURE — 3066F NEPHROPATHY DOC TX: CPT | Mod: CPTII,S$GLB,, | Performed by: INTERNAL MEDICINE

## 2023-06-15 PROCEDURE — 4010F PR ACE/ARB THEARPY RXD/TAKEN: ICD-10-PCS | Mod: CPTII,S$GLB,, | Performed by: INTERNAL MEDICINE

## 2023-06-15 PROCEDURE — 3077F PR MOST RECENT SYSTOLIC BLOOD PRESSURE >= 140 MM HG: ICD-10-PCS | Mod: CPTII,S$GLB,, | Performed by: INTERNAL MEDICINE

## 2023-06-15 PROCEDURE — 3066F NEPHROPATHY DOC TX: CPT | Mod: CPTII,S$GLB,, | Performed by: ORTHOPAEDIC SURGERY

## 2023-06-15 PROCEDURE — 3060F POS MICROALBUMINURIA REV: CPT | Mod: CPTII,S$GLB,, | Performed by: INTERNAL MEDICINE

## 2023-06-15 PROCEDURE — 3051F PR MOST RECENT HEMOGLOBIN A1C LEVEL 7.0 - < 8.0%: ICD-10-PCS | Mod: CPTII,S$GLB,, | Performed by: INTERNAL MEDICINE

## 2023-06-15 PROCEDURE — 99999 PR PBB SHADOW E&M-EST. PATIENT-LVL IV: CPT | Mod: PBBFAC,,, | Performed by: INTERNAL MEDICINE

## 2023-06-15 PROCEDURE — 1159F PR MEDICATION LIST DOCUMENTED IN MEDICAL RECORD: ICD-10-PCS | Mod: CPTII,S$GLB,, | Performed by: INTERNAL MEDICINE

## 2023-06-15 PROCEDURE — 1125F AMNT PAIN NOTED PAIN PRSNT: CPT | Mod: CPTII,S$GLB,, | Performed by: INTERNAL MEDICINE

## 2023-06-15 PROCEDURE — 3078F DIAST BP <80 MM HG: CPT | Mod: CPTII,S$GLB,, | Performed by: INTERNAL MEDICINE

## 2023-06-15 PROCEDURE — 20610 LARGE JOINT ASPIRATION/INJECTION: L KNEE: ICD-10-PCS | Mod: LT,S$GLB,, | Performed by: ORTHOPAEDIC SURGERY

## 2023-06-15 PROCEDURE — 3060F POS MICROALBUMINURIA REV: CPT | Mod: CPTII,S$GLB,, | Performed by: ORTHOPAEDIC SURGERY

## 2023-06-15 PROCEDURE — 99999 PR PBB SHADOW E&M-EST. PATIENT-LVL III: CPT | Mod: PBBFAC,,, | Performed by: ORTHOPAEDIC SURGERY

## 2023-06-15 PROCEDURE — 3066F PR DOCUMENTATION OF TREATMENT FOR NEPHROPATHY: ICD-10-PCS | Mod: CPTII,S$GLB,, | Performed by: INTERNAL MEDICINE

## 2023-06-15 PROCEDURE — 3077F SYST BP >= 140 MM HG: CPT | Mod: CPTII,S$GLB,, | Performed by: INTERNAL MEDICINE

## 2023-06-15 PROCEDURE — 1125F PR PAIN SEVERITY QUANTIFIED, PAIN PRESENT: ICD-10-PCS | Mod: CPTII,S$GLB,, | Performed by: INTERNAL MEDICINE

## 2023-06-15 PROCEDURE — 1101F PR PT FALLS ASSESS DOC 0-1 FALLS W/OUT INJ PAST YR: ICD-10-PCS | Mod: CPTII,S$GLB,, | Performed by: ORTHOPAEDIC SURGERY

## 2023-06-15 PROCEDURE — 3051F HG A1C>EQUAL 7.0%<8.0%: CPT | Mod: CPTII,S$GLB,, | Performed by: INTERNAL MEDICINE

## 2023-06-15 PROCEDURE — 3008F PR BODY MASS INDEX (BMI) DOCUMENTED: ICD-10-PCS | Mod: CPTII,S$GLB,, | Performed by: ORTHOPAEDIC SURGERY

## 2023-06-15 PROCEDURE — 99214 PR OFFICE/OUTPT VISIT, EST, LEVL IV, 30-39 MIN: ICD-10-PCS | Mod: S$GLB,,, | Performed by: INTERNAL MEDICINE

## 2023-06-15 RX ORDER — TRIAMCINOLONE ACETONIDE 40 MG/ML
40 INJECTION, SUSPENSION INTRA-ARTICULAR; INTRAMUSCULAR
Status: DISCONTINUED | OUTPATIENT
Start: 2023-06-15 | End: 2023-06-15 | Stop reason: HOSPADM

## 2023-06-15 RX ADMIN — TRIAMCINOLONE ACETONIDE 40 MG: 40 INJECTION, SUSPENSION INTRA-ARTICULAR; INTRAMUSCULAR at 11:06

## 2023-06-15 NOTE — PROGRESS NOTES
Subjective:    Patient ID:  Tomeka Burns is a 67 y.o. female who presents for follow-up of LBBB    HPI    66 y/o Cambodian speaking female with hx of HTN, DM, LBBB, hx of Covid infection, who presents for f/u. Had developed mild nonspecific, left sided sharp chest pain, unrelated to exertion, sent for exercise stress echo, however, found to have LBBB and stress portion stopped. 2DE with normal EF, DD, no WMA's. Claims to have had a stress test years ago and was told she had a mild blockage. Has some intermittent TROTTER and palps. Had nuclear SPECT with fixed perfusion defect in the septal wall of the left ventricle and moderate apical thinning. Had LHC with normal coronaries, normal EF and DD. Holter with no significant abnormalities. Has had extensive cardiac w/u. Was started on HCTZ. Elevated in clinic today, but controlled at home.   Had Covid infection 1/2021 with PNA, no hospitalization. Has since had TROTTER that is episodic.  Denies orthopnea, PND, syncope. Compliant with meds. Non smoker. No fam hx of early CAD, although mother had valve replaced at about 54 y/o.     1/5/2023:  Doing well since last visit. Continues to have mild TROTTER since Covid. Has not been exercising and has fatigue and unchanged fatigue. Denies orthopnea, PND, syncope.    6/15/2023:  Since last visit had an episode of substernal/epigastric pain, described as a sharp pain, intermittent, unrelated to exertion, improved with ASA. No recurrence. States it was reproducible.     Review of Systems   Constitutional: Negative for malaise/fatigue.   HENT:  Negative for congestion.    Eyes:  Negative for blurred vision.   Cardiovascular:  Positive for chest pain and dyspnea on exertion. Negative for claudication, cyanosis, irregular heartbeat, leg swelling, near-syncope, orthopnea, palpitations, paroxysmal nocturnal dyspnea and syncope.   Respiratory:  Negative for shortness of breath.    Endocrine: Negative for polyuria.   Hematologic/Lymphatic: Negative  for bleeding problem.   Skin:  Negative for itching and rash.   Musculoskeletal:  Negative for joint swelling, muscle cramps and muscle weakness.   Gastrointestinal:  Negative for abdominal pain, hematemesis, hematochezia, melena, nausea and vomiting.   Genitourinary:  Negative for dysuria and hematuria.   Neurological:  Negative for dizziness, focal weakness, headaches, light-headedness, loss of balance and weakness.   Psychiatric/Behavioral:  Negative for depression. The patient is not nervous/anxious.       Objective:    Physical Exam  Constitutional:       Appearance: She is well-developed.   HENT:      Head: Normocephalic and atraumatic.   Neck:      Vascular: No JVD.   Cardiovascular:      Rate and Rhythm: Normal rate and regular rhythm.      Pulses:           Carotid pulses are 2+ on the right side and 2+ on the left side.       Radial pulses are 2+ on the right side and 2+ on the left side.        Femoral pulses are 2+ on the right side and 2+ on the left side.       Dorsalis pedis pulses are 2+ on the right side and 2+ on the left side.        Posterior tibial pulses are 2+ on the right side and 2+ on the left side.      Heart sounds: Murmur heard.   Systolic murmur is present with a grade of 2/6.   Pulmonary:      Effort: Pulmonary effort is normal.      Breath sounds: Normal breath sounds.   Abdominal:      General: Bowel sounds are normal.      Palpations: Abdomen is soft.   Musculoskeletal:      Cervical back: Neck supple.   Skin:     General: Skin is warm and dry.   Neurological:      Mental Status: She is alert and oriented to person, place, and time.   Psychiatric:         Behavior: Behavior normal.         Thought Content: Thought content normal.         Assessment:       1. Left bundle branch block    2. Hyperlipidemia, unspecified hyperlipidemia type    3. Essential hypertension    4. Aortic atherosclerosis    5. Papillary thyroid carcinoma    6. Type 2 diabetes mellitus with hyperglycemia,  without long-term current use of insulin      66 y/o pt with hx and presentation as above. Chest pain does not appear to be cardiac in origin and what is reassuring is previous normal LHC. ECG today and NSAID's PRN. Needs to be more active. Discussed the etiology, evaluation, and management of HTN, HLD, DM, and LBBB. Discussed the importance of med compliance, heart healthy diet, and regular exercise.       Plan:       -Continue current medical management  -f/u in 3 months

## 2023-06-15 NOTE — PROCEDURES
Large Joint Aspiration/Injection: L knee    Date/Time: 6/15/2023 11:00 AM  Performed by: Fernie Moeller MD  Authorized by: Fernie Moeller MD     Location:  Knee  Site:  L knee  Medications:  40 mg triamcinolone acetonide 40 mg/mL     After obtaining verbal informed consent the patient's left knee was prepped aseptically and injected through an inferior lateral approach using 40 mg of triamcinolone and 1 cc of 1% plain Xylocaine.  The patient was warned about postinjection flare and how to manage it with ice, rest and over-the-counter analgesics.  They're advised to contact me for any severe, uncontrolled pain.

## 2023-06-15 NOTE — PROGRESS NOTES
Subjective:      Patient ID: Tomeka Burns is a 67 y.o. female.    Chief Complaint: Follow-up, Hip Pain (Left ), and Knee Pain (left )    HPI    Follow-up for osteoarthritis.  The patient reports the left knee injection was extremely helpful.  She requests reinjection today.  She also notes occasional left groin pain but does not feel that this is functionally limiting.  Her past medical history is relevant for thyroidectomy because of carcinoma.          Review of Systems   Constitutional: Negative for fever and weight loss.   HENT:  Negative for congestion.    Eyes:  Negative for visual disturbance.   Cardiovascular:  Negative for chest pain.   Respiratory:  Negative for shortness of breath.    Hematologic/Lymphatic: Negative for bleeding problem. Does not bruise/bleed easily.   Skin:  Negative for poor wound healing.   Musculoskeletal:  Positive for joint pain.   Gastrointestinal:  Negative for abdominal pain.   Genitourinary:  Negative for dysuria.   Neurological:  Negative for seizures.   Psychiatric/Behavioral:  Negative for altered mental status.    Allergic/Immunologic: Negative for persistent infections.       Objective:      Ortho/SPM Exam      Left knee     The patient is not in acute distress.   Body habitus is normal.   Sclera appear normal  No respiratory distress  The patient walks without a limp.  Resisted SLR equivocal  The skin over the knee is intact.  Knee effusion 0  Tendernes is located absent.  Range of motion- Flexion full, Extension full.   Ligament exam:              MCL 1+ laxity              Lachman intact              Post sag intact              LCL intact  Patellar apprehension negative.  Popliteal cyst negative  Patellar crepitation present  Flexion/pinch negative.  Pulses DP present, PT present.  Motor normal 5/5 strength in all tested muscle groups.   Sensory normal.    Left hip  Nontender  Full painless range of motion    I reviewed the relevant imaging for the patient's condition:   Recent left hip radiographs show mixed sclerotic and lytic changes in the left inferior pelvic ramus          Assessment:       Encounter Diagnoses   Name Primary?    Primary osteoarthritis of left knee Yes    Lytic bone lesion of hip         The left knee symptoms are controlled with periodic injection.  As long as this is the case, this can be continued.  If progression occurs, left knee arthroplasty  would be considered    Possible causes of radiographic findings in the left hip include remote history of trauma and destructive bone process.  Given the history of previous thyroid carcinoma, further workup is indicated          Plan:       Tomeka was seen today for follow-up, hip pain and knee pain.    Diagnoses and all orders for this visit:    Primary osteoarthritis of left knee    Lytic bone lesion of hip          Regarding the knee, reinjection was requested and consent was given.    I also explained that further investigation of the radiographic findings in the left hip is essential to rule out any aggressive process.  MRI ordered

## 2023-06-22 ENCOUNTER — HOSPITAL ENCOUNTER (OUTPATIENT)
Dept: RADIOLOGY | Facility: HOSPITAL | Age: 68
Discharge: HOME | End: 2023-06-22
Attending: ORTHOPAEDIC SURGERY
Payer: MEDICARE

## 2023-06-22 DIAGNOSIS — M89.8X5 LYTIC BONE LESION OF HIP: ICD-10-CM

## 2023-06-22 PROCEDURE — 73721 MRI JNT OF LWR EXTRE W/O DYE: CPT | Mod: TC,PO,LT

## 2023-06-22 PROCEDURE — 73721 MRI JNT OF LWR EXTRE W/O DYE: CPT | Mod: 26,LT,, | Performed by: STUDENT IN AN ORGANIZED HEALTH CARE EDUCATION/TRAINING PROGRAM

## 2023-06-22 PROCEDURE — 73721 MRI HIP WITHOUT CONTRAST LEFT: ICD-10-PCS | Mod: 26,LT,, | Performed by: STUDENT IN AN ORGANIZED HEALTH CARE EDUCATION/TRAINING PROGRAM

## 2023-06-30 ENCOUNTER — PATIENT MESSAGE (OUTPATIENT)
Dept: ENDOCRINOLOGY | Facility: CLINIC | Age: 68
End: 2023-06-30
Payer: MEDICARE

## 2023-06-30 ENCOUNTER — LAB VISIT (OUTPATIENT)
Dept: LAB | Facility: HOSPITAL | Age: 68
End: 2023-06-30
Attending: INTERNAL MEDICINE
Payer: MEDICARE

## 2023-06-30 DIAGNOSIS — E89.0 POSTSURGICAL HYPOTHYROIDISM: ICD-10-CM

## 2023-06-30 LAB — TSH SERPL DL<=0.005 MIU/L-ACNC: 3.67 UIU/ML (ref 0.4–4)

## 2023-06-30 PROCEDURE — 36415 COLL VENOUS BLD VENIPUNCTURE: CPT | Mod: PO | Performed by: INTERNAL MEDICINE

## 2023-06-30 PROCEDURE — 84443 ASSAY THYROID STIM HORMONE: CPT | Mod: PO | Performed by: INTERNAL MEDICINE

## 2023-06-30 RX ORDER — LEVOTHYROXINE SODIUM 75 UG/1
75 TABLET ORAL
Qty: 102 TABLET | Refills: 3 | Status: SHIPPED | OUTPATIENT
Start: 2023-06-30 | End: 2023-08-14

## 2023-07-07 ENCOUNTER — PATIENT MESSAGE (OUTPATIENT)
Dept: ORTHOPEDICS | Facility: CLINIC | Age: 68
End: 2023-07-07
Payer: MEDICARE

## 2023-07-07 NOTE — PROGRESS NOTES
I called the patient several times without receiving an answer.  I left a voicemail message for her.    Please let the patient know that her MRI did not show any evidence of cancer or serious bone problems in her hip.  There is a soft tissue sprain that may explain her pain.  I expected it to heal without surgery or any other major intervention.    If the patient would like, I would be pleased to see her for follow-up when I return from my scheduled vacation next week.    Fernie

## 2023-07-10 DIAGNOSIS — I10 ESSENTIAL HYPERTENSION: ICD-10-CM

## 2023-07-11 RX ORDER — AMLODIPINE BESYLATE 5 MG/1
5 TABLET ORAL DAILY
Qty: 90 TABLET | Refills: 3 | Status: SHIPPED | OUTPATIENT
Start: 2023-07-11 | End: 2024-07-10

## 2023-07-11 NOTE — TELEPHONE ENCOUNTER
No care due was identified.  [facility] Embedded Care Due Messages. Reference number: [id number].   7/10/2023 9:42:22 PM CDT

## 2023-08-11 ENCOUNTER — LAB VISIT (OUTPATIENT)
Dept: LAB | Facility: HOSPITAL | Age: 68
End: 2023-08-11
Attending: DENTIST
Payer: MEDICARE

## 2023-08-11 DIAGNOSIS — E89.0 POSTSURGICAL HYPOTHYROIDISM: ICD-10-CM

## 2023-08-11 LAB
T4 FREE SERPL-MCNC: 1.51 NG/DL (ref 0.71–1.51)
TSH SERPL DL<=0.005 MIU/L-ACNC: 5.76 UIU/ML (ref 0.4–4)

## 2023-08-11 PROCEDURE — 36415 COLL VENOUS BLD VENIPUNCTURE: CPT | Mod: PO | Performed by: INTERNAL MEDICINE

## 2023-08-11 PROCEDURE — 84439 ASSAY OF FREE THYROXINE: CPT | Performed by: INTERNAL MEDICINE

## 2023-08-11 PROCEDURE — 84443 ASSAY THYROID STIM HORMONE: CPT | Mod: PO | Performed by: INTERNAL MEDICINE

## 2023-08-14 ENCOUNTER — PATIENT MESSAGE (OUTPATIENT)
Dept: ENDOCRINOLOGY | Facility: CLINIC | Age: 68
End: 2023-08-14
Payer: MEDICARE

## 2023-08-14 DIAGNOSIS — E89.0 POSTSURGICAL HYPOTHYROIDISM: Primary | ICD-10-CM

## 2023-08-14 RX ORDER — LEVOTHYROXINE SODIUM 100 UG/1
100 TABLET ORAL
Qty: 30 TABLET | Refills: 11 | Status: SHIPPED | OUTPATIENT
Start: 2023-08-14 | End: 2023-09-28

## 2023-09-07 ENCOUNTER — PATIENT MESSAGE (OUTPATIENT)
Dept: ENDOCRINOLOGY | Facility: CLINIC | Age: 68
End: 2023-09-07
Payer: MEDICARE

## 2023-09-07 RX ORDER — GLIMEPIRIDE 1 MG/1
1 TABLET ORAL
Qty: 90 TABLET | Refills: 3 | Status: SHIPPED | OUTPATIENT
Start: 2023-09-07 | End: 2024-09-06

## 2023-09-07 NOTE — TELEPHONE ENCOUNTER
It is very likely that patient is in the donut hole at this point and all the meds in that class of drugs will probably be very expensive. Please advise.

## 2023-09-27 ENCOUNTER — LAB VISIT (OUTPATIENT)
Dept: LAB | Facility: HOSPITAL | Age: 68
End: 2023-09-27
Attending: FAMILY MEDICINE
Payer: MEDICARE

## 2023-09-27 DIAGNOSIS — E78.5 HYPERLIPIDEMIA, UNSPECIFIED HYPERLIPIDEMIA TYPE: ICD-10-CM

## 2023-09-27 DIAGNOSIS — K76.0 NAFLD (NONALCOHOLIC FATTY LIVER DISEASE): ICD-10-CM

## 2023-09-27 DIAGNOSIS — E11.65 TYPE 2 DIABETES MELLITUS WITH HYPERGLYCEMIA, WITHOUT LONG-TERM CURRENT USE OF INSULIN: ICD-10-CM

## 2023-09-27 DIAGNOSIS — I10 ESSENTIAL HYPERTENSION: ICD-10-CM

## 2023-09-27 DIAGNOSIS — I70.0 AORTIC ATHEROSCLEROSIS: ICD-10-CM

## 2023-09-27 DIAGNOSIS — E89.0 POSTSURGICAL HYPOTHYROIDISM: ICD-10-CM

## 2023-09-27 LAB
ALBUMIN SERPL BCP-MCNC: 4.5 G/DL (ref 3.5–5.2)
ALP SERPL-CCNC: 74 U/L (ref 38–126)
ALT SERPL W/O P-5'-P-CCNC: 41 U/L (ref 10–44)
ANION GAP SERPL CALC-SCNC: 12 MMOL/L (ref 8–16)
AST SERPL-CCNC: 37 U/L (ref 15–46)
BILIRUB SERPL-MCNC: 1 MG/DL (ref 0.1–1)
CALCIUM SERPL-MCNC: 8.6 MG/DL (ref 8.7–10.5)
CHLORIDE SERPL-SCNC: 101 MMOL/L (ref 95–110)
CHOLEST SERPL-MCNC: 157 MG/DL (ref 120–199)
CHOLEST/HDLC SERPL: 3.7 {RATIO} (ref 2–5)
CO2 SERPL-SCNC: 25 MMOL/L (ref 23–29)
CREAT SERPL-MCNC: 0.93 MG/DL (ref 0.5–1.4)
EST. GFR  (NO RACE VARIABLE): >60 ML/MIN/1.73 M^2
ESTIMATED AVG GLUCOSE: 137 MG/DL (ref 68–131)
GLUCOSE SERPL-MCNC: 144 MG/DL (ref 70–110)
HBA1C MFR BLD: 6.4 % (ref 4–5.6)
HDLC SERPL-MCNC: 42 MG/DL (ref 40–75)
HDLC SERPL: 26.8 % (ref 20–50)
LDLC SERPL CALC-MCNC: 84.8 MG/DL (ref 63–159)
NONHDLC SERPL-MCNC: 115 MG/DL
POTASSIUM SERPL-SCNC: 5.2 MMOL/L (ref 3.5–5.1)
PROT SERPL-MCNC: 8.2 G/DL (ref 6–8.4)
SODIUM SERPL-SCNC: 138 MMOL/L (ref 136–145)
T4 FREE SERPL-MCNC: 2.15 NG/DL (ref 0.71–1.51)
TRIGL SERPL-MCNC: 151 MG/DL (ref 30–150)
TSH SERPL DL<=0.005 MIU/L-ACNC: <0.026 UIU/ML (ref 0.4–4)
UUN UR-MCNC: 29 MG/DL (ref 7–17)

## 2023-09-27 PROCEDURE — 83036 HEMOGLOBIN GLYCOSYLATED A1C: CPT | Performed by: FAMILY MEDICINE

## 2023-09-27 PROCEDURE — 80061 LIPID PANEL: CPT | Performed by: FAMILY MEDICINE

## 2023-09-27 PROCEDURE — 36415 COLL VENOUS BLD VENIPUNCTURE: CPT | Mod: PN | Performed by: INTERNAL MEDICINE

## 2023-09-27 PROCEDURE — 80053 COMPREHEN METABOLIC PANEL: CPT | Mod: PN | Performed by: FAMILY MEDICINE

## 2023-09-27 PROCEDURE — 84443 ASSAY THYROID STIM HORMONE: CPT | Mod: PN | Performed by: INTERNAL MEDICINE

## 2023-09-27 PROCEDURE — 84439 ASSAY OF FREE THYROXINE: CPT | Performed by: INTERNAL MEDICINE

## 2023-09-28 ENCOUNTER — PATIENT MESSAGE (OUTPATIENT)
Dept: ENDOCRINOLOGY | Facility: CLINIC | Age: 68
End: 2023-09-28
Payer: MEDICARE

## 2023-09-28 ENCOUNTER — OFFICE VISIT (OUTPATIENT)
Dept: CARDIOLOGY | Facility: CLINIC | Age: 68
End: 2023-09-28
Payer: MEDICARE

## 2023-09-28 VITALS
SYSTOLIC BLOOD PRESSURE: 152 MMHG | HEART RATE: 100 BPM | HEIGHT: 62 IN | WEIGHT: 122.81 LBS | BODY MASS INDEX: 22.6 KG/M2 | OXYGEN SATURATION: 98 % | DIASTOLIC BLOOD PRESSURE: 72 MMHG

## 2023-09-28 DIAGNOSIS — E11.65 TYPE 2 DIABETES MELLITUS WITH HYPERGLYCEMIA, WITHOUT LONG-TERM CURRENT USE OF INSULIN: ICD-10-CM

## 2023-09-28 DIAGNOSIS — I70.0 AORTIC ATHEROSCLEROSIS: ICD-10-CM

## 2023-09-28 DIAGNOSIS — I44.7 LEFT BUNDLE BRANCH BLOCK: Primary | ICD-10-CM

## 2023-09-28 DIAGNOSIS — E89.0 POSTSURGICAL HYPOTHYROIDISM: Primary | ICD-10-CM

## 2023-09-28 DIAGNOSIS — I10 ESSENTIAL HYPERTENSION: ICD-10-CM

## 2023-09-28 DIAGNOSIS — K76.0 NAFLD (NONALCOHOLIC FATTY LIVER DISEASE): ICD-10-CM

## 2023-09-28 DIAGNOSIS — E78.5 HYPERLIPIDEMIA, UNSPECIFIED HYPERLIPIDEMIA TYPE: ICD-10-CM

## 2023-09-28 PROCEDURE — 3044F PR MOST RECENT HEMOGLOBIN A1C LEVEL <7.0%: ICD-10-PCS | Mod: CPTII,S$GLB,, | Performed by: INTERNAL MEDICINE

## 2023-09-28 PROCEDURE — 3078F DIAST BP <80 MM HG: CPT | Mod: CPTII,S$GLB,, | Performed by: INTERNAL MEDICINE

## 2023-09-28 PROCEDURE — 99999 PR PBB SHADOW E&M-EST. PATIENT-LVL IV: CPT | Mod: PBBFAC,,, | Performed by: INTERNAL MEDICINE

## 2023-09-28 PROCEDURE — 1101F PR PT FALLS ASSESS DOC 0-1 FALLS W/OUT INJ PAST YR: ICD-10-PCS | Mod: CPTII,S$GLB,, | Performed by: INTERNAL MEDICINE

## 2023-09-28 PROCEDURE — 3077F PR MOST RECENT SYSTOLIC BLOOD PRESSURE >= 140 MM HG: ICD-10-PCS | Mod: CPTII,S$GLB,, | Performed by: INTERNAL MEDICINE

## 2023-09-28 PROCEDURE — 3060F PR POS MICROALBUMINURIA RESULT DOCUMENTED/REVIEW: ICD-10-PCS | Mod: CPTII,S$GLB,, | Performed by: INTERNAL MEDICINE

## 2023-09-28 PROCEDURE — 3288F PR FALLS RISK ASSESSMENT DOCUMENTED: ICD-10-PCS | Mod: CPTII,S$GLB,, | Performed by: INTERNAL MEDICINE

## 2023-09-28 PROCEDURE — 1160F RVW MEDS BY RX/DR IN RCRD: CPT | Mod: CPTII,S$GLB,, | Performed by: INTERNAL MEDICINE

## 2023-09-28 PROCEDURE — 3066F PR DOCUMENTATION OF TREATMENT FOR NEPHROPATHY: ICD-10-PCS | Mod: CPTII,S$GLB,, | Performed by: INTERNAL MEDICINE

## 2023-09-28 PROCEDURE — 1126F PR PAIN SEVERITY QUANTIFIED, NO PAIN PRESENT: ICD-10-PCS | Mod: CPTII,S$GLB,, | Performed by: INTERNAL MEDICINE

## 2023-09-28 PROCEDURE — 1159F MED LIST DOCD IN RCRD: CPT | Mod: CPTII,S$GLB,, | Performed by: INTERNAL MEDICINE

## 2023-09-28 PROCEDURE — 1160F PR REVIEW ALL MEDS BY PRESCRIBER/CLIN PHARMACIST DOCUMENTED: ICD-10-PCS | Mod: CPTII,S$GLB,, | Performed by: INTERNAL MEDICINE

## 2023-09-28 PROCEDURE — 3078F PR MOST RECENT DIASTOLIC BLOOD PRESSURE < 80 MM HG: ICD-10-PCS | Mod: CPTII,S$GLB,, | Performed by: INTERNAL MEDICINE

## 2023-09-28 PROCEDURE — 3008F PR BODY MASS INDEX (BMI) DOCUMENTED: ICD-10-PCS | Mod: CPTII,S$GLB,, | Performed by: INTERNAL MEDICINE

## 2023-09-28 PROCEDURE — 4010F ACE/ARB THERAPY RXD/TAKEN: CPT | Mod: CPTII,S$GLB,, | Performed by: INTERNAL MEDICINE

## 2023-09-28 PROCEDURE — 3044F HG A1C LEVEL LT 7.0%: CPT | Mod: CPTII,S$GLB,, | Performed by: INTERNAL MEDICINE

## 2023-09-28 PROCEDURE — 99999 PR PBB SHADOW E&M-EST. PATIENT-LVL IV: ICD-10-PCS | Mod: PBBFAC,,, | Performed by: INTERNAL MEDICINE

## 2023-09-28 PROCEDURE — 4010F PR ACE/ARB THEARPY RXD/TAKEN: ICD-10-PCS | Mod: CPTII,S$GLB,, | Performed by: INTERNAL MEDICINE

## 2023-09-28 PROCEDURE — 1159F PR MEDICATION LIST DOCUMENTED IN MEDICAL RECORD: ICD-10-PCS | Mod: CPTII,S$GLB,, | Performed by: INTERNAL MEDICINE

## 2023-09-28 PROCEDURE — 3060F POS MICROALBUMINURIA REV: CPT | Mod: CPTII,S$GLB,, | Performed by: INTERNAL MEDICINE

## 2023-09-28 PROCEDURE — 1101F PT FALLS ASSESS-DOCD LE1/YR: CPT | Mod: CPTII,S$GLB,, | Performed by: INTERNAL MEDICINE

## 2023-09-28 PROCEDURE — 3066F NEPHROPATHY DOC TX: CPT | Mod: CPTII,S$GLB,, | Performed by: INTERNAL MEDICINE

## 2023-09-28 PROCEDURE — 99214 PR OFFICE/OUTPT VISIT, EST, LEVL IV, 30-39 MIN: ICD-10-PCS | Mod: S$GLB,,, | Performed by: INTERNAL MEDICINE

## 2023-09-28 PROCEDURE — 3288F FALL RISK ASSESSMENT DOCD: CPT | Mod: CPTII,S$GLB,, | Performed by: INTERNAL MEDICINE

## 2023-09-28 PROCEDURE — 3008F BODY MASS INDEX DOCD: CPT | Mod: CPTII,S$GLB,, | Performed by: INTERNAL MEDICINE

## 2023-09-28 PROCEDURE — 99214 OFFICE O/P EST MOD 30 MIN: CPT | Mod: S$GLB,,, | Performed by: INTERNAL MEDICINE

## 2023-09-28 PROCEDURE — 3077F SYST BP >= 140 MM HG: CPT | Mod: CPTII,S$GLB,, | Performed by: INTERNAL MEDICINE

## 2023-09-28 PROCEDURE — 1126F AMNT PAIN NOTED NONE PRSNT: CPT | Mod: CPTII,S$GLB,, | Performed by: INTERNAL MEDICINE

## 2023-09-28 RX ORDER — LEVOTHYROXINE SODIUM 75 UG/1
75 TABLET ORAL
Qty: 30 TABLET | Refills: 11 | Status: SHIPPED | OUTPATIENT
Start: 2023-09-28 | End: 2024-09-27

## 2023-09-28 NOTE — PROGRESS NOTES
Subjective:    Patient ID:  Tomeka Burns is a 68 y.o. female who presents for follow-up of LBBB    HPI    69 y/o American speaking female with hx of HTN, DM, LBBB, hx of Covid infection, who presents for f/u. Had developed mild nonspecific, left sided sharp chest pain, unrelated to exertion, sent for exercise stress echo, however, found to have LBBB and stress portion stopped. 2DE with normal EF, DD, no WMA's. Claims to have had a stress test years ago and was told she had a mild blockage. Has some intermittent TROTTER and palps. Had nuclear SPECT with fixed perfusion defect in the septal wall of the left ventricle and moderate apical thinning. Had LHC with normal coronaries, normal EF and DD. Holter with no significant abnormalities. Has had extensive cardiac w/u. Was started on HCTZ. Elevated in clinic today, but controlled at home.   Had Covid infection 1/2021 with PNA, no hospitalization. Has since had TROTTER that is episodic.  Denies orthopnea, PND, syncope. Compliant with meds. Non smoker. No fam hx of early CAD, although mother had valve replaced at about 54 y/o.     1/5/2023:  Doing well since last visit. Continues to have mild TROTTER since Covid. Has not been exercising and has fatigue and unchanged fatigue. Denies orthopnea, PND, syncope.    6/15/2023:  Since last visit had an episode of substernal/epigastric pain, described as a sharp pain, intermittent, unrelated to exertion, improved with ASA. No recurrence. States it was reproducible.     9/28/2023:  Since last visit, no new cardiac symptoms at full activities. Works at [employer] and stays active at home with no significant cardiac issues.     Review of Systems   Constitutional: Negative for malaise/fatigue.   HENT:  Negative for congestion.    Eyes:  Negative for blurred vision.   Cardiovascular:  Positive for chest pain and dyspnea on exertion. Negative for claudication, cyanosis, irregular heartbeat, leg swelling, near-syncope, orthopnea, palpitations,  paroxysmal nocturnal dyspnea and syncope.   Respiratory:  Negative for shortness of breath.    Endocrine: Negative for polyuria.   Hematologic/Lymphatic: Negative for bleeding problem.   Skin:  Negative for itching and rash.   Musculoskeletal:  Negative for joint swelling, muscle cramps and muscle weakness.   Gastrointestinal:  Negative for abdominal pain, hematemesis, hematochezia, melena, nausea and vomiting.   Genitourinary:  Negative for dysuria and hematuria.   Neurological:  Negative for dizziness, focal weakness, headaches, light-headedness, loss of balance and weakness.   Psychiatric/Behavioral:  Negative for depression. The patient is not nervous/anxious.         Objective:    Physical Exam  Constitutional:       Appearance: She is well-developed.   HENT:      Head: Normocephalic and atraumatic.   Neck:      Vascular: No JVD.   Cardiovascular:      Rate and Rhythm: Normal rate and regular rhythm.      Pulses:           Carotid pulses are 2+ on the right side and 2+ on the left side.       Radial pulses are 2+ on the right side and 2+ on the left side.        Femoral pulses are 2+ on the right side and 2+ on the left side.       Dorsalis pedis pulses are 2+ on the right side and 2+ on the left side.        Posterior tibial pulses are 2+ on the right side and 2+ on the left side.      Heart sounds: Murmur heard.      Systolic murmur is present with a grade of 2/6.   Pulmonary:      Effort: Pulmonary effort is normal.      Breath sounds: Normal breath sounds.   Abdominal:      General: Bowel sounds are normal.      Palpations: Abdomen is soft.   Musculoskeletal:      Cervical back: Neck supple.   Skin:     General: Skin is warm and dry.   Neurological:      Mental Status: She is alert and oriented to person, place, and time.   Psychiatric:         Behavior: Behavior normal.         Thought Content: Thought content normal.           Assessment:       1. Left bundle branch block    2. Hyperlipidemia, unspecified  hyperlipidemia type    3. Essential hypertension    4. Aortic atherosclerosis    5. Type 2 diabetes mellitus with hyperglycemia, without long-term current use of insulin    6. NAFLD (nonalcoholic fatty liver disease)      69 y/o pt with hx and presentation as above. Doing well from a cardiac perspective and compensated from a HF perspective. Previous normal LHC. NSAID's PRN. Needs to be more active. Discussed the etiology, evaluation, and management of HTN, HLD, DM, and LBBB. Discussed the importance of med compliance, heart healthy diet, and regular exercise.       Plan:       -Continue current medical management  -f/u in 6 months

## 2023-10-02 ENCOUNTER — PATIENT OUTREACH (OUTPATIENT)
Dept: ADMINISTRATIVE | Facility: HOSPITAL | Age: 68
End: 2023-10-02
Payer: MEDICARE

## 2023-10-02 NOTE — PROGRESS NOTES
10/02/2023  Gap report audit performed. Care Everywhere updates requested and reviewed  Overdue HM topic chart audit and/or requested. LINKS triggered and reconciled. Media reviewed : HTN - Appt sched 10/05/2023

## 2023-10-05 ENCOUNTER — OFFICE VISIT (OUTPATIENT)
Dept: FAMILY MEDICINE | Facility: CLINIC | Age: 68
End: 2023-10-05
Payer: MEDICARE

## 2023-10-05 VITALS
OXYGEN SATURATION: 98 % | SYSTOLIC BLOOD PRESSURE: 136 MMHG | DIASTOLIC BLOOD PRESSURE: 70 MMHG | HEART RATE: 95 BPM | WEIGHT: 124.31 LBS | HEIGHT: 62 IN | BODY MASS INDEX: 22.88 KG/M2

## 2023-10-05 DIAGNOSIS — Z12.31 ENCOUNTER FOR SCREENING MAMMOGRAM FOR BREAST CANCER: ICD-10-CM

## 2023-10-05 DIAGNOSIS — E11.65 TYPE 2 DIABETES MELLITUS WITH HYPERGLYCEMIA, WITHOUT LONG-TERM CURRENT USE OF INSULIN: ICD-10-CM

## 2023-10-05 DIAGNOSIS — Z23 NEED FOR STREPTOCOCCUS PNEUMONIAE VACCINATION: ICD-10-CM

## 2023-10-05 DIAGNOSIS — I10 ESSENTIAL HYPERTENSION: Primary | ICD-10-CM

## 2023-10-05 PROCEDURE — 3060F PR POS MICROALBUMINURIA RESULT DOCUMENTED/REVIEW: ICD-10-PCS | Mod: CPTII,S$GLB,, | Performed by: FAMILY MEDICINE

## 2023-10-05 PROCEDURE — 1160F RVW MEDS BY RX/DR IN RCRD: CPT | Mod: CPTII,S$GLB,, | Performed by: FAMILY MEDICINE

## 2023-10-05 PROCEDURE — 1101F PR PT FALLS ASSESS DOC 0-1 FALLS W/OUT INJ PAST YR: ICD-10-PCS | Mod: CPTII,S$GLB,, | Performed by: FAMILY MEDICINE

## 2023-10-05 PROCEDURE — 3078F DIAST BP <80 MM HG: CPT | Mod: CPTII,S$GLB,, | Performed by: FAMILY MEDICINE

## 2023-10-05 PROCEDURE — 3044F HG A1C LEVEL LT 7.0%: CPT | Mod: CPTII,S$GLB,, | Performed by: FAMILY MEDICINE

## 2023-10-05 PROCEDURE — 3066F NEPHROPATHY DOC TX: CPT | Mod: CPTII,S$GLB,, | Performed by: FAMILY MEDICINE

## 2023-10-05 PROCEDURE — 3044F PR MOST RECENT HEMOGLOBIN A1C LEVEL <7.0%: ICD-10-PCS | Mod: CPTII,S$GLB,, | Performed by: FAMILY MEDICINE

## 2023-10-05 PROCEDURE — 90677 PCV20 VACCINE IM: CPT | Mod: S$GLB,,, | Performed by: FAMILY MEDICINE

## 2023-10-05 PROCEDURE — 2023F PR DILATED RETINAL EXAM W/O EVID OF RETINOPATHY: ICD-10-PCS | Mod: CPTII,S$GLB,, | Performed by: FAMILY MEDICINE

## 2023-10-05 PROCEDURE — 3075F SYST BP GE 130 - 139MM HG: CPT | Mod: CPTII,S$GLB,, | Performed by: FAMILY MEDICINE

## 2023-10-05 PROCEDURE — 4010F PR ACE/ARB THEARPY RXD/TAKEN: ICD-10-PCS | Mod: CPTII,S$GLB,, | Performed by: FAMILY MEDICINE

## 2023-10-05 PROCEDURE — 1126F AMNT PAIN NOTED NONE PRSNT: CPT | Mod: CPTII,S$GLB,, | Performed by: FAMILY MEDICINE

## 2023-10-05 PROCEDURE — 1159F MED LIST DOCD IN RCRD: CPT | Mod: CPTII,S$GLB,, | Performed by: FAMILY MEDICINE

## 2023-10-05 PROCEDURE — 4010F ACE/ARB THERAPY RXD/TAKEN: CPT | Mod: CPTII,S$GLB,, | Performed by: FAMILY MEDICINE

## 2023-10-05 PROCEDURE — 99999 PR PBB SHADOW E&M-EST. PATIENT-LVL V: ICD-10-PCS | Mod: PBBFAC,,, | Performed by: FAMILY MEDICINE

## 2023-10-05 PROCEDURE — 99215 OFFICE O/P EST HI 40 MIN: CPT | Mod: S$GLB,,, | Performed by: FAMILY MEDICINE

## 2023-10-05 PROCEDURE — 2023F DILAT RTA XM W/O RTNOPTHY: CPT | Mod: CPTII,S$GLB,, | Performed by: FAMILY MEDICINE

## 2023-10-05 PROCEDURE — 3078F PR MOST RECENT DIASTOLIC BLOOD PRESSURE < 80 MM HG: ICD-10-PCS | Mod: CPTII,S$GLB,, | Performed by: FAMILY MEDICINE

## 2023-10-05 PROCEDURE — 90677 PNEUMOCOCCAL CONJUGATE VACCINE 20-VALENT: ICD-10-PCS | Mod: S$GLB,,, | Performed by: FAMILY MEDICINE

## 2023-10-05 PROCEDURE — 3288F FALL RISK ASSESSMENT DOCD: CPT | Mod: CPTII,S$GLB,, | Performed by: FAMILY MEDICINE

## 2023-10-05 PROCEDURE — 1101F PT FALLS ASSESS-DOCD LE1/YR: CPT | Mod: CPTII,S$GLB,, | Performed by: FAMILY MEDICINE

## 2023-10-05 PROCEDURE — 3008F PR BODY MASS INDEX (BMI) DOCUMENTED: ICD-10-PCS | Mod: CPTII,S$GLB,, | Performed by: FAMILY MEDICINE

## 2023-10-05 PROCEDURE — G0009 ADMIN PNEUMOCOCCAL VACCINE: HCPCS | Mod: S$GLB,,, | Performed by: FAMILY MEDICINE

## 2023-10-05 PROCEDURE — 3288F PR FALLS RISK ASSESSMENT DOCUMENTED: ICD-10-PCS | Mod: CPTII,S$GLB,, | Performed by: FAMILY MEDICINE

## 2023-10-05 PROCEDURE — G0009 PNEUMOCOCCAL CONJUGATE VACCINE 20-VALENT: ICD-10-PCS | Mod: S$GLB,,, | Performed by: FAMILY MEDICINE

## 2023-10-05 PROCEDURE — 3075F PR MOST RECENT SYSTOLIC BLOOD PRESS GE 130-139MM HG: ICD-10-PCS | Mod: CPTII,S$GLB,, | Performed by: FAMILY MEDICINE

## 2023-10-05 PROCEDURE — 3008F BODY MASS INDEX DOCD: CPT | Mod: CPTII,S$GLB,, | Performed by: FAMILY MEDICINE

## 2023-10-05 PROCEDURE — 99215 PR OFFICE/OUTPT VISIT, EST, LEVL V, 40-54 MIN: ICD-10-PCS | Mod: S$GLB,,, | Performed by: FAMILY MEDICINE

## 2023-10-05 PROCEDURE — 3066F PR DOCUMENTATION OF TREATMENT FOR NEPHROPATHY: ICD-10-PCS | Mod: CPTII,S$GLB,, | Performed by: FAMILY MEDICINE

## 2023-10-05 PROCEDURE — 1126F PR PAIN SEVERITY QUANTIFIED, NO PAIN PRESENT: ICD-10-PCS | Mod: CPTII,S$GLB,, | Performed by: FAMILY MEDICINE

## 2023-10-05 PROCEDURE — 1159F PR MEDICATION LIST DOCUMENTED IN MEDICAL RECORD: ICD-10-PCS | Mod: CPTII,S$GLB,, | Performed by: FAMILY MEDICINE

## 2023-10-05 PROCEDURE — 99999 PR PBB SHADOW E&M-EST. PATIENT-LVL V: CPT | Mod: PBBFAC,,, | Performed by: FAMILY MEDICINE

## 2023-10-05 PROCEDURE — 1160F PR REVIEW ALL MEDS BY PRESCRIBER/CLIN PHARMACIST DOCUMENTED: ICD-10-PCS | Mod: CPTII,S$GLB,, | Performed by: FAMILY MEDICINE

## 2023-10-05 PROCEDURE — 3060F POS MICROALBUMINURIA REV: CPT | Mod: CPTII,S$GLB,, | Performed by: FAMILY MEDICINE

## 2023-10-05 NOTE — PROGRESS NOTES
Subjective     Patient ID: Tomeka Burns is a 68 y.o. female.    Chief Complaint: Follow-up    68 years old female came to the clinic for blood pressure check.  Blood pressure today was stable.  No chest pain, palpitation, orthopnea or PND.  Last A1c was normal.  No polyuria, polydipsia or polyphagia.  Patient is requesting mammogram testing.  Last colonoscopy was 3 years ago.    Follow-up  Pertinent negatives include no abdominal pain, arthralgias, chest pain, congestion, coughing, fatigue, fever, nausea, sore throat or vomiting.     Review of Systems   Constitutional: Negative.  Negative for activity change, fatigue and fever.   HENT: Negative.  Negative for nasal congestion, dental problem, ear discharge, ear pain, hearing loss, postnasal drip, rhinorrhea, sore throat and voice change.    Eyes: Negative.  Negative for pain, discharge, itching and visual disturbance.   Respiratory: Negative.  Negative for cough, chest tightness, shortness of breath and wheezing.    Cardiovascular:  Negative for chest pain and palpitations.   Gastrointestinal: Negative.  Negative for abdominal pain, blood in stool, diarrhea, nausea and vomiting.   Genitourinary: Negative.  Negative for difficulty urinating, dyspareunia, dysuria, hematuria, menstrual problem, pelvic pain, urgency, vaginal bleeding, vaginal discharge and vaginal pain.   Musculoskeletal: Negative.  Negative for arthralgias and gait problem.   Integumentary:  Negative for wound.   Neurological: Negative.  Negative for dizziness and seizures.   Hematological:  Negative for adenopathy. Does not bruise/bleed easily.   Psychiatric/Behavioral: Negative.  Negative for behavioral problems, decreased concentration, sleep disturbance and suicidal ideas. The patient is not nervous/anxious.           Objective     Physical Exam  Constitutional:       General: She is not in acute distress.     Appearance: She is well-developed. She is not diaphoretic.   HENT:      Head:  Normocephalic and atraumatic.      Right Ear: External ear normal.      Left Ear: External ear normal.      Nose: Nose normal.      Mouth/Throat:      Pharynx: No oropharyngeal exudate.   Eyes:      General: No scleral icterus.        Right eye: No discharge.         Left eye: No discharge.      Conjunctiva/sclera: Conjunctivae normal.      Pupils: Pupils are equal, round, and reactive to light.   Neck:      Thyroid: No thyromegaly.      Vascular: No JVD.      Trachea: No tracheal deviation.   Cardiovascular:      Rate and Rhythm: Normal rate and regular rhythm.      Heart sounds: Normal heart sounds. No murmur heard.     No friction rub. No gallop.   Pulmonary:      Effort: Pulmonary effort is normal. No respiratory distress.      Breath sounds: Normal breath sounds. No stridor. No wheezing or rales.   Chest:      Chest wall: No tenderness.   Abdominal:      General: Bowel sounds are normal. There is no distension.      Palpations: Abdomen is soft. There is no mass.      Tenderness: There is no abdominal tenderness. There is no guarding or rebound.   Musculoskeletal:         General: No tenderness. Normal range of motion.      Cervical back: Normal range of motion and neck supple.   Feet:      Right foot:      Protective Sensation: 10 sites tested.  10 sites sensed.      Skin integrity: Dry skin present. No ulcer, blister, skin breakdown, erythema, warmth or fissure.      Left foot:      Protective Sensation: 10 sites tested.  10 sites sensed.      Skin integrity: Dry skin present. No ulcer, blister, skin breakdown, erythema, warmth or fissure.   Lymphadenopathy:      Cervical: No cervical adenopathy.   Skin:     General: Skin is warm and dry.      Coloration: Skin is not pale.      Findings: No erythema or rash.   Neurological:      Mental Status: She is alert and oriented to person, place, and time.      Cranial Nerves: No cranial nerve deficit.      Motor: No abnormal muscle tone.      Coordination: Coordination  normal.      Deep Tendon Reflexes: Reflexes are normal and symmetric.   Psychiatric:         Behavior: Behavior normal.         Thought Content: Thought content normal.         Judgment: Judgment normal.            Assessment and Plan     1. Essential hypertension  -     CBC Auto Differential; Future; Expected date: 10/05/2023  -     Lipid Panel; Future; Expected date: 10/05/2023  -     Comprehensive Metabolic Panel; Future; Expected date: 10/05/2023    2. Type 2 diabetes mellitus with hyperglycemia, without long-term current use of insulin  -     CBC Auto Differential; Future; Expected date: 10/05/2023  -     Microalbumin/Creatinine Ratio, Urine; Future; Expected date: 10/05/2023  -     Lipid Panel; Future; Expected date: 10/05/2023  -     Hemoglobin A1C; Future; Expected date: 10/05/2023  -     Comprehensive Metabolic Panel; Future; Expected date: 10/05/2023    3. Need for Streptococcus pneumoniae vaccination  -     (In Office Administered) Pneumococcal Conjugate Vaccine (20 Valent) (IM) (Preferred)    4. Encounter for screening mammogram for breast cancer  -     Mammo Digital Screening Bilat; Future; Expected date: 10/05/2023        Continue monitoring blood sugar at home,ADA diet.   Continue monitoring blood pressure at home, low sodium diet.          Follow up in about 6 months (around 4/5/2024), or if symptoms worsen or fail to improve.

## 2023-10-06 ENCOUNTER — HOSPITAL ENCOUNTER (OUTPATIENT)
Dept: RADIOLOGY | Facility: HOSPITAL | Age: 68
Discharge: HOME | End: 2023-10-06
Attending: FAMILY MEDICINE
Payer: MEDICARE

## 2023-10-06 DIAGNOSIS — Z12.31 ENCOUNTER FOR SCREENING MAMMOGRAM FOR BREAST CANCER: ICD-10-CM

## 2023-10-06 PROCEDURE — 77067 MAMMO DIGITAL SCREENING BILAT WITH TOMO: ICD-10-PCS | Mod: 26,,, | Performed by: RADIOLOGY

## 2023-10-06 PROCEDURE — 77063 BREAST TOMOSYNTHESIS BI: CPT | Mod: 26,,, | Performed by: RADIOLOGY

## 2023-10-06 PROCEDURE — 77067 SCR MAMMO BI INCL CAD: CPT | Mod: 26,,, | Performed by: RADIOLOGY

## 2023-10-06 PROCEDURE — 77067 SCR MAMMO BI INCL CAD: CPT | Mod: TC,PN

## 2023-10-06 PROCEDURE — 77063 MAMMO DIGITAL SCREENING BILAT WITH TOMO: ICD-10-PCS | Mod: 26,,, | Performed by: RADIOLOGY

## 2023-10-10 ENCOUNTER — PATIENT MESSAGE (OUTPATIENT)
Dept: ENDOCRINOLOGY | Facility: CLINIC | Age: 68
End: 2023-10-10
Payer: MEDICARE

## 2023-10-10 DIAGNOSIS — E89.0 POSTSURGICAL HYPOTHYROIDISM: Primary | ICD-10-CM

## 2023-10-12 ENCOUNTER — PATIENT MESSAGE (OUTPATIENT)
Dept: RESPIRATORY THERAPY | Facility: HOSPITAL | Age: 68
End: 2023-10-12
Payer: MEDICARE

## 2023-10-18 DIAGNOSIS — I10 ESSENTIAL HYPERTENSION: ICD-10-CM

## 2023-10-18 DIAGNOSIS — E78.5 DYSLIPIDEMIA: ICD-10-CM

## 2023-10-18 RX ORDER — LOVASTATIN 20 MG/1
20 TABLET ORAL NIGHTLY
Qty: 90 TABLET | Refills: 3 | Status: SHIPPED | OUTPATIENT
Start: 2023-10-18 | End: 2024-10-17

## 2023-10-18 RX ORDER — LOSARTAN POTASSIUM 100 MG/1
100 TABLET ORAL DAILY
Qty: 90 TABLET | Refills: 3 | Status: SHIPPED | OUTPATIENT
Start: 2023-10-18 | End: 2024-10-17

## 2023-10-18 NOTE — TELEPHONE ENCOUNTER
No care due was identified.  [facility] Embedded Care Due Messages. Reference number: [id number].   10/18/2023 2:29:59 PM CDT

## 2023-10-18 NOTE — TELEPHONE ENCOUNTER
No care due was identified.  [facility] Embedded Care Due Messages. Reference number: [id number].   10/18/2023 2:31:20 PM CDT

## 2023-11-21 ENCOUNTER — LAB VISIT (OUTPATIENT)
Dept: LAB | Facility: HOSPITAL | Age: 68
End: 2023-11-21
Attending: INTERNAL MEDICINE
Payer: MEDICARE

## 2023-11-21 DIAGNOSIS — E89.0 POSTSURGICAL HYPOTHYROIDISM: ICD-10-CM

## 2023-11-21 LAB — TSH SERPL DL<=0.005 MIU/L-ACNC: 1.61 UIU/ML (ref 0.4–4)

## 2023-11-21 PROCEDURE — 84443 ASSAY THYROID STIM HORMONE: CPT | Mod: PN | Performed by: INTERNAL MEDICINE

## 2023-11-21 PROCEDURE — 36415 COLL VENOUS BLD VENIPUNCTURE: CPT | Mod: PN | Performed by: INTERNAL MEDICINE

## 2023-12-13 NOTE — Clinical Note
ID band present and verified. Family is in the lobby.  [facility] Call Center    Phone Message    May a detailed message be left on voicemail: yes     Reason for Call: Medication Refill Request    Has the patient contacted the pharmacy for the refill? Yes   Name of medication being requested: naltrexone (DEPADE/REVIA) 50 MG tablet  Provider who prescribed the medication: Jacy Aldrich  Pharmacy: [facility]/pharmacy #[id number] [place], [place] - [address] (Ph: [phone])  Parent says patient needs refill on all medications    Action Taken: Other: wm    Travel Screening: Not Applicable

## 2024-01-24 DIAGNOSIS — K29.30 CHRONIC SUPERFICIAL GASTRITIS WITHOUT BLEEDING: ICD-10-CM

## 2024-01-24 DIAGNOSIS — E11.65 TYPE 2 DIABETES MELLITUS WITH HYPERGLYCEMIA, WITHOUT LONG-TERM CURRENT USE OF INSULIN: ICD-10-CM

## 2024-01-25 RX ORDER — METFORMIN HYDROCHLORIDE 1000 MG/1
1000 TABLET ORAL 2 TIMES DAILY WITH MEALS
Qty: 180 TABLET | Refills: 3 | Status: SHIPPED | OUTPATIENT
Start: 2024-01-25

## 2024-01-25 RX ORDER — PANTOPRAZOLE SODIUM 40 MG/1
40 TABLET, DELAYED RELEASE ORAL
Qty: 90 TABLET | Refills: 2 | Status: SHIPPED | OUTPATIENT
Start: 2024-01-25

## 2024-01-25 NOTE — TELEPHONE ENCOUNTER
Care Due:                  Date            Visit Type   Department     Provider  --------------------------------------------------------------------------------                                EP -                              PRIMARY      [facility] FAMILY  Last Visit: 10-      CARE ([facility])   ELIS Miller                              EP -                              PRIMARY      [facility]  Next Visit: 04-      CARE ([facility])   ELIS Miller                                                            Last  Test          Frequency    Reason                     Performed    Due Date  --------------------------------------------------------------------------------    CBC.........  12 months..  celecoxib................  03- 03-    [facility] Embedded Care Due Messages. Reference number: [id number].   1/24/2024 11:26:01 PM CST

## 2024-01-25 NOTE — TELEPHONE ENCOUNTER
Refill Decision Note   Breannedaysi Grant  is requesting a refill authorization.  Brief Assessment and Rationale for Refill:  Approve     Medication Therapy Plan:         Comments:     Note composed:8:02 AM 01/25/2024

## 2024-01-30 DIAGNOSIS — Z00.00 ENCOUNTER FOR MEDICARE ANNUAL WELLNESS EXAM: ICD-10-CM

## 2024-03-27 DIAGNOSIS — E11.9 TYPE 2 DIABETES MELLITUS WITHOUT COMPLICATION, UNSPECIFIED WHETHER LONG TERM INSULIN USE: ICD-10-CM

## 2024-04-02 ENCOUNTER — LAB VISIT (OUTPATIENT)
Dept: LAB | Facility: HOSPITAL | Age: 69
End: 2024-04-02
Attending: FAMILY MEDICINE
Payer: MEDICARE

## 2024-04-02 DIAGNOSIS — E11.65 TYPE 2 DIABETES MELLITUS WITH HYPERGLYCEMIA, WITHOUT LONG-TERM CURRENT USE OF INSULIN: ICD-10-CM

## 2024-04-02 DIAGNOSIS — I10 ESSENTIAL HYPERTENSION: ICD-10-CM

## 2024-04-02 LAB
ALBUMIN SERPL BCP-MCNC: 4.3 G/DL (ref 3.5–5.2)
ALP SERPL-CCNC: 72 U/L (ref 38–126)
ALT SERPL W/O P-5'-P-CCNC: 33 U/L (ref 10–44)
ANION GAP SERPL CALC-SCNC: 11 MMOL/L (ref 8–16)
AST SERPL-CCNC: 31 U/L (ref 15–46)
BASOPHILS # BLD AUTO: 0.04 K/UL (ref 0–0.2)
BASOPHILS NFR BLD: 0.5 % (ref 0–1.9)
BILIRUB SERPL-MCNC: 0.9 MG/DL (ref 0.1–1)
CALCIUM SERPL-MCNC: 9 MG/DL (ref 8.7–10.5)
CHLORIDE SERPL-SCNC: 98 MMOL/L (ref 95–110)
CHOLEST SERPL-MCNC: 195 MG/DL (ref 120–199)
CHOLEST/HDLC SERPL: 4.2 {RATIO} (ref 2–5)
CO2 SERPL-SCNC: 26 MMOL/L (ref 23–29)
CREAT SERPL-MCNC: 0.78 MG/DL (ref 0.5–1.4)
DIFFERENTIAL METHOD BLD: NORMAL
EOSINOPHIL # BLD AUTO: 0.4 K/UL (ref 0–0.5)
EOSINOPHIL NFR BLD: 4.4 % (ref 0–8)
ERYTHROCYTE [DISTWIDTH] IN BLOOD BY AUTOMATED COUNT: 12.3 % (ref 11.5–14.5)
EST. GFR  (NO RACE VARIABLE): >60 ML/MIN/1.73 M^2
ESTIMATED AVG GLUCOSE: 137 MG/DL (ref 68–131)
GLUCOSE SERPL-MCNC: 137 MG/DL (ref 70–110)
HBA1C MFR BLD: 6.4 % (ref 4–5.6)
HCT VFR BLD AUTO: 37.6 % (ref 37–48.5)
HDLC SERPL-MCNC: 46 MG/DL (ref 40–75)
HDLC SERPL: 23.6 % (ref 20–50)
HGB BLD-MCNC: 12.7 G/DL (ref 12–16)
IMM GRANULOCYTES # BLD AUTO: 0.01 K/UL (ref 0–0.04)
IMM GRANULOCYTES NFR BLD AUTO: 0.1 % (ref 0–0.5)
LDLC SERPL CALC-MCNC: 114.6 MG/DL (ref 63–159)
LYMPHOCYTES # BLD AUTO: 3.7 K/UL (ref 1–4.8)
LYMPHOCYTES NFR BLD: 42.5 % (ref 18–48)
MCH RBC QN AUTO: 28.7 PG (ref 27–31)
MCHC RBC AUTO-ENTMCNC: 33.8 G/DL (ref 32–36)
MCV RBC AUTO: 85 FL (ref 82–98)
MONOCYTES # BLD AUTO: 0.6 K/UL (ref 0.3–1)
MONOCYTES NFR BLD: 7 % (ref 4–15)
NEUTROPHILS # BLD AUTO: 4 K/UL (ref 1.8–7.7)
NEUTROPHILS NFR BLD: 45.5 % (ref 38–73)
NONHDLC SERPL-MCNC: 149 MG/DL
NRBC BLD-RTO: 0 /100 WBC
PLATELET # BLD AUTO: 299 K/UL (ref 150–450)
PMV BLD AUTO: 10.3 FL (ref 9.2–12.9)
POTASSIUM SERPL-SCNC: 4.7 MMOL/L (ref 3.5–5.1)
PROT SERPL-MCNC: 7.5 G/DL (ref 6–8.4)
RBC # BLD AUTO: 4.42 M/UL (ref 4–5.4)
SODIUM SERPL-SCNC: 135 MMOL/L (ref 136–145)
TRIGL SERPL-MCNC: 172 MG/DL (ref 30–150)
UUN UR-MCNC: 17 MG/DL (ref 7–17)
WBC # BLD AUTO: 8.7 K/UL (ref 3.9–12.7)

## 2024-04-02 PROCEDURE — 83036 HEMOGLOBIN GLYCOSYLATED A1C: CPT | Performed by: FAMILY MEDICINE

## 2024-04-02 PROCEDURE — 85025 COMPLETE CBC W/AUTO DIFF WBC: CPT | Mod: PN | Performed by: FAMILY MEDICINE

## 2024-04-02 PROCEDURE — 80061 LIPID PANEL: CPT | Performed by: FAMILY MEDICINE

## 2024-04-02 PROCEDURE — 80053 COMPREHEN METABOLIC PANEL: CPT | Mod: PN | Performed by: FAMILY MEDICINE

## 2024-04-02 PROCEDURE — 36415 COLL VENOUS BLD VENIPUNCTURE: CPT | Mod: PN | Performed by: FAMILY MEDICINE

## 2024-04-02 NOTE — TELEPHONE ENCOUNTER
No care due was identified.  [facility] Embedded Care Due Messages. Reference number: [id number].   4/02/2024 5:10:20 PM CDT

## 2024-04-03 RX ORDER — LANCETS
1 EACH MISCELLANEOUS 2 TIMES DAILY
Qty: 200 EACH | Refills: 3 | Status: SHIPPED | OUTPATIENT
Start: 2024-04-03

## 2024-04-03 RX ORDER — INSULIN PUMP SYRINGE, 3 ML
EACH MISCELLANEOUS
Qty: 1 EACH | Refills: 0 | Status: SHIPPED | OUTPATIENT
Start: 2024-04-03 | End: 2026-03-02

## 2024-04-04 NOTE — TELEPHONE ENCOUNTER
Refill Decision Note   Breannedaysi Grant  is requesting a refill authorization.  Brief Assessment and Rationale for Refill:  Approve     Medication Therapy Plan:        Comments:     Note composed:7:14 PM 04/03/2024

## 2024-04-05 ENCOUNTER — OFFICE VISIT (OUTPATIENT)
Dept: FAMILY MEDICINE | Facility: CLINIC | Age: 69
End: 2024-04-05
Payer: MEDICARE

## 2024-04-05 VITALS
OXYGEN SATURATION: 100 % | BODY MASS INDEX: 23.13 KG/M2 | DIASTOLIC BLOOD PRESSURE: 66 MMHG | WEIGHT: 125.69 LBS | HEART RATE: 85 BPM | HEIGHT: 62 IN | SYSTOLIC BLOOD PRESSURE: 120 MMHG

## 2024-04-05 DIAGNOSIS — I10 ESSENTIAL HYPERTENSION: Primary | ICD-10-CM

## 2024-04-05 DIAGNOSIS — E89.2 POSTSURGICAL HYPOPARATHYROIDISM: ICD-10-CM

## 2024-04-05 DIAGNOSIS — E11.65 TYPE 2 DIABETES MELLITUS WITH HYPERGLYCEMIA, WITHOUT LONG-TERM CURRENT USE OF INSULIN: ICD-10-CM

## 2024-04-05 DIAGNOSIS — I70.0 AORTIC ATHEROSCLEROSIS: ICD-10-CM

## 2024-04-05 DIAGNOSIS — M25.511 CHRONIC RIGHT SHOULDER PAIN: ICD-10-CM

## 2024-04-05 DIAGNOSIS — G89.29 CHRONIC RIGHT SHOULDER PAIN: ICD-10-CM

## 2024-04-05 DIAGNOSIS — C73 PAPILLARY THYROID CARCINOMA: ICD-10-CM

## 2024-04-05 DIAGNOSIS — E08.42 DIABETIC POLYNEUROPATHY ASSOCIATED WITH DIABETES MELLITUS DUE TO UNDERLYING CONDITION: ICD-10-CM

## 2024-04-05 PROCEDURE — 3061F NEG MICROALBUMINURIA REV: CPT | Mod: CPTII,S$GLB,, | Performed by: FAMILY MEDICINE

## 2024-04-05 PROCEDURE — 3008F BODY MASS INDEX DOCD: CPT | Mod: CPTII,S$GLB,, | Performed by: FAMILY MEDICINE

## 2024-04-05 PROCEDURE — 99215 OFFICE O/P EST HI 40 MIN: CPT | Mod: S$GLB,,, | Performed by: FAMILY MEDICINE

## 2024-04-05 PROCEDURE — 99999 PR PBB SHADOW E&M-EST. PATIENT-LVL IV: CPT | Mod: PBBFAC,,, | Performed by: FAMILY MEDICINE

## 2024-04-05 PROCEDURE — 1159F MED LIST DOCD IN RCRD: CPT | Mod: CPTII,S$GLB,, | Performed by: FAMILY MEDICINE

## 2024-04-05 PROCEDURE — 3066F NEPHROPATHY DOC TX: CPT | Mod: CPTII,S$GLB,, | Performed by: FAMILY MEDICINE

## 2024-04-05 PROCEDURE — 1160F RVW MEDS BY RX/DR IN RCRD: CPT | Mod: CPTII,S$GLB,, | Performed by: FAMILY MEDICINE

## 2024-04-05 PROCEDURE — 3074F SYST BP LT 130 MM HG: CPT | Mod: CPTII,S$GLB,, | Performed by: FAMILY MEDICINE

## 2024-04-05 PROCEDURE — 3078F DIAST BP <80 MM HG: CPT | Mod: CPTII,S$GLB,, | Performed by: FAMILY MEDICINE

## 2024-04-05 PROCEDURE — 4010F ACE/ARB THERAPY RXD/TAKEN: CPT | Mod: CPTII,S$GLB,, | Performed by: FAMILY MEDICINE

## 2024-04-05 PROCEDURE — 1101F PT FALLS ASSESS-DOCD LE1/YR: CPT | Mod: CPTII,S$GLB,, | Performed by: FAMILY MEDICINE

## 2024-04-05 PROCEDURE — 3044F HG A1C LEVEL LT 7.0%: CPT | Mod: CPTII,S$GLB,, | Performed by: FAMILY MEDICINE

## 2024-04-05 PROCEDURE — 3288F FALL RISK ASSESSMENT DOCD: CPT | Mod: CPTII,S$GLB,, | Performed by: FAMILY MEDICINE

## 2024-04-05 RX ORDER — MELOXICAM 15 MG/1
15 TABLET ORAL DAILY PRN
Qty: 30 TABLET | Refills: 0 | Status: SHIPPED | OUTPATIENT
Start: 2024-04-05

## 2024-04-05 NOTE — PROGRESS NOTES
Subjective     Patient ID: Tomeka Burns is a 68 y.o. female.    Chief Complaint: Follow-up and Knee Pain    68 years old female came to the clinic for chronic blood pressure check.  Blood pressure today was stable.  No chest pain, palpitation, orthopnea or PND.  Patient with good compliance with pressure regimen.  No flare ups chronic neuropathy associated with diabetes.  Patient with follow-up with the endocrinologist she was previously diagnosed with thyroid cancer.  Patient currently with chronic hypothyroidism after thyroid removal.  She was previously diagnosed with chronic aortic atherosclerosis.  Patient with chronic right shoulder pain for the last year.  The pain is 3/10 of intensity on and off aggravated with activity and better with rest.    Follow-up  Associated symptoms include arthralgias. Pertinent negatives include no chest pain.   Knee Pain       Review of Systems   Constitutional: Negative.    HENT: Negative.     Eyes: Negative.    Respiratory: Negative.     Cardiovascular:  Negative for chest pain, palpitations, leg swelling and claudication.   Gastrointestinal: Negative.    Endocrine: Negative for polydipsia, polyphagia and polyuria.   Genitourinary: Negative.    Musculoskeletal:  Positive for arthralgias.   Neurological: Negative.    Psychiatric/Behavioral: Negative.            Objective     Physical Exam  Constitutional:       General: She is not in acute distress.     Appearance: She is well-developed. She is not diaphoretic.   HENT:      Head: Normocephalic and atraumatic.      Right Ear: External ear normal.      Left Ear: External ear normal.      Nose: Nose normal.      Mouth/Throat:      Pharynx: No oropharyngeal exudate.   Eyes:      General: No scleral icterus.        Right eye: No discharge.         Left eye: No discharge.      Conjunctiva/sclera: Conjunctivae normal.      Pupils: Pupils are equal, round, and reactive to light.   Neck:      Thyroid: No thyromegaly.      Vascular: No  JVD.      Trachea: No tracheal deviation.   Cardiovascular:      Rate and Rhythm: Normal rate and regular rhythm.      Heart sounds: Normal heart sounds. No murmur heard.     No friction rub. No gallop.   Pulmonary:      Effort: Pulmonary effort is normal. No respiratory distress.      Breath sounds: Normal breath sounds. No stridor. No wheezing or rales.   Chest:      Chest wall: No tenderness.   Abdominal:      General: Bowel sounds are normal. There is no distension.      Palpations: Abdomen is soft. There is no mass.      Tenderness: There is no abdominal tenderness. There is no guarding or rebound.   Musculoskeletal:         General: Normal range of motion.      Right shoulder: Tenderness present. Normal range of motion.      Cervical back: Normal range of motion and neck supple.   Lymphadenopathy:      Cervical: No cervical adenopathy.   Skin:     General: Skin is warm and dry.      Coloration: Skin is not pale.      Findings: No erythema or rash.   Neurological:      Mental Status: She is alert and oriented to person, place, and time.      Cranial Nerves: No cranial nerve deficit.      Motor: No abnormal muscle tone.      Coordination: Coordination normal.      Deep Tendon Reflexes: Reflexes are normal and symmetric.   Psychiatric:         Behavior: Behavior normal.         Thought Content: Thought content normal.         Judgment: Judgment normal.            Assessment and Plan     1. Essential hypertension  -     Lipid Panel; Future; Expected date: 04/05/2024  -     Comprehensive Metabolic Panel; Future; Expected date: 04/05/2024  -     CBC Auto Differential; Future; Expected date: 04/05/2024    2. Diabetic polyneuropathy associated with diabetes mellitus due to underlying condition  -     Microalbumin/Creatinine Ratio, Urine; Future; Expected date: 04/05/2024  -     Lipid Panel; Future; Expected date: 04/05/2024  -     Hemoglobin A1C; Future; Expected date: 04/05/2024  -     Comprehensive Metabolic Panel;  Future; Expected date: 04/05/2024    3. Papillary thyroid carcinoma    4. Type 2 diabetes mellitus with hyperglycemia, without long-term current use of insulin  -     Microalbumin/Creatinine Ratio, Urine; Future; Expected date: 04/05/2024  -     Lipid Panel; Future; Expected date: 04/05/2024  -     Hemoglobin A1C; Future; Expected date: 04/05/2024  -     Comprehensive Metabolic Panel; Future; Expected date: 04/05/2024    5. Postsurgical hypoparathyroidism    6. Aortic atherosclerosis  -     Lipid Panel; Future; Expected date: 04/05/2024    7. Chronic right shoulder pain  -     meloxicam (MOBIC) 15 MG tablet; Take 1 tablet (15 mg total) by mouth daily as needed for Pain.  Dispense: 30 tablet; Refill: 0        Continue monitoring blood pressure at home, low sodium diet.   Follow-up with endocrinologist.         Follow up in about 6 months (around 10/5/2024), or if symptoms worsen or fail to improve.

## 2024-04-17 DIAGNOSIS — M25.511 RIGHT SHOULDER PAIN, UNSPECIFIED CHRONICITY: Primary | ICD-10-CM

## 2024-04-18 ENCOUNTER — HOSPITAL ENCOUNTER (OUTPATIENT)
Dept: RADIOLOGY | Facility: HOSPITAL | Age: 69
Discharge: HOME | End: 2024-04-18
Attending: ORTHOPAEDIC SURGERY
Payer: MEDICARE

## 2024-04-18 ENCOUNTER — OFFICE VISIT (OUTPATIENT)
Dept: ORTHOPEDICS | Facility: CLINIC | Age: 69
End: 2024-04-18
Payer: MEDICARE

## 2024-04-18 VITALS — HEIGHT: 62 IN | WEIGHT: 125.69 LBS | BODY MASS INDEX: 23.13 KG/M2

## 2024-04-18 DIAGNOSIS — M75.41 ROTATOR CUFF IMPINGEMENT SYNDROME OF RIGHT SHOULDER: Primary | ICD-10-CM

## 2024-04-18 DIAGNOSIS — M25.511 RIGHT SHOULDER PAIN, UNSPECIFIED CHRONICITY: ICD-10-CM

## 2024-04-18 PROCEDURE — 73030 X-RAY EXAM OF SHOULDER: CPT | Mod: TC,PN,RT

## 2024-04-18 PROCEDURE — 3288F FALL RISK ASSESSMENT DOCD: CPT | Mod: CPTII,S$GLB,, | Performed by: ORTHOPAEDIC SURGERY

## 2024-04-18 PROCEDURE — 3044F HG A1C LEVEL LT 7.0%: CPT | Mod: CPTII,S$GLB,, | Performed by: ORTHOPAEDIC SURGERY

## 2024-04-18 PROCEDURE — 1159F MED LIST DOCD IN RCRD: CPT | Mod: CPTII,S$GLB,, | Performed by: ORTHOPAEDIC SURGERY

## 2024-04-18 PROCEDURE — 4010F ACE/ARB THERAPY RXD/TAKEN: CPT | Mod: CPTII,S$GLB,, | Performed by: ORTHOPAEDIC SURGERY

## 2024-04-18 PROCEDURE — 1125F AMNT PAIN NOTED PAIN PRSNT: CPT | Mod: CPTII,S$GLB,, | Performed by: ORTHOPAEDIC SURGERY

## 2024-04-18 PROCEDURE — 20610 DRAIN/INJ JOINT/BURSA W/O US: CPT | Mod: RT,S$GLB,, | Performed by: ORTHOPAEDIC SURGERY

## 2024-04-18 PROCEDURE — 3066F NEPHROPATHY DOC TX: CPT | Mod: CPTII,S$GLB,, | Performed by: ORTHOPAEDIC SURGERY

## 2024-04-18 PROCEDURE — 3061F NEG MICROALBUMINURIA REV: CPT | Mod: CPTII,S$GLB,, | Performed by: ORTHOPAEDIC SURGERY

## 2024-04-18 PROCEDURE — 99214 OFFICE O/P EST MOD 30 MIN: CPT | Mod: 25,S$GLB,, | Performed by: ORTHOPAEDIC SURGERY

## 2024-04-18 PROCEDURE — 73030 X-RAY EXAM OF SHOULDER: CPT | Mod: 26,RT,, | Performed by: STUDENT IN AN ORGANIZED HEALTH CARE EDUCATION/TRAINING PROGRAM

## 2024-04-18 PROCEDURE — 99999 PR PBB SHADOW E&M-EST. PATIENT-LVL III: CPT | Mod: PBBFAC,,, | Performed by: ORTHOPAEDIC SURGERY

## 2024-04-18 PROCEDURE — 1101F PT FALLS ASSESS-DOCD LE1/YR: CPT | Mod: CPTII,S$GLB,, | Performed by: ORTHOPAEDIC SURGERY

## 2024-04-18 PROCEDURE — 3008F BODY MASS INDEX DOCD: CPT | Mod: CPTII,S$GLB,, | Performed by: ORTHOPAEDIC SURGERY

## 2024-04-18 RX ORDER — TRIAMCINOLONE ACETONIDE 40 MG/ML
40 INJECTION, SUSPENSION INTRA-ARTICULAR; INTRAMUSCULAR
Status: DISCONTINUED | OUTPATIENT
Start: 2024-04-18 | End: 2024-04-18 | Stop reason: HOSPADM

## 2024-04-18 RX ADMIN — TRIAMCINOLONE ACETONIDE 40 MG: 40 INJECTION, SUSPENSION INTRA-ARTICULAR; INTRAMUSCULAR at 10:04

## 2024-04-18 NOTE — PROGRESS NOTES
[facility], Orthopedics and Sports Medicine  [facility]    New Patient Shoulder Office Visit  04/18/2024     Subjective:      Tomeka Burns is a 68 y.o. right handed female referred by No ref. provider found for evaluation and treatment of a right shoulder problem. This is evaluated as a personal injury.    The patient notes the following symptoms: pain and weakness.  Symptoms are located anterior and lateral shoulder.    The symptoms began 2 weeks ago and are worsening.  No injury or trauma.     Previously had CSI by Jason right shoulder for same problem in 2019, had pain relief until recently.     Works as .     Outside reports reviewed: historical medical records, office notes, and radiology reports.    Past Medical History:   Diagnosis Date    Diabetes mellitus, type 2     Encounter for blood transfusion 1980    GERD (gastroesophageal reflux disease)     Hyperlipidemia     Hypertension     Hypothyroidism     Multiple thyroid nodules 01/21/2019       Patient Active Problem List   Diagnosis    Type 2 diabetes mellitus with hyperglycemia, without long-term current use of insulin    Essential hypertension    Left bundle branch block    Colon polyposis    Biceps tendinitis of right upper extremity    Abnormal stress test    HLD (hyperlipidemia)    Pain in left hip    Chronic pain of left knee    Muscle weakness of lower extremity    NAFLD (nonalcoholic fatty liver disease)    Papillary thyroid carcinoma    Postsurgical hypothyroidism    Postsurgical hypoparathyroidism    Aortic atherosclerosis    Diabetic polyneuropathy associated with diabetes mellitus due to underlying condition       Past Surgical History:   Procedure Laterality Date    ANGIOCARDIOGRAPHY OF LEFT VENTRICLE N/A 06/17/2019    Procedure: Left ventriculography;  Surgeon: Gab Calderon MD;  Location: [facility] CATH LAB/EP;  Service: Cardiology;  Laterality: N/A;    APPENDECTOMY      CORONARY ANGIOGRAPHY N/A 06/17/2019     Procedure: ANGIOGRAM, CORONARY ARTERY;  Surgeon: Gab Calderon MD;  Location: [facility] CATH LAB/EP;  Service: Cardiology;  Laterality: N/A;    HYSTERECTOMY      LEFT HEART CATHETERIZATION N/A 06/17/2019    Procedure: Left heart cath;  Surgeon: Gab Cadleron MD;  Location: [facility] CATH LAB/EP;  Service: Cardiology;  Laterality: N/A;    THYROIDECTOMY Bilateral 06/16/2020    Procedure: THYROIDECTOMY;  Surgeon: Hermelinda Zuniga MD;  Location: [facility] OR;  Service: General;  Laterality: Bilateral;    TONSILLECTOMY          Current Outpatient Medications   Medication Instructions    alcohol swabs (CURITY ALCOHOL SWABS) PadM 1 each, Topical (Top), 3 times daily    amLODIPine (NORVASC) 5 mg, Oral, Daily    aspirin (ECOTRIN) 81 mg, Oral, Daily    blood sugar diagnostic (BLOOD GLUCOSE TEST) Strp 1 strip, Misc.(Non-Drug; Combo Route), 2 times daily    blood-glucose meter kit Use as instructed    glimepiride (AMARYL) 1 mg, Oral, Before breakfast    hydroCHLOROthiazide (HYDRODIURIL) 12.5 mg, Oral, Daily    lancets Misc 1 lancet , Misc.(Non-Drug; Combo Route), 2 times daily    levocetirizine (XYZAL) 5 mg, Oral, Nightly    losartan (COZAAR) 100 mg, Oral, Daily    lovastatin (MEVACOR) 20 mg, Oral, Nightly    meloxicam (MOBIC) 15 mg, Oral, Daily PRN    metFORMIN (GLUCOPHAGE) 1,000 mg, Oral, 2 times daily with meals    pantoprazole (PROTONIX) 40 mg, Oral, Before breakfast    pulse oximeter (PULSE OXIMETER) device Apply Externally, 2 times daily, Use twice daily at 8 AM and 3 PM and record the value in MyChart as directed.    SYNTHROID 75 mcg, Oral, Before breakfast        Review of patient's allergies indicates:  No Known Allergies    Social History     Socioeconomic History    Marital status:    Tobacco Use    Smoking status: Never     Passive exposure: Never    Smokeless tobacco: Never   Substance and Sexual Activity    Alcohol use: No    Drug use: No    Sexual activity: Yes     Partners: Male     Social Determinants of  Health     Financial Resource Strain: Low Risk  (4/3/2024)    Overall Financial Resource Strain (CARDIA)     Difficulty of Paying Living Expenses: Not hard at all   Food Insecurity: No Food Insecurity (4/3/2024)    Hunger Vital Sign     Worried About Running Out of Food in the Last Year: Never true     Ran Out of Food in the Last Year: Never true   Transportation Needs: No Transportation Needs (4/3/2024)    PRAPARE - Transportation     Lack of Transportation (Medical): No     Lack of Transportation (Non-Medical): No   Physical Activity: Unknown (4/3/2024)    Exercise Vital Sign     Days of Exercise per Week: 1 day   Stress: No Stress Concern Present (4/3/2024)    [employer] - Occupational Stress Questionnaire     Feeling of Stress : Not at all   Social Connections: Unknown (4/3/2024)    Social Connection and Isolation Panel [NHANES]     Frequency of Communication with Friends and Family: Once a week     Frequency of Social Gatherings with Friends and Family: Once a week     Active Member of Clubs or Organizations: No     Attends Club or Organization Meetings: Never     Marital Status:    Housing Stability: Low Risk  (4/3/2024)    Housing Stability Vital Sign     Unable to Pay for Housing in the Last Year: No     Number of Places Lived in the Last Year: 1     Unstable Housing in the Last Year: No       Family History   Problem Relation Name Age of Onset    Heart disease Mother          CABG    Hypertension Mother      Hyperlipidemia Mother      Hearing loss Mother      Diabetes Mother      Alcohol abuse Father      Liver disease Brother          liver transplant    Hypertension Son x2          Review of Systems   Constitutional: Negative for chills and fever.   HENT:  Negative for hearing loss.    Eyes:  Negative for blurred vision.   Cardiovascular:  Negative for chest pain.   Respiratory:  Negative for shortness of breath.    Gastrointestinal:  Negative for abdominal pain.    Neurological:  Negative for light-headedness.        Objective:      General    Nursing note and vitals reviewed.  Constitutional: She is oriented to person, place, and time. She appears well-developed and well-nourished. No distress.   HENT:   Head: Normocephalic and atraumatic.   Eyes: Pupils are equal, round, and reactive to light.   Cardiovascular:  Normal rate and regular rhythm.            Pulmonary/Chest: Effort normal and breath sounds normal. No respiratory distress.   Neurological: She is alert and oriented to person, place, and time.   Psychiatric: She has a normal mood and affect. Her behavior is normal.         Right Shoulder Exam     Inspection/Observation   Swelling: absent  Bruising: absent  Atrophy: absent    Tenderness   The patient is tender to palpation of the greater tuberosity.    Range of Motion   Active abduction:  150   Forward Flexion:  150   External Rotation 0 degrees:  50   Internal rotation 0 degrees:  T12     Tests & Signs   Drop arm: negative  Christian test: positive  Impingement: positive  Rotator Cuff Painful Arc/Range: moderate  Belly Press: negative  Active Compression Test (Harford's Sign): negative  Speed's Test: negative    Other   Sensation: normal    Comments:  Irlanda test- positive    Left Shoulder Exam     Inspection/Observation   Swelling: absent  Bruising: absent  Atrophy: absent    Tenderness   The patient is experiencing no tenderness.     Range of Motion   Active abduction:  170 normal   Forward Flexion:  170 normal   External Rotation 0 degrees:  50 normal   Internal rotation 0 degrees:  T8 normal     Tests & Signs   Drop arm: negative  Christian test: negative  Impingement: negative  Belly Press: negative  Active Compression test (Harford's Sign): negative  Speed's Test: negative    Other   Sensation: normal     Comments:  Irlanda test- negative      Muscle Strength   Right Upper Extremity   Shoulder Abduction: 4/5   Shoulder Internal Rotation: 5/5   Shoulder External  Rotation: 4/5   Biceps: 5/5   Left Upper Extremity  Shoulder Abduction: 5/5   Shoulder Internal Rotation: 5/5   Shoulder External Rotation: 5/5   Biceps: 5/5     Reflexes     Left Side  Biceps:  2+  Triceps:  2+  Brachioradialis:  2+  Left Mascorro's Sign:  Absent    Right Side   Biceps:  2+  Triceps:  2+  Brachioradialis:  2+  Right Mascorro's Sign:  absent    Vascular Exam     Right Pulses      Radial:                    2+      Left Pulses      Radial:                    2+        Imaging:  Radiographs of the right shoulder taken 04/18/2024 were personally reviewed from the [facility] EMR.  Multiple views of the shoulder are available today for review, including an AP, scapular Y, axillary view.  The glenohumeral joint demonstrates moderate degenerative changes.  The acromioclavicular joint demonstrates moderate degenerative changes.  The glenohumeral joint is concentrically reduced.  There is no acute fracture or dislocation.  Sclerosis at GT consistent with impingement syndrome.     Large Joint Aspiration/Injection: R subacromial bursa    Date/Time: 4/18/2024 10:45 AM    Performed by: Karan Sharpe IV, MD  Authorized by: Karan Sharpe IV, MD    Consent Done?:  Yes (Verbal)  Indications:  Pain  Site marked: the procedure site was marked    Timeout: prior to procedure the correct patient, procedure, and site was verified    Prep: patient was prepped and draped in usual sterile fashion      Local anesthesia used?: Yes    Local anesthetic:  Lidocaine 1% without epinephrine    Details:  Needle Size:  22 G  Ultrasonic Guidance for needle placement?: No    Approach:  Lateral  Location:  Shoulder  Site:  R subacromial bursa  Medications:  40 mg triamcinolone acetonide 40 mg/mL  Patient tolerance:  Patient tolerated the procedure well with no immediate complications          Assessment:       Tomeka Burns is a 68 y.o. female seen in the office today. The encounter diagnosis was Rotator cuff impingement syndrome of  right shoulder.  Non-operative treatment is recommended at this time. CSI given right shoulder today and ordered therapy. The natural history and expected course discussed with patient. Various treatment options were discussed, including their risks and benefits. All of the patient's questions were answered.     Plan:      Physical therapy and rehabilitation treatment.  Tylenol 650mg TID, PRN pain.  Follow up in 6 weeks.   Corticosteroid injection given today (right subacromial).         Karan Sharpe IV, MD   of Clinical Orthopedics  Department of Orthopedic Surgery  [facility]  Office: [phone]  Website: [email or web address]      Orders Placed This Encounter    Large Joint Aspiration/Injection    Ambulatory referral/consult to Physical/Occupational Therapy

## 2024-04-23 DIAGNOSIS — M17.0 PRIMARY OSTEOARTHRITIS OF BOTH KNEES: Primary | ICD-10-CM

## 2024-04-25 ENCOUNTER — OFFICE VISIT (OUTPATIENT)
Dept: ORTHOPEDICS | Facility: CLINIC | Age: 69
End: 2024-04-25
Payer: MEDICARE

## 2024-04-25 ENCOUNTER — HOSPITAL ENCOUNTER (OUTPATIENT)
Dept: RADIOLOGY | Facility: HOSPITAL | Age: 69
Discharge: HOME | End: 2024-04-25
Attending: ORTHOPAEDIC SURGERY
Payer: MEDICARE

## 2024-04-25 VITALS — WEIGHT: 127 LBS | BODY MASS INDEX: 23.23 KG/M2

## 2024-04-25 DIAGNOSIS — M17.0 PRIMARY OSTEOARTHRITIS OF BOTH KNEES: Primary | ICD-10-CM

## 2024-04-25 DIAGNOSIS — M17.0 PRIMARY OSTEOARTHRITIS OF BOTH KNEES: ICD-10-CM

## 2024-04-25 PROCEDURE — 99999 PR PBB SHADOW E&M-EST. PATIENT-LVL III: CPT | Mod: PBBFAC,,, | Performed by: ORTHOPAEDIC SURGERY

## 2024-04-25 PROCEDURE — 3066F NEPHROPATHY DOC TX: CPT | Mod: CPTII,S$GLB,, | Performed by: ORTHOPAEDIC SURGERY

## 2024-04-25 PROCEDURE — 3061F NEG MICROALBUMINURIA REV: CPT | Mod: CPTII,S$GLB,, | Performed by: ORTHOPAEDIC SURGERY

## 2024-04-25 PROCEDURE — 3044F HG A1C LEVEL LT 7.0%: CPT | Mod: CPTII,S$GLB,, | Performed by: ORTHOPAEDIC SURGERY

## 2024-04-25 PROCEDURE — 1159F MED LIST DOCD IN RCRD: CPT | Mod: CPTII,S$GLB,, | Performed by: ORTHOPAEDIC SURGERY

## 2024-04-25 PROCEDURE — 73564 X-RAY EXAM KNEE 4 OR MORE: CPT | Mod: 26,50,, | Performed by: RADIOLOGY

## 2024-04-25 PROCEDURE — 4010F ACE/ARB THERAPY RXD/TAKEN: CPT | Mod: CPTII,S$GLB,, | Performed by: ORTHOPAEDIC SURGERY

## 2024-04-25 PROCEDURE — 3008F BODY MASS INDEX DOCD: CPT | Mod: CPTII,S$GLB,, | Performed by: ORTHOPAEDIC SURGERY

## 2024-04-25 PROCEDURE — 20610 DRAIN/INJ JOINT/BURSA W/O US: CPT | Mod: 50,S$GLB,, | Performed by: ORTHOPAEDIC SURGERY

## 2024-04-25 PROCEDURE — 99213 OFFICE O/P EST LOW 20 MIN: CPT | Mod: 25,S$GLB,, | Performed by: ORTHOPAEDIC SURGERY

## 2024-04-25 PROCEDURE — 73564 X-RAY EXAM KNEE 4 OR MORE: CPT | Mod: TC,50,PN

## 2024-04-25 PROCEDURE — 1160F RVW MEDS BY RX/DR IN RCRD: CPT | Mod: CPTII,S$GLB,, | Performed by: ORTHOPAEDIC SURGERY

## 2024-04-25 PROCEDURE — 1125F AMNT PAIN NOTED PAIN PRSNT: CPT | Mod: CPTII,S$GLB,, | Performed by: ORTHOPAEDIC SURGERY

## 2024-04-25 RX ORDER — TRIAMCINOLONE ACETONIDE 40 MG/ML
80 INJECTION, SUSPENSION INTRA-ARTICULAR; INTRAMUSCULAR
Status: DISCONTINUED | OUTPATIENT
Start: 2024-04-25 | End: 2024-04-25 | Stop reason: HOSPADM

## 2024-04-25 RX ADMIN — TRIAMCINOLONE ACETONIDE 80 MG: 40 INJECTION, SUSPENSION INTRA-ARTICULAR; INTRAMUSCULAR at 09:04

## 2024-04-25 NOTE — PROCEDURES
Large Joint Aspiration/Injection: bilateral knee    Date/Time: 4/25/2024 9:45 AM    Performed by: Fernie Moeller MD  Authorized by: Fernie Moeller MD    Consent Done?:  Yes (Verbal)  Approach:  Anterolateral  Location:  Knee  Laterality:  Bilateral  Site:  Bilateral knee  Medications (Left):  80 mg triamcinolone acetonide 40 mg/mL     After obtaining verbal informed consent both of the patient's knees were prepped aseptically and injected through an inferior lateral approach using 40 mg of triamcinolone and 1 cc of 1% plain Xylocaine.  The patient was warned about postinjection flare and how to manage it with ice, rest and over-the-counter analgesics.  They're advised to contact me for any severe, uncontrolled pain.

## 2024-04-25 NOTE — PROGRESS NOTES
Subjective:      Patient ID: Tomeka Burns is a 68 y.o. female.    Chief Complaint: Pain of the Left Knee, Pain of the Right Knee, and Knee Pain    HPI    Follow-up for osteoarthritis.  The patient complains of recent increasing pain in both knees, worse on the left.  Pain is medial and worse with prolonged ambulation.  The patient denies mechanical symptoms.  She reports previous injections were helpful and she would like them repeated.          Review of Systems   Constitutional: Negative for fever and weight loss.   HENT:  Negative for congestion.    Eyes:  Negative for visual disturbance.   Cardiovascular:  Negative for chest pain.   Respiratory:  Negative for shortness of breath.    Hematologic/Lymphatic: Negative for bleeding problem. Does not bruise/bleed easily.   Skin:  Negative for poor wound healing.   Musculoskeletal:  Positive for joint pain.   Gastrointestinal:  Negative for abdominal pain.   Genitourinary:  Negative for dysuria.   Neurological:  Negative for seizures.   Psychiatric/Behavioral:  Negative for altered mental status.    Allergic/Immunologic: Negative for persistent infections.         Objective:      Ortho/SPM Exam      Left knee    The patient is not in acute distress.   Body habitus is normal.   Sclera appear normal  No respiratory distress  The patient walks with a slight limp  Resisted SLR negative.   The skin over the knee is intact.  Knee effusion trace  Mild medial tenderness  Range of motion- Flexion full, Extension full.   Ligament exam:   MCL 1+   Lachman intact              Post sag intact    LCL intact  Patellar apprehension negative.  Popliteal cyst present  Patellar crepitation absent.  Flexion/pinch negative.  Pulses DP present, PT present.  Motor normal 5/5 strength in all tested muscle groups.   Sensory normal.    The right knee is identical    Radiographs of both knees show moderate medial narrowing with osteophytes, Kellgren stage II            Assessment:        Encounter Diagnosis   Name Primary?    Primary osteoarthritis of both knees Yes        The condition is structurally moderate.  The patient does not have severe pain or functional incapacity at this time.  Future progression is possible.          Plan:       Tomeka was seen today for pain, pain and knee pain.    Diagnoses and all orders for this visit:    Primary osteoarthritis of both knees          I explained my diagnostic impression and the reasoning behind it in detail, using layman's terms.  Models and/or pictures were used to help in the explanation.    Injection recommended and consent given for both knees    The patient will be on my palliative protocol for the present time

## 2024-04-30 LAB
LEFT EYE DM RETINOPATHY: NEGATIVE
RIGHT EYE DM RETINOPATHY: NEGATIVE

## 2024-05-02 ENCOUNTER — CLINICAL SUPPORT (OUTPATIENT)
Dept: REHABILITATION | Facility: HOSPITAL | Age: 69
End: 2024-05-02
Attending: ORTHOPAEDIC SURGERY
Payer: MEDICARE

## 2024-05-02 DIAGNOSIS — M25.611 STIFFNESS OF RIGHT SHOULDER JOINT: ICD-10-CM

## 2024-05-02 DIAGNOSIS — M75.41 ROTATOR CUFF IMPINGEMENT SYNDROME OF RIGHT SHOULDER: ICD-10-CM

## 2024-05-02 DIAGNOSIS — R53.1 WEAKNESS: Primary | ICD-10-CM

## 2024-05-02 DIAGNOSIS — M25.511 ACUTE PAIN OF RIGHT SHOULDER: ICD-10-CM

## 2024-05-02 PROCEDURE — 97530 THERAPEUTIC ACTIVITIES: CPT | Mod: PO

## 2024-05-02 PROCEDURE — 97165 OT EVAL LOW COMPLEX 30 MIN: CPT | Mod: PO

## 2024-05-03 ENCOUNTER — PATIENT OUTREACH (OUTPATIENT)
Dept: ADMINISTRATIVE | Facility: HOSPITAL | Age: 69
End: 2024-05-03
Payer: MEDICARE

## 2024-05-03 NOTE — PROGRESS NOTES
Population Health Chart Review & Patient Outreach Details      Additional [facility] Health Notes:               Updates Requested / Reviewed:      Updated Care Coordination Note, Care Everywhere, , and Immunizations Reconciliation Completed or Queried: [facility] Topics Overdue:      [facility] Score: 0     Patient is not due for any topics at this time.    RSV Vaccine                  Health Maintenance Topic(s) Outreach Outcomes & Actions Taken:    Eye Exam - Outreach Outcomes & Actions Taken  : Diabetic Eye External Records Uploaded, Care Team & History Updated if Applicable

## 2024-05-15 ENCOUNTER — CLINICAL SUPPORT (OUTPATIENT)
Dept: REHABILITATION | Facility: HOSPITAL | Age: 69
End: 2024-05-15
Payer: MEDICARE

## 2024-05-15 DIAGNOSIS — M25.611 STIFFNESS OF RIGHT SHOULDER JOINT: ICD-10-CM

## 2024-05-15 DIAGNOSIS — R53.1 WEAKNESS: Primary | ICD-10-CM

## 2024-05-15 DIAGNOSIS — M25.511 ACUTE PAIN OF RIGHT SHOULDER: ICD-10-CM

## 2024-05-15 PROCEDURE — 97530 THERAPEUTIC ACTIVITIES: CPT | Mod: PO

## 2024-05-15 NOTE — PROGRESS NOTES
Occupational Therapy Daily Treatment Note     Name: Tomeka Burns  Clinic Number: [id number]    Therapy Diagnosis:   Encounter Diagnoses   Name Primary?    Weakness Yes    Stiffness of right shoulder joint     Acute pain of right shoulder      Physician: Karan Sharpe IV, MD    Visit Date: 5/15/2024    Physician Orders: OT eval/treat  Medical Diagnosis: M75.41 (ICD-10-CM) - Rotator cuff impingement syndrome of right shoulder  Surgical Procedure and Date: n/a  Evaluation Date: 5/2/24  Onset Date: April 2023  Insurance Authorization Period Expiration: 5/2/34 - 12/31/24  Plan of Care Certification Period: 7/25/24  Date of Return to MD: 6/6/24  Visit # / Visits authorized: 1 / 20  FOTO Completion: 1/3    Time In:1000  Time Out: 1100  Total Billable Time: 60 minutes    Precautions:  Standard      Subjective     Pt reports: She has been completing her exercises  she was compliant with home exercise program given last session.   Response to previous treatment:1st after   Functional change: 1st after     Pain: 2/10  Location: right shoulder      Objective       Observation: No abnormalities present     UE Range of Motion     Active Range of Motion:   Shoulder Left Right   Flexion WNL WNL   Abduction WNL WNL   ER at 0 WNL WNL   ER at 90 WNL WNL   IR WNL WNL      Painful Arc:   Patient demonstrates no painful arc in shoulder flexion or abduction     Upper Extremity Strength  (R) UE   (L) UE     Shoulder flexion: 4/5 Shoulder flexion: 5/5   Shoulder Abduction: 4/5 Shoulder abduction: 5/5   Shoulder ER 4/5 Shoulder ER 5/5   Shoulder IR 4/5 Shoulder IR 5/5   Rhomboids 4/5 Rhomboids 5/5            Special Tests:  AC Joint Left Right   Hawkin's Kennd[place] NT Positive         Palpation: Tender at the anterior/lateral aspect      Sensation: Pt denies any numbness and tingling        Scapular Control/Dyskinesis:     Normal / Subtle / Obvious  Comments    Left  N N/a    Right  N N/a         OUTCOME MEASURE:FOTO     Category:Self Care        Current Score  = 65%   Goal at Discharge Score = 67%    Treatment       Tomeka participated in dynamic functional therapeutic activities to improve functional performance for 60  minutes, including:  -pulley's flexion/ abduction 3 minutes  each  - pendulums: forward/ horizontal/ circles CW/ circles CCW  - supine flexion 2/15 with 2# weight  - supine abduction 2/15 with 2# weight  - supine chest press 2/15 with 2# weight  - side lying external rotation with 2# DB  - red theraband rows 2/15  -red theraband tricep extensions 2/15  - red theraband internal rotations 2/15  - red theraband external rotations 2/15      Home Exercises and Education Provided     Education provided:   - Progress towards goals     Written Home Exercises Provided: Patient instructed to cont prior HEP.  Exercises were reviewed and Tomeka was able to demonstrate them prior to the end of the session.  Tomeka demonstrated good  understanding of the HEP provided.   .   See EMR under Patient Instructions for exercises provided prior visit.        Assessment     Pt would continue to benefit from skilled OT. Pt tolerated added therapeutic activities well without complaints of additional pain. Pt is motivated. Will progress as tolerated.     Tomeka is progressing well towards her goals and there are no updates to goals at this time. Pt prognosis is Excellent.     Pt will continue to benefit from skilled outpatient occupational therapy to address the deficits listed in the problem list on initial evaluation provide pt/family education and to maximize pt's level of independence in the home and community environment.     Anticipated barriers to occupational therapy: continued use of arm at work    Pt's spiritual, cultural and educational needs considered and pt agreeable to plan of care and goals.      Goals:      Short Term (6 week):  1)   Patient to be IND with HEP and modalities for pain management Ongoing  2)   Increase ROM to WNL  in flexion plane of motion  with 2/10 to increase functional UE use for ADLs, IADLs, and work duties Ongoing  3)   Increase ROM to WNL  in abduction plane of motion with 2/10 to increase functional UE use for ADLs, IADLs, and work duties Ongoing  4)   Increase ROM to WNL  in internal rotation plane of motion with 2/10 to increase functional UE use for ADLs, IADLs, and work duties Ongoing  5)   Increase ROM to WNL  in external rotation plane of motion with 2/10 to increase functional UE use for ADLs, IADLs, and work duties Ongoing  6)   Improve muscle strength ing 4/5 to 4+/5 in order to complete ADLs, IADLs, and work task with less pain. Ongoing     Long Term (by discharge):  1)   Pt will report 1 out of 10 pain with ADLs, IADLs, and work tasks.Ongoing  2)   Patient to score at 67% or more on FOTO to demonstrate improved perception of functional shoulder use.Ongoing  3)   Pt will return to prior level of function for ADLs and household management. Ongoing     Plan      Pt to be treated by Occupational Therapy 2 times per week for 12 weeks during the certification period to achieve the established goals.      Treatment to include: Paraffin, Fluidotherapy, Manual therapy/joint mobilizations, Modalities for pain management, US 3 mhz, Therapeutic exercises/activities., Iontophoresis with 2.0 cc Dexamethasone, Strengthening, Orthotic Fabrication/Fit/Training, Edema Control, Scar Management, Wound Care, Electrical Modalities, Joint Protection, and Energy Conservation, as well as any other treatments deemed necessary based on the patient's needs or progress.         Updates/Grading for next session: Continue with current plan of care      Bessie Berrios OT

## 2024-05-15 NOTE — PLAN OF CARE
[facility]  Occupational Therapy - Shoulder Evaluation       Evaluation Date: 5/2/2024  Patient: Tomeka Burns  YOB: 1955  Chart Number: [id number]  Referring Physician: Karan Sharpe IV, MD  Diagnosis:   1. Weakness        2. Rotator cuff impingement syndrome of right shoulder  Ambulatory referral/consult to Physical/Occupational Therapy      3. Stiffness of right shoulder joint        4. Acute pain of right shoulder            Visit #: 1 of 1  Plan of Care Certification Date: 7/26/24  Authorization Period: 4/18/24 - 12/31/24  Date of Return to MD: 6/6/24    Time In: 1000  Time Out: 1100  Total Billable Time: 60    Precautions:  Standard    Subjective     Reason for Referral: Tomeka presents to therapy with a medical referral of M75.41 (ICD-10-CM) - Rotator cuff impingement syndrome of right shoulder.   Involved Side: R  Dominant Side: Right  Date of Onset: April 2023  Mechanism of Injury: Insidious   History of Current Condition: Pt states that her shoulder started to hurt around a month ago. She received a CSI from MD Gomez in 2019 and hasn't had problems until recently.She received a CSI on 4/18/24 with MD Sharpe and was prescribed conservative treatment for rotator cuff impingement syndrome  Surgical Procedure: n/a  Imaging: EXAMINATION:  XR SHOULDER TRAUMA 3 VIEW RIGHT     CLINICAL HISTORY:  Pain in right shoulder     TECHNIQUE:  Two or three views of the right shoulder were performed.     COMPARISON:  Right shoulder radiograph 09/26/2018     FINDINGS:  Bones: No fracture.  No lytic or blastic lesion.     Joints: No evidence for glenohumeral dislocation.  Mild acromioclavicular joint arthropathy.     Soft tissues: Unremarkable.     Visualized right lung is unremarkable.     Impression:     Please see above.  Previous Therapy: Y    Pain:  Functional Pain Scale Rating 0-10:   2/10 on average  1/10 at best  9/10 at worst  Location: anterior/ lateral aspect of  "shoulder  Description: Aching  Activities Which Increase Pain: Flexing  Activities Which Decrease Pain: injection    Functional Limitations/Social History:    Previous functional status includes: Independent with all ADLs.     Current FunctionalStatus   Home/Living environment : lives with their family      Limitation of Functional Status as follows:   ADLs/IADLs:     - Feeding: MOD INDEP    - Bathing: MOD INDEP    - Dressing: MOD INDEP    - Grooming: MOD INDEP    - Driving: Y     Leisure: none affected    Occupation:   at [employer]  Working presently: employed  Duties: lifting, carrying, reaching, bagging    Patient's Goals for Therapy: "To be able to use my arm like normal"     Past Medical History/Physical Systems Review:   Past Medical History:   Diagnosis Date    Diabetes mellitus, type 2     Encounter for blood transfusion 1980    GERD (gastroesophageal reflux disease)     Hyperlipidemia     Hypertension     Hypothyroidism     Multiple thyroid nodules 01/21/2019     Tomeka Burns  has a past surgical history that includes Hysterectomy; Tonsillectomy; Left heart catheterization (N/A, 06/17/2019); Coronary angiography (N/A, 06/17/2019); Angiocardiography of left ventricle (N/A, 06/17/2019); Thyroidectomy (Bilateral, 06/16/2020); and Appendectomy.    Tomeka has a current medication list which includes the following prescription(s): alcohol swabs, amlodipine, aspirin, blood sugar diagnostic, blood-glucose meter, glimepiride, hydrochlorothiazide, lancets, levocetirizine, losartan, lovastatin, meloxicam, metformin, pantoprazole, pulse oximeter, and synthroid.    Review of patient's allergies indicates:  No Known Allergies     Barriers:  Environmental Concerns/ Fall Risk:  None  Barriers to Learning: None   Cultural/Spiritual : None   Developmental/Education: None   Abuse/ Neglect: none   Nutritional Deficit: None   Language: None   Hearing/Vision Deficit: None   History of Cancer: N        Objective "     Observation: No abnormalities present    UE Range of Motion     Active Range of Motion:   Shoulder Left Right   Flexion WNL WNL   Abduction WNL WNL   ER at 0 WNL WNL   ER at 90 WNL WNL   IR WNL WNL     Painful Arc:   Patient demonstrates no painful arc in shoulder flexion or abduction    Upper Extremity Strength  (R) UE  (L) UE    Shoulder flexion: 4/5 Shoulder flexion: 5/5   Shoulder Abduction: 4/5 Shoulder abduction: 5/5   Shoulder ER 4/5 Shoulder ER 5/5   Shoulder IR 4/5 Shoulder IR 5/5   Rhomboids 4/5 Rhomboids 5/5         Special Tests:  AC Joint Left Right   Hawkin's Kenndy NT Positive       Palpation: Tender at the anterior/lateral aspect     Sensation: Pt denies any numbness and tingling      Scapular Control/Dyskinesis:    Normal / Subtle / Obvious  Comments    Left  N N/a    Right  N N/a       OUTCOME MEASURE:FOTO    Category:Self Care       Current Score  = 65%   Goal at Discharge Score = 67%    Treatment     Treatment Time In: 1045  Treatment Time Out:1100    Issued HEP and educated on modality use for pain management (see patient instructions). Pt verbally understood the instructions as they were given and demonstrated proper form and technique during therapy. Pt was advise to perform these exercises free of pain, and to stop performing them if pain occurs.    Patient/Family Education: role of OT, goals for OT, scheduling/cancellations - pt verbalized understanding. Discussed insurance limitations with patient.      Assessment     Manav Walker is a 74 y.o. male presents with limitations as described in problem list. Patient can benefit from Occupational Therapy services for Iontophoresis, ultrasound, moist heat, therapeutic exercises, home exercise program provied with written instructions, ice and strengthening and orthotics, if deemed necessary . The following goals were discussed with the patient and she is in agreement with them as to be addressed in the treatment plan.    The patient's rehab  potential is Excellent.     Anticipated barriers to occupational therapy: continual use of shoulder at work  Pt has no cultural, educational or language barriers to learning provided.    Profile and History Assessment of Occupational Performance Level of Clinical Decision Making Complexity Score   Occupational Profile:   Manav Walker is a 74 y.o. male who is currently employed Manav Walker has difficulty with  ADLs and IADLs as listed previously, which  affecting his/her daily functional abilities.      Comorbidities:    has a past medical history of Anemia in stage 3 chronic kidney disease, Arthritis of facet joints at multiple vertebral levels, Benign essential hypertension, Cancer, Cataract, Chronic neck and back pain, Erectile dysfunction, GERD (gastroesophageal reflux disease), History of prostate cancer, History of prostatectomy, Leukopenia, Mixed hyperlipidemia, Neural foraminal stenosis of lumbar spine, Osteoarthritis, Prostate cancer, Spinal stenosis of lumbar region, Stress incontinence, male, and Vitamin D deficiency.    Medical and Therapy History Review:   Expanded               Performance Deficits    Physical:  Joint Mobility  Joint Stability  Muscle Power/Strength  Muscle Endurance  Pain    Cognitive:  No Deficits    Psychosocial:    Habits  Routines  Rituals     Clinical Decision Making:  low    Assessment Process:  Problem-Focused Assessments    Modification/Need for Assistance:  Minimal-Moderate Modifications/Assistance    Intervention Selection:  Several Treatment Options       low  Based on PMHX, co morbidities , data from assessments and functional level of assistance required with task and clinical presentation directly impacting function.           Goals:     Short Term (6 week):  1)   Patient to be IND with HEP and modalities for pain management  2)   Increase ROM to WNL  in flexion plane of motion with 2/10 to increase functional UE use for ADLs, IADLs, and work duties  3)   Increase ROM to  WNL  in abduction plane of motion with 2/10 to increase functional UE use for ADLs, IADLs, and work duties  4)   Increase ROM to WNL  in internal rotation plane of motion with 2/10 to increase functional UE use for ADLs, IADLs, and work duties  5)   Increase ROM to WNL  in external rotation plane of motion with 2/10 to increase functional UE use for ADLs, IADLs, and work duties  6)   Improve muscle strength ing 4/5 to 4+/5 in order to complete ADLs, IADLs, and work task with less pain.     Long Term (by discharge):  1)   Pt will report 1 out of 10 pain with ADLs, IADLs, and work tasks.  2)   Patient to score at 67% or more on FOTO to demonstrate improved perception of functional shoulder use.  3)   Pt will return to prior level of function for ADLs and household management.     Plan     Pt to be treated by Occupational Therapy 2 times per week for 12 weeks during the certification period to achieve the established goals.     Treatment to include: Paraffin, Fluidotherapy, Manual therapy/joint mobilizations, Modalities for pain management, US 3 mhz, Therapeutic exercises/activities., Iontophoresis with 2.0 cc Dexamethasone, Strengthening, Orthotic Fabrication/Fit/Training, Edema Control, Scar Management, Wound Care, Electrical Modalities, Joint Protection, and Energy Conservation, as well as any other treatments deemed necessary based on the patient's needs or progress.        HARINI Rivera

## 2024-05-22 ENCOUNTER — CLINICAL SUPPORT (OUTPATIENT)
Dept: REHABILITATION | Facility: HOSPITAL | Age: 69
End: 2024-05-22
Payer: MEDICARE

## 2024-05-22 DIAGNOSIS — M25.611 STIFFNESS OF RIGHT SHOULDER JOINT: ICD-10-CM

## 2024-05-22 DIAGNOSIS — M25.511 ACUTE PAIN OF RIGHT SHOULDER: ICD-10-CM

## 2024-05-22 DIAGNOSIS — R53.1 WEAKNESS: Primary | ICD-10-CM

## 2024-05-22 PROCEDURE — 97530 THERAPEUTIC ACTIVITIES: CPT | Mod: PO

## 2024-05-22 NOTE — PROGRESS NOTES
Occupational Therapy Daily Treatment Note     Name: Tomeka Burns  Clinic Number: [id number]    Therapy Diagnosis:   Encounter Diagnoses   Name Primary?    Weakness Yes    Stiffness of right shoulder joint     Acute pain of right shoulder      Physician: Karan Sharpe IV, MD    Visit Date: 5/22/2024    Physician Orders: OT eval/treat  Medical Diagnosis: M75.41 (ICD-10-CM) - Rotator cuff impingement syndrome of right shoulder  Surgical Procedure and Date: n/a  Evaluation Date: 5/2/24  Onset Date: April 2023  Insurance Authorization Period Expiration: 5/2/34 - 12/31/24  Plan of Care Certification Period: 7/25/24  Date of Return to MD: 6/6/24  Visit # / Visits authorized: 2/ 20  FOTO Completion: 1/3    Time In:1000  Time Out: 1100  Total Billable Time: 60 minutes    Precautions:  Standard      Subjective     Pt reports: She has been completing her exercises  she was compliant with home exercise program given last session.   Response to previous treatment:increased ROM  Functional change: increased ROM    Pain: 2/10  Location: right shoulder      Objective       Observation: No abnormalities present     UE Range of Motion     Active Range of Motion:   Shoulder Left Right   Flexion WNL WNL   Abduction WNL WNL   ER at 0 WNL WNL   ER at 90 WNL WNL   IR WNL WNL      Painful Arc:   Patient demonstrates no painful arc in shoulder flexion or abduction     Upper Extremity Strength  (R) UE   (L) UE     Shoulder flexion: 4/5 Shoulder flexion: 5/5   Shoulder Abduction: 4/5 Shoulder abduction: 5/5   Shoulder ER 4/5 Shoulder ER 5/5   Shoulder IR 4/5 Shoulder IR 5/5   Rhomboids 4/5 Rhomboids 5/5            Special Tests:  AC Joint Left Right   Hawkin's Kenndy NT Positive         Palpation: Tender at the anterior/lateral aspect      Sensation: Pt denies any numbness and tingling        Scapular Control/Dyskinesis:     Normal / Subtle / Obvious  Comments    Left  N N/a    Right  N N/a         OUTCOME MEASURE:FOTO     Category:Self  Care       Current Score  = 65%   Goal at Discharge Score = 67%    Treatment       Tomeka participated in dynamic functional therapeutic activities to improve functional performance for 60  minutes, including:  -pulley's flexion/ abduction 3 minutes  each  - pendulums: forward/ horizontal/ circles CW/ circles CCW  - supine flexion 2/15 with 2# weight  - supine abduction 2/15 with 2# weight  - supine chest press 2/15 with 2# weight  - side lying external rotation with 2# DB  - red theraband rows 2/15  -red theraband tricep extensions 2/15  - red theraband internal rotation step outs  2/15  - red theraband external rotation step outs 2/15      Home Exercises and Education Provided     Education provided:   - Progress towards goals     Written Home Exercises Provided: Patient instructed to cont prior HEP.  Exercises were reviewed and Tomeka was able to demonstrate them prior to the end of the session.  Tomeka demonstrated good  understanding of the HEP provided.   .   See EMR under Patient Instructions for exercises provided prior visit.        Assessment     Pt would continue to benefit from skilled OT. Pt tolerated added therapeutic activities well without complaints of additional pain. Pt is motivated. Will progress as tolerated.     Tomeka is progressing well towards her goals and there are no updates to goals at this time. Pt prognosis is Excellent.     Pt will continue to benefit from skilled outpatient occupational therapy to address the deficits listed in the problem list on initial evaluation provide pt/family education and to maximize pt's level of independence in the home and community environment.     Anticipated barriers to occupational therapy: continued use of arm at work    Pt's spiritual, cultural and educational needs considered and pt agreeable to plan of care and goals.      Goals:      Short Term (6 week):  1)   Patient to be IND with HEP and modalities for pain management Ongoing  2)   Increase ROM to WNL   in flexion plane of motion with 2/10 to increase functional UE use for ADLs, IADLs, and work duties Ongoing  3)   Increase ROM to WNL  in abduction plane of motion with 2/10 to increase functional UE use for ADLs, IADLs, and work duties Ongoing  4)   Increase ROM to WNL  in internal rotation plane of motion with 2/10 to increase functional UE use for ADLs, IADLs, and work duties Ongoing  5)   Increase ROM to WNL  in external rotation plane of motion with 2/10 to increase functional UE use for ADLs, IADLs, and work duties Ongoing  6)   Improve muscle strength ing 4/5 to 4+/5 in order to complete ADLs, IADLs, and work task with less pain. Ongoing     Long Term (by discharge):  1)   Pt will report 1 out of 10 pain with ADLs, IADLs, and work tasks.Ongoing  2)   Patient to score at 67% or more on FOTO to demonstrate improved perception of functional shoulder use.Ongoing  3)   Pt will return to prior level of function for ADLs and household management. Ongoing     Plan      Pt to be treated by Occupational Therapy 2 times per week for 12 weeks during the certification period to achieve the established goals.      Treatment to include: Paraffin, Fluidotherapy, Manual therapy/joint mobilizations, Modalities for pain management, US 3 mhz, Therapeutic exercises/activities., Iontophoresis with 2.0 cc Dexamethasone, Strengthening, Orthotic Fabrication/Fit/Training, Edema Control, Scar Management, Wound Care, Electrical Modalities, Joint Protection, and Energy Conservation, as well as any other treatments deemed necessary based on the patient's needs or progress.         Updates/Grading for next session: Continue with current plan of care      Bessie Berrios, OT

## 2024-05-24 ENCOUNTER — CLINICAL SUPPORT (OUTPATIENT)
Dept: REHABILITATION | Facility: HOSPITAL | Age: 69
End: 2024-05-24
Payer: MEDICARE

## 2024-05-24 DIAGNOSIS — M25.611 STIFFNESS OF RIGHT SHOULDER JOINT: ICD-10-CM

## 2024-05-24 DIAGNOSIS — R53.1 WEAKNESS: Primary | ICD-10-CM

## 2024-05-24 DIAGNOSIS — M25.511 ACUTE PAIN OF RIGHT SHOULDER: ICD-10-CM

## 2024-05-24 PROCEDURE — 97530 THERAPEUTIC ACTIVITIES: CPT | Mod: PO

## 2024-05-24 NOTE — PROGRESS NOTES
Occupational Therapy Daily Treatment Note     Name: Tomeka Burns  Clinic Number: [id number]    Therapy Diagnosis:   Encounter Diagnoses   Name Primary?    Weakness Yes    Stiffness of right shoulder joint     Acute pain of right shoulder      Physician: Karan Sharpe IV, MD    Visit Date: 5/24/2024    Physician Orders: OT eval/treat  Medical Diagnosis: M75.41 (ICD-10-CM) - Rotator cuff impingement syndrome of right shoulder  Surgical Procedure and Date: n/a  Evaluation Date: 5/2/24  Onset Date: April 2023  Insurance Authorization Period Expiration: 5/2/34 - 12/31/24  Plan of Care Certification Period: 7/25/24  Date of Return to MD: 6/6/24  Visit # / Visits authorized: 3/ 20  FOTO Completion: 1/3    Time In:1000  Time Out: 1100  Total Billable Time: 60 minutes    Precautions:  Standard      Subjective     Pt reports: She has been completing her exercises  she was compliant with home exercise program given last session.   Response to previous treatment:increased ROM  Functional change: increased ROM    Pain: 2/10  Location: right shoulder      Objective       Observation: No abnormalities present     UE Range of Motion     Active Range of Motion:   Shoulder Left Right   Flexion WNL WNL   Abduction WNL WNL   ER at 0 WNL WNL   ER at 90 WNL WNL   IR WNL WNL      Painful Arc:   Patient demonstrates no painful arc in shoulder flexion or abduction     Upper Extremity Strength  (R) UE   (L) UE     Shoulder flexion: 4/5 Shoulder flexion: 5/5   Shoulder Abduction: 4/5 Shoulder abduction: 5/5   Shoulder ER 4/5 Shoulder ER 5/5   Shoulder IR 4/5 Shoulder IR 5/5   Rhomboids 4/5 Rhomboids 5/5            Special Tests:  AC Joint Left Right   Hawkin's Kenndy NT Positive         Palpation: Tender at the anterior/lateral aspect      Sensation: Pt denies any numbness and tingling        Scapular Control/Dyskinesis:     Normal / Subtle / Obvious  Comments    Left  N N/a    Right  N N/a         OUTCOME MEASURE:FOTO     Category:Self  Care       Current Score  = 65%   Goal at Discharge Score = 67%    Treatment       Tomeka participated in dynamic functional therapeutic activities to improve functional performance for 60  minutes, including:  -pulley's flexion/ abduction 3 minutes  each  - pendulums: forward/ horizontal/ circles CW/ circles CCW  - red theraband rows 2/15  -red theraband tricep extensions 2/15  - red theraband internal rotation step outs  2/15  - red theraband external rotation step outs 2/15  - pec release  - PROM of shoulder though all planes of motion      Home Exercises and Education Provided     Education provided:   - Progress towards goals     Written Home Exercises Provided: Patient instructed to cont prior HEP.  Exercises were reviewed and Tomeka was able to demonstrate them prior to the end of the session.  Tomeka demonstrated good  understanding of the HEP provided.   .   See EMR under Patient Instructions for exercises provided prior visit.        Assessment     Pt would continue to benefit from skilled OT. Pt tolerated added therapeutic activities well without complaints of additional pain. Pt is motivated. Will progress as tolerated.     Tomeka is progressing well towards her goals and there are no updates to goals at this time. Pt prognosis is Excellent.     Pt will continue to benefit from skilled outpatient occupational therapy to address the deficits listed in the problem list on initial evaluation provide pt/family education and to maximize pt's level of independence in the home and community environment.     Anticipated barriers to occupational therapy: continued use of arm at work    Pt's spiritual, cultural and educational needs considered and pt agreeable to plan of care and goals.      Goals:      Short Term (6 week):  1)   Patient to be IND with HEP and modalities for pain management Ongoing  2)   Increase ROM to WNL  in flexion plane of motion with 2/10 to increase functional UE use for ADLs, IADLs, and work  duties Ongoing  3)   Increase ROM to WNL  in abduction plane of motion with 2/10 to increase functional UE use for ADLs, IADLs, and work duties Ongoing  4)   Increase ROM to WNL  in internal rotation plane of motion with 2/10 to increase functional UE use for ADLs, IADLs, and work duties Ongoing  5)   Increase ROM to WNL  in external rotation plane of motion with 2/10 to increase functional UE use for ADLs, IADLs, and work duties Ongoing  6)   Improve muscle strength ing 4/5 to 4+/5 in order to complete ADLs, IADLs, and work task with less pain. Ongoing     Long Term (by discharge):  1)   Pt will report 1 out of 10 pain with ADLs, IADLs, and work tasks.Ongoing  2)   Patient to score at 67% or more on FOTO to demonstrate improved perception of functional shoulder use.Ongoing  3)   Pt will return to prior level of function for ADLs and household management. Ongoing     Plan      Pt to be treated by Occupational Therapy 2 times per week for 12 weeks during the certification period to achieve the established goals.      Treatment to include: Paraffin, Fluidotherapy, Manual therapy/joint mobilizations, Modalities for pain management, US 3 mhz, Therapeutic exercises/activities., Iontophoresis with 2.0 cc Dexamethasone, Strengthening, Orthotic Fabrication/Fit/Training, Edema Control, Scar Management, Wound Care, Electrical Modalities, Joint Protection, and Energy Conservation, as well as any other treatments deemed necessary based on the patient's needs or progress.         Updates/Grading for next session: Continue with current plan of care      Bessie Berrios OT

## 2024-05-29 ENCOUNTER — OFFICE VISIT (OUTPATIENT)
Dept: ENDOCRINOLOGY | Facility: CLINIC | Age: 69
End: 2024-05-29
Payer: MEDICARE

## 2024-05-29 ENCOUNTER — LAB VISIT (OUTPATIENT)
Dept: LAB | Facility: HOSPITAL | Age: 69
End: 2024-05-29
Payer: MEDICARE

## 2024-05-29 ENCOUNTER — CLINICAL SUPPORT (OUTPATIENT)
Dept: REHABILITATION | Facility: HOSPITAL | Age: 69
End: 2024-05-29
Payer: MEDICARE

## 2024-05-29 ENCOUNTER — TELEPHONE (OUTPATIENT)
Dept: ENDOCRINOLOGY | Facility: CLINIC | Age: 69
End: 2024-05-29

## 2024-05-29 VITALS
WEIGHT: 125.75 LBS | HEART RATE: 93 BPM | BODY MASS INDEX: 23.14 KG/M2 | SYSTOLIC BLOOD PRESSURE: 125 MMHG | HEIGHT: 62 IN | DIASTOLIC BLOOD PRESSURE: 70 MMHG

## 2024-05-29 DIAGNOSIS — Z78.0 ASYMPTOMATIC POSTMENOPAUSAL STATE: ICD-10-CM

## 2024-05-29 DIAGNOSIS — K76.0 NAFLD (NONALCOHOLIC FATTY LIVER DISEASE): ICD-10-CM

## 2024-05-29 DIAGNOSIS — E11.65 TYPE 2 DIABETES MELLITUS WITH HYPERGLYCEMIA, WITHOUT LONG-TERM CURRENT USE OF INSULIN: ICD-10-CM

## 2024-05-29 DIAGNOSIS — R53.1 WEAKNESS: Primary | ICD-10-CM

## 2024-05-29 DIAGNOSIS — E89.0 POSTSURGICAL HYPOTHYROIDISM: Primary | ICD-10-CM

## 2024-05-29 DIAGNOSIS — C73 PAPILLARY THYROID CARCINOMA: ICD-10-CM

## 2024-05-29 DIAGNOSIS — E89.2 POSTSURGICAL HYPOPARATHYROIDISM: ICD-10-CM

## 2024-05-29 DIAGNOSIS — E78.5 HYPERLIPIDEMIA, UNSPECIFIED HYPERLIPIDEMIA TYPE: ICD-10-CM

## 2024-05-29 DIAGNOSIS — E89.0 POSTSURGICAL HYPOTHYROIDISM: ICD-10-CM

## 2024-05-29 DIAGNOSIS — M85.80 OSTEOPENIA, UNSPECIFIED LOCATION: ICD-10-CM

## 2024-05-29 DIAGNOSIS — M25.511 ACUTE PAIN OF RIGHT SHOULDER: ICD-10-CM

## 2024-05-29 DIAGNOSIS — M25.611 STIFFNESS OF RIGHT SHOULDER JOINT: ICD-10-CM

## 2024-05-29 LAB
25(OH)D3+25(OH)D2 SERPL-MCNC: 41 NG/ML (ref 30–96)
T4 FREE SERPL-MCNC: 1.21 NG/DL (ref 0.71–1.51)
TSH SERPL DL<=0.005 MIU/L-ACNC: 6.32 UIU/ML (ref 0.4–4)

## 2024-05-29 PROCEDURE — 36415 COLL VENOUS BLD VENIPUNCTURE: CPT

## 2024-05-29 PROCEDURE — 1160F RVW MEDS BY RX/DR IN RCRD: CPT | Mod: CPTII,S$GLB,, | Performed by: INTERNAL MEDICINE

## 2024-05-29 PROCEDURE — 99214 OFFICE O/P EST MOD 30 MIN: CPT | Mod: S$GLB,,, | Performed by: INTERNAL MEDICINE

## 2024-05-29 PROCEDURE — 84432 ASSAY OF THYROGLOBULIN: CPT

## 2024-05-29 PROCEDURE — 84443 ASSAY THYROID STIM HORMONE: CPT

## 2024-05-29 PROCEDURE — 2023F DILAT RTA XM W/O RTNOPTHY: CPT | Mod: CPTII,S$GLB,, | Performed by: INTERNAL MEDICINE

## 2024-05-29 PROCEDURE — 1126F AMNT PAIN NOTED NONE PRSNT: CPT | Mod: CPTII,S$GLB,, | Performed by: INTERNAL MEDICINE

## 2024-05-29 PROCEDURE — 3066F NEPHROPATHY DOC TX: CPT | Mod: CPTII,S$GLB,, | Performed by: INTERNAL MEDICINE

## 2024-05-29 PROCEDURE — 1101F PT FALLS ASSESS-DOCD LE1/YR: CPT | Mod: CPTII,S$GLB,, | Performed by: INTERNAL MEDICINE

## 2024-05-29 PROCEDURE — 3288F FALL RISK ASSESSMENT DOCD: CPT | Mod: CPTII,S$GLB,, | Performed by: INTERNAL MEDICINE

## 2024-05-29 PROCEDURE — 97530 THERAPEUTIC ACTIVITIES: CPT | Mod: PO

## 2024-05-29 PROCEDURE — 84439 ASSAY OF FREE THYROXINE: CPT

## 2024-05-29 PROCEDURE — 82306 VITAMIN D 25 HYDROXY: CPT

## 2024-05-29 PROCEDURE — 3074F SYST BP LT 130 MM HG: CPT | Mod: CPTII,S$GLB,, | Performed by: INTERNAL MEDICINE

## 2024-05-29 PROCEDURE — G2211 COMPLEX E/M VISIT ADD ON: HCPCS | Mod: S$GLB,,, | Performed by: INTERNAL MEDICINE

## 2024-05-29 PROCEDURE — 3078F DIAST BP <80 MM HG: CPT | Mod: CPTII,S$GLB,, | Performed by: INTERNAL MEDICINE

## 2024-05-29 PROCEDURE — 4010F ACE/ARB THERAPY RXD/TAKEN: CPT | Mod: CPTII,S$GLB,, | Performed by: INTERNAL MEDICINE

## 2024-05-29 PROCEDURE — 3044F HG A1C LEVEL LT 7.0%: CPT | Mod: CPTII,S$GLB,, | Performed by: INTERNAL MEDICINE

## 2024-05-29 PROCEDURE — 1159F MED LIST DOCD IN RCRD: CPT | Mod: CPTII,S$GLB,, | Performed by: INTERNAL MEDICINE

## 2024-05-29 PROCEDURE — 3008F BODY MASS INDEX DOCD: CPT | Mod: CPTII,S$GLB,, | Performed by: INTERNAL MEDICINE

## 2024-05-29 PROCEDURE — 3061F NEG MICROALBUMINURIA REV: CPT | Mod: CPTII,S$GLB,, | Performed by: INTERNAL MEDICINE

## 2024-05-29 PROCEDURE — 99999 PR PBB SHADOW E&M-EST. PATIENT-LVL V: CPT | Mod: PBBFAC,,, | Performed by: INTERNAL MEDICINE

## 2024-05-29 RX ORDER — ATORVASTATIN CALCIUM 20 MG/1
20 TABLET, FILM COATED ORAL DAILY
Qty: 90 TABLET | Refills: 3 | Status: SHIPPED | OUTPATIENT
Start: 2024-05-29 | End: 2025-05-29
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
## 2024-05-29 NOTE — ASSESSMENT & PLAN NOTE
A1c at goal  Previously on Trulicity but discontinued due to costs  Continue metformin 1000 mg twice daily and Glimepiride 1 mg once before breakfast  Reviewed goals of therapy are to get the best control we can without hypoglycemia.  Advised frequent self blood glucose monitoring.  Patient encouraged to document glucose results and bring them to every clinic visit.  Hypoglycemia precautions discussed. Instructed on precautions before driving.    Call for Bg repeatedly < 90 or > 180.   Close adherence to lifestyle changes recommended.   Periodic follow ups for eye evaluations, foot care and dental care suggested.

## 2024-05-29 NOTE — ASSESSMENT & PLAN NOTE
-- Risks include low weight and menopause  -- Reassuring they are not fracturing   -- Pharmacological therapy is recommended for patients with osteopenia if the 10 year probability of a hip fracture is >3% and 10 year probability of other major osteoporotic fractures is >20%.    -Calcium and Vitamin D RDD provided.  -Exercise: as tolerated  -Fall precautions made in the home.  -Avoid falls and fractures  -- Schedule DXA

## 2024-05-29 NOTE — PATIENT INSTRUCTIONS
Estimated Calcium Content of Foods:  Produce  Serving Size Estimated Calcium*    [place] greens, frozen 8 oz 360 mg   Broccoli emile 8 oz 200 mg   Kale, frozen 8 oz 180 mg   Soy Beans, green, boiled 8 oz 175 mg   [place], cooked, boiled 8 oz 160 mg   Figs, dried 2 figs 65 mg   Broccoli, fresh, cooked 8 oz 60 mg   Oranges 1 whole 55 mg   Seafood Serving Size Estimated Calcium*    Sardines, canned with bones 3 oz 325 mg   [place], canned with bones 3 oz 180 mg   Shrimp, canned 3 oz 125 mg   Dairy Serving Size Estimated Calcium*    Ricotta, part-skim 4 oz 335 mg   Yogurt, plain, low-fat 6 oz 310 mg   Milk, skim, low-fat, whole 8 oz 300 mg   Yogurt with fruit, low-fat 6 oz 260 mg   Mozzarella, part-skim 1 oz 210 mg   Cheddar 1 oz 205 mg   Yogurt, Greek 6 oz 200 mg   American Cheese 1 oz 195 mg   Feta Cheese 4 oz 140 mg   Cottage Cheese, 2% 4 oz 105 mg   Frozen yogurt, vanilla 8 oz 105 mg   Ice Cream, vanilla 8 oz 85 mg   Parmesan 1 tbsp 55 mg   Fortified Food Serving Size Estimated Calcium*   [place] milk, rice milk or soy milk, fortified 8 oz 300 mg   Orange juice and other fruit juices, fortified 8 oz 300 mg   Tofu, prepared with calcium 4 oz 205 mg   Waffle, frozen, fortified 2 pieces 200 mg   Oatmeal, fortified 1 packet 140 mg   English muffin, fortified 1 muffin 100 mg   Cereal, fortified 8 oz 100-1,000 mg   Other Serving Size Estimated Calcium*   Mac & cheese, frozen 1 package 325 mg   Pizza, cheese, frozen 1 serving 115 mg   Pudding, chocolate, prepared with 2% milk 4 oz 160 mg   Beans, baked, canned 4 oz 160 mg   *The calcium content listed for most foods is estimated and can vary due to multiple factors. Check the food label to determine how much calcium is in a particular product.  If you read the nutrition label for a food source, it lists the % calcium in that food.  For an 8 oz glass of milk, for example, the label states calcium 30%.  This is equivalent to 300 mg of calcium (multiply the listed number by  10).   **Table from the [employer]

## 2024-05-29 NOTE — TELEPHONE ENCOUNTER
----- Message from Solange Rowe sent at 5/29/2024  2:21 PM CDT -----  Regarding: Appt Access  Contact: [phone]  Patient is calling to reschedule ultrasound appt that was scheduled for 6/6/24. Pt accidentally cancelled appt for US and would like to have that appt back scheduled for same day and same time as it was originally scheduled. Please give pt a call if needed.

## 2024-05-29 NOTE — PROGRESS NOTES
Occupational Therapy Daily Treatment Note     Name: Tomeka Burns  Clinic Number: [id number]    Therapy Diagnosis:   Encounter Diagnoses   Name Primary?    Weakness Yes    Stiffness of right shoulder joint     Acute pain of right shoulder      Physician: Karan Sharpe IV, MD    Visit Date: 5/29/2024    Physician Orders: OT eval/treat  Medical Diagnosis: M75.41 (ICD-10-CM) - Rotator cuff impingement syndrome of right shoulder  Surgical Procedure and Date: n/a  Evaluation Date: 5/2/24  Onset Date: April 2023  Insurance Authorization Period Expiration: 5/2/34 - 12/31/24  Plan of Care Certification Period: 7/25/24  Date of Return to MD: 6/6/24  Visit # / Visits authorized: 4/ 20  FOTO Completion: 1/3    Time In:1000  Time Out: 1100  Total Billable Time: 60 minutes    Precautions:  Standard      Subjective     Pt reports: She has been completing her exercises  she was compliant with home exercise program given last session.   Response to previous treatment:increased ROM  Functional change: increased ROM    Pain: 2/10  Location: right shoulder      Objective       Observation: No abnormalities present     UE Range of Motion     Active Range of Motion:   Shoulder Left Right   Flexion WNL WNL   Abduction WNL WNL   ER at 0 WNL WNL   ER at 90 WNL WNL   IR WNL WNL      Painful Arc:   Patient demonstrates no painful arc in shoulder flexion or abduction     Upper Extremity Strength  (R) UE   (L) UE     Shoulder flexion: 4/5 Shoulder flexion: 5/5   Shoulder Abduction: 4/5 Shoulder abduction: 5/5   Shoulder ER 4/5 Shoulder ER 5/5   Shoulder IR 4/5 Shoulder IR 5/5   Rhomboids 4/5 Rhomboids 5/5            Special Tests:  AC Joint Left Right   Hawkin's Kenndy NT Positive         Palpation: Tender at the anterior/lateral aspect      Sensation: Pt denies any numbness and tingling        Scapular Control/Dyskinesis:     Normal / Subtle / Obvious  Comments    Left  N N/a    Right  N N/a         OUTCOME MEASURE:FOTO     Category:Self  Care       Current Score  = 65%   Goal at Discharge Score = 67%    Treatment       Tomeka participated in dynamic functional therapeutic activities to improve functional performance for 60  minutes, including:  -pulley's flexion/ abduction 3 minutes  each  - pendulums: forward/ horizontal/ circles CW/ circles CCW  - red theraband rows 2/15  -red theraband tricep extensions 2/15  - red theraband internal rotation step outs  2/15  - red theraband external rotation step outs 2/15  - pec release  - PROM of shoulder though all planes of motion      Home Exercises and Education Provided     Education provided:   - Progress towards goals     Written Home Exercises Provided: Patient instructed to cont prior HEP.  Exercises were reviewed and Tomeka was able to demonstrate them prior to the end of the session.  Tomeka demonstrated good  understanding of the HEP provided.   .   See EMR under Patient Instructions for exercises provided prior visit.        Assessment     Pt would continue to benefit from skilled OT. Pt tolerated added therapeutic activities well without complaints of additional pain. Pt is motivated. Will progress as tolerated.     Tomeka is progressing well towards her goals and there are no updates to goals at this time. Pt prognosis is Excellent.     Pt will continue to benefit from skilled outpatient occupational therapy to address the deficits listed in the problem list on initial evaluation provide pt/family education and to maximize pt's level of independence in the home and community environment.     Anticipated barriers to occupational therapy: continued use of arm at work    Pt's spiritual, cultural and educational needs considered and pt agreeable to plan of care and goals.      Goals:      Short Term (6 week):  1)   Patient to be IND with HEP and modalities for pain management Ongoing  2)   Increase ROM to WNL  in flexion plane of motion with 2/10 to increase functional UE use for ADLs, IADLs, and work  duties Ongoing  3)   Increase ROM to WNL  in abduction plane of motion with 2/10 to increase functional UE use for ADLs, IADLs, and work duties Ongoing  4)   Increase ROM to WNL  in internal rotation plane of motion with 2/10 to increase functional UE use for ADLs, IADLs, and work duties Ongoing  5)   Increase ROM to WNL  in external rotation plane of motion with 2/10 to increase functional UE use for ADLs, IADLs, and work duties Ongoing  6)   Improve muscle strength ing 4/5 to 4+/5 in order to complete ADLs, IADLs, and work task with less pain. Ongoing     Long Term (by discharge):  1)   Pt will report 1 out of 10 pain with ADLs, IADLs, and work tasks.Ongoing  2)   Patient to score at 67% or more on FOTO to demonstrate improved perception of functional shoulder use.Ongoing  3)   Pt will return to prior level of function for ADLs and household management. Ongoing     Plan      Pt to be treated by Occupational Therapy 2 times per week for 12 weeks during the certification period to achieve the established goals.      Treatment to include: Paraffin, Fluidotherapy, Manual therapy/joint mobilizations, Modalities for pain management, US 3 mhz, Therapeutic exercises/activities., Iontophoresis with 2.0 cc Dexamethasone, Strengthening, Orthotic Fabrication/Fit/Training, Edema Control, Scar Management, Wound Care, Electrical Modalities, Joint Protection, and Energy Conservation, as well as any other treatments deemed necessary based on the patient's needs or progress.         Updates/Grading for next session: Continue with current plan of care      Bessie Berrios OT

## 2024-05-30 ENCOUNTER — PATIENT MESSAGE (OUTPATIENT)
Dept: ENDOCRINOLOGY | Facility: CLINIC | Age: 69
End: 2024-05-30
Payer: MEDICARE

## 2024-05-30 DIAGNOSIS — E89.0 POSTSURGICAL HYPOTHYROIDISM: Primary | ICD-10-CM

## 2024-05-30 LAB
THRYOGLOBULIN INTERPRETATION: ABNORMAL
THYROGLOB AB SERPL-ACNC: <1.8 IU/ML
THYROGLOB SERPL-MCNC: 0.5 NG/ML

## 2024-05-31 ENCOUNTER — CLINICAL SUPPORT (OUTPATIENT)
Dept: REHABILITATION | Facility: HOSPITAL | Age: 69
End: 2024-05-31
Payer: MEDICARE

## 2024-05-31 DIAGNOSIS — M25.611 STIFFNESS OF RIGHT SHOULDER JOINT: ICD-10-CM

## 2024-05-31 DIAGNOSIS — R53.1 WEAKNESS: Primary | ICD-10-CM

## 2024-05-31 DIAGNOSIS — M25.511 ACUTE PAIN OF RIGHT SHOULDER: ICD-10-CM

## 2024-05-31 PROCEDURE — 97530 THERAPEUTIC ACTIVITIES: CPT | Mod: PO

## 2024-05-31 NOTE — PROGRESS NOTES
Occupational Therapy Daily Treatment Note     Name: Tomeka Burns  Clinic Number: [id number]    Therapy Diagnosis:   Encounter Diagnoses   Name Primary?    Weakness Yes    Stiffness of right shoulder joint     Acute pain of right shoulder      Physician: Karan Sharpe IV, MD    Visit Date: 5/31/2024    Physician Orders: OT eval/treat  Medical Diagnosis: M75.41 (ICD-10-CM) - Rotator cuff impingement syndrome of right shoulder  Surgical Procedure and Date: n/a  Evaluation Date: 5/2/24  Onset Date: April 2023  Insurance Authorization Period Expiration: 5/2/34 - 12/31/24  Plan of Care Certification Period: 7/25/24  Date of Return to MD: 6/6/24  Visit # / Visits authorized: 5/ 20  FOTO Completion: 1/3    Time In:1000  Time Out: 1100  Total Billable Time: 60 minutes    Precautions:  Standard      Subjective     Pt reports: She has been completing her exercises  she was compliant with home exercise program given last session.   Response to previous treatment:increased ROM  Functional change: increased ROM    Pain: 2/10  Location: right shoulder      Objective       Observation: No abnormalities present     UE Range of Motion     Active Range of Motion:   Shoulder Left Right   Flexion WNL WNL   Abduction WNL WNL   ER at 0 WNL WNL   ER at 90 WNL WNL   IR WNL WNL      Painful Arc:   Patient demonstrates no painful arc in shoulder flexion or abduction     Upper Extremity Strength  (R) UE   (L) UE     Shoulder flexion: 4/5 Shoulder flexion: 5/5   Shoulder Abduction: 4/5 Shoulder abduction: 5/5   Shoulder ER 4/5 Shoulder ER 5/5   Shoulder IR 4/5 Shoulder IR 5/5   Rhomboids 4/5 Rhomboids 5/5            Special Tests:  AC Joint Left Right   Hawkin's Kenndy NT Positive         Palpation: Tender at the anterior/lateral aspect      Sensation: Pt denies any numbness and tingling        Scapular Control/Dyskinesis:     Normal / Subtle / Obvious  Comments    Left  N N/a    Right  N N/a         OUTCOME MEASURE:FOTO     Category:Self  Care       Current Score  = 65%   Goal at Discharge Score = 67%    Treatment       Tomeka participated in dynamic functional therapeutic activities to improve functional performance for 60  minutes, including:  -pulley's flexion/ abduction 3 minutes  each  - pendulums: forward/ horizontal/ circles CW/ circles CCW  - red theraband rows 2/15  -red theraband tricep extensions 2/15  - red theraband internal rotation step outs  2/15  - red theraband external rotation step outs 2/15  - pec release  - PROM of shoulder though all planes of motion      Home Exercises and Education Provided     Education provided:   - Progress towards goals     Written Home Exercises Provided: Patient instructed to cont prior HEP.  Exercises were reviewed and Tomeka was able to demonstrate them prior to the end of the session.  Tomeka demonstrated good  understanding of the HEP provided.   .   See EMR under Patient Instructions for exercises provided prior visit.        Assessment     Pt would continue to benefit from skilled OT. Pt tolerated added therapeutic activities well without complaints of additional pain. Pt is motivated. Will progress as tolerated.     Tomeka is progressing well towards her goals and there are no updates to goals at this time. Pt prognosis is Excellent.     Pt will continue to benefit from skilled outpatient occupational therapy to address the deficits listed in the problem list on initial evaluation provide pt/family education and to maximize pt's level of independence in the home and community environment.     Anticipated barriers to occupational therapy: continued use of arm at work    Pt's spiritual, cultural and educational needs considered and pt agreeable to plan of care and goals.      Goals:      Short Term (6 week):  1)   Patient to be IND with HEP and modalities for pain management Ongoing  2)   Increase ROM to WNL  in flexion plane of motion with 2/10 to increase functional UE use for ADLs, IADLs, and work  duties Ongoing  3)   Increase ROM to WNL  in abduction plane of motion with 2/10 to increase functional UE use for ADLs, IADLs, and work duties Ongoing  4)   Increase ROM to WNL  in internal rotation plane of motion with 2/10 to increase functional UE use for ADLs, IADLs, and work duties Ongoing  5)   Increase ROM to WNL  in external rotation plane of motion with 2/10 to increase functional UE use for ADLs, IADLs, and work duties Ongoing  6)   Improve muscle strength ing 4/5 to 4+/5 in order to complete ADLs, IADLs, and work task with less pain. Ongoing     Long Term (by discharge):  1)   Pt will report 1 out of 10 pain with ADLs, IADLs, and work tasks.Ongoing  2)   Patient to score at 67% or more on FOTO to demonstrate improved perception of functional shoulder use.Ongoing  3)   Pt will return to prior level of function for ADLs and household management. Ongoing     Plan      Pt to be treated by Occupational Therapy 2 times per week for 12 weeks during the certification period to achieve the established goals.      Treatment to include: Paraffin, Fluidotherapy, Manual therapy/joint mobilizations, Modalities for pain management, US 3 mhz, Therapeutic exercises/activities., Iontophoresis with 2.0 cc Dexamethasone, Strengthening, Orthotic Fabrication/Fit/Training, Edema Control, Scar Management, Wound Care, Electrical Modalities, Joint Protection, and Energy Conservation, as well as any other treatments deemed necessary based on the patient's needs or progress.         Updates/Grading for next session: Continue with current plan of care      Bessie Berrios OT

## 2024-06-05 ENCOUNTER — CLINICAL SUPPORT (OUTPATIENT)
Dept: REHABILITATION | Facility: HOSPITAL | Age: 69
End: 2024-06-05
Payer: MEDICARE

## 2024-06-05 DIAGNOSIS — M25.511 ACUTE PAIN OF RIGHT SHOULDER: ICD-10-CM

## 2024-06-05 DIAGNOSIS — M25.611 STIFFNESS OF RIGHT SHOULDER JOINT: ICD-10-CM

## 2024-06-05 DIAGNOSIS — R53.1 WEAKNESS: Primary | ICD-10-CM

## 2024-06-05 PROCEDURE — 97530 THERAPEUTIC ACTIVITIES: CPT | Mod: PO

## 2024-06-05 NOTE — PROGRESS NOTES
Occupational Therapy Daily Treatment Note     Name: Tomeka Burns  Clinic Number: [id number]    Therapy Diagnosis:   Encounter Diagnoses   Name Primary?    Weakness Yes    Stiffness of right shoulder joint     Acute pain of right shoulder        Physician: Karan Sharpe IV, MD    Visit Date: 6/5/2024    Physician Orders: OT eval/treat  Medical Diagnosis: M75.41 (ICD-10-CM) - Rotator cuff impingement syndrome of right shoulder  Surgical Procedure and Date: n/a  Evaluation Date: 5/2/24  Onset Date: April 2023  Insurance Authorization Period Expiration: 5/2/34 - 12/31/24  Plan of Care Certification Period: 7/25/24  Date of Return to MD: 6/6/24  Visit # / Visits authorized: 6/ 20  FOTO Completion: 1/3    Time In:1000  Time Out: 1100  Total Billable Time: 60 minutes    Precautions:  Standard      Subjective     Pt reports: She has been completing her exercises  she was compliant with home exercise program given last session.   Response to previous treatment:increased ROM  Functional change: increased ROM    Pain: 2/10  Location: right shoulder      Objective       Observation: No abnormalities present     UE Range of Motion     Active Range of Motion:   Shoulder Left Right   Flexion WNL WNL   Abduction WNL WNL   ER at 0 WNL WNL   ER at 90 WNL WNL   IR WNL WNL      Painful Arc:   Patient demonstrates no painful arc in shoulder flexion or abduction     Upper Extremity Strength  (R) UE   (L) UE     Shoulder flexion: 4/5 Shoulder flexion: 5/5   Shoulder Abduction: 4/5 Shoulder abduction: 5/5   Shoulder ER 4/5 Shoulder ER 5/5   Shoulder IR 4/5 Shoulder IR 5/5   Rhomboids 4/5 Rhomboids 5/5            Special Tests:  AC Joint Left Right   Hawkin's Kenndy NT Positive         Palpation: Tender at the anterior/lateral aspect      Sensation: Pt denies any numbness and tingling        Scapular Control/Dyskinesis:     Normal / Subtle / Obvious  Comments    Left  N N/a    Right  N N/a         OUTCOME MEASURE:FOTO     Category:Self  Care       Current Score  = 65%   Goal at Discharge Score = 67%    Treatment       Tomeka participated in dynamic functional therapeutic activities to improve functional performance for 60  minutes, including:  -pulley's flexion/ abduction 3 minutes  each  - pendulums: forward/ horizontal/ circles CW/ circles CCW  - red theraband rows 2/15  -red theraband tricep extensions 2/15  - red theraband internal rotation step outs  2/15  - red theraband external rotation step outs 2/15  - pec release  - PROM of shoulder though all planes of motion      Home Exercises and Education Provided     Education provided:   - Progress towards goals     Written Home Exercises Provided: Patient instructed to cont prior HEP.  Exercises were reviewed and Tomeka was able to demonstrate them prior to the end of the session.  Tomeka demonstrated good  understanding of the HEP provided.   .   See EMR under Patient Instructions for exercises provided prior visit.        Assessment     Pt would continue to benefit from skilled OT. Pt tolerated added therapeutic activities well without complaints of additional pain. Pt is motivated. Will progress as tolerated.     Tomeka is progressing well towards her goals and there are no updates to goals at this time. Pt prognosis is Excellent.     Pt will continue to benefit from skilled outpatient occupational therapy to address the deficits listed in the problem list on initial evaluation provide pt/family education and to maximize pt's level of independence in the home and community environment.     Anticipated barriers to occupational therapy: continued use of arm at work    Pt's spiritual, cultural and educational needs considered and pt agreeable to plan of care and goals.      Goals:      Short Term (6 week):  1)   Patient to be IND with HEP and modalities for pain management Ongoing  2)   Increase ROM to WNL  in flexion plane of motion with 2/10 to increase functional UE use for ADLs, IADLs, and work  duties Ongoing  3)   Increase ROM to WNL  in abduction plane of motion with 2/10 to increase functional UE use for ADLs, IADLs, and work duties Ongoing  4)   Increase ROM to WNL  in internal rotation plane of motion with 2/10 to increase functional UE use for ADLs, IADLs, and work duties Ongoing  5)   Increase ROM to WNL  in external rotation plane of motion with 2/10 to increase functional UE use for ADLs, IADLs, and work duties Ongoing  6)   Improve muscle strength ing 4/5 to 4+/5 in order to complete ADLs, IADLs, and work task with less pain. Ongoing     Long Term (by discharge):  1)   Pt will report 1 out of 10 pain with ADLs, IADLs, and work tasks.Ongoing  2)   Patient to score at 67% or more on FOTO to demonstrate improved perception of functional shoulder use.Ongoing  3)   Pt will return to prior level of function for ADLs and household management. Ongoing     Plan      Pt to be treated by Occupational Therapy 2 times per week for 12 weeks during the certification period to achieve the established goals.      Treatment to include: Paraffin, Fluidotherapy, Manual therapy/joint mobilizations, Modalities for pain management, US 3 mhz, Therapeutic exercises/activities., Iontophoresis with 2.0 cc Dexamethasone, Strengthening, Orthotic Fabrication/Fit/Training, Edema Control, Scar Management, Wound Care, Electrical Modalities, Joint Protection, and Energy Conservation, as well as any other treatments deemed necessary based on the patient's needs or progress.         Updates/Grading for next session: Continue with current plan of care      Bessie Berrios OT

## 2024-06-06 ENCOUNTER — HOSPITAL ENCOUNTER (OUTPATIENT)
Dept: ENDOCRINOLOGY | Facility: CLINIC | Age: 69
Discharge: HOME | End: 2024-06-06
Payer: MEDICARE

## 2024-06-06 DIAGNOSIS — E89.0 POSTSURGICAL HYPOTHYROIDISM: ICD-10-CM

## 2024-06-06 DIAGNOSIS — C73 PAPILLARY THYROID CARCINOMA: ICD-10-CM

## 2024-06-06 PROCEDURE — 76536 US EXAM OF HEAD AND NECK: CPT | Mod: S$GLB,,, | Performed by: INTERNAL MEDICINE

## 2024-06-07 DIAGNOSIS — E89.0 POSTSURGICAL HYPOTHYROIDISM: Primary | ICD-10-CM

## 2024-06-07 DIAGNOSIS — C73 PAPILLARY THYROID CARCINOMA: ICD-10-CM

## 2024-06-12 ENCOUNTER — CLINICAL SUPPORT (OUTPATIENT)
Dept: REHABILITATION | Facility: HOSPITAL | Age: 69
End: 2024-06-12
Payer: MEDICARE

## 2024-06-12 DIAGNOSIS — R53.1 WEAKNESS: Primary | ICD-10-CM

## 2024-06-12 DIAGNOSIS — M25.611 STIFFNESS OF RIGHT SHOULDER JOINT: ICD-10-CM

## 2024-06-12 DIAGNOSIS — M25.511 ACUTE PAIN OF RIGHT SHOULDER: ICD-10-CM

## 2024-06-12 PROCEDURE — 97530 THERAPEUTIC ACTIVITIES: CPT | Mod: PO

## 2024-06-12 NOTE — PROGRESS NOTES
Occupational Therapy Daily Treatment Note     Name: Tomeka Burns  Clinic Number: [id number]    Therapy Diagnosis:   Encounter Diagnoses   Name Primary?    Weakness Yes    Stiffness of right shoulder joint     Acute pain of right shoulder        Physician: Karan Sharpe IV, MD    Visit Date: 6/12/2024    Physician Orders: OT eval/treat  Medical Diagnosis: M75.41 (ICD-10-CM) - Rotator cuff impingement syndrome of right shoulder  Surgical Procedure and Date: n/a  Evaluation Date: 5/2/24  Onset Date: April 2023  Insurance Authorization Period Expiration: 5/2/34 - 12/31/24  Plan of Care Certification Period: 7/25/24  Date of Return to MD: 6/6/24  Visit # / Visits authorized: 8/ 20  FOTO Completion: 1/3    Time In:1000  Time Out: 1045  Total Billable Time: 45 minutes    Precautions:  Standard      Subjective     Pt reports: She has been completing her exercises  she was compliant with home exercise program given last session.   Response to previous treatment:increased ROM  Functional change: increased ROM    Pain: 2/10  Location: right shoulder      Objective       Observation: No abnormalities present     UE Range of Motion     Active Range of Motion:   Shoulder Left Right   Flexion WNL WNL   Abduction WNL WNL   ER at 0 WNL WNL   ER at 90 WNL WNL   IR WNL WNL      Painful Arc:   Patient demonstrates no painful arc in shoulder flexion or abduction     Upper Extremity Strength  (R) UE   (L) UE     Shoulder flexion: 4/5 Shoulder flexion: 5/5   Shoulder Abduction: 4/5 Shoulder abduction: 5/5   Shoulder ER 4/5 Shoulder ER 5/5   Shoulder IR 4/5 Shoulder IR 5/5   Rhomboids 4/5 Rhomboids 5/5            Special Tests:  AC Joint Left Right   Hawkin's Kenndy NT Positive         Palpation: Tender at the anterior/lateral aspect      Sensation: Pt denies any numbness and tingling        Scapular Control/Dyskinesis:     Normal / Subtle / Obvious  Comments    Left  N N/a    Right  N N/a         OUTCOME MEASURE:FOTO     Category:Self  Care       Current Score  = 65%   Goal at Discharge Score = 67%    Treatment       Tomeka participated in dynamic functional therapeutic activities to improve functional performance for 60  minutes, including:  -pulley's flexion/ abduction 3 minutes  each  - pendulums: forward/ horizontal/ circles CW/ circles CCW  - red theraband rows 2/15  -red theraband tricep extensions 2/15  - red theraband internal rotation step outs  2/15  - red theraband external rotation step outs 2/15  - pec release  - PROM of shoulder though all planes of motion      Home Exercises and Education Provided     Education provided:   - Progress towards goals     Written Home Exercises Provided: Patient instructed to cont prior HEP.  Exercises were reviewed and Tomeka was able to demonstrate them prior to the end of the session.  Tomeka demonstrated good  understanding of the HEP provided.   .   See EMR under Patient Instructions for exercises provided prior visit.        Assessment     Pt would continue to benefit from skilled OT. Pt tolerated added therapeutic activities well without complaints of additional pain. Pt is motivated. Will progress as tolerated.     Tomeka is progressing well towards her goals and there are no updates to goals at this time. Pt prognosis is Excellent.     Pt will continue to benefit from skilled outpatient occupational therapy to address the deficits listed in the problem list on initial evaluation provide pt/family education and to maximize pt's level of independence in the home and community environment.     Anticipated barriers to occupational therapy: continued use of arm at work    Pt's spiritual, cultural and educational needs considered and pt agreeable to plan of care and goals.      Goals:      Short Term (6 week):  1)   Patient to be IND with HEP and modalities for pain management Ongoing  2)   Increase ROM to WNL  in flexion plane of motion with 2/10 to increase functional UE use for ADLs, IADLs, and work  duties Ongoing  3)   Increase ROM to WNL  in abduction plane of motion with 2/10 to increase functional UE use for ADLs, IADLs, and work duties Ongoing  4)   Increase ROM to WNL  in internal rotation plane of motion with 2/10 to increase functional UE use for ADLs, IADLs, and work duties Ongoing  5)   Increase ROM to WNL  in external rotation plane of motion with 2/10 to increase functional UE use for ADLs, IADLs, and work duties Ongoing  6)   Improve muscle strength ing 4/5 to 4+/5 in order to complete ADLs, IADLs, and work task with less pain. Ongoing     Long Term (by discharge):  1)   Pt will report 1 out of 10 pain with ADLs, IADLs, and work tasks.Ongoing  2)   Patient to score at 67% or more on FOTO to demonstrate improved perception of functional shoulder use.Ongoing  3)   Pt will return to prior level of function for ADLs and household management. Ongoing     Plan      Pt to be treated by Occupational Therapy 2 times per week for 12 weeks during the certification period to achieve the established goals.      Treatment to include: Paraffin, Fluidotherapy, Manual therapy/joint mobilizations, Modalities for pain management, US 3 mhz, Therapeutic exercises/activities., Iontophoresis with 2.0 cc Dexamethasone, Strengthening, Orthotic Fabrication/Fit/Training, Edema Control, Scar Management, Wound Care, Electrical Modalities, Joint Protection, and Energy Conservation, as well as any other treatments deemed necessary based on the patient's needs or progress.         Updates/Grading for next session: Continue with current plan of care      Bessie Berrios OT

## 2024-06-13 ENCOUNTER — HOSPITAL ENCOUNTER (OUTPATIENT)
Dept: RADIOLOGY | Facility: HOSPITAL | Age: 69
Discharge: HOME | End: 2024-06-13
Payer: MEDICARE

## 2024-06-13 DIAGNOSIS — M85.80 OSTEOPENIA, UNSPECIFIED LOCATION: ICD-10-CM

## 2024-06-13 DIAGNOSIS — Z78.0 ASYMPTOMATIC POSTMENOPAUSAL STATE: ICD-10-CM

## 2024-06-13 PROCEDURE — 77080 DXA BONE DENSITY AXIAL: CPT | Mod: TC

## 2024-06-13 PROCEDURE — 77080 DXA BONE DENSITY AXIAL: CPT | Mod: 26,,, | Performed by: RADIOLOGY

## 2024-06-19 ENCOUNTER — CLINICAL SUPPORT (OUTPATIENT)
Dept: REHABILITATION | Facility: HOSPITAL | Age: 69
End: 2024-06-19
Payer: MEDICARE

## 2024-06-19 DIAGNOSIS — M25.511 ACUTE PAIN OF RIGHT SHOULDER: ICD-10-CM

## 2024-06-19 DIAGNOSIS — M25.611 STIFFNESS OF RIGHT SHOULDER JOINT: ICD-10-CM

## 2024-06-19 DIAGNOSIS — R53.1 WEAKNESS: Primary | ICD-10-CM

## 2024-06-19 PROCEDURE — 97530 THERAPEUTIC ACTIVITIES: CPT | Mod: PO

## 2024-06-19 NOTE — PROGRESS NOTES
Occupational Therapy Daily Treatment Note     Name: Tomeka Burns  Clinic Number: [id number]    Therapy Diagnosis:   Encounter Diagnoses   Name Primary?    Weakness Yes    Stiffness of right shoulder joint     Acute pain of right shoulder        Physician: Karan Sharpe IV, MD    Visit Date: 6/19/2024    Physician Orders: OT eval/treat  Medical Diagnosis: M75.41 (ICD-10-CM) - Rotator cuff impingement syndrome of right shoulder  Surgical Procedure and Date: n/a  Evaluation Date: 5/2/24  Onset Date: April 2023  Insurance Authorization Period Expiration: 5/2/34 - 12/31/24  Plan of Care Certification Period: 7/25/24  Date of Return to MD: 6/6/24  Visit # / Visits authorized: 8/ 20  FOTO Completion: 1/3    Time In:1000  Time Out: 1100  Total Billable Time: 60 minutes    Precautions:  Standard      Subjective     Pt reports: She has been completing her exercises  she was compliant with home exercise program given last session.   Response to previous treatment:increased ROM  Functional change: increased ROM    Pain: 2/10  Location: right shoulder      Objective       Observation: No abnormalities present     UE Range of Motion     Active Range of Motion:   Shoulder Left Right   Flexion WNL WNL   Abduction WNL WNL   ER at 0 WNL WNL   ER at 90 WNL WNL   IR WNL WNL      Painful Arc:   Patient demonstrates no painful arc in shoulder flexion or abduction     Upper Extremity Strength  (R) UE   (L) UE     Shoulder flexion: 4/5 Shoulder flexion: 5/5   Shoulder Abduction: 4/5 Shoulder abduction: 5/5   Shoulder ER 4/5 Shoulder ER 5/5   Shoulder IR 4/5 Shoulder IR 5/5   Rhomboids 4/5 Rhomboids 5/5            Special Tests:  AC Joint Left Right   Hawkin's Kenndy NT Positive         Palpation: Tender at the anterior/lateral aspect      Sensation: Pt denies any numbness and tingling        Scapular Control/Dyskinesis:     Normal / Subtle / Obvious  Comments    Left  N N/a    Right  N N/a         OUTCOME MEASURE:FOTO     Category:Self  Care       Current Score  = 65%   Goal at Discharge Score = 67%    Treatment       Tomeka participated in dynamic functional therapeutic activities to improve functional performance for 60  minutes, including:  -pulley's flexion/ abduction 3 minutes  each  - pendulums: forward/ horizontal/ circles CW/ circles CCW  - red theraband rows 2/15  -red theraband tricep extensions 2/15  - red theraband internal rotation step outs  2/15  - red theraband external rotation step outs 2/15  - pec release  - PROM of shoulder though all planes of motion      Home Exercises and Education Provided     Education provided:   - Progress towards goals     Written Home Exercises Provided: Patient instructed to cont prior HEP.  Exercises were reviewed and Tomeka was able to demonstrate them prior to the end of the session.  Tomeka demonstrated good  understanding of the HEP provided.   .   See EMR under Patient Instructions for exercises provided prior visit.        Assessment     Pt would continue to benefit from skilled OT. Pt tolerated added therapeutic activities well without complaints of additional pain. Pt is motivated. Will progress as tolerated.     Tomeka is progressing well towards her goals and there are no updates to goals at this time. Pt prognosis is Excellent.     Pt will continue to benefit from skilled outpatient occupational therapy to address the deficits listed in the problem list on initial evaluation provide pt/family education and to maximize pt's level of independence in the home and community environment.     Anticipated barriers to occupational therapy: continued use of arm at work    Pt's spiritual, cultural and educational needs considered and pt agreeable to plan of care and goals.      Goals:      Short Term (6 week):  1)   Patient to be IND with HEP and modalities for pain management Ongoing  2)   Increase ROM to WNL  in flexion plane of motion with 2/10 to increase functional UE use for ADLs, IADLs, and work  duties Ongoing  3)   Increase ROM to WNL  in abduction plane of motion with 2/10 to increase functional UE use for ADLs, IADLs, and work duties Ongoing  4)   Increase ROM to WNL  in internal rotation plane of motion with 2/10 to increase functional UE use for ADLs, IADLs, and work duties Ongoing  5)   Increase ROM to WNL  in external rotation plane of motion with 2/10 to increase functional UE use for ADLs, IADLs, and work duties Ongoing  6)   Improve muscle strength ing 4/5 to 4+/5 in order to complete ADLs, IADLs, and work task with less pain. Ongoing     Long Term (by discharge):  1)   Pt will report 1 out of 10 pain with ADLs, IADLs, and work tasks.Ongoing  2)   Patient to score at 67% or more on FOTO to demonstrate improved perception of functional shoulder use.Ongoing  3)   Pt will return to prior level of function for ADLs and household management. Ongoing     Plan      Pt to be treated by Occupational Therapy 2 times per week for 12 weeks during the certification period to achieve the established goals.      Treatment to include: Paraffin, Fluidotherapy, Manual therapy/joint mobilizations, Modalities for pain management, US 3 mhz, Therapeutic exercises/activities., Iontophoresis with 2.0 cc Dexamethasone, Strengthening, Orthotic Fabrication/Fit/Training, Edema Control, Scar Management, Wound Care, Electrical Modalities, Joint Protection, and Energy Conservation, as well as any other treatments deemed necessary based on the patient's needs or progress.         Updates/Grading for next session: Continue with current plan of care      Bessie Berrios OT

## 2024-06-26 ENCOUNTER — CLINICAL SUPPORT (OUTPATIENT)
Dept: REHABILITATION | Facility: HOSPITAL | Age: 69
End: 2024-06-26
Payer: MEDICARE

## 2024-06-26 DIAGNOSIS — M25.611 STIFFNESS OF RIGHT SHOULDER JOINT: ICD-10-CM

## 2024-06-26 DIAGNOSIS — M25.511 ACUTE PAIN OF RIGHT SHOULDER: ICD-10-CM

## 2024-06-26 DIAGNOSIS — R53.1 WEAKNESS: Primary | ICD-10-CM

## 2024-06-26 PROCEDURE — 97530 THERAPEUTIC ACTIVITIES: CPT | Mod: PO

## 2024-06-26 NOTE — PROGRESS NOTES
Occupational Therapy Daily Treatment Note/ Discharge Note     Name: Tomeka Burns  Clinic Number: [id number]    Therapy Diagnosis:   Encounter Diagnoses   Name Primary?    Weakness Yes    Stiffness of right shoulder joint     Acute pain of right shoulder        Physician: Karan Sharpe IV, MD    Visit Date: 6/26/2024    Physician Orders: OT eval/treat  Medical Diagnosis: M75.41 (ICD-10-CM) - Rotator cuff impingement syndrome of right shoulder  Surgical Procedure and Date: n/a  Evaluation Date: 5/2/24  Onset Date: April 2023  Insurance Authorization Period Expiration: 5/2/34 - 12/31/24  Plan of Care Certification Period: 7/25/24  Date of Return to MD: 6/6/24  Visit # / Visits authorized: 9/ 20  FOTO Completion: 2/3    Time In:1000  Time Out: 1100  Total Billable Time: 60 minutes    Precautions:  Standard      Subjective     Pt reports: She feels ready for discharge  she was compliant with home exercise program given last session.   Response to previous treatment:increased ROM  Functional change: increased ROM    Pain: 0/10  Location: right shoulder      Objective       Observation: No abnormalities present     UE Range of Motion     Active Range of Motion:   Shoulder Left Right   Flexion WNL WNL   Abduction WNL WNL   ER at 0 WNL WNL   ER at 90 WNL WNL   IR WNL WNL      Painful Arc:   Patient demonstrates no painful arc in shoulder flexion or abduction     Upper Extremity Strength  (R) UE  5/2/24 6/26/24 (L) UE     Shoulder flexion: 4/5 5/5 Shoulder flexion: 5/5   Shoulder Abduction: 4/5 5/5 Shoulder abduction: 5/5   Shoulder ER 4/5 5/5 Shoulder ER 5/5   Shoulder IR 4/5 5/5 Shoulder IR 5/5   Rhomboids 4/5 5/5 Rhomboids 5/5            Special Tests:  AC Joint Left Right   Hawkin's Kenndy NT Positive         Palpation: Tender at the anterior/lateral aspect      Sensation: Pt denies any numbness and tingling        Scapular Control/Dyskinesis:     Normal / Subtle / Obvious  Comments    Left  N N/a    Right  N N/a          OUTCOME MEASURE:FOTO     Category:Self Care       Current Score  = 65%   Goal at Discharge Score = 67%    6/26/24: 71%    Treatment       Tomeka participated in dynamic functional therapeutic activities to improve functional performance for 60  minutes, including:  -pulley's flexion/ abduction 3 minutes  each  - pendulums: forward/ horizontal/ circles CW/ circles CCW  - red theraband rows 2/15  -red theraband tricep extensions 2/15  - red theraband internal rotation step outs  2/15  - red theraband external rotation step outs 2/15  - pec release  - PROM of shoulder though all planes of motion  -reassessment of objective measures   -reissued FOTO      Home Exercises and Education Provided     Education provided:   - Progress towards goals     Written Home Exercises Provided: Patient instructed to cont prior HEP.  Exercises were reviewed and Tomeka was able to demonstrate them prior to the end of the session.  Tomeka demonstrated good  understanding of the HEP provided.   .   See EMR under Patient Instructions for exercises provided prior visit.        Assessment   Pt has met all of her LTGs and STGs at this time. She is no longer having pain when completing ADLs and has full range of motion of her hand. Due to patient's progression, she is no longer a candidate for occupational therapy services. Pt is being discharged from occupational therapy services at this time. Pt was given an updated HEP and she demonstrated good understanding. Pt was informed if that she has any changes contact her MD.     Goals:      Short Term (6 week):  1)   Patient to be IND with HEP and modalities for pain management MET 6/26/24  2)   Increase ROM to WNL  in flexion plane of motion with 2/10 to increase functional UE use for ADLs, IADLs, and work duties MET 6/26/24  3)   Increase ROM to WNL  in abduction plane of motion with 2/10 to increase functional UE use for ADLs, IADLs, and work duties MET 6/26/24  4)   Increase ROM to WNL  in internal  rotation plane of motion with 2/10 to increase functional UE use for ADLs, IADLs, and work duties MET 6/26/24  5)   Increase ROM to WNL  in external rotation plane of motion with 2/10 to increase functional UE use for ADLs, IADLs, and work duties MET 6/26/24  6)   Improve muscle strength ing 4/5 to 4+/5 in order to complete ADLs, IADLs, and work task with less pain. MET 6/26/24     Long Term (by discharge):  1)   Pt will report 1 out of 10 pain with ADLs, IADLs, and work tasks.MET 6/26/24  2)   Patient to score at 67% or more on FOTO to demonstrate improved perception of functional shoulder use.MET 6/26/24  3)   Pt will return to prior level of function for ADLs and household management. MET 6/26/24     Plan      Pt discharged      Bessie Berrios, OT

## 2024-06-27 ENCOUNTER — OFFICE VISIT (OUTPATIENT)
Dept: ORTHOPEDICS | Facility: CLINIC | Age: 69
End: 2024-06-27
Payer: MEDICARE

## 2024-06-27 VITALS — HEIGHT: 62 IN | WEIGHT: 127.63 LBS | BODY MASS INDEX: 23.49 KG/M2

## 2024-06-27 DIAGNOSIS — M75.41 ROTATOR CUFF IMPINGEMENT SYNDROME OF RIGHT SHOULDER: Primary | ICD-10-CM

## 2024-06-27 DIAGNOSIS — M25.511 RIGHT SHOULDER PAIN, UNSPECIFIED CHRONICITY: ICD-10-CM

## 2024-06-27 PROCEDURE — 1159F MED LIST DOCD IN RCRD: CPT | Mod: CPTII,S$GLB,, | Performed by: ORTHOPAEDIC SURGERY

## 2024-06-27 PROCEDURE — 4010F ACE/ARB THERAPY RXD/TAKEN: CPT | Mod: CPTII,S$GLB,, | Performed by: ORTHOPAEDIC SURGERY

## 2024-06-27 PROCEDURE — 99999 PR PBB SHADOW E&M-EST. PATIENT-LVL III: CPT | Mod: PBBFAC,,, | Performed by: ORTHOPAEDIC SURGERY

## 2024-06-27 PROCEDURE — 3061F NEG MICROALBUMINURIA REV: CPT | Mod: CPTII,S$GLB,, | Performed by: ORTHOPAEDIC SURGERY

## 2024-06-27 PROCEDURE — 1125F AMNT PAIN NOTED PAIN PRSNT: CPT | Mod: CPTII,S$GLB,, | Performed by: ORTHOPAEDIC SURGERY

## 2024-06-27 PROCEDURE — 3008F BODY MASS INDEX DOCD: CPT | Mod: CPTII,S$GLB,, | Performed by: ORTHOPAEDIC SURGERY

## 2024-06-27 PROCEDURE — 3044F HG A1C LEVEL LT 7.0%: CPT | Mod: CPTII,S$GLB,, | Performed by: ORTHOPAEDIC SURGERY

## 2024-06-27 PROCEDURE — 1101F PT FALLS ASSESS-DOCD LE1/YR: CPT | Mod: CPTII,S$GLB,, | Performed by: ORTHOPAEDIC SURGERY

## 2024-06-27 PROCEDURE — 3066F NEPHROPATHY DOC TX: CPT | Mod: CPTII,S$GLB,, | Performed by: ORTHOPAEDIC SURGERY

## 2024-06-27 PROCEDURE — 3288F FALL RISK ASSESSMENT DOCD: CPT | Mod: CPTII,S$GLB,, | Performed by: ORTHOPAEDIC SURGERY

## 2024-06-27 PROCEDURE — 99213 OFFICE O/P EST LOW 20 MIN: CPT | Mod: S$GLB,,, | Performed by: ORTHOPAEDIC SURGERY

## 2024-06-27 NOTE — PROGRESS NOTES
[facility], Orthopedics and Sports Medicine  [facility]    Established Patient Shoulder Office Visit  06/27/2024     Diagnosis:  Right rotator cuff impingement syndrome    Procedure:   (4/18/2023) R SA CSI    Subjective:      Tomeka Burns is a 68 y.o. female who returns for follow up treatment of the right shoulder problem.    At the last office visit the patient was prescribed physical therapy, which has improved the symptoms and was given a corticosteroid injection (right subacromial), which improved the symptoms.     The patient has not had advanced imaging of the shoulder.     The patient continues with the following symptoms: none, symptoms resolved.  The symptoms are resolved.     Continues to work as .     Outside reports reviewed: historical medical records, office notes, and radiology reports.    Past Medical History:   Diagnosis Date    Diabetes mellitus, type 2     Encounter for blood transfusion 1980    GERD (gastroesophageal reflux disease)     Hyperlipidemia     Hypertension     Hypothyroidism     Multiple thyroid nodules 01/21/2019       Patient Active Problem List   Diagnosis    Type 2 diabetes mellitus with hyperglycemia, without long-term current use of insulin    Essential hypertension    Left bundle branch block    Colon polyposis    Biceps tendinitis of right upper extremity    Abnormal stress test    HLD (hyperlipidemia)    Pain in left hip    Chronic pain of left knee    Muscle weakness of lower extremity    NAFLD (nonalcoholic fatty liver disease)    Papillary thyroid carcinoma    Postsurgical hypothyroidism    Postsurgical hypoparathyroidism    Aortic atherosclerosis    Diabetic polyneuropathy associated with diabetes mellitus due to underlying condition    Weakness    Stiffness of right shoulder joint    Pain in right shoulder    Osteopenia       Past Surgical History:   Procedure Laterality Date    ANGIOCARDIOGRAPHY OF LEFT VENTRICLE N/A 06/17/2019     Procedure: Left ventriculography;  Surgeon: Gab Calderon MD;  Location: [facility] CATH LAB/EP;  Service: Cardiology;  Laterality: N/A;    APPENDECTOMY      CORONARY ANGIOGRAPHY N/A 06/17/2019    Procedure: ANGIOGRAM, CORONARY ARTERY;  Surgeon: Gab Calderon MD;  Location: [facility] CATH LAB/EP;  Service: Cardiology;  Laterality: N/A;    HYSTERECTOMY      LEFT HEART CATHETERIZATION N/A 06/17/2019    Procedure: Left heart cath;  Surgeon: Gab Calderon MD;  Location: [facility] CATH LAB/EP;  Service: Cardiology;  Laterality: N/A;    THYROIDECTOMY Bilateral 06/16/2020    Procedure: THYROIDECTOMY;  Surgeon: Hermelinda Zuniga MD;  Location: [facility] OR;  Service: General;  Laterality: Bilateral;    TONSILLECTOMY          Current Outpatient Medications   Medication Instructions    alcohol swabs (CURITY ALCOHOL SWABS) PadM 1 each, Topical (Top), 3 times daily    amLODIPine (NORVASC) 5 mg, Oral, Daily    aspirin (ECOTRIN) 81 mg, Oral, Daily    atorvastatin (LIPITOR) 20 mg, Oral, Daily    blood sugar diagnostic (BLOOD GLUCOSE TEST) Strp 1 strip, Misc.(Non-Drug; Combo Route), 2 times daily    blood-glucose meter kit Use as instructed    glimepiride (AMARYL) 1 mg, Oral, Before breakfast    hydroCHLOROthiazide (HYDRODIURIL) 12.5 mg, Oral, Daily    lancets Misc 1 lancet , Misc.(Non-Drug; Combo Route), 2 times daily    levocetirizine (XYZAL) 5 mg, Oral, Nightly    losartan (COZAAR) 100 mg, Oral, Daily    meloxicam (MOBIC) 15 mg, Oral, Daily PRN    metFORMIN (GLUCOPHAGE) 1,000 mg, Oral, 2 times daily with meals    pantoprazole (PROTONIX) 40 mg, Oral, Before breakfast    pulse oximeter (PULSE OXIMETER) device Apply Externally, 2 times daily, Use twice daily at 8 AM and 3 PM and record the value in MyChart as directed.    SYNTHROID 75 mcg, Oral, Before breakfast        Review of patient's allergies indicates:  No Known Allergies    Social History     Socioeconomic History    Marital status:    Tobacco Use    Smoking status: Never      Passive exposure: Never    Smokeless tobacco: Never   Substance and Sexual Activity    Alcohol use: No    Drug use: No    Sexual activity: Yes     Partners: Male     Social Determinants of Health     Financial Resource Strain: Low Risk  (5/23/2024)    Overall Financial Resource Strain (CARDIA)     Difficulty of Paying Living Expenses: Not hard at all   Food Insecurity: No Food Insecurity (5/23/2024)    Hunger Vital Sign     Worried About Running Out of Food in the Last Year: Never true     Ran Out of Food in the Last Year: Never true   Transportation Needs: No Transportation Needs (4/3/2024)    PRAPARE - Transportation     Lack of Transportation (Medical): No     Lack of Transportation (Non-Medical): No   Physical Activity: Unknown (5/23/2024)    Exercise Vital Sign     Days of Exercise per Week: 1 day   Stress: No Stress Concern Present (5/23/2024)    [employer] - Occupational Stress Questionnaire     Feeling of Stress : Not at all   Housing Stability: Low Risk  (4/3/2024)    Housing Stability Vital Sign     Unable to Pay for Housing in the Last Year: No     Number of Places Lived in the Last Year: 1     Unstable Housing in the Last Year: No       Family History   Problem Relation Name Age of Onset    Heart disease Mother          CABG    Hypertension Mother      Hyperlipidemia Mother      Hearing loss Mother      Diabetes Mother      Alcohol abuse Father      Liver disease Brother          liver transplant    Hypertension Son x2          Review of Systems   Constitutional: Negative for chills and fever.   HENT:  Negative for hearing loss.    Eyes:  Negative for blurred vision.   Cardiovascular:  Negative for chest pain.   Respiratory:  Negative for shortness of breath.    Gastrointestinal:  Negative for abdominal pain.   Neurological:  Negative for light-headedness.        Objective:      General    Nursing note and vitals reviewed.  Constitutional: She is oriented to person,  place, and time. She appears well-developed and well-nourished. No distress.   HENT:   Head: Normocephalic and atraumatic.   Eyes: Pupils are equal, round, and reactive to light.   Cardiovascular:  Normal rate and regular rhythm.            Pulmonary/Chest: Effort normal and breath sounds normal. No respiratory distress.   Neurological: She is alert and oriented to person, place, and time.   Psychiatric: She has a normal mood and affect. Her behavior is normal.         Right Shoulder Exam     Inspection/Observation   Swelling: absent  Bruising: absent  Atrophy: absent    Tenderness   The patient is experiencing no tenderness.    Range of Motion   Active abduction:  170 normal   Forward Flexion:  170 normal   External Rotation 0 degrees:  50 normal   Internal rotation 0 degrees:  T8 normal     Tests & Signs   Drop arm: negative  Christian test: negative  Impingement: negative  Belly Press: negative  Active Compression Test (Oreland's Sign): negative  Speed's Test: negative    Other   Sensation: normal    Comments:  Rilanda test- negative    Left Shoulder Exam     Inspection/Observation   Swelling: absent  Bruising: absent  Atrophy: absent    Tenderness   The patient is experiencing no tenderness.     Range of Motion   Active abduction:  170 normal   Forward Flexion:  170 normal   External Rotation 0 degrees:  50 normal   Internal rotation 0 degrees:  T8 normal     Tests & Signs   Drop arm: negative  Christian test: negative  Impingement: negative  Belly Press: negative  Active Compression test ([place]'s Sign): negative  Speed's Test: negative    Other   Sensation: normal     Comments:  Irlanda test- negative      Muscle Strength   Right Upper Extremity   Shoulder Abduction: 5/5   Shoulder Internal Rotation: 5/5   Shoulder External Rotation: 5/5   Biceps: 5/5   Left Upper Extremity  Shoulder Abduction: 5/5   Shoulder Internal Rotation: 5/5   Shoulder External Rotation: 5/5   Biceps: 5/5     Reflexes     Left Side  Biceps:   2+  Triceps:  2+  Brachioradialis:  2+  Left Mascorro's Sign:  Absent    Right Side   Biceps:  2+  Triceps:  2+  Brachioradialis:  2+  Right Mascorro's Sign:  absent    Vascular Exam     Right Pulses      Radial:                    2+      Left Pulses      Radial:                    2+        Imaging:  None today    Procedures      Assessment:       Tomeka Burns is a 68 y.o. female seen in the office today. The primary encounter diagnosis was Rotator cuff impingement syndrome of right shoulder. A diagnosis of Right shoulder pain, unspecified chronicity was also pertinent to this visit.  Non-operative treatment is recommended at this time. Doing well. Transition to home exercise program.  Follow up if needed, can give more injection or send more therapy order. The natural history and expected course discussed with patient. Various treatment options were discussed, including their risks and benefits. All of the patient's questions were answered.     Plan:      Tylenol 650mg TID, PRN pain.  Follow up in 3 months or as  needed if symptoms worsen.  Home exercise program          Karan Sharpe IV, MD   of Clinical Orthopedics  Department of Orthopedic Surgery  [facility]  Office: [phone]  Website: [email or web address]

## 2024-07-10 ENCOUNTER — PATIENT OUTREACH (OUTPATIENT)
Dept: ADMINISTRATIVE | Facility: OTHER | Age: 69
End: 2024-07-10
Payer: MEDICARE

## 2024-07-11 ENCOUNTER — OFFICE VISIT (OUTPATIENT)
Dept: FAMILY MEDICINE | Facility: CLINIC | Age: 69
End: 2024-07-11
Payer: MEDICARE

## 2024-07-11 VITALS
HEART RATE: 84 BPM | WEIGHT: 129.06 LBS | SYSTOLIC BLOOD PRESSURE: 150 MMHG | HEIGHT: 62 IN | OXYGEN SATURATION: 98 % | BODY MASS INDEX: 23.75 KG/M2 | DIASTOLIC BLOOD PRESSURE: 72 MMHG

## 2024-07-11 DIAGNOSIS — E89.0 POSTSURGICAL HYPOTHYROIDISM: ICD-10-CM

## 2024-07-11 DIAGNOSIS — Z00.00 ENCOUNTER FOR MEDICARE ANNUAL WELLNESS EXAM: Primary | ICD-10-CM

## 2024-07-11 DIAGNOSIS — E08.42 DIABETIC POLYNEUROPATHY ASSOCIATED WITH DIABETES MELLITUS DUE TO UNDERLYING CONDITION: ICD-10-CM

## 2024-07-11 DIAGNOSIS — I10 ESSENTIAL HYPERTENSION: ICD-10-CM

## 2024-07-11 DIAGNOSIS — E11.65 TYPE 2 DIABETES MELLITUS WITH HYPERGLYCEMIA, WITHOUT LONG-TERM CURRENT USE OF INSULIN: ICD-10-CM

## 2024-07-11 DIAGNOSIS — I70.0 AORTIC ATHEROSCLEROSIS: ICD-10-CM

## 2024-07-11 PROBLEM — R53.1 WEAKNESS: Status: RESOLVED | Noted: 2024-05-02 | Resolved: 2024-07-11

## 2024-07-11 PROBLEM — E89.2 POSTSURGICAL HYPOPARATHYROIDISM: Status: RESOLVED | Noted: 2020-08-07 | Resolved: 2024-07-11

## 2024-07-11 PROBLEM — Z85.850 HISTORY OF THYROID CANCER: Status: ACTIVE | Noted: 2020-08-07

## 2024-07-11 PROBLEM — K29.60 REFLUX GASTRITIS: Status: ACTIVE | Noted: 2024-07-11

## 2024-07-11 PROBLEM — M25.552 PAIN IN LEFT HIP: Status: RESOLVED | Noted: 2020-03-12 | Resolved: 2024-07-11

## 2024-07-11 PROCEDURE — 99999 PR PBB SHADOW E&M-EST. PATIENT-LVL V: CPT | Mod: PBBFAC,,, | Performed by: NURSE PRACTITIONER

## 2024-07-11 RX ORDER — HYDROCHLOROTHIAZIDE 25 MG/1
25 TABLET ORAL DAILY
Qty: 30 TABLET | Refills: 11 | Status: SHIPPED | OUTPATIENT
Start: 2024-07-11 | End: 2025-07-11

## 2024-07-11 NOTE — PATIENT INSTRUCTIONS
Counseling and Referral of Other Preventative  (Italic type indicates deductible and co-insurance are waived)    Patient Name: Tomeka Burns  Today's Date: 7/11/2024    Health Maintenance       Date Due Completion Date    RSV Vaccine (Age 60+ and Pregnant patients) (1 - 1-dose 60+ series) Never done ---    COVID-19 Vaccine (10 - 2023-24 season) 12/28/2023 11/2/2023    Influenza Vaccine (1) 09/01/2024 9/20/2023    Hemoglobin A1c 10/02/2024 4/2/2024    Foot Exam 10/05/2024 10/5/2023 (Done)    Override on 10/5/2023: Done    Override on 11/9/2020: Done    Override on 8/29/2019: Done    Colorectal Cancer Screening 01/13/2025 1/13/2020    Diabetes Urine Screening 04/02/2025 4/2/2024    Lipid Panel 04/02/2025 4/2/2024    Eye Exam 04/30/2025 4/30/2024    Mammogram 10/06/2025 10/6/2023    DEXA Scan 06/13/2027 6/13/2024    TETANUS VACCINE 03/05/2028 3/5/2018 (Done)    Override on 3/5/2018: Done        No orders of the defined types were placed in this encounter.    The following information is provided to all patients.  This information is to help you find resources for any of the problems found today that may be affecting your health:                  Living healthy guide: www.[facility].[place].gov      Understanding Diabetes: www.diabetes.org      Eating healthy: www.cdc.gov/healthyweight      CDC home safety checklist: www.cdc.gov/steadi/patient.html      Agency on Aging: www.goea.[place].[facility]      Alcoholics anonymous (AA): www.aa.org      Physical Activity: www.yordy.nih.gov/qq6kszm      Tobacco use: www.quitwithusla.org

## 2024-07-11 NOTE — PROGRESS NOTES
"  Tomeka Burns presented for a  Medicare AWV and comprehensive Health Risk Assessment today. The following components were reviewed and updated:    Medical history  Family History  Social history  Allergies and Current Medications  Health Risk Assessment  Health Maintenance  Care Team         ** See Completed Assessments for Annual Wellness Visit within the encounter summary.**         The following assessments were completed:  Living Situation  CAGE  Depression Screening  Timed Get Up and Go  Whisper Test  Cognitive Function Screening      Nutrition Screening  ADL Screening  PAQ Screening      Opioid documentation:      Patient does not have a current opioid prescription.        Vitals:    07/11/24 1304 07/11/24 1339   BP: (!) 140/70 (!) 150/72   BP Location: Left arm Left arm   Patient Position: Sitting Sitting   BP Method: Large (Manual)    Pulse: 84    SpO2: 98%    Weight: 58.6 kg (129 lb 1.3 oz)    Height: 5' 2" (1.575 m)      Body mass index is 23.61 kg/m².    Physical Exam  Vitals reviewed.   Constitutional:       General: She is awake. She is not in acute distress.     Appearance: Normal appearance. She is well-developed, well-groomed and normal weight.   HENT:      Head: Normocephalic.   Eyes:      General:         Right eye: No discharge.         Left eye: No discharge.      Comments: Wearing glasses   Cardiovascular:      Rate and Rhythm: Normal rate.   Pulmonary:      Effort: Pulmonary effort is normal. No respiratory distress.   Abdominal:      General: There is no distension.   Skin:     General: Skin is warm and dry.      Coloration: Skin is not pale.   Neurological:      Mental Status: She is alert and oriented to person, place, and time.      Coordination: Coordination normal.      Gait: Gait normal.   Psychiatric:         Attention and Perception: Attention normal.         Mood and Affect: Mood and affect normal.         Speech: Speech normal.         Behavior: Behavior normal. Behavior is " cooperative.         Thought Content: Thought content normal.         Cognition and Memory: Cognition and memory normal.             Diagnoses and health risks identified today and associated recommendations/orders:    1. Encounter for Medicare annual wellness exam  - Ambulatory Referral/Consult to Enhanced Annual Wellness Visit (eAWV)    2. Type 2 diabetes mellitus with hyperglycemia, without long-term current use of insulin  Chronic; stable on metformin and glimepiride medication. Followed by Endocrinology.    3. Diabetic polyneuropathy associated with diabetes mellitus due to underlying condition  Chronic; stable on metformin and glimepiride medication. Followed by Endocrinology.    4. Aortic atherosclerosis  Chronic; stable on statin medication. As seen on previous imaging. Follow up with PCP.    5. Essential hypertension  Chronic; on HCTZ, CCB and ARB. BP elevated today. Asymptomatic. Reports compliance with medications. Avoids caffeine, says she drinks mostly water every day, denies excessive sodium intake, stays physically active throughout the day. Occasionally checks home SBP that runs 140s. Will increase HCTZ from 12.5mg to 25mg daily. Continue all other medications as ordered. Avoid sodium. Keep BP log and bring home BP machine to appt in 4 weeks. Warning signs discussed. Verbalized understanding.  - hydroCHLOROthiazide (HYDRODIURIL) 25 MG tablet; Take 1 tablet (25 mg total) by mouth once daily.  Dispense: 30 tablet; Refill: 11    6. Postsurgical hypothyroidism  Chronic; stable on levothyroxine medication. Follow up with PCP.    7. Body mass index (BMI) of 23.0 to 23.9 in adult  Patient's BMI is WNL. Eat a low salt/low fat ADA diet and discussed importance of engaging in physical activity at least 5x/week for a minimum of 30 min/day.      Provided Aura with a 5-10 year written screening schedule and personal prevention plan. Recommendations were developed using the USPSTF age appropriate recommendations.  Education, counseling, and referrals were provided as needed. After Visit Summary printed and given to patient which includes a list of additional screenings/tests needed.    Follow up in 4 weeks (on 8/8/2024), or if symptoms worsen or fail to improve, for your next annual wellness visit.    Melinda Espinosa NP      Advance Care Planning     I offered to discuss advanced care planning, including how to pick a person who would make decisions for you if you were unable to make them for yourself, called a health care power of , and what kind of decisions you might make such as use of life sustaining treatments such as ventilators and tube feeding when faced with a life limiting illness recorded on a living will that they will need to know. (How you want to be cared for as you near the end of your natural life)     X Patient is interested in learning more about how to make advanced directives.  I provided them paperwork and offered to discuss this with them.

## 2024-07-12 ENCOUNTER — LAB VISIT (OUTPATIENT)
Dept: LAB | Facility: HOSPITAL | Age: 69
End: 2024-07-12
Attending: INTERNAL MEDICINE
Payer: MEDICARE

## 2024-07-12 DIAGNOSIS — E89.0 POSTSURGICAL HYPOTHYROIDISM: ICD-10-CM

## 2024-07-12 LAB — TSH SERPL DL<=0.005 MIU/L-ACNC: 5.1 UIU/ML (ref 0.4–4)

## 2024-07-12 PROCEDURE — 84439 ASSAY OF FREE THYROXINE: CPT | Performed by: INTERNAL MEDICINE

## 2024-07-12 PROCEDURE — 84443 ASSAY THYROID STIM HORMONE: CPT | Mod: PN | Performed by: INTERNAL MEDICINE

## 2024-07-12 PROCEDURE — 36415 COLL VENOUS BLD VENIPUNCTURE: CPT | Mod: PN | Performed by: INTERNAL MEDICINE

## 2024-07-13 LAB — T4 FREE SERPL-MCNC: 1.49 NG/DL (ref 0.71–1.51)

## 2024-07-15 ENCOUNTER — PATIENT MESSAGE (OUTPATIENT)
Dept: ENDOCRINOLOGY | Facility: CLINIC | Age: 69
End: 2024-07-15
Payer: MEDICARE

## 2024-07-15 DIAGNOSIS — I10 ESSENTIAL HYPERTENSION: ICD-10-CM

## 2024-07-15 DIAGNOSIS — E89.0 POSTSURGICAL HYPOTHYROIDISM: Primary | ICD-10-CM

## 2024-07-15 RX ORDER — AMLODIPINE BESYLATE 5 MG/1
5 TABLET ORAL DAILY
Qty: 90 TABLET | Refills: 3 | Status: SHIPPED | OUTPATIENT
Start: 2024-07-15 | End: 2024-07-15 | Stop reason: SDUPTHER

## 2024-07-15 NOTE — TELEPHONE ENCOUNTER
No care due was identified.  [facility] Embedded Care Due Messages. Reference number: [id number].   7/15/2024 12:48:22 PM CDT

## 2024-07-15 NOTE — TELEPHONE ENCOUNTER
Refill Routing Note   Medication(s) are not appropriate for processing by [facility] for the following reason(s):        Required vitals abnormal  Responsible provider unclear    [facility] action(s):  Defer             Appointments  past 12m or future 3m with PCP    Date Provider   Last Visit   4/5/2024 Andrei Miller MD   Next Visit   10/9/2024 Andrei Miller MD   ED visits in past 90 days: 0        Note composed:7:10 AM 07/15/2024

## 2024-07-15 NOTE — TELEPHONE ENCOUNTER
No care due was identified.  [facility] Embedded Care Due Messages. Reference number: [id number].   7/15/2024 6:51:03 AM CDT

## 2024-07-17 RX ORDER — AMLODIPINE BESYLATE 5 MG/1
5 TABLET ORAL DAILY
Qty: 90 TABLET | Refills: 3 | Status: SHIPPED | OUTPATIENT
Start: 2024-07-17 | End: 2025-07-17

## 2024-08-09 ENCOUNTER — LAB VISIT (OUTPATIENT)
Dept: LAB | Facility: HOSPITAL | Age: 69
End: 2024-08-09
Attending: NURSE PRACTITIONER
Payer: MEDICARE

## 2024-08-09 ENCOUNTER — OFFICE VISIT (OUTPATIENT)
Dept: FAMILY MEDICINE | Facility: CLINIC | Age: 69
End: 2024-08-09
Payer: MEDICARE

## 2024-08-09 VITALS
DIASTOLIC BLOOD PRESSURE: 64 MMHG | HEART RATE: 87 BPM | BODY MASS INDEX: 23.53 KG/M2 | OXYGEN SATURATION: 100 % | HEIGHT: 62 IN | WEIGHT: 127.88 LBS | SYSTOLIC BLOOD PRESSURE: 130 MMHG

## 2024-08-09 DIAGNOSIS — E87.1 HYPONATREMIA: ICD-10-CM

## 2024-08-09 DIAGNOSIS — I10 ESSENTIAL HYPERTENSION: ICD-10-CM

## 2024-08-09 DIAGNOSIS — I10 ESSENTIAL HYPERTENSION: Primary | ICD-10-CM

## 2024-08-09 LAB
ANION GAP SERPL CALC-SCNC: 9 MMOL/L (ref 8–16)
BUN SERPL-MCNC: 19 MG/DL (ref 8–23)
CALCIUM SERPL-MCNC: 8.6 MG/DL (ref 8.7–10.5)
CHLORIDE SERPL-SCNC: 94 MMOL/L (ref 95–110)
CO2 SERPL-SCNC: 26 MMOL/L (ref 23–29)
CREAT SERPL-MCNC: 0.9 MG/DL (ref 0.5–1.4)
EST. GFR  (NO RACE VARIABLE): >60 ML/MIN/1.73 M^2
GLUCOSE SERPL-MCNC: 258 MG/DL (ref 70–110)
POTASSIUM SERPL-SCNC: 4 MMOL/L (ref 3.5–5.1)
SODIUM SERPL-SCNC: 129 MMOL/L (ref 136–145)

## 2024-08-09 PROCEDURE — 1160F RVW MEDS BY RX/DR IN RCRD: CPT | Mod: CPTII,S$GLB,, | Performed by: NURSE PRACTITIONER

## 2024-08-09 PROCEDURE — 4010F ACE/ARB THERAPY RXD/TAKEN: CPT | Mod: CPTII,S$GLB,, | Performed by: NURSE PRACTITIONER

## 2024-08-09 PROCEDURE — 3066F NEPHROPATHY DOC TX: CPT | Mod: CPTII,S$GLB,, | Performed by: NURSE PRACTITIONER

## 2024-08-09 PROCEDURE — 3008F BODY MASS INDEX DOCD: CPT | Mod: CPTII,S$GLB,, | Performed by: NURSE PRACTITIONER

## 2024-08-09 PROCEDURE — 1101F PT FALLS ASSESS-DOCD LE1/YR: CPT | Mod: CPTII,S$GLB,, | Performed by: NURSE PRACTITIONER

## 2024-08-09 PROCEDURE — 80048 BASIC METABOLIC PNL TOTAL CA: CPT | Performed by: NURSE PRACTITIONER

## 2024-08-09 PROCEDURE — 3288F FALL RISK ASSESSMENT DOCD: CPT | Mod: CPTII,S$GLB,, | Performed by: NURSE PRACTITIONER

## 2024-08-09 PROCEDURE — 2023F DILAT RTA XM W/O RTNOPTHY: CPT | Mod: CPTII,S$GLB,, | Performed by: NURSE PRACTITIONER

## 2024-08-09 PROCEDURE — 3044F HG A1C LEVEL LT 7.0%: CPT | Mod: CPTII,S$GLB,, | Performed by: NURSE PRACTITIONER

## 2024-08-09 PROCEDURE — 36415 COLL VENOUS BLD VENIPUNCTURE: CPT | Mod: PO | Performed by: NURSE PRACTITIONER

## 2024-08-09 PROCEDURE — 1159F MED LIST DOCD IN RCRD: CPT | Mod: CPTII,S$GLB,, | Performed by: NURSE PRACTITIONER

## 2024-08-09 PROCEDURE — 99214 OFFICE O/P EST MOD 30 MIN: CPT | Mod: S$GLB,,, | Performed by: NURSE PRACTITIONER

## 2024-08-09 PROCEDURE — 99999 PR PBB SHADOW E&M-EST. PATIENT-LVL IV: CPT | Mod: PBBFAC,,, | Performed by: NURSE PRACTITIONER

## 2024-08-09 PROCEDURE — 1126F AMNT PAIN NOTED NONE PRSNT: CPT | Mod: CPTII,S$GLB,, | Performed by: NURSE PRACTITIONER

## 2024-08-09 PROCEDURE — 3075F SYST BP GE 130 - 139MM HG: CPT | Mod: CPTII,S$GLB,, | Performed by: NURSE PRACTITIONER

## 2024-08-09 PROCEDURE — 3061F NEG MICROALBUMINURIA REV: CPT | Mod: CPTII,S$GLB,, | Performed by: NURSE PRACTITIONER

## 2024-08-09 PROCEDURE — 3078F DIAST BP <80 MM HG: CPT | Mod: CPTII,S$GLB,, | Performed by: NURSE PRACTITIONER

## 2024-08-09 NOTE — PROGRESS NOTES
Subjective     Patient ID: Tomeka Burns is a 68 y.o. female.    Chief Complaint: Hypertension    HPI    This is a 68 y.o. yo female patient of Dr. Hernández who is known to me. She presents today with c/o Hypertension  - PMH includes    Patient Active Problem List   Diagnosis    Type 2 diabetes mellitus with hyperglycemia, without long-term current use of insulin    Essential hypertension    Left bundle branch block    Colon polyposis    Biceps tendinitis of right upper extremity    Abnormal stress test    HLD (hyperlipidemia)    Chronic pain of left knee    NAFLD (nonalcoholic fatty liver disease)    History of thyroid cancer    Postsurgical hypothyroidism    Aortic atherosclerosis    Diabetic polyneuropathy associated with diabetes mellitus due to underlying condition    Stiffness of right shoulder joint    Osteopenia    Reflux gastritis    Body mass index (BMI) of 23.0 to 23.9 in adult     VSS today. Seen by me recently for her Medicare wellness visit and noted to have elevated BP. Patient at that time reported home BPs were also elevated. Increased patient's dose of HCTZ from 12.5mg to 25mg daily. Has been taking this higher dose and presents today for a follow-up. BP is normal today and reports her BPs have been normal at home. Denies CP, SOB. Feeling well. Last CMP in April showed mildly decreased sodium level at 135; will recheck this today. Typically avoids sodium in diet. Stays physically active daily. No issues or complaints.    Review of Systems  Otherwise negative     Objective     Physical Exam  Vitals reviewed.   Constitutional:       General: She is not in acute distress.     Appearance: Normal appearance. She is well-developed, well-groomed and normal weight.   HENT:      Head: Normocephalic.   Eyes:      General:         Right eye: No discharge.         Left eye: No discharge.      Comments: Wearing glasses   Neck:      Vascular: No carotid bruit.   Cardiovascular:      Rate and Rhythm: Normal rate  and regular rhythm.      Pulses:           Radial pulses are 2+ on the right side and 2+ on the left side.        Dorsalis pedis pulses are 2+ on the right side and 2+ on the left side.      Heart sounds: Murmur heard.      Systolic murmur is present.   Pulmonary:      Effort: Pulmonary effort is normal. No respiratory distress.      Breath sounds: Normal breath sounds. No wheezing or rhonchi.   Abdominal:      General: There is no distension.   Musculoskeletal:      Right lower leg: No edema.      Left lower leg: No edema.   Lymphadenopathy:      Cervical: No cervical adenopathy.   Skin:     General: Skin is warm and dry.      Coloration: Skin is not pale.   Neurological:      Mental Status: She is alert and oriented to person, place, and time.      Coordination: Coordination normal.      Gait: Gait normal.   Psychiatric:         Attention and Perception: Attention normal.         Mood and Affect: Mood and affect normal.         Speech: Speech normal.         Behavior: Behavior normal. Behavior is cooperative.         Thought Content: Thought content normal.            Assessment and Plan     1. Essential hypertension  -     BASIC METABOLIC PANEL; Future; Expected date: 08/09/2024    2. Hyponatremia  -     BASIC METABOLIC PANEL; Future; Expected date: 08/09/2024        - BMP ordered today to check renal function and Na level  - continue CCB, ARB and HCTZ as ordered  - Eat a low salt/low fat diet and discussed importance of engaging in physical activity at least 5x/week for a minimum of 30 min/day         Follow up if symptoms worsen or fail to improve.        I spent a total of 30 minutes on the day of the visit. This includes face to face time and non-face to face time preparing to see the patient (eg, review of tests), obtaining and/or reviewing separately obtained history, documenting clinical information in the electronic or other health record, independently interpreting results and communicating results to  the patient/family/caregiver, or care coordinator.        (Portions of this note were dictated using voice recognition software and may contain dictation related errors in spelling/grammar/syntax not found on text review)

## 2024-08-12 ENCOUNTER — TELEPHONE (OUTPATIENT)
Dept: FAMILY MEDICINE | Facility: CLINIC | Age: 69
End: 2024-08-12
Payer: MEDICARE

## 2024-08-12 NOTE — TELEPHONE ENCOUNTER
----- Message from Melinda Espinosa NP sent at 8/11/2024  9:26 PM CDT -----  Please contact pt to schedule HTN follow-up w/ me or another available provider in 2 weeks  Instruct patient to stop hydrochlorothiazide altogether as her sodium level is dropping  Instruct patient to double up on amlodipine dose; take two pills of amlodipine for a total of 10mg  Monitor BP daily and bring log to next appt

## 2024-08-16 ENCOUNTER — PATIENT MESSAGE (OUTPATIENT)
Dept: INTERNAL MEDICINE | Facility: CLINIC | Age: 69
End: 2024-08-16
Payer: MEDICARE

## 2024-08-16 ENCOUNTER — TELEPHONE (OUTPATIENT)
Dept: INTERNAL MEDICINE | Facility: CLINIC | Age: 69
End: 2024-08-16
Payer: MEDICARE

## 2024-08-16 NOTE — TELEPHONE ENCOUNTER
Used language line ID# [id number] to contact pt , left pt message to call the office back in regards to result message

## 2024-09-03 DIAGNOSIS — E11.65 TYPE 2 DIABETES MELLITUS WITH HYPERGLYCEMIA, WITHOUT LONG-TERM CURRENT USE OF INSULIN: Primary | ICD-10-CM

## 2024-09-03 RX ORDER — GLIMEPIRIDE 1 MG/1
1 TABLET ORAL
Qty: 90 TABLET | Refills: 3 | Status: SHIPPED | OUTPATIENT
Start: 2024-09-03 | End: 2025-09-03

## 2024-09-25 DIAGNOSIS — E89.0 POSTSURGICAL HYPOTHYROIDISM: ICD-10-CM

## 2024-09-25 RX ORDER — LEVOTHYROXINE SODIUM 75 UG/1
TABLET ORAL
Qty: 34 TABLET | Refills: 11 | Status: SHIPPED | OUTPATIENT
Start: 2024-09-25

## 2024-09-30 ENCOUNTER — LAB VISIT (OUTPATIENT)
Dept: LAB | Facility: HOSPITAL | Age: 69
End: 2024-09-30
Attending: FAMILY MEDICINE
Payer: MEDICARE

## 2024-09-30 DIAGNOSIS — E08.42 DIABETIC POLYNEUROPATHY ASSOCIATED WITH DIABETES MELLITUS DUE TO UNDERLYING CONDITION: ICD-10-CM

## 2024-09-30 DIAGNOSIS — E11.65 TYPE 2 DIABETES MELLITUS WITH HYPERGLYCEMIA, WITHOUT LONG-TERM CURRENT USE OF INSULIN: ICD-10-CM

## 2024-09-30 DIAGNOSIS — E89.0 POSTSURGICAL HYPOTHYROIDISM: ICD-10-CM

## 2024-09-30 DIAGNOSIS — I10 ESSENTIAL HYPERTENSION: ICD-10-CM

## 2024-09-30 DIAGNOSIS — I70.0 AORTIC ATHEROSCLEROSIS: ICD-10-CM

## 2024-09-30 LAB
ALBUMIN SERPL BCP-MCNC: 4.4 G/DL (ref 3.5–5.2)
ALBUMIN/CREAT UR: 24.3 UG/MG (ref 0–30)
ALP SERPL-CCNC: 87 U/L (ref 38–126)
ALT SERPL W/O P-5'-P-CCNC: 27 U/L (ref 10–44)
ANION GAP SERPL CALC-SCNC: 9 MMOL/L (ref 8–16)
AST SERPL-CCNC: 50 U/L (ref 15–46)
BASOPHILS # BLD AUTO: 0.05 K/UL (ref 0–0.2)
BASOPHILS NFR BLD: 0.6 % (ref 0–1.9)
BILIRUB SERPL-MCNC: 0.7 MG/DL (ref 0.1–1)
CALCIUM SERPL-MCNC: 9 MG/DL (ref 8.7–10.5)
CHLORIDE SERPL-SCNC: 101 MMOL/L (ref 95–110)
CHOLEST SERPL-MCNC: 189 MG/DL (ref 120–199)
CHOLEST/HDLC SERPL: 4.3 {RATIO} (ref 2–5)
CO2 SERPL-SCNC: 27 MMOL/L (ref 23–29)
CREAT SERPL-MCNC: 0.78 MG/DL (ref 0.5–1.4)
CREAT UR-MCNC: 74 MG/DL (ref 15–325)
DIFFERENTIAL METHOD BLD: NORMAL
EOSINOPHIL # BLD AUTO: 0.4 K/UL (ref 0–0.5)
EOSINOPHIL NFR BLD: 4.6 % (ref 0–8)
ERYTHROCYTE [DISTWIDTH] IN BLOOD BY AUTOMATED COUNT: 12.6 % (ref 11.5–14.5)
EST. GFR  (NO RACE VARIABLE): >60 ML/MIN/1.73 M^2
ESTIMATED AVG GLUCOSE: 148 MG/DL (ref 68–131)
GLUCOSE SERPL-MCNC: 135 MG/DL (ref 70–110)
HBA1C MFR BLD: 6.8 % (ref 4–5.6)
HCT VFR BLD AUTO: 39.6 % (ref 37–48.5)
HDLC SERPL-MCNC: 44 MG/DL (ref 40–75)
HDLC SERPL: 23.3 % (ref 20–50)
HGB BLD-MCNC: 13.3 G/DL (ref 12–16)
IMM GRANULOCYTES # BLD AUTO: 0.02 K/UL (ref 0–0.04)
IMM GRANULOCYTES NFR BLD AUTO: 0.2 % (ref 0–0.5)
LDLC SERPL CALC-MCNC: 109.6 MG/DL (ref 63–159)
LYMPHOCYTES # BLD AUTO: 3.2 K/UL (ref 1–4.8)
LYMPHOCYTES NFR BLD: 38.1 % (ref 18–48)
MCH RBC QN AUTO: 28.8 PG (ref 27–31)
MCHC RBC AUTO-ENTMCNC: 33.6 G/DL (ref 32–36)
MCV RBC AUTO: 86 FL (ref 82–98)
MICROALBUMIN UR DL<=1MG/L-MCNC: 18 UG/ML
MONOCYTES # BLD AUTO: 0.6 K/UL (ref 0.3–1)
MONOCYTES NFR BLD: 6.5 % (ref 4–15)
NEUTROPHILS # BLD AUTO: 4.3 K/UL (ref 1.8–7.7)
NEUTROPHILS NFR BLD: 50 % (ref 38–73)
NONHDLC SERPL-MCNC: 145 MG/DL
NRBC BLD-RTO: 0 /100 WBC
PLATELET # BLD AUTO: 297 K/UL (ref 150–450)
PMV BLD AUTO: 10.4 FL (ref 9.2–12.9)
POTASSIUM SERPL-SCNC: 5 MMOL/L (ref 3.5–5.1)
PROT SERPL-MCNC: 7.8 G/DL (ref 6–8.4)
RBC # BLD AUTO: 4.62 M/UL (ref 4–5.4)
SODIUM SERPL-SCNC: 137 MMOL/L (ref 136–145)
T4 FREE SERPL-MCNC: 1.45 NG/DL (ref 0.71–1.51)
TRIGL SERPL-MCNC: 177 MG/DL (ref 30–150)
TSH SERPL DL<=0.005 MIU/L-ACNC: 1.75 UIU/ML (ref 0.4–4)
UUN UR-MCNC: 18 MG/DL (ref 7–17)
WBC # BLD AUTO: 8.51 K/UL (ref 3.9–12.7)

## 2024-09-30 PROCEDURE — 82043 UR ALBUMIN QUANTITATIVE: CPT | Mod: PN | Performed by: FAMILY MEDICINE

## 2024-09-30 PROCEDURE — 85025 COMPLETE CBC W/AUTO DIFF WBC: CPT | Mod: PN | Performed by: FAMILY MEDICINE

## 2024-09-30 PROCEDURE — 84439 ASSAY OF FREE THYROXINE: CPT | Performed by: INTERNAL MEDICINE

## 2024-09-30 PROCEDURE — 83036 HEMOGLOBIN GLYCOSYLATED A1C: CPT | Performed by: FAMILY MEDICINE

## 2024-09-30 PROCEDURE — 80053 COMPREHEN METABOLIC PANEL: CPT | Mod: PN | Performed by: FAMILY MEDICINE

## 2024-09-30 PROCEDURE — 84443 ASSAY THYROID STIM HORMONE: CPT | Mod: PN

## 2024-09-30 PROCEDURE — 36415 COLL VENOUS BLD VENIPUNCTURE: CPT | Mod: PN | Performed by: FAMILY MEDICINE

## 2024-09-30 PROCEDURE — 84436 ASSAY OF TOTAL THYROXINE: CPT | Mod: PN | Performed by: INTERNAL MEDICINE

## 2024-09-30 PROCEDURE — 80061 LIPID PANEL: CPT | Performed by: FAMILY MEDICINE

## 2024-09-30 PROCEDURE — 82570 ASSAY OF URINE CREATININE: CPT | Mod: PN | Performed by: FAMILY MEDICINE

## 2024-10-09 ENCOUNTER — OFFICE VISIT (OUTPATIENT)
Dept: FAMILY MEDICINE | Facility: CLINIC | Age: 69
End: 2024-10-09
Payer: MEDICARE

## 2024-10-09 VITALS
HEIGHT: 62 IN | DIASTOLIC BLOOD PRESSURE: 80 MMHG | OXYGEN SATURATION: 98 % | HEART RATE: 94 BPM | SYSTOLIC BLOOD PRESSURE: 132 MMHG | WEIGHT: 127.13 LBS | BODY MASS INDEX: 23.4 KG/M2

## 2024-10-09 DIAGNOSIS — I10 ESSENTIAL HYPERTENSION: Primary | ICD-10-CM

## 2024-10-09 DIAGNOSIS — E78.5 HYPERLIPIDEMIA, UNSPECIFIED HYPERLIPIDEMIA TYPE: ICD-10-CM

## 2024-10-09 DIAGNOSIS — E11.65 TYPE 2 DIABETES MELLITUS WITH HYPERGLYCEMIA, WITHOUT LONG-TERM CURRENT USE OF INSULIN: ICD-10-CM

## 2024-10-09 DIAGNOSIS — E03.9 HYPOTHYROIDISM, UNSPECIFIED TYPE: ICD-10-CM

## 2024-10-09 DIAGNOSIS — Z23 NEED FOR COVID-19 VACCINE: ICD-10-CM

## 2024-10-09 PROCEDURE — 91322 SARSCOV2 VAC 50 MCG/0.5ML IM: CPT | Mod: S$GLB,,, | Performed by: FAMILY MEDICINE

## 2024-10-09 PROCEDURE — 3008F BODY MASS INDEX DOCD: CPT | Mod: CPTII,S$GLB,, | Performed by: FAMILY MEDICINE

## 2024-10-09 PROCEDURE — 3061F NEG MICROALBUMINURIA REV: CPT | Mod: CPTII,S$GLB,, | Performed by: FAMILY MEDICINE

## 2024-10-09 PROCEDURE — 3075F SYST BP GE 130 - 139MM HG: CPT | Mod: CPTII,S$GLB,, | Performed by: FAMILY MEDICINE

## 2024-10-09 PROCEDURE — 1160F RVW MEDS BY RX/DR IN RCRD: CPT | Mod: CPTII,S$GLB,, | Performed by: FAMILY MEDICINE

## 2024-10-09 PROCEDURE — 1126F AMNT PAIN NOTED NONE PRSNT: CPT | Mod: CPTII,S$GLB,, | Performed by: FAMILY MEDICINE

## 2024-10-09 PROCEDURE — 3079F DIAST BP 80-89 MM HG: CPT | Mod: CPTII,S$GLB,, | Performed by: FAMILY MEDICINE

## 2024-10-09 PROCEDURE — 2023F DILAT RTA XM W/O RTNOPTHY: CPT | Mod: CPTII,S$GLB,, | Performed by: FAMILY MEDICINE

## 2024-10-09 PROCEDURE — 4010F ACE/ARB THERAPY RXD/TAKEN: CPT | Mod: CPTII,S$GLB,, | Performed by: FAMILY MEDICINE

## 2024-10-09 PROCEDURE — 3066F NEPHROPATHY DOC TX: CPT | Mod: CPTII,S$GLB,, | Performed by: FAMILY MEDICINE

## 2024-10-09 PROCEDURE — 1101F PT FALLS ASSESS-DOCD LE1/YR: CPT | Mod: CPTII,S$GLB,, | Performed by: FAMILY MEDICINE

## 2024-10-09 PROCEDURE — 90480 ADMN SARSCOV2 VAC 1/ONLY CMP: CPT | Mod: S$GLB,,, | Performed by: FAMILY MEDICINE

## 2024-10-09 PROCEDURE — 3288F FALL RISK ASSESSMENT DOCD: CPT | Mod: CPTII,S$GLB,, | Performed by: FAMILY MEDICINE

## 2024-10-09 PROCEDURE — 3044F HG A1C LEVEL LT 7.0%: CPT | Mod: CPTII,S$GLB,, | Performed by: FAMILY MEDICINE

## 2024-10-09 PROCEDURE — 99215 OFFICE O/P EST HI 40 MIN: CPT | Mod: S$GLB,,, | Performed by: FAMILY MEDICINE

## 2024-10-09 PROCEDURE — 1159F MED LIST DOCD IN RCRD: CPT | Mod: CPTII,S$GLB,, | Performed by: FAMILY MEDICINE

## 2024-10-09 PROCEDURE — 99999 PR PBB SHADOW E&M-EST. PATIENT-LVL III: CPT | Mod: PBBFAC,,, | Performed by: FAMILY MEDICINE

## 2024-10-09 RX ORDER — LOVASTATIN 20 MG/1
20 TABLET ORAL NIGHTLY
Qty: 90 TABLET | Refills: 3 | Status: SHIPPED | OUTPATIENT
Start: 2024-10-09 | End: 2025-10-09

## 2024-10-09 NOTE — PROGRESS NOTES
Subjective     Patient ID: Tomeka Burns is a 69 y.o. female.    Chief Complaint: Follow-up    69 years old female came to the clinic for blood pressure check.  Pressure today was stable.  No chest pain, palpitation, orthopnea or PND.  Thyroid test was normal.  Patient was not able to tolerate atorvastatin.  She reports she is able to take lovastatin.      Hypertension  This is a chronic problem. The current episode started more than 1 year ago. The problem is unchanged. Pertinent negatives include no chest pain, orthopnea, palpitations or peripheral edema. There are no associated agents to hypertension. Risk factors for coronary artery disease include post-menopausal state and diabetes mellitus. Past treatments include angiotensin blockers, calcium channel blockers and diuretics. The current treatment provides significant improvement. Compliance problems include exercise.  There is no history of angina. Identifiable causes of hypertension include a thyroid problem. There is no history of a hypertension causing med.   Diabetes  She presents for her follow-up diabetic visit. She has type 2 diabetes mellitus. No MedicAlert identification noted. There are no hypoglycemic associated symptoms. Pertinent negatives for hypoglycemia include no tremors. Pertinent negatives for diabetes include no chest pain, no polydipsia, no polyphagia and no polyuria. There are no hypoglycemic complications. Symptoms are stable. There are no diabetic complications. Risk factors for coronary artery disease include hypertension, diabetes mellitus, post-menopausal and sedentary lifestyle. She is compliant with treatment all of the time. She is following a generally healthy diet. An ACE inhibitor/angiotensin II receptor blocker is being taken.   Thyroid Problem  Presents for follow-up visit. Patient reports no hoarse voice, palpitations or tremors. The symptoms have been stable.     Review of Systems   Constitutional: Negative.    HENT:  Negative.  Negative for hoarse voice.    Eyes: Negative.    Respiratory: Negative.     Cardiovascular:  Negative for chest pain, palpitations and orthopnea.   Gastrointestinal: Negative.    Endocrine: Negative for polydipsia, polyphagia and polyuria.   Genitourinary: Negative.    Musculoskeletal: Negative.    Neurological: Negative.  Negative for tremors.   Psychiatric/Behavioral: Negative.            Objective     Physical Exam  Constitutional:       General: She is not in acute distress.     Appearance: She is well-developed. She is not diaphoretic.   HENT:      Head: Normocephalic and atraumatic.      Right Ear: External ear normal.      Left Ear: External ear normal.      Nose: Nose normal.      Mouth/Throat:      Pharynx: No oropharyngeal exudate.   Eyes:      General: No scleral icterus.        Right eye: No discharge.         Left eye: No discharge.      Conjunctiva/sclera: Conjunctivae normal.      Pupils: Pupils are equal, round, and reactive to light.   Neck:      Thyroid: No thyromegaly.      Vascular: No JVD.      Trachea: No tracheal deviation.   Cardiovascular:      Rate and Rhythm: Normal rate and regular rhythm.      Heart sounds: Normal heart sounds. No murmur heard.     No friction rub. No gallop.   Pulmonary:      Effort: Pulmonary effort is normal. No respiratory distress.      Breath sounds: Normal breath sounds. No stridor. No wheezing or rales.   Chest:      Chest wall: No tenderness.   Abdominal:      General: Bowel sounds are normal. There is no distension.      Palpations: Abdomen is soft. There is no mass.      Tenderness: There is no abdominal tenderness. There is no guarding or rebound.   Musculoskeletal:         General: No tenderness. Normal range of motion.      Cervical back: Normal range of motion and neck supple.   Feet:      Right foot:      Protective Sensation: 10 sites tested.  10 sites sensed.      Skin integrity: No ulcer, blister, skin breakdown, erythema, warmth, callus,  dry skin or fissure.      Left foot:      Protective Sensation: 10 sites tested.  10 sites sensed.      Skin integrity: No ulcer, blister, skin breakdown, erythema, warmth, callus, dry skin or fissure.   Lymphadenopathy:      Cervical: No cervical adenopathy.   Skin:     General: Skin is warm and dry.      Coloration: Skin is not pale.      Findings: No erythema or rash.   Neurological:      Mental Status: She is alert and oriented to person, place, and time.      Cranial Nerves: No cranial nerve deficit.      Motor: No abnormal muscle tone.      Coordination: Coordination normal.      Deep Tendon Reflexes: Reflexes are normal and symmetric.   Psychiatric:         Behavior: Behavior normal.         Thought Content: Thought content normal.         Judgment: Judgment normal.            Assessment and Plan     1. Essential hypertension  -     Lipid Panel; Future; Expected date: 10/09/2024  -     Comprehensive Metabolic Panel; Future; Expected date: 10/09/2024    2. Hyperlipidemia, unspecified hyperlipidemia type  -     lovastatin (MEVACOR) 20 MG tablet; Take 1 tablet (20 mg total) by mouth every evening.  Dispense: 90 tablet; Refill: 3  -     Lipid Panel; Future; Expected date: 10/09/2024  -     Comprehensive Metabolic Panel; Future; Expected date: 10/09/2024    3. Type 2 diabetes mellitus with hyperglycemia, without long-term current use of insulin  -     Microalbumin/Creatinine Ratio, Urine; Future; Expected date: 10/09/2024  -     Lipid Panel; Future; Expected date: 10/09/2024  -     Hemoglobin A1C; Future; Expected date: 10/09/2024  -     Comprehensive Metabolic Panel; Future; Expected date: 10/09/2024    4. Hypothyroidism, unspecified type    5. Need for COVID-19 vaccine  -     COVID-19 (Moderna) 50 mcg/0.5 mL IM vaccine (>/= 13 yo) 0.5 mL        I spent a total of 42 minutes on the day of the visit.This includes face to face time and non-face to face time preparing to see the patient (eg, review of tests),  obtaining and/or reviewing separately obtained history, documenting clinical information in the electronic or other health record, independently interpreting results and communicating results to the patient/family/caregiver, or care coordinator.          Follow up in about 6 months (around 4/9/2025), or if symptoms worsen or fail to improve.

## 2024-10-10 LAB
T4 FREE SERPL DIALY-MCNC: 2.9 NG/DL (ref 0.9–2.2)
T4 SERPL-MCNC: 12.6 MCG/DL (ref 5.1–11.9)

## 2024-10-13 DIAGNOSIS — I10 ESSENTIAL HYPERTENSION: ICD-10-CM

## 2024-10-13 NOTE — TELEPHONE ENCOUNTER
No care due was identified.  [facility] Embedded Care Due Messages. Reference number: [id number].   10/13/2024 2:48:03 PM CDT

## 2024-10-14 RX ORDER — LOSARTAN POTASSIUM 100 MG/1
100 TABLET ORAL DAILY
Qty: 90 TABLET | Refills: 3 | Status: SHIPPED | OUTPATIENT
Start: 2024-10-14 | End: 2025-10-14

## 2024-10-14 NOTE — TELEPHONE ENCOUNTER
Refill Decision Note   Tomeka Grant  is requesting a refill authorization.  Brief Assessment and Rationale for Refill:  Approve     Medication Therapy Plan:         Comments:     Note composed:7:24 AM 10/14/2024

## 2024-10-17 ENCOUNTER — TELEPHONE (OUTPATIENT)
Dept: FAMILY MEDICINE | Facility: CLINIC | Age: 69
End: 2024-10-17
Payer: MEDICARE

## 2024-10-17 DIAGNOSIS — Z12.31 SCREENING MAMMOGRAM FOR BREAST CANCER: Primary | ICD-10-CM

## 2024-12-04 ENCOUNTER — HOSPITAL ENCOUNTER (OUTPATIENT)
Dept: RADIOLOGY | Facility: HOSPITAL | Age: 69
Discharge: HOME | End: 2024-12-04
Attending: FAMILY MEDICINE
Payer: MEDICARE

## 2024-12-04 DIAGNOSIS — K29.30 CHRONIC SUPERFICIAL GASTRITIS WITHOUT BLEEDING: ICD-10-CM

## 2024-12-04 DIAGNOSIS — Z12.31 SCREENING MAMMOGRAM FOR BREAST CANCER: ICD-10-CM

## 2024-12-04 PROCEDURE — 77067 SCR MAMMO BI INCL CAD: CPT | Mod: 26,,, | Performed by: RADIOLOGY

## 2024-12-04 PROCEDURE — 77063 BREAST TOMOSYNTHESIS BI: CPT | Mod: TC,PN

## 2024-12-04 PROCEDURE — 77063 BREAST TOMOSYNTHESIS BI: CPT | Mod: 26,,, | Performed by: RADIOLOGY

## 2024-12-04 RX ORDER — PANTOPRAZOLE SODIUM 40 MG/1
40 TABLET, DELAYED RELEASE ORAL
Qty: 90 TABLET | Refills: 3 | Status: SHIPPED | OUTPATIENT
Start: 2024-12-04

## 2024-12-04 NOTE — TELEPHONE ENCOUNTER
No care due was identified.  [facility] Embedded Care Due Messages. Reference number: [id number].   12/04/2024 9:38:48 AM CST

## 2024-12-04 NOTE — TELEPHONE ENCOUNTER
Refill Decision Note   Tomeka Grant  is requesting a refill authorization.  Brief Assessment and Rationale for Refill:  Approve     Medication Therapy Plan:        Comments:     Note composed:1:13 PM 12/04/2024

## 2025-01-24 DIAGNOSIS — E11.65 TYPE 2 DIABETES MELLITUS WITH HYPERGLYCEMIA, WITHOUT LONG-TERM CURRENT USE OF INSULIN: ICD-10-CM

## 2025-01-24 RX ORDER — METFORMIN HYDROCHLORIDE 1000 MG/1
1000 TABLET ORAL 2 TIMES DAILY WITH MEALS
Qty: 180 TABLET | Refills: 3 | Status: SHIPPED | OUTPATIENT
Start: 2025-01-24

## 2025-01-29 ENCOUNTER — HOSPITAL ENCOUNTER (OUTPATIENT)
Dept: ENDOCRINOLOGY | Facility: CLINIC | Age: 70
Discharge: HOME | End: 2025-01-29
Payer: MEDICARE

## 2025-01-29 DIAGNOSIS — C73 PAPILLARY THYROID CARCINOMA: ICD-10-CM

## 2025-01-29 DIAGNOSIS — E89.0 POSTSURGICAL HYPOTHYROIDISM: ICD-10-CM

## 2025-01-29 PROCEDURE — 76536 US EXAM OF HEAD AND NECK: CPT | Mod: S$GLB,,, | Performed by: INTERNAL MEDICINE

## 2025-01-30 LAB — CRC RECOMMENDATION EXT: NORMAL

## 2025-01-31 ENCOUNTER — PATIENT OUTREACH (OUTPATIENT)
Dept: ADMINISTRATIVE | Facility: HOSPITAL | Age: 70
End: 2025-01-31
Payer: MEDICARE

## 2025-01-31 NOTE — PROGRESS NOTES
Health Maintenance Topic(s) Outreach Outcomes & Actions Taken:    Colorectal Cancer Screening - Outreach Outcomes & Actions Taken  : External Records Uploaded, Care Team Updated, & History Updated if Applicable

## 2025-04-03 ENCOUNTER — LAB VISIT (OUTPATIENT)
Dept: LAB | Facility: HOSPITAL | Age: 70
End: 2025-04-03
Attending: FAMILY MEDICINE
Payer: MEDICARE

## 2025-04-03 DIAGNOSIS — E11.65 TYPE 2 DIABETES MELLITUS WITH HYPERGLYCEMIA, WITHOUT LONG-TERM CURRENT USE OF INSULIN: ICD-10-CM

## 2025-04-03 DIAGNOSIS — I10 ESSENTIAL HYPERTENSION: ICD-10-CM

## 2025-04-03 DIAGNOSIS — E78.5 HYPERLIPIDEMIA, UNSPECIFIED HYPERLIPIDEMIA TYPE: ICD-10-CM

## 2025-04-03 LAB
ALBUMIN SERPL BCP-MCNC: 4.3 G/DL (ref 3.5–5.2)
ALBUMIN/CREAT UR: 62.2 UG/MG
ALP SERPL-CCNC: 86 UNIT/L (ref 38–126)
ALT SERPL W/O P-5'-P-CCNC: 40 UNIT/L (ref 10–44)
ANION GAP (OHS): 11 MMOL/L (ref 8–16)
AST SERPL-CCNC: 37 UNIT/L (ref 15–46)
BILIRUB SERPL-MCNC: 0.6 MG/DL (ref 0.1–1)
BUN SERPL-MCNC: 24 MG/DL (ref 7–17)
CALCIUM SERPL-MCNC: 8.7 MG/DL (ref 8.7–10.5)
CHLORIDE SERPL-SCNC: 101 MMOL/L (ref 95–110)
CHOLEST SERPL-MCNC: 160 MG/DL (ref 120–199)
CHOLEST/HDLC SERPL: 3.8 {RATIO} (ref 2–5)
CO2 SERPL-SCNC: 26 MMOL/L (ref 23–29)
CREAT SERPL-MCNC: 1 MG/DL (ref 0.5–1.4)
CREAT UR-MCNC: 37 MG/DL (ref 15–325)
EAG (OHS): 151 MG/DL (ref 68–131)
GFR SERPLBLD CREATININE-BSD FMLA CKD-EPI: 60 ML/MIN/1.73/M2
GLUCOSE SERPL-MCNC: 134 MG/DL (ref 70–110)
HBA1C MFR BLD: 6.9 % (ref 4–5.6)
HDLC SERPL-MCNC: 42 MG/DL (ref 40–75)
HDLC SERPL: 26.3 % (ref 20–50)
LDLC SERPL CALC-MCNC: 88.6 MG/DL (ref 63–159)
MICROALBUMIN UR-MCNC: 23 UG/ML (ref ?–5000)
NONHDLC SERPL-MCNC: 118 MG/DL
POTASSIUM SERPL-SCNC: 5.6 MMOL/L (ref 3.5–5.1)
PROT SERPL-MCNC: 7.4 GM/DL (ref 6–8.4)
SODIUM SERPL-SCNC: 138 MMOL/L (ref 136–145)
TRIGL SERPL-MCNC: 147 MG/DL (ref 30–150)

## 2025-04-03 PROCEDURE — 80061 LIPID PANEL: CPT | Mod: PN

## 2025-04-03 PROCEDURE — 83036 HEMOGLOBIN GLYCOSYLATED A1C: CPT | Mod: PN

## 2025-04-03 PROCEDURE — 82043 UR ALBUMIN QUANTITATIVE: CPT | Mod: PN

## 2025-04-03 PROCEDURE — 36415 COLL VENOUS BLD VENIPUNCTURE: CPT | Mod: PN

## 2025-04-03 PROCEDURE — 82247 BILIRUBIN TOTAL: CPT | Mod: PN

## 2025-04-06 ENCOUNTER — RESULTS FOLLOW-UP (OUTPATIENT)
Dept: FAMILY MEDICINE | Facility: CLINIC | Age: 70
End: 2025-04-06

## 2025-04-15 ENCOUNTER — OFFICE VISIT (OUTPATIENT)
Dept: FAMILY MEDICINE | Facility: CLINIC | Age: 70
End: 2025-04-15
Payer: MEDICARE

## 2025-04-15 VITALS
BODY MASS INDEX: 22.08 KG/M2 | OXYGEN SATURATION: 99 % | WEIGHT: 120 LBS | DIASTOLIC BLOOD PRESSURE: 70 MMHG | HEART RATE: 90 BPM | HEIGHT: 62 IN | SYSTOLIC BLOOD PRESSURE: 136 MMHG

## 2025-04-15 DIAGNOSIS — C73 PAPILLARY THYROID CARCINOMA: ICD-10-CM

## 2025-04-15 DIAGNOSIS — I10 ESSENTIAL HYPERTENSION: Primary | ICD-10-CM

## 2025-04-15 DIAGNOSIS — E08.42 DIABETIC POLYNEUROPATHY ASSOCIATED WITH DIABETES MELLITUS DUE TO UNDERLYING CONDITION: ICD-10-CM

## 2025-04-15 DIAGNOSIS — E11.65 TYPE 2 DIABETES MELLITUS WITH HYPERGLYCEMIA, WITHOUT LONG-TERM CURRENT USE OF INSULIN: ICD-10-CM

## 2025-04-15 DIAGNOSIS — E87.5 HYPERKALEMIA: ICD-10-CM

## 2025-04-15 DIAGNOSIS — R80.9 MICROALBUMINURIA: ICD-10-CM

## 2025-04-15 PROCEDURE — 3075F SYST BP GE 130 - 139MM HG: CPT | Mod: CPTII,S$GLB,, | Performed by: FAMILY MEDICINE

## 2025-04-15 PROCEDURE — 3008F BODY MASS INDEX DOCD: CPT | Mod: CPTII,S$GLB,, | Performed by: FAMILY MEDICINE

## 2025-04-15 PROCEDURE — 3060F POS MICROALBUMINURIA REV: CPT | Mod: CPTII,S$GLB,, | Performed by: FAMILY MEDICINE

## 2025-04-15 PROCEDURE — 99999 PR PBB SHADOW E&M-EST. PATIENT-LVL IV: CPT | Mod: PBBFAC,,, | Performed by: FAMILY MEDICINE

## 2025-04-15 PROCEDURE — 3288F FALL RISK ASSESSMENT DOCD: CPT | Mod: CPTII,S$GLB,, | Performed by: FAMILY MEDICINE

## 2025-04-15 PROCEDURE — 4010F ACE/ARB THERAPY RXD/TAKEN: CPT | Mod: CPTII,S$GLB,, | Performed by: FAMILY MEDICINE

## 2025-04-15 PROCEDURE — 3044F HG A1C LEVEL LT 7.0%: CPT | Mod: CPTII,S$GLB,, | Performed by: FAMILY MEDICINE

## 2025-04-15 PROCEDURE — 1101F PT FALLS ASSESS-DOCD LE1/YR: CPT | Mod: CPTII,S$GLB,, | Performed by: FAMILY MEDICINE

## 2025-04-15 PROCEDURE — 1160F RVW MEDS BY RX/DR IN RCRD: CPT | Mod: CPTII,S$GLB,, | Performed by: FAMILY MEDICINE

## 2025-04-15 PROCEDURE — 1126F AMNT PAIN NOTED NONE PRSNT: CPT | Mod: CPTII,S$GLB,, | Performed by: FAMILY MEDICINE

## 2025-04-15 PROCEDURE — 3078F DIAST BP <80 MM HG: CPT | Mod: CPTII,S$GLB,, | Performed by: FAMILY MEDICINE

## 2025-04-15 PROCEDURE — 1159F MED LIST DOCD IN RCRD: CPT | Mod: CPTII,S$GLB,, | Performed by: FAMILY MEDICINE

## 2025-04-15 PROCEDURE — 99215 OFFICE O/P EST HI 40 MIN: CPT | Mod: S$GLB,,, | Performed by: FAMILY MEDICINE

## 2025-04-15 PROCEDURE — 3066F NEPHROPATHY DOC TX: CPT | Mod: CPTII,S$GLB,, | Performed by: FAMILY MEDICINE

## 2025-04-15 NOTE — PROGRESS NOTES
Subjective     Patient ID: Tomeka Burns is a 69 y.o. female.    Chief Complaint: Results    69 years old female came to the clinic for blood pressure check.  Blood pressure today was stable.Patient with good compliance with statin therapy.  Last cholesterol was normal.  A1c was stable.  Potassium was slightly elevated associated with possible dehydration.  Repeat testing next week.  Neuropathy episodes sometimes.  Patient did not want pharmacotherapy.  She was previously diagnosed with thyroid cancer.  No recent flare ups.  Patient with sonographic surveillance.  Urine with evidence of microalbuminuria.    Hypertension  This is a chronic problem. The current episode started more than 1 year ago. The problem is unchanged. The problem is controlled. Pertinent negatives include no chest pain, headaches, neck pain or palpitations. There are no associated agents to hypertension. There are no known risk factors for coronary artery disease. Past treatments include angiotensin blockers, calcium channel blockers and diuretics. The current treatment provides significant improvement. There is no history of angina. Identifiable causes of hypertension include a thyroid problem. There is no history of a hypertension causing med.   Diabetes  She presents for her follow-up diabetic visit. She has type 2 diabetes mellitus. Her disease course has been stable. There are no hypoglycemic associated symptoms. Pertinent negatives for hypoglycemia include no confusion or headaches. Pertinent negatives for diabetes include no chest pain, no polydipsia, no polyuria and no weakness. There are no hypoglycemic complications. Symptoms are stable. There are no diabetic complications. Risk factors for coronary artery disease include diabetes mellitus, hypertension and post-menopausal. Current diabetic treatment includes oral agent (monotherapy). An ACE inhibitor/angiotensin II receptor blocker is being taken.   Thyroid Problem  Presents for  follow-up visit. Patient reports no constipation, diarrhea, menstrual problem or palpitations. The symptoms have been stable.     Review of Systems   Constitutional: Negative.  Negative for activity change and unexpected weight change.   HENT:  Positive for hearing loss. Negative for rhinorrhea and trouble swallowing.    Eyes: Negative.  Negative for discharge and visual disturbance.   Respiratory: Negative.  Negative for chest tightness and wheezing.    Cardiovascular:  Negative for chest pain and palpitations.   Gastrointestinal: Negative.  Negative for blood in stool, constipation, diarrhea and vomiting.   Endocrine: Negative for polydipsia and polyuria.   Genitourinary: Negative.  Negative for difficulty urinating, dysuria, hematuria and menstrual problem.   Musculoskeletal: Negative.  Negative for arthralgias, joint swelling and neck pain.   Neurological: Negative.  Negative for weakness and headaches.   Psychiatric/Behavioral: Negative.  Negative for confusion and dysphoric mood.           Objective     Physical Exam  Constitutional:       General: She is not in acute distress.     Appearance: She is well-developed. She is not diaphoretic.   HENT:      Head: Normocephalic and atraumatic.      Right Ear: External ear normal.      Left Ear: External ear normal.      Nose: Nose normal.      Mouth/Throat:      Pharynx: No oropharyngeal exudate.   Eyes:      General: No scleral icterus.        Right eye: No discharge.         Left eye: No discharge.      Conjunctiva/sclera: Conjunctivae normal.      Pupils: Pupils are equal, round, and reactive to light.   Neck:      Thyroid: No thyromegaly.      Vascular: No JVD.      Trachea: No tracheal deviation.   Cardiovascular:      Rate and Rhythm: Normal rate and regular rhythm.      Heart sounds: Normal heart sounds. No murmur heard.     No friction rub. No gallop.   Pulmonary:      Effort: Pulmonary effort is normal. No respiratory distress.      Breath sounds: Normal  breath sounds. No stridor. No wheezing or rales.   Chest:      Chest wall: No tenderness.   Abdominal:      General: Bowel sounds are normal. There is no distension.      Palpations: Abdomen is soft. There is no mass.      Tenderness: There is no abdominal tenderness. There is no guarding or rebound.   Musculoskeletal:         General: No tenderness. Normal range of motion.      Cervical back: Normal range of motion and neck supple.   Feet:      Right foot:      Protective Sensation: 10 sites tested.  10 sites sensed.      Skin integrity: No ulcer, blister, skin breakdown, erythema, warmth, callus, dry skin or fissure.      Left foot:      Protective Sensation: 10 sites tested.  10 sites sensed.      Skin integrity: No ulcer, blister, skin breakdown, erythema, warmth, callus, dry skin or fissure.   Lymphadenopathy:      Cervical: No cervical adenopathy.   Skin:     General: Skin is warm and dry.      Coloration: Skin is not pale.      Findings: No erythema or rash.   Neurological:      Mental Status: She is alert and oriented to person, place, and time.      Cranial Nerves: No cranial nerve deficit.      Motor: No abnormal muscle tone.      Coordination: Coordination normal.      Deep Tendon Reflexes: Reflexes are normal and symmetric.   Psychiatric:         Behavior: Behavior normal.         Thought Content: Thought content normal.         Judgment: Judgment normal.            Assessment and Plan     1. Essential hypertension  -     Lipid Panel; Future; Expected date: 04/15/2025  -     Comprehensive Metabolic Panel; Future; Expected date: 04/15/2025  -     TSH; Future; Expected date: 04/15/2025    2. Type 2 diabetes mellitus with hyperglycemia, without long-term current use of insulin  -     Microalbumin/Creatinine Ratio, Urine; Future; Expected date: 04/15/2025  -     Lipid Panel; Future; Expected date: 04/15/2025  -     Hemoglobin A1C; Future; Expected date: 04/15/2025  -     Comprehensive Metabolic Panel;  Future; Expected date: 04/15/2025    3. Hyperkalemia  -     Renal Function Panel; Future; Expected date: 04/15/2025    4. Microalbuminuria  -     Microalbumin/Creatinine Ratio, Urine; Future; Expected date: 04/15/2025    5. Diabetic polyneuropathy associated with diabetes mellitus due to underlying condition    6. Papillary thyroid carcinoma    I spent a total of 42 minutes on the day of the visit.This includes face to face time and non-face to face time preparing to see the patient (eg, review of tests), obtaining and/or reviewing separately obtained history, documenting clinical information in the electronic or other health record, independently interpreting results and communicating results to the patient/family/caregiver, or care coordinator.   Continue monitoring blood pressure at home, low sodium diet.   Continue monitoring blood sugar at home,ADA diet.   Follow-up with endocrinology.  Continue with losartan for microalbuminuria.       Follow up in about 6 months (around 10/15/2025).

## 2025-04-21 ENCOUNTER — LAB VISIT (OUTPATIENT)
Dept: LAB | Facility: HOSPITAL | Age: 70
End: 2025-04-21
Attending: FAMILY MEDICINE
Payer: MEDICARE

## 2025-04-21 ENCOUNTER — RESULTS FOLLOW-UP (OUTPATIENT)
Dept: FAMILY MEDICINE | Facility: CLINIC | Age: 70
End: 2025-04-21

## 2025-04-21 DIAGNOSIS — E11.65 TYPE 2 DIABETES MELLITUS WITH HYPERGLYCEMIA, WITHOUT LONG-TERM CURRENT USE OF INSULIN: ICD-10-CM

## 2025-04-21 DIAGNOSIS — R80.9 MICROALBUMINURIA: ICD-10-CM

## 2025-04-21 DIAGNOSIS — I10 ESSENTIAL HYPERTENSION: ICD-10-CM

## 2025-04-21 DIAGNOSIS — E87.5 HYPERKALEMIA: ICD-10-CM

## 2025-04-21 LAB
ALBUMIN SERPL BCP-MCNC: 4.3 G/DL (ref 3.5–5.2)
ALBUMIN/CREAT UR: 33.3 UG/MG
ALP SERPL-CCNC: 99 UNIT/L (ref 38–126)
ALT SERPL W/O P-5'-P-CCNC: 40 UNIT/L (ref 10–44)
ANION GAP (OHS): 11 MMOL/L (ref 8–16)
AST SERPL-CCNC: 33 UNIT/L (ref 15–46)
BILIRUB SERPL-MCNC: 0.6 MG/DL (ref 0.1–1)
BUN SERPL-MCNC: 18 MG/DL (ref 7–17)
CALCIUM SERPL-MCNC: 8.4 MG/DL (ref 8.7–10.5)
CHLORIDE SERPL-SCNC: 98 MMOL/L (ref 95–110)
CHOLEST SERPL-MCNC: 157 MG/DL (ref 120–199)
CHOLEST/HDLC SERPL: 3.8 {RATIO} (ref 2–5)
CO2 SERPL-SCNC: 25 MMOL/L (ref 23–29)
CREAT SERPL-MCNC: 0.8 MG/DL (ref 0.5–1.4)
CREAT UR-MCNC: 33 MG/DL (ref 15–325)
EAG (OHS): 146 MG/DL (ref 68–131)
GFR SERPLBLD CREATININE-BSD FMLA CKD-EPI: >60 ML/MIN/1.73/M2
GLUCOSE SERPL-MCNC: 138 MG/DL (ref 70–110)
HBA1C MFR BLD: 6.7 % (ref 4–5.6)
HDLC SERPL-MCNC: 41 MG/DL (ref 40–75)
HDLC SERPL: 26.1 % (ref 20–50)
LDLC SERPL CALC-MCNC: 87 MG/DL (ref 63–159)
MICROALBUMIN UR-MCNC: 11 UG/ML (ref ?–5000)
NONHDLC SERPL-MCNC: 116 MG/DL
PHOSPHATE SERPL-MCNC: 3.4 MG/DL (ref 2.7–4.5)
POTASSIUM SERPL-SCNC: 4.7 MMOL/L (ref 3.5–5.1)
PROT SERPL-MCNC: 7.7 GM/DL (ref 6–8.4)
SODIUM SERPL-SCNC: 134 MMOL/L (ref 136–145)
TRIGL SERPL-MCNC: 145 MG/DL (ref 30–150)
TSH SERPL-ACNC: 0.56 UIU/ML (ref 0.4–4)

## 2025-04-21 PROCEDURE — 84443 ASSAY THYROID STIM HORMONE: CPT | Mod: PN

## 2025-04-21 PROCEDURE — 80053 COMPREHEN METABOLIC PANEL: CPT | Mod: PN

## 2025-04-21 PROCEDURE — 84100 ASSAY OF PHOSPHORUS: CPT | Mod: PN

## 2025-04-21 PROCEDURE — 83718 ASSAY OF LIPOPROTEIN: CPT | Mod: PN

## 2025-04-21 PROCEDURE — 36415 COLL VENOUS BLD VENIPUNCTURE: CPT | Mod: PN

## 2025-04-21 PROCEDURE — 83036 HEMOGLOBIN GLYCOSYLATED A1C: CPT | Mod: PN

## 2025-04-21 PROCEDURE — 82570 ASSAY OF URINE CREATININE: CPT | Mod: PN

## 2025-05-06 LAB
LEFT EYE DM RETINOPATHY: NEGATIVE
RIGHT EYE DM RETINOPATHY: NEGATIVE

## 2025-05-13 ENCOUNTER — PATIENT OUTREACH (OUTPATIENT)
Dept: ADMINISTRATIVE | Facility: HOSPITAL | Age: 70
End: 2025-05-13
Payer: MEDICARE

## 2025-05-13 NOTE — PROGRESS NOTES
Health Maintenance Topic(s) Outreach Outcomes & Actions Taken:    Eye Exam - Outreach Outcomes & Actions Taken  : Diabetic Eye External Records Uploaded, Care Team & History Updated if Applicable

## 2025-06-04 DIAGNOSIS — I70.0 AORTIC ATHEROSCLEROSIS: Primary | ICD-10-CM

## 2025-06-04 DIAGNOSIS — I10 ESSENTIAL HYPERTENSION: ICD-10-CM

## 2025-06-05 ENCOUNTER — OFFICE VISIT (OUTPATIENT)
Dept: CARDIOLOGY | Facility: CLINIC | Age: 70
End: 2025-06-05
Payer: MEDICARE

## 2025-06-05 VITALS — HEART RATE: 96 BPM | DIASTOLIC BLOOD PRESSURE: 75 MMHG | SYSTOLIC BLOOD PRESSURE: 131 MMHG

## 2025-06-05 DIAGNOSIS — R07.2 PRECORDIAL CHEST PAIN: Primary | ICD-10-CM

## 2025-06-05 DIAGNOSIS — I70.0 AORTIC ATHEROSCLEROSIS: ICD-10-CM

## 2025-06-05 DIAGNOSIS — E78.5 HYPERLIPIDEMIA, UNSPECIFIED HYPERLIPIDEMIA TYPE: ICD-10-CM

## 2025-06-05 DIAGNOSIS — R00.2 PALPITATIONS: ICD-10-CM

## 2025-06-05 DIAGNOSIS — I10 ESSENTIAL HYPERTENSION: ICD-10-CM

## 2025-06-05 DIAGNOSIS — I44.7 LEFT BUNDLE BRANCH BLOCK: ICD-10-CM

## 2025-06-05 DIAGNOSIS — E11.65 TYPE 2 DIABETES MELLITUS WITH HYPERGLYCEMIA, WITHOUT LONG-TERM CURRENT USE OF INSULIN: ICD-10-CM

## 2025-06-05 LAB
OHS QRS DURATION: 134 MS
OHS QTC CALCULATION: 502 MS

## 2025-06-05 PROCEDURE — 99999 PR PBB SHADOW E&M-EST. PATIENT-LVL III: CPT | Mod: PBBFAC,,, | Performed by: STUDENT IN AN ORGANIZED HEALTH CARE EDUCATION/TRAINING PROGRAM

## 2025-06-23 DIAGNOSIS — Z00.00 ENCOUNTER FOR MEDICARE ANNUAL WELLNESS EXAM: ICD-10-CM

## 2025-07-02 ENCOUNTER — HOSPITAL ENCOUNTER (OUTPATIENT)
Dept: CARDIOLOGY | Facility: HOSPITAL | Age: 70
Discharge: HOME | End: 2025-07-02
Attending: STUDENT IN AN ORGANIZED HEALTH CARE EDUCATION/TRAINING PROGRAM
Payer: MEDICARE

## 2025-07-02 DIAGNOSIS — R00.2 PALPITATIONS: ICD-10-CM

## 2025-07-02 PROCEDURE — 93246 EXT ECG>7D<15D RECORDING: CPT

## 2025-07-06 ENCOUNTER — PATIENT MESSAGE (OUTPATIENT)
Dept: CARDIOLOGY | Facility: CLINIC | Age: 70
End: 2025-07-06
Payer: MEDICARE

## 2025-07-10 ENCOUNTER — PATIENT MESSAGE (OUTPATIENT)
Dept: FAMILY MEDICINE | Facility: CLINIC | Age: 70
End: 2025-07-10
Payer: MEDICARE

## 2025-07-10 DIAGNOSIS — I10 ESSENTIAL HYPERTENSION: ICD-10-CM

## 2025-07-10 RX ORDER — AMLODIPINE BESYLATE 5 MG/1
5 TABLET ORAL DAILY
Qty: 90 TABLET | Refills: 3 | Status: SHIPPED | OUTPATIENT
Start: 2025-07-10 | End: 2026-07-10

## 2025-07-10 NOTE — TELEPHONE ENCOUNTER
No care due was identified.  [facility] Embedded Care Due Messages. Reference number: [id number].   7/10/2025 2:31:51 PM CDT

## 2025-07-17 ENCOUNTER — HOSPITAL ENCOUNTER (OUTPATIENT)
Dept: CARDIOLOGY | Facility: HOSPITAL | Age: 70
Discharge: HOME | End: 2025-07-17
Attending: STUDENT IN AN ORGANIZED HEALTH CARE EDUCATION/TRAINING PROGRAM
Payer: MEDICARE

## 2025-07-17 ENCOUNTER — HOSPITAL ENCOUNTER (OUTPATIENT)
Dept: RADIOLOGY | Facility: HOSPITAL | Age: 70
Discharge: HOME | End: 2025-07-17
Attending: STUDENT IN AN ORGANIZED HEALTH CARE EDUCATION/TRAINING PROGRAM
Payer: MEDICARE

## 2025-07-17 VITALS — HEIGHT: 62 IN | WEIGHT: 120 LBS | BODY MASS INDEX: 22.08 KG/M2

## 2025-07-17 DIAGNOSIS — R07.2 PRECORDIAL CHEST PAIN: ICD-10-CM

## 2025-07-17 LAB
AORTIC SIZE INDEX (SOV): 2 CM/M2
AORTIC SIZE INDEX: 1.8 CM/M2
APICAL FOUR CHAMBER EJECTION FRACTION: 49 %
APICAL TWO CHAMBER EJECTION FRACTION: 61 %
ASCENDING AORTA: 2.8 CM
AV INDEX (PROSTH): 0.72
AV MEAN GRADIENT: 5 MMHG
AV PEAK GRADIENT: 8 MMHG
AV VALVE AREA BY VELOCITY RATIO: 2 CM²
AV VALVE AREA: 2 CM²
AV VELOCITY RATIO: 0.71
BSA FOR ECHO PROCEDURE: 1.54 M2
CV ECHO LV RWT: 0.4 CM
CV STRESS BASE HR: 86 BPM
DIASTOLIC BLOOD PRESSURE: 78 MMHG
DOP CALC AO PEAK VEL: 1.4 M/S
DOP CALC AO VTI: 27 CM
DOP CALC LVOT AREA: 2.8 CM2
DOP CALC LVOT DIAMETER: 1.9 CM
DOP CALC LVOT PEAK VEL: 1 M/S
DOP CALC LVOT STROKE VOLUME: 55.3 CM3
DOP CALC MV VTI: 21.7 CM
DOP CALCLVOT PEAK VEL VTI: 19.5 CM
E WAVE DECELERATION TIME: 128 MSEC
E/A RATIO: 1.25
E/E' RATIO: 24 M/S
ECHO LV POSTERIOR WALL: 0.8 CM (ref 0.6–1.1)
EJECTION FRACTION- HIGH: 65 %
END DIASTOLIC INDEX-HIGH: 153 ML/M2
END DIASTOLIC INDEX-LOW: 93 ML/M2
END SYSTOLIC INDEX-HIGH: 71 ML/M2
END SYSTOLIC INDEX-LOW: 31 ML/M2
FRACTIONAL SHORTENING: 25 % (ref 28–44)
HR MV ECHO: 95 BPM
INTERVENTRICULAR SEPTUM: 1.1 CM (ref 0.6–1.1)
IVC DIAMETER: 1.13 CM
LEFT ATRIUM AREA SYSTOLIC (APICAL 2 CHAMBER): 14.72 CM2
LEFT ATRIUM AREA SYSTOLIC (APICAL 4 CHAMBER): 15.58 CM2
LEFT ATRIUM VOLUME INDEX MOD: 27 ML/M2
LEFT ATRIUM VOLUME MOD: 42 ML
LEFT INTERNAL DIMENSION IN SYSTOLE: 3 CM (ref 2.1–4)
LEFT VENTRICLE DIASTOLIC VOLUME INDEX: 46.1 ML/M2
LEFT VENTRICLE DIASTOLIC VOLUME: 71 ML
LEFT VENTRICLE END DIASTOLIC VOLUME APICAL 2 CHAMBER: 98.65 ML
LEFT VENTRICLE END DIASTOLIC VOLUME APICAL 4 CHAMBER: 87.78 ML
LEFT VENTRICLE END SYSTOLIC VOLUME APICAL 2 CHAMBER: 39.99 ML
LEFT VENTRICLE END SYSTOLIC VOLUME APICAL 4 CHAMBER: 42.32 ML
LEFT VENTRICLE MASS INDEX: 76.8 G/M2
LEFT VENTRICLE SYSTOLIC VOLUME INDEX: 22.1 ML/M2
LEFT VENTRICLE SYSTOLIC VOLUME: 34 ML
LEFT VENTRICULAR INTERNAL DIMENSION IN DIASTOLE: 4 CM (ref 3.5–6)
LEFT VENTRICULAR MASS: 118.2 G
LV LATERAL E/E' RATIO: 24.2 M/S
LV SEPTAL E/E' RATIO: 24.2 M/S
LVED V (TEICH): 70.89 ML
LVES V (TEICH): 34.37 ML
LVOT MG: 2.32 MMHG
LVOT MV: 0.72 CM/S
Lab: 1.8 CM/M
Lab: 2 CM/M
MV A" WAVE DURATION": 51.38 MSEC
MV MEAN GRADIENT: 5 MMHG
MV PEAK A VEL: 1.16 M/S
MV PEAK E VEL: 1.45 M/S
MV PEAK GRADIENT: 9 MMHG
MV STENOSIS PRESSURE HALF TIME: 37.1 MS
MV VALVE AREA BY CONTINUITY EQUATION: 2.55 CM2
MV VALVE AREA P 1/2 METHOD: 5.93 CM2
NUC REST DIASTOLIC VOLUME INDEX: 61
NUC REST EJECTION FRACTION: 75
NUC REST SYSTOLIC VOLUME INDEX: 15
NUC STRESS DIASTOLIC VOLUME INDEX: 74
NUC STRESS EJECTION FRACTION: 72 %
NUC STRESS SYSTOLIC VOLUME INDEX: 21
OHS CV CPX 85 PERCENT MAX PREDICTED HEART RATE MALE: 127
OHS CV CPX ESTIMATED METS: 1
OHS CV CPX MAX PREDICTED HEART RATE: 149
OHS CV CPX PATIENT HEIGHT IN: 62
OHS CV CPX PATIENT IS FEMALE: 1
OHS CV CPX PATIENT IS MALE: 0
OHS CV CPX PEAK DIASTOLIC BLOOD PRESSURE: 81 MMHG
OHS CV CPX PEAK HEAR RATE: 122 BPM
OHS CV CPX PEAK RATE PRESSURE PRODUCT: NORMAL
OHS CV CPX PEAK SYSTOLIC BLOOD PRESSURE: 162 MMHG
OHS CV CPX PERCENT MAX PREDICTED HEART RATE ACHIEVED: 85
OHS CV CPX RATE PRESSURE PRODUCT PRESENTING: NORMAL
OHS CV INITIAL DOSE: 10 MCG/KG/MIN
OHS CV PEAK DOSE: 30 MCG/KG/MIN
OHS CV RV/LV RATIO: 0.6 CM
OHS LV EJECTION FRACTION SIMPSONS BIPLANE MOD: 54 %
PISA TR MAX VEL: 2.4 M/S
PV MV: 0.93 M/S
PV PEAK GRADIENT: 6 MMHG
PV PEAK VELOCITY: 1.25 M/S
RA PRESSURE ESTIMATED: 3 MMHG
RA VOL SYS: 14.14 ML
RETIRED EF AND QEF - SEE NOTES: 53 %
RIGHT ATRIAL AREA: 8.1 CM2
RIGHT ATRIUM END SYSTOLIC VOLUME APICAL 4 CHAMBER INDEX BSA: 8.73 ML/M2
RIGHT ATRIUM VOLUME AREA LENGTH APICAL 4 CHAMBER: 13.45 ML
RIGHT VENTRICLE DIASTOLIC BASEL DIMENSION: 2.4 CM
RV TB RVSP: 5 MMHG
RV TISSUE DOPPLER FREE WALL SYSTOLIC VELOCITY 1 (APICAL 4 CHAMBER VIEW): 14 CM/S
SINUS: 3.1 CM
STJ: 2.7 CM
STRESS ECHO POST EXERCISE DUR MIN: 9 MINUTES
STRESS ECHO POST EXERCISE DUR SEC: 46 SECONDS
SYSTOLIC BLOOD PRESSURE: 155 MMHG
TDI LATERAL: 0.06 M/S
TDI SEPTAL: 0.06 M/S
TDI: 0.06 M/S
TR MAX PG: 23 MMHG
TRICUSPID ANNULAR PLANE SYSTOLIC EXCURSION: 2.1 CM
TV REST PULMONARY ARTERY PRESSURE: 26 MMHG
Z-SCORE OF LEFT VENTRICULAR DIMENSION IN END DIASTOLE: -1.12
Z-SCORE OF LEFT VENTRICULAR DIMENSION IN END SYSTOLE: 0.61

## 2025-07-17 PROCEDURE — 93306 TTE W/DOPPLER COMPLETE: CPT | Mod: 26,,, | Performed by: INTERNAL MEDICINE

## 2025-07-17 PROCEDURE — 63600175 PHARM REV CODE 636 W HCPCS: Performed by: STUDENT IN AN ORGANIZED HEALTH CARE EDUCATION/TRAINING PROGRAM

## 2025-07-17 PROCEDURE — C8929 TTE W OR WO FOL WCON,DOPPLER: HCPCS

## 2025-07-17 PROCEDURE — 93016 CV STRESS TEST SUPVJ ONLY: CPT | Mod: ,,, | Performed by: INTERNAL MEDICINE

## 2025-07-17 PROCEDURE — 78452 HT MUSCLE IMAGE SPECT MULT: CPT | Mod: 26,,, | Performed by: INTERNAL MEDICINE

## 2025-07-17 PROCEDURE — A9502 TC99M TETROFOSMIN: HCPCS | Performed by: STUDENT IN AN ORGANIZED HEALTH CARE EDUCATION/TRAINING PROGRAM

## 2025-07-17 PROCEDURE — 93018 CV STRESS TEST I&R ONLY: CPT | Mod: ,,, | Performed by: INTERNAL MEDICINE

## 2025-07-17 PROCEDURE — 25500020 PHARM REV CODE 255: Performed by: STUDENT IN AN ORGANIZED HEALTH CARE EDUCATION/TRAINING PROGRAM

## 2025-07-17 PROCEDURE — 78452 HT MUSCLE IMAGE SPECT MULT: CPT

## 2025-07-17 PROCEDURE — 93017 CV STRESS TEST TRACING ONLY: CPT

## 2025-07-17 RX ORDER — REGADENOSON 0.08 MG/ML
0.4 INJECTION, SOLUTION INTRAVENOUS
Status: COMPLETED | OUTPATIENT
Start: 2025-07-17 | End: 2025-07-17

## 2025-07-17 RX ADMIN — TETROFOSMIN 30 MILLICURIE: 1.38 INJECTION, POWDER, LYOPHILIZED, FOR SOLUTION INTRAVENOUS at 09:07

## 2025-07-17 RX ADMIN — REGADENOSON 0.4 MG: 0.08 INJECTION, SOLUTION INTRAVENOUS at 09:07

## 2025-07-17 RX ADMIN — TETROFOSMIN 10 MILLICURIE: 1.38 INJECTION, POWDER, LYOPHILIZED, FOR SOLUTION INTRAVENOUS at 08:07

## 2025-07-17 RX ADMIN — HUMAN ALBUMIN MICROSPHERES AND PERFLUTREN 0.11 MG: 10; .22 INJECTION, SOLUTION INTRAVENOUS at 11:07

## 2025-07-30 NOTE — PROGRESS NOTES
Cardiology Clinic Visit    History of Present Illness:      History of Present Illness  8/1/25  Tomeka Burns is a pleasant 69 y.o. female with PMHx of hx of HTN, DM, LBBB, hypoT4, hx of Covid infection. Ms. Burns presents today for follow up of chest pain. She reports chest discomfort characterized by a squeezing sensation during physical exertion including quick movements, fast walking, and using her home treadmill. Symptoms prevent her from running and require her to stop and rest during activities. Recently, while cleaning her grandson's room, she felt she could not breathe and had to sit down. She describes the sensation as a squeezing feeling in her chest that emerges with increased activity and requires modification of her physical activities. She denies severe or prolonged chest pain but expresses concern about the recurring chest discomfort with exertion. Previous cardiac testing reveals normal stress test results and a normal angiogram from 6 years ago. Recent EKG demonstrated a left bundle branch abnormality.             A1c 6.7  LDL 88    Echo    Left Ventricle: The left ventricle is normal in size. Mildly increased wall thickness. Normal wall motion. Septal motion is consistent with bundle branch block. There is normal systolic function with a visually estimated ejection fraction of 55 - 60%. Quantitated ejection fraction is 54%.    Right Ventricle: The right ventricle is normal in size measuring 2.4 cm. Systolic function is normal.    Pulmonary Artery: The estimated pulmonary artery systolic pressure is 26 mmHg.    Normal valvular structure and function.    MPI 7/17/25    Normal myocardial perfusion scan. There is no evidence of myocardial ischemia or infarction.    There is a moderate fixed septal perfusion abnormality consistent with the patient's underlying left bundle branch block.    The gated perfusion images showed an ejection fraction of 75% at rest. The gated perfusion images showed an  ejection fraction of 72% post stress. Normal ejection fraction is greater than 53%.    The study's ECG is uninterpretable due to left bundle branch block.    The patient reported chest pain during the stress test.    There were no arrhythmias during stress.    The patient reported chest discomfort, shortness of breath and headaches during the stress test.             Echo 2021  The left ventricle is normal in size with concentric remodeling and normal systolic function.  The estimated ejection fraction is 55%.  There is abnormal septal wall motion consistent with left bundle branch block.  Normal left ventricular diastolic function.  Normal right ventricular size with normal right ventricular systolic function.  Normal central venous pressure (3 mmHg).     Cath 06/2019    Normal coronary arteries.  The ejection fraction is calculated to be 60%.  Diastolic dysfunction.     MPI 2019  The EKG portion of this study is negative for myocardial ischemia.  The patient reported chest discomfort during the stress test.  There were no arrhythmias during stress.  Perfusion imaging interpreted by Radiologist      Holter 04/03/2019  Predominant Rhythm Sinus rhythm with heart rates varying between 51 and 125 bpm with an average of 75 bpm.  There were very rare PVCs totalling 69 and averaging 1.44 per hour.  There were very rare PACs totalling 76 and averaging 1.58 per hour  The longest RR interval was 1500 msec.  The diary was properly completed  History obtained by patient interview and supplemented by nursing documentation and chart review.   PMHx:  has a past medical history of Diabetes mellitus, type 2, Encounter for blood transfusion (1980), GERD (gastroesophageal reflux disease), Hyperlipidemia, Hypertension, Hypothyroidism, and Multiple thyroid nodules (01/21/2019).   SurgHx:  has a past surgical history that includes Hysterectomy; Tonsillectomy; Left heart catheterization (N/A, 06/17/2019); Coronary angiography (N/A,  "06/17/2019); Angiocardiography of left ventricle (N/A, 06/17/2019); Thyroidectomy (Bilateral, 06/16/2020); and Appendectomy.   FamHx: family history includes Alcohol abuse in her father; Diabetes in her mother and sister; Hearing loss in her mother; Heart disease in her mother; Hyperlipidemia in her mother; Hypertension in her mother, sister, and son; Liver disease in her brother and sister.   SocialHx:  reports that she has never smoked. She has never been exposed to tobacco smoke. She has never used smokeless tobacco. She reports that she does not drink alcohol and does not use drugs.  Home Meds:  Current Outpatient Medications   Medication Instructions    alcohol swabs (CURITY ALCOHOL SWABS) PadM 1 each, Topical (Top), 3 times daily    amLODIPine (NORVASC) 5 mg, Oral, Daily    aspirin (ECOTRIN) 81 mg, Oral, Daily    blood sugar diagnostic (BLOOD GLUCOSE TEST) Strp 1 strip, Misc.(Non-Drug; Combo Route), 2 times daily    blood-glucose meter kit Use as instructed    glimepiride (AMARYL) 1 mg, Oral, Before breakfast    hydroCHLOROthiazide (HYDRODIURIL) 25 mg, Oral, Daily    lancets Misc 1 lancet , Misc.(Non-Drug; Combo Route), 2 times daily    levocetirizine (XYZAL) 5 mg, Nightly    losartan (COZAAR) 100 mg, Oral, Daily    lovastatin (MEVACOR) 20 mg, Oral, Nightly    metFORMIN (GLUCOPHAGE) 1,000 mg, Oral, 2 times daily with meals    metoprolol succinate (TOPROL-XL) 25 mg, Oral, Daily    nitroGLYCERIN (NITROSTAT) 0.4 mg, Sublingual, Every 5 min PRN    pantoprazole (PROTONIX) 40 mg, Oral, Before breakfast    SYNTHROID 75 mcg tablet Take one tablet (75 mcg) once daily Monday through Saturday and take two tablets (150 mcg) on Sundays for a total of 8 tablets weekly.       Review of Systems: Comprehensive ROS was performed and is negative unless otherwise noted in HPI.   Objective   Objective/Exam:   BP (!) 150/70 (BP Location: Right arm, Patient Position: Sitting)   Pulse 89   Ht 5' 2" (1.575 m)   Wt 56.3 kg (124 lb " "1.9 oz)   SpO2 97%   BMI 22.70 kg/m²    Wt Readings from Last 4 Encounters:   08/01/25 56.3 kg (124 lb 1.9 oz)   07/17/25 54.4 kg (120 lb)   04/15/25 54.4 kg (120 lb)   10/09/24 57.6 kg (127 lb 1.5 oz)      Constitutional: NAD, Atraumatic, Conversant   HEENT: MMM, Sclera anicteric, No JVD   Cardiovascular: RRR, no murmurs noted, no chest tenderness to palpation, 2+ radial pulses b/l  Pulmonary: normal respiratory effort, CTAB, no crackles, wheezes  Abdominal: S/NT/ND  Musculoskeletal: No lower extremity edema noted b/l  Neurological: No gross neurological deficits  Skin: W/D/I  Psych: Appropriate affect, normal mood  Labs/Imaging/Procedures   Personally reviewed  Lab Results   Component Value Date     (L) 04/21/2025     09/30/2024    K 4.7 04/21/2025    K 5.0 09/30/2024    CL 98 04/21/2025     09/30/2024    CO2 25 04/21/2025    CO2 27 09/30/2024    BUN 18 (H) 04/21/2025    BUN 19 03/19/2018    CREATININE 0.8 04/21/2025    CREATININE 0.8 03/19/2018    ANIONGAP 11 04/21/2025     Lab Results   Component Value Date    HGBA1C 6.7 (H) 04/21/2025    HGBA1C 6.8 (H) 09/30/2024     No results found for: "BNP", "BNPTRIAGEBLO"   Lab Results   Component Value Date    WBC 8.51 09/30/2024    HGB 13.3 09/30/2024    HCT 39.6 09/30/2024     09/30/2024    GRAN 4.3 09/30/2024    GRAN 50.0 09/30/2024     Lab Results   Component Value Date    CHOL 157 04/21/2025    CHOL 189 09/30/2024    HDL 41 04/21/2025    LDLCALC 87.0 04/21/2025    LDLCALC 88.6 04/03/2025    LDLCALC 109.6 09/30/2024    LDLCALC 114.6 04/02/2024    LDLCALC 84.8 09/27/2023    TRIG 145 04/21/2025    TRIG 177 (H) 09/30/2024     Lab Results   Component Value Date    TSH 0.564 04/21/2025    TSH 1.750 09/30/2024     No results found for: "APOLIPOPROTE"  No results found for: "LIPOA"     TTE:  Results for orders placed during the hospital encounter of 07/15/21    Echo    Interpretation Summary  · The left ventricle is normal in size with concentric " remodeling and normal systolic function.  · The estimated ejection fraction is 55%.  · There is abnormal septal wall motion consistent with left bundle branch block.  · Normal left ventricular diastolic function.  · Normal right ventricular size with normal right ventricular systolic function.  · Normal central venous pressure (3 mmHg).    Stress Testing:   Results for orders placed during the hospital encounter of 05/09/19    Nuc Tread Stress test - Radiologist interpreted    Interpretation Summary  · The EKG portion of this study is negative for myocardial ischemia.  · The patient reported chest discomfort during the stress test.  · There were no arrhythmias during stress.  · Perfusion imaging interpreted by Radiologist     Coronary Angiogram:  Results for orders placed during the hospital encounter of 06/17/19    Cardiac catheterization    Conclusion  · Normal coronary arteries.  · The ejection fraction is calculated to be 60%.  · Diastolic dysfunction.    I certify that I was present for catheter insertion, catheter manipulation, angiography, and angiographic interpretation of this patient.    Procedure Log documented by Documenter: RT Samir and verified by Gab Calderon.    Date: 6/17/2019  Time: 10:49 AM    -Reviewing Medical records & lab results  -Independently reviewing and interpreting (if not documented by myself) EKGs, echocardiograms, catherizations   -Obtaining a history, performing a relevant exam, counseling/educating the patient/family  -Documenting clinical information in the EMR including ordering of tests  -Care coordination and communicating with other health care providers       Problem List:     1. Precordial chest pain    2. Left bundle branch block    3. Aortic atherosclerosis    4. Type 2 diabetes mellitus with hyperglycemia, without long-term current use of insulin    5. Essential hypertension    6. Hyperlipidemia, unspecified hyperlipidemia type    7. Abnormal stress test     8. Palpitations    9. Diabetic polyneuropathy associated with diabetes mellitus due to underlying condition        Assessment/Plan:       Assessment & Plan    PRECORDIAL CHEST PAIN:  - Stress test results normal  - Considered microvascular heart disease due to normal angiogram from 6 years ago, noting angiograms typically remain valid for about 5 years due to gradual atherosclerosis accumulation.  - Explained that nitroglycerin dilates arteries, improving blood flow.  - Ms. Burns to report any improvement in symptoms with nitroglycerin use via patient portal.  - Prescribed nitroglycerin sublingual tablets for acute episodes to assess for potential coronary artery stenosis  - Initiated trial of metoprolol 12.5 mg (half of a 25 mg tablet) daily to lower heart rate.  - Follow up in one month.  - Contact the office if having chest pain again.    LEFT BUNDLE BRANCH BLOCK:  -EF normal     AORTIC ATHEROSCLEROSIS:  - Noting angiograms typically remain valid for about 5 years due to gradual atherosclerosis accumulation.       HTN at oal. Continue current regimen     HLD at goal. Continue statin    DM per PCP    AA continue statin    Preventative Care:  Lipids:  PVD(V/A)      Patient expressed verbal understanding and agreed with the plan     Return sooner for concerns or questions. If symptoms persist go to the ED.  I have reviewed all pertinent data including patient's medical history in detail and updated the computerized patient record.     Total time spent counseling greater than fifty percent of total visit time.  Counseling included discussion regarding imaging findings, diagnosis, possibilities, treatment options, risks and benefits.      Thank you for the opportunity to care for this patient. Please don't hesitate to reach out with any questions/concerns         Bhanu Rosen MD  Cardiovascular Disease; Interventional Cardiology  [facility] [place]

## 2025-08-01 ENCOUNTER — OFFICE VISIT (OUTPATIENT)
Dept: CARDIOLOGY | Facility: CLINIC | Age: 70
End: 2025-08-01
Payer: MEDICARE

## 2025-08-01 VITALS
HEIGHT: 62 IN | HEART RATE: 89 BPM | OXYGEN SATURATION: 97 % | BODY MASS INDEX: 22.84 KG/M2 | WEIGHT: 124.13 LBS | SYSTOLIC BLOOD PRESSURE: 150 MMHG | DIASTOLIC BLOOD PRESSURE: 70 MMHG

## 2025-08-01 DIAGNOSIS — R00.2 PALPITATIONS: ICD-10-CM

## 2025-08-01 DIAGNOSIS — I10 ESSENTIAL HYPERTENSION: ICD-10-CM

## 2025-08-01 DIAGNOSIS — E08.42 DIABETIC POLYNEUROPATHY ASSOCIATED WITH DIABETES MELLITUS DUE TO UNDERLYING CONDITION: ICD-10-CM

## 2025-08-01 DIAGNOSIS — I44.7 LEFT BUNDLE BRANCH BLOCK: ICD-10-CM

## 2025-08-01 DIAGNOSIS — E78.5 HYPERLIPIDEMIA, UNSPECIFIED HYPERLIPIDEMIA TYPE: ICD-10-CM

## 2025-08-01 DIAGNOSIS — I70.0 AORTIC ATHEROSCLEROSIS: ICD-10-CM

## 2025-08-01 DIAGNOSIS — R94.39 ABNORMAL STRESS TEST: ICD-10-CM

## 2025-08-01 DIAGNOSIS — E11.65 TYPE 2 DIABETES MELLITUS WITH HYPERGLYCEMIA, WITHOUT LONG-TERM CURRENT USE OF INSULIN: ICD-10-CM

## 2025-08-01 DIAGNOSIS — R07.2 PRECORDIAL CHEST PAIN: Primary | ICD-10-CM

## 2025-08-01 PROCEDURE — 99999 PR PBB SHADOW E&M-EST. PATIENT-LVL III: CPT | Mod: PBBFAC,,, | Performed by: STUDENT IN AN ORGANIZED HEALTH CARE EDUCATION/TRAINING PROGRAM

## 2025-08-01 RX ORDER — NITROGLYCERIN 0.4 MG/1
0.4 TABLET SUBLINGUAL EVERY 5 MIN PRN
Qty: 20 TABLET | Refills: 12 | Status: SHIPPED | OUTPATIENT
Start: 2025-08-01 | End: 2025-08-31

## 2025-08-01 RX ORDER — METOPROLOL SUCCINATE 25 MG/1
25 TABLET, EXTENDED RELEASE ORAL DAILY
Qty: 30 TABLET | Refills: 0 | Status: SHIPPED | OUTPATIENT
Start: 2025-08-01 | End: 2026-08-01

## 2025-08-06 ENCOUNTER — LAB VISIT (OUTPATIENT)
Dept: LAB | Facility: HOSPITAL | Age: 70
End: 2025-08-06
Attending: INTERNAL MEDICINE
Payer: MEDICARE

## 2025-08-06 ENCOUNTER — OFFICE VISIT (OUTPATIENT)
Dept: ENDOCRINOLOGY | Facility: CLINIC | Age: 70
End: 2025-08-06
Payer: MEDICARE

## 2025-08-06 VITALS
WEIGHT: 120.38 LBS | HEIGHT: 62 IN | DIASTOLIC BLOOD PRESSURE: 72 MMHG | BODY MASS INDEX: 22.15 KG/M2 | SYSTOLIC BLOOD PRESSURE: 146 MMHG

## 2025-08-06 DIAGNOSIS — E89.0 POSTSURGICAL HYPOTHYROIDISM: ICD-10-CM

## 2025-08-06 DIAGNOSIS — C73 PAPILLARY THYROID CARCINOMA: ICD-10-CM

## 2025-08-06 DIAGNOSIS — C73 PAPILLARY THYROID CARCINOMA: Primary | ICD-10-CM

## 2025-08-06 DIAGNOSIS — Z85.850 HISTORY OF THYROID CANCER: ICD-10-CM

## 2025-08-06 DIAGNOSIS — E11.65 TYPE 2 DIABETES MELLITUS WITH HYPERGLYCEMIA, WITHOUT LONG-TERM CURRENT USE OF INSULIN: ICD-10-CM

## 2025-08-06 LAB
T4 FREE SERPL-MCNC: 1.46 NG/DL (ref 0.71–1.51)
TSH SERPL-ACNC: 0.36 UIU/ML (ref 0.4–4)

## 2025-08-06 PROCEDURE — 1159F MED LIST DOCD IN RCRD: CPT | Mod: CPTII,S$GLB,, | Performed by: INTERNAL MEDICINE

## 2025-08-06 PROCEDURE — 36415 COLL VENOUS BLD VENIPUNCTURE: CPT

## 2025-08-06 PROCEDURE — 3060F POS MICROALBUMINURIA REV: CPT | Mod: CPTII,S$GLB,, | Performed by: INTERNAL MEDICINE

## 2025-08-06 PROCEDURE — 84439 ASSAY OF FREE THYROXINE: CPT

## 2025-08-06 PROCEDURE — 3044F HG A1C LEVEL LT 7.0%: CPT | Mod: CPTII,S$GLB,, | Performed by: INTERNAL MEDICINE

## 2025-08-06 PROCEDURE — 1160F RVW MEDS BY RX/DR IN RCRD: CPT | Mod: CPTII,S$GLB,, | Performed by: INTERNAL MEDICINE

## 2025-08-06 PROCEDURE — 1101F PT FALLS ASSESS-DOCD LE1/YR: CPT | Mod: CPTII,S$GLB,, | Performed by: INTERNAL MEDICINE

## 2025-08-06 PROCEDURE — 3078F DIAST BP <80 MM HG: CPT | Mod: CPTII,S$GLB,, | Performed by: INTERNAL MEDICINE

## 2025-08-06 PROCEDURE — 3288F FALL RISK ASSESSMENT DOCD: CPT | Mod: CPTII,S$GLB,, | Performed by: INTERNAL MEDICINE

## 2025-08-06 PROCEDURE — 2023F DILAT RTA XM W/O RTNOPTHY: CPT | Mod: CPTII,S$GLB,, | Performed by: INTERNAL MEDICINE

## 2025-08-06 PROCEDURE — G2211 COMPLEX E/M VISIT ADD ON: HCPCS | Mod: S$GLB,,, | Performed by: INTERNAL MEDICINE

## 2025-08-06 PROCEDURE — 99214 OFFICE O/P EST MOD 30 MIN: CPT | Mod: S$GLB,,, | Performed by: INTERNAL MEDICINE

## 2025-08-06 PROCEDURE — 84443 ASSAY THYROID STIM HORMONE: CPT

## 2025-08-06 PROCEDURE — 3066F NEPHROPATHY DOC TX: CPT | Mod: CPTII,S$GLB,, | Performed by: INTERNAL MEDICINE

## 2025-08-06 PROCEDURE — 3077F SYST BP >= 140 MM HG: CPT | Mod: CPTII,S$GLB,, | Performed by: INTERNAL MEDICINE

## 2025-08-06 PROCEDURE — 3008F BODY MASS INDEX DOCD: CPT | Mod: CPTII,S$GLB,, | Performed by: INTERNAL MEDICINE

## 2025-08-06 PROCEDURE — 4010F ACE/ARB THERAPY RXD/TAKEN: CPT | Mod: CPTII,S$GLB,, | Performed by: INTERNAL MEDICINE

## 2025-08-06 PROCEDURE — 86800 THYROGLOBULIN ANTIBODY: CPT

## 2025-08-06 PROCEDURE — 1126F AMNT PAIN NOTED NONE PRSNT: CPT | Mod: CPTII,S$GLB,, | Performed by: INTERNAL MEDICINE

## 2025-08-06 PROCEDURE — 99999 PR PBB SHADOW E&M-EST. PATIENT-LVL IV: CPT | Mod: PBBFAC,,, | Performed by: INTERNAL MEDICINE

## 2025-08-06 RX ORDER — GLIMEPIRIDE 1 MG/1
1 TABLET ORAL
Qty: 90 TABLET | Refills: 3 | Status: SHIPPED | OUTPATIENT
Start: 2025-08-06 | End: 2026-08-06

## 2025-08-06 NOTE — ASSESSMENT & PLAN NOTE
Discussed adding SGLT2 given persistent MAC  She is concerned about cost. Pt assistance may be option  Will do the following:  Patient Instructions    Will check on cost of Jardiance for diabetes and kidney protection  If you start it, please reduce glimepiride to half a tab with lunch  Continue metformin twice a day    If expensive, let me know and we can see if pharmacy assistance can help
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
## 2025-08-06 NOTE — PATIENT INSTRUCTIONS
Will check on cost of Jardiance for diabetes and kidney protection  If you start it, please reduce glimepiride to half a tab with lunch  Continue metformin twice a day    If expensive, let me know and we can see if pharmacy assistance can help

## 2025-08-08 LAB
ENDOCRINOLOGIST REVIEW: NORMAL
THYROGLOB AB SERPL IA-ACNC: <1.8 IU/ML
THYROGLOB SERPL-MCNC: <0.1 NG/ML

## 2025-08-12 ENCOUNTER — PATIENT MESSAGE (OUTPATIENT)
Dept: ENDOCRINOLOGY | Facility: CLINIC | Age: 70
End: 2025-08-12
Payer: MEDICARE

## 2025-08-12 DIAGNOSIS — E89.0 POSTSURGICAL HYPOTHYROIDISM: ICD-10-CM

## 2025-08-12 RX ORDER — LEVOTHYROXINE SODIUM 75 UG/1
75 TABLET ORAL
Qty: 30 TABLET | Refills: 11 | Status: SHIPPED | OUTPATIENT
Start: 2025-08-12

## 2025-09-05 ENCOUNTER — HOSPITAL ENCOUNTER (OUTPATIENT)
Dept: ENDOCRINOLOGY | Facility: CLINIC | Age: 70
Discharge: HOME | End: 2025-09-05
Attending: INTERNAL MEDICINE
Payer: MEDICARE

## 2025-09-05 DIAGNOSIS — E89.0 POSTSURGICAL HYPOTHYROIDISM: ICD-10-CM

## 2025-09-05 DIAGNOSIS — C73 PAPILLARY THYROID CARCINOMA: ICD-10-CM

## 2025-09-05 PROCEDURE — 76536 US EXAM OF HEAD AND NECK: CPT | Mod: S$GLB,,, | Performed by: INTERNAL MEDICINE

## (undated) DEVICE — TRAY DRY SKIN SCRUB PREP

## (undated) DEVICE — WARMER DRAPE STERILE LF

## (undated) DEVICE — SYR 10CC LUER LOCK

## (undated) DEVICE — CATH IMPULSE 5FR PIGTAIL 125CM

## (undated) DEVICE — SOL WATER STRL IRR 1000ML

## (undated) DEVICE — SUT PROLENE 5/0 RB-1 36 IN

## (undated) DEVICE — TUBE EMG NIM 8.0MM TRIVANTAGE

## (undated) DEVICE — SEE MEDLINE ITEM 157194

## (undated) DEVICE — Device

## (undated) DEVICE — ADHESIVE DERMABOND ADVANCED

## (undated) DEVICE — SUT 2-0 12-18IN SILK

## (undated) DEVICE — BLADE SURG STAINLESS STEEL #15

## (undated) DEVICE — SUT 4-0 12-18IN SILK BLACK

## (undated) DEVICE — ELECTRODE REM PLYHSV RETURN 9

## (undated) DEVICE — CORD FOR BIPOLAR FORCEPS 12

## (undated) DEVICE — CLIP LIGATING MEDIUM

## (undated) DEVICE — SEE MEDLINE ITEM 157150

## (undated) DEVICE — SEE MEDLINE ITEM 152532

## (undated) DEVICE — SUT VICRYL PLUS 3-0 SH 18IN

## (undated) DEVICE — GLIDESHEATH SLENDER SS 5FR10CM

## (undated) DEVICE — RETRACTOR WAVEGUIDE NAR/FLAT

## (undated) DEVICE — POSITIONER HEAD DONUT 9IN FOAM

## (undated) DEVICE — CLIP LIGATING HEMOCLP SMALL

## (undated) DEVICE — CATH JACKY RADIAL 5FR 100CM

## (undated) DEVICE — NDL HYPO REG 25G X 1 1/2

## (undated) DEVICE — SEE MEDLINE ITEM 157187

## (undated) DEVICE — GLOVE BIOGEL ECLIPSE SZ 7

## (undated) DEVICE — CATH IMPULSE 5F 100CM FR4

## (undated) DEVICE — SEE MEDLINE ITEM 157166

## (undated) DEVICE — TUBING HPCIL ROT M/F ADPT 48IN

## (undated) DEVICE — CONTRAST VISIPAQUE 150ML

## (undated) DEVICE — SEE MEDLINE ITEM 152622

## (undated) DEVICE — SUT 3-0 12-18IN SILK

## (undated) DEVICE — SEE MEDLINE ITEM 157117

## (undated) DEVICE — MARKER SKIN STND TIP BLUE BARR

## (undated) DEVICE — HEMOSTAT SURGICEL FIBRLR 1X2IN

## (undated) DEVICE — CONTAINER SPECIMEN STRL 4OZ

## (undated) DEVICE — POSITIONER IV ARMBOARD FOAM

## (undated) DEVICE — SPONGE KITTNER 1/4X 5/8 L STRL

## (undated) DEVICE — DRAPE STERI INSTRUMENT 1018

## (undated) DEVICE — PROBE PRASS SLIM

## (undated) DEVICE — DRESSING GZ SURGICOUNT 4X8

## (undated) DEVICE — ELECTRODE BLD EXT INSUL 1

## (undated) DEVICE — PAD DEFIB CADENCE ADULT R2

## (undated) DEVICE — SCRUB HIBICLENS 4% CHG 4OZ

## (undated) DEVICE — HEMOSTAT VASC BAND REG 24CM